# Patient Record
Sex: FEMALE | Race: WHITE | NOT HISPANIC OR LATINO | Employment: OTHER | URBAN - METROPOLITAN AREA
[De-identification: names, ages, dates, MRNs, and addresses within clinical notes are randomized per-mention and may not be internally consistent; named-entity substitution may affect disease eponyms.]

---

## 2023-09-04 ENCOUNTER — APPOINTMENT (OUTPATIENT)
Dept: CT IMAGING | Facility: HOSPITAL | Age: 68
DRG: 853 | End: 2023-09-04
Payer: MEDICARE

## 2023-09-04 ENCOUNTER — HOSPITAL ENCOUNTER (INPATIENT)
Facility: HOSPITAL | Age: 68
LOS: 5 days | Discharge: HOME | DRG: 853 | End: 2023-09-10
Attending: EMERGENCY MEDICINE | Admitting: INTERNAL MEDICINE
Payer: MEDICARE

## 2023-09-04 ENCOUNTER — APPOINTMENT (OUTPATIENT)
Dept: GENERAL RADIOLOGY | Facility: HOSPITAL | Age: 68
DRG: 853 | End: 2023-09-04
Payer: MEDICARE

## 2023-09-04 DIAGNOSIS — N39.0 URINARY TRACT INFECTION WITHOUT HEMATURIA, SITE UNSPECIFIED: ICD-10-CM

## 2023-09-04 DIAGNOSIS — N20.1 LEFT URETERAL STONE: ICD-10-CM

## 2023-09-04 DIAGNOSIS — R41.82 ALTERED MENTAL STATUS, UNSPECIFIED ALTERED MENTAL STATUS TYPE: Primary | ICD-10-CM

## 2023-09-04 PROBLEM — Z79.899 POLYPHARMACY: Status: ACTIVE | Noted: 2023-09-04

## 2023-09-04 PROBLEM — R82.5 POSITIVE URINE DRUG SCREEN: Status: ACTIVE | Noted: 2023-09-04

## 2023-09-04 PROBLEM — N17.9 AKI (ACUTE KIDNEY INJURY): Status: ACTIVE | Noted: 2023-09-04

## 2023-09-04 LAB
ALBUMIN SERPL-MCNC: 3.7 G/DL (ref 3.5–5.2)
ALBUMIN/GLOB SERPL: 1.9 G/DL
ALP SERPL-CCNC: 78 U/L (ref 39–117)
ALT SERPL W P-5'-P-CCNC: 14 U/L (ref 1–33)
AMMONIA BLD-SCNC: 16 UMOL/L (ref 11–51)
AMPHET+METHAMPHET UR QL: POSITIVE
ANION GAP SERPL CALCULATED.3IONS-SCNC: 13.9 MMOL/L (ref 5–15)
AST SERPL-CCNC: 18 U/L (ref 1–32)
BACTERIA UR QL AUTO: ABNORMAL /HPF
BARBITURATES UR QL SCN: NEGATIVE
BENZODIAZ UR QL SCN: POSITIVE
BILIRUB SERPL-MCNC: 0.9 MG/DL (ref 0–1.2)
BILIRUB UR QL STRIP: NEGATIVE
BUN SERPL-MCNC: 24 MG/DL (ref 8–23)
BUN/CREAT SERPL: 17.4 (ref 7–25)
CALCIUM SPEC-SCNC: 9 MG/DL (ref 8.6–10.5)
CANNABINOIDS SERPL QL: NEGATIVE
CHLORIDE SERPL-SCNC: 107 MMOL/L (ref 98–107)
CK SERPL-CCNC: 44 U/L (ref 20–180)
CLARITY UR: ABNORMAL
CO2 SERPL-SCNC: 20.1 MMOL/L (ref 22–29)
COCAINE UR QL: NEGATIVE
COLOR UR: ABNORMAL
CREAT SERPL-MCNC: 1.38 MG/DL (ref 0.57–1)
D-LACTATE SERPL-SCNC: 2 MMOL/L (ref 0.5–2)
D-LACTATE SERPL-SCNC: 4.5 MMOL/L (ref 0.5–2)
D-LACTATE SERPL-SCNC: 4.6 MMOL/L (ref 0.5–2)
DEPRECATED RDW RBC AUTO: 45.7 FL (ref 37–54)
EGFRCR SERPLBLD CKD-EPI 2021: 41.5 ML/MIN/1.73
EOSINOPHIL # BLD MANUAL: 0.23 10*3/MM3 (ref 0–0.4)
EOSINOPHIL NFR BLD MANUAL: 4.1 % (ref 0.3–6.2)
ERYTHROCYTE [DISTWIDTH] IN BLOOD BY AUTOMATED COUNT: 14 % (ref 12.3–15.4)
ETHANOL BLD-MCNC: <10 MG/DL (ref 0–10)
ETHANOL UR QL: <0.01 %
FENTANYL UR-MCNC: NEGATIVE NG/ML
GEN 5 2HR TROPONIN T REFLEX: 11 NG/L
GLOBULIN UR ELPH-MCNC: 2 GM/DL
GLUCOSE BLDC GLUCOMTR-MCNC: 109 MG/DL (ref 70–130)
GLUCOSE SERPL-MCNC: 129 MG/DL (ref 65–99)
GLUCOSE UR STRIP-MCNC: NEGATIVE MG/DL
HCT VFR BLD AUTO: 38.4 % (ref 34–46.6)
HGB BLD-MCNC: 12.9 G/DL (ref 12–15.9)
HGB UR QL STRIP.AUTO: ABNORMAL
HYALINE CASTS UR QL AUTO: ABNORMAL /LPF
KETONES UR QL STRIP: ABNORMAL
LEUKOCYTE ESTERASE UR QL STRIP.AUTO: ABNORMAL
LYMPHOCYTES # BLD MANUAL: 0.35 10*3/MM3 (ref 0.7–3.1)
MCH RBC QN AUTO: 30.2 PG (ref 26.6–33)
MCHC RBC AUTO-ENTMCNC: 33.6 G/DL (ref 31.5–35.7)
MCV RBC AUTO: 89.9 FL (ref 79–97)
METHADONE UR QL SCN: NEGATIVE
NEUTROPHILS # BLD AUTO: 5.11 10*3/MM3 (ref 1.7–7)
NEUTROPHILS NFR BLD MANUAL: 89.8 % (ref 42.7–76)
NITRITE UR QL STRIP: POSITIVE
OPIATES UR QL: NEGATIVE
OVALOCYTES BLD QL SMEAR: ABNORMAL
OXYCODONE UR QL SCN: NEGATIVE
PH UR STRIP.AUTO: 5.5 [PH] (ref 5–8)
PLAT MORPH BLD: NORMAL
PLATELET # BLD AUTO: 147 10*3/MM3 (ref 140–450)
PMV BLD AUTO: 10.6 FL (ref 6–12)
POIKILOCYTOSIS BLD QL SMEAR: ABNORMAL
POTASSIUM SERPL-SCNC: 3.9 MMOL/L (ref 3.5–5.2)
PROT SERPL-MCNC: 5.7 G/DL (ref 6–8.5)
PROT UR QL STRIP: ABNORMAL
QTC INTERVAL: NORMAL MS
RBC # BLD AUTO: 4.27 10*6/MM3 (ref 3.77–5.28)
RBC # UR STRIP: ABNORMAL /HPF
REF LAB TEST METHOD: ABNORMAL
SODIUM SERPL-SCNC: 141 MMOL/L (ref 136–145)
SP GR UR STRIP: 1.02 (ref 1–1.03)
SQUAMOUS #/AREA URNS HPF: ABNORMAL /HPF
TROPONIN T DELTA: -2 NG/L
TROPONIN T SERPL HS-MCNC: 13 NG/L
TSH SERPL DL<=0.05 MIU/L-ACNC: 0.4 UIU/ML (ref 0.27–4.2)
UROBILINOGEN UR QL STRIP: ABNORMAL
VARIANT LYMPHS NFR BLD MANUAL: 6.1 % (ref 19.6–45.3)
WBC # UR STRIP: ABNORMAL /HPF
WBC MORPH BLD: NORMAL
WBC NRBC COR # BLD: 5.69 10*3/MM3 (ref 3.4–10.8)

## 2023-09-04 PROCEDURE — 80307 DRUG TEST PRSMV CHEM ANLYZR: CPT | Performed by: EMERGENCY MEDICINE

## 2023-09-04 PROCEDURE — 99285 EMERGENCY DEPT VISIT HI MDM: CPT

## 2023-09-04 PROCEDURE — 87150 DNA/RNA AMPLIFIED PROBE: CPT | Performed by: EMERGENCY MEDICINE

## 2023-09-04 PROCEDURE — 82140 ASSAY OF AMMONIA: CPT | Performed by: EMERGENCY MEDICINE

## 2023-09-04 PROCEDURE — 71045 X-RAY EXAM CHEST 1 VIEW: CPT

## 2023-09-04 PROCEDURE — 84484 ASSAY OF TROPONIN QUANT: CPT | Performed by: EMERGENCY MEDICINE

## 2023-09-04 PROCEDURE — 82948 REAGENT STRIP/BLOOD GLUCOSE: CPT

## 2023-09-04 PROCEDURE — 82550 ASSAY OF CK (CPK): CPT | Performed by: EMERGENCY MEDICINE

## 2023-09-04 PROCEDURE — G0378 HOSPITAL OBSERVATION PER HR: HCPCS

## 2023-09-04 PROCEDURE — 87077 CULTURE AEROBIC IDENTIFY: CPT | Performed by: EMERGENCY MEDICINE

## 2023-09-04 PROCEDURE — 83605 ASSAY OF LACTIC ACID: CPT | Performed by: EMERGENCY MEDICINE

## 2023-09-04 PROCEDURE — 87040 BLOOD CULTURE FOR BACTERIA: CPT | Performed by: EMERGENCY MEDICINE

## 2023-09-04 PROCEDURE — 85007 BL SMEAR W/DIFF WBC COUNT: CPT | Performed by: EMERGENCY MEDICINE

## 2023-09-04 PROCEDURE — 85025 COMPLETE CBC W/AUTO DIFF WBC: CPT | Performed by: EMERGENCY MEDICINE

## 2023-09-04 PROCEDURE — 70450 CT HEAD/BRAIN W/O DYE: CPT

## 2023-09-04 PROCEDURE — 25010000002 CEFTRIAXONE PER 250 MG: Performed by: EMERGENCY MEDICINE

## 2023-09-04 PROCEDURE — 87086 URINE CULTURE/COLONY COUNT: CPT | Performed by: EMERGENCY MEDICINE

## 2023-09-04 PROCEDURE — P9612 CATHETERIZE FOR URINE SPEC: HCPCS

## 2023-09-04 PROCEDURE — 36415 COLL VENOUS BLD VENIPUNCTURE: CPT

## 2023-09-04 PROCEDURE — 84443 ASSAY THYROID STIM HORMONE: CPT | Performed by: EMERGENCY MEDICINE

## 2023-09-04 PROCEDURE — 80053 COMPREHEN METABOLIC PANEL: CPT | Performed by: EMERGENCY MEDICINE

## 2023-09-04 PROCEDURE — 93010 ELECTROCARDIOGRAM REPORT: CPT | Performed by: INTERNAL MEDICINE

## 2023-09-04 PROCEDURE — 81001 URINALYSIS AUTO W/SCOPE: CPT | Performed by: EMERGENCY MEDICINE

## 2023-09-04 PROCEDURE — 36415 COLL VENOUS BLD VENIPUNCTURE: CPT | Performed by: EMERGENCY MEDICINE

## 2023-09-04 PROCEDURE — 82077 ASSAY SPEC XCP UR&BREATH IA: CPT | Performed by: EMERGENCY MEDICINE

## 2023-09-04 PROCEDURE — 93005 ELECTROCARDIOGRAM TRACING: CPT | Performed by: EMERGENCY MEDICINE

## 2023-09-04 PROCEDURE — 87186 SC STD MICRODIL/AGAR DIL: CPT | Performed by: EMERGENCY MEDICINE

## 2023-09-04 RX ORDER — SODIUM CHLORIDE 0.9 % (FLUSH) 0.9 %
10 SYRINGE (ML) INJECTION AS NEEDED
Status: DISCONTINUED | OUTPATIENT
Start: 2023-09-04 | End: 2023-09-10 | Stop reason: HOSPADM

## 2023-09-04 RX ORDER — ONDANSETRON 2 MG/ML
4 INJECTION INTRAMUSCULAR; INTRAVENOUS EVERY 6 HOURS PRN
Status: DISCONTINUED | OUTPATIENT
Start: 2023-09-04 | End: 2023-09-10 | Stop reason: HOSPADM

## 2023-09-04 RX ORDER — ALPRAZOLAM 2 MG/1
2 TABLET ORAL NIGHTLY PRN
COMMUNITY

## 2023-09-04 RX ORDER — SEMAGLUTIDE 1.34 MG/ML
1 INJECTION, SOLUTION SUBCUTANEOUS WEEKLY
COMMUNITY

## 2023-09-04 RX ORDER — BISACODYL 10 MG
10 SUPPOSITORY, RECTAL RECTAL DAILY PRN
Status: DISCONTINUED | OUTPATIENT
Start: 2023-09-04 | End: 2023-09-10 | Stop reason: HOSPADM

## 2023-09-04 RX ORDER — POLYETHYLENE GLYCOL 3350 17 G/17G
17 POWDER, FOR SOLUTION ORAL DAILY PRN
Status: DISCONTINUED | OUTPATIENT
Start: 2023-09-04 | End: 2023-09-10 | Stop reason: HOSPADM

## 2023-09-04 RX ORDER — AMOXICILLIN 250 MG
2 CAPSULE ORAL 2 TIMES DAILY
Status: DISCONTINUED | OUTPATIENT
Start: 2023-09-04 | End: 2023-09-10 | Stop reason: HOSPADM

## 2023-09-04 RX ORDER — ACETAMINOPHEN 325 MG/1
650 TABLET ORAL EVERY 4 HOURS PRN
Status: DISCONTINUED | OUTPATIENT
Start: 2023-09-04 | End: 2023-09-10 | Stop reason: HOSPADM

## 2023-09-04 RX ORDER — CLINDAMYCIN PALMITATE HYDROCHLORIDE 75 MG/5ML
SOLUTION ORAL 3 TIMES DAILY
COMMUNITY
End: 2023-09-10 | Stop reason: HOSPADM

## 2023-09-04 RX ORDER — NITROGLYCERIN 0.4 MG/1
0.4 TABLET SUBLINGUAL
Status: DISCONTINUED | OUTPATIENT
Start: 2023-09-04 | End: 2023-09-10 | Stop reason: HOSPADM

## 2023-09-04 RX ORDER — ACETAMINOPHEN 650 MG/1
650 SUPPOSITORY RECTAL EVERY 4 HOURS PRN
Status: DISCONTINUED | OUTPATIENT
Start: 2023-09-04 | End: 2023-09-10 | Stop reason: HOSPADM

## 2023-09-04 RX ORDER — BISACODYL 5 MG/1
5 TABLET, DELAYED RELEASE ORAL DAILY PRN
Status: DISCONTINUED | OUTPATIENT
Start: 2023-09-04 | End: 2023-09-10 | Stop reason: HOSPADM

## 2023-09-04 RX ORDER — TRAZODONE HYDROCHLORIDE 50 MG/1
50 TABLET ORAL NIGHTLY
COMMUNITY

## 2023-09-04 RX ORDER — SODIUM CHLORIDE 9 MG/ML
40 INJECTION, SOLUTION INTRAVENOUS AS NEEDED
Status: DISCONTINUED | OUTPATIENT
Start: 2023-09-04 | End: 2023-09-10 | Stop reason: HOSPADM

## 2023-09-04 RX ORDER — SODIUM CHLORIDE 9 MG/ML
75 INJECTION, SOLUTION INTRAVENOUS CONTINUOUS
Status: DISCONTINUED | OUTPATIENT
Start: 2023-09-04 | End: 2023-09-08

## 2023-09-04 RX ORDER — ACETAMINOPHEN 160 MG/5ML
650 SOLUTION ORAL EVERY 4 HOURS PRN
Status: DISCONTINUED | OUTPATIENT
Start: 2023-09-04 | End: 2023-09-10 | Stop reason: HOSPADM

## 2023-09-04 RX ORDER — SUVOREXANT 20 MG/1
TABLET, FILM COATED ORAL
Status: ON HOLD | COMMUNITY
End: 2023-09-08

## 2023-09-04 RX ORDER — SODIUM CHLORIDE 0.9 % (FLUSH) 0.9 %
10 SYRINGE (ML) INJECTION EVERY 12 HOURS SCHEDULED
Status: DISCONTINUED | OUTPATIENT
Start: 2023-09-04 | End: 2023-09-10 | Stop reason: HOSPADM

## 2023-09-04 RX ORDER — ONDANSETRON 4 MG/1
4 TABLET, FILM COATED ORAL EVERY 8 HOURS PRN
COMMUNITY

## 2023-09-04 RX ADMIN — Medication 10 ML: at 20:09

## 2023-09-04 RX ADMIN — CEFTRIAXONE SODIUM 1000 MG: 1 INJECTION, POWDER, FOR SOLUTION INTRAMUSCULAR; INTRAVENOUS at 17:44

## 2023-09-04 RX ADMIN — ACETAMINOPHEN 650 MG: 325 TABLET, FILM COATED ORAL at 21:28

## 2023-09-04 RX ADMIN — SODIUM CHLORIDE 100 ML/HR: 9 INJECTION, SOLUTION INTRAVENOUS at 20:09

## 2023-09-04 RX ADMIN — SODIUM CHLORIDE 500 ML: 9 INJECTION, SOLUTION INTRAVENOUS at 14:54

## 2023-09-05 ENCOUNTER — ANESTHESIA (OUTPATIENT)
Dept: PERIOP | Facility: HOSPITAL | Age: 68
DRG: 853 | End: 2023-09-05
Payer: MEDICARE

## 2023-09-05 ENCOUNTER — APPOINTMENT (OUTPATIENT)
Dept: CT IMAGING | Facility: HOSPITAL | Age: 68
DRG: 853 | End: 2023-09-05
Payer: MEDICARE

## 2023-09-05 ENCOUNTER — ANESTHESIA EVENT (OUTPATIENT)
Dept: PERIOP | Facility: HOSPITAL | Age: 68
DRG: 853 | End: 2023-09-05
Payer: MEDICARE

## 2023-09-05 ENCOUNTER — APPOINTMENT (OUTPATIENT)
Dept: GENERAL RADIOLOGY | Facility: HOSPITAL | Age: 68
DRG: 853 | End: 2023-09-05
Payer: MEDICARE

## 2023-09-05 PROBLEM — B96.20 BACTEREMIA DUE TO ESCHERICHIA COLI: Status: ACTIVE | Noted: 2023-09-05

## 2023-09-05 PROBLEM — R78.81 BACTEREMIA DUE TO ESCHERICHIA COLI: Status: ACTIVE | Noted: 2023-09-05

## 2023-09-05 LAB
ADV 40+41 DNA STL QL NAA+NON-PROBE: NOT DETECTED
ALBUMIN SERPL-MCNC: 2.8 G/DL (ref 3.5–5.2)
ALBUMIN/GLOB SERPL: 1.3 G/DL
ALP SERPL-CCNC: 49 U/L (ref 39–117)
ALT SERPL W P-5'-P-CCNC: 14 U/L (ref 1–33)
ANION GAP SERPL CALCULATED.3IONS-SCNC: 11.4 MMOL/L (ref 5–15)
AST SERPL-CCNC: 18 U/L (ref 1–32)
ASTRO TYP 1-8 RNA STL QL NAA+NON-PROBE: NOT DETECTED
BACTERIA BLD CULT: ABNORMAL
BILIRUB SERPL-MCNC: 0.4 MG/DL (ref 0–1.2)
BOTTLE TYPE: ABNORMAL
BUN SERPL-MCNC: 24 MG/DL (ref 8–23)
BUN/CREAT SERPL: 20.2 (ref 7–25)
BURR CELLS BLD QL SMEAR: ABNORMAL
C CAYETANENSIS DNA STL QL NAA+NON-PROBE: NOT DETECTED
C COLI+JEJ+UPSA DNA STL QL NAA+NON-PROBE: NOT DETECTED
C DIFF TOX GENS STL QL NAA+PROBE: NEGATIVE
CALCIUM SPEC-SCNC: 7.7 MG/DL (ref 8.6–10.5)
CHLORIDE SERPL-SCNC: 109 MMOL/L (ref 98–107)
CO2 SERPL-SCNC: 19.6 MMOL/L (ref 22–29)
CREAT SERPL-MCNC: 1.19 MG/DL (ref 0.57–1)
CRYPTOSP DNA STL QL NAA+NON-PROBE: NOT DETECTED
D-LACTATE SERPL-SCNC: 2.4 MMOL/L (ref 0.5–2)
DEPRECATED RDW RBC AUTO: 45.6 FL (ref 37–54)
E HISTOLYT DNA STL QL NAA+NON-PROBE: NOT DETECTED
EAEC PAA PLAS AGGR+AATA ST NAA+NON-PRB: NOT DETECTED
EC STX1+STX2 GENES STL QL NAA+NON-PROBE: NOT DETECTED
EGFRCR SERPLBLD CKD-EPI 2021: 49.6 ML/MIN/1.73
ELLIPTOCYTES BLD QL SMEAR: ABNORMAL
EPEC EAE GENE STL QL NAA+NON-PROBE: NOT DETECTED
ERYTHROCYTE [DISTWIDTH] IN BLOOD BY AUTOMATED COUNT: 14.3 % (ref 12.3–15.4)
ETEC LTA+ST1A+ST1B TOX ST NAA+NON-PROBE: NOT DETECTED
G LAMBLIA DNA STL QL NAA+NON-PROBE: NOT DETECTED
GLOBULIN UR ELPH-MCNC: 2.2 GM/DL
GLUCOSE BLDC GLUCOMTR-MCNC: 115 MG/DL (ref 70–130)
GLUCOSE BLDC GLUCOMTR-MCNC: 130 MG/DL (ref 70–130)
GLUCOSE BLDC GLUCOMTR-MCNC: 86 MG/DL (ref 70–130)
GLUCOSE BLDC GLUCOMTR-MCNC: 89 MG/DL (ref 70–130)
GLUCOSE BLDC GLUCOMTR-MCNC: 94 MG/DL (ref 70–130)
GLUCOSE SERPL-MCNC: 101 MG/DL (ref 65–99)
HBA1C MFR BLD: 5.5 % (ref 4.8–5.6)
HCT VFR BLD AUTO: 32.6 % (ref 34–46.6)
HGB BLD-MCNC: 11.1 G/DL (ref 12–15.9)
LYMPHOCYTES # BLD MANUAL: 0.24 10*3/MM3 (ref 0.7–3.1)
LYMPHOCYTES NFR BLD MANUAL: 1 % (ref 5–12)
MCH RBC QN AUTO: 30 PG (ref 26.6–33)
MCHC RBC AUTO-ENTMCNC: 34 G/DL (ref 31.5–35.7)
MCV RBC AUTO: 88.1 FL (ref 79–97)
MONOCYTES # BLD: 0.12 10*3/MM3 (ref 0.1–0.9)
NEUTROPHILS # BLD AUTO: 11.55 10*3/MM3 (ref 1.7–7)
NEUTROPHILS NFR BLD MANUAL: 97 % (ref 42.7–76)
NOROVIRUS GI+II RNA STL QL NAA+NON-PROBE: NOT DETECTED
OVALOCYTES BLD QL SMEAR: ABNORMAL
P SHIGELLOIDES DNA STL QL NAA+NON-PROBE: NOT DETECTED
PLAT MORPH BLD: NORMAL
PLATELET # BLD AUTO: 131 10*3/MM3 (ref 140–450)
PMV BLD AUTO: 10.4 FL (ref 6–12)
POIKILOCYTOSIS BLD QL SMEAR: ABNORMAL
POTASSIUM SERPL-SCNC: 3.5 MMOL/L (ref 3.5–5.2)
PROT SERPL-MCNC: 5 G/DL (ref 6–8.5)
RBC # BLD AUTO: 3.7 10*6/MM3 (ref 3.77–5.28)
RVA RNA STL QL NAA+NON-PROBE: NOT DETECTED
S ENT+BONG DNA STL QL NAA+NON-PROBE: NOT DETECTED
SAPO I+II+IV+V RNA STL QL NAA+NON-PROBE: NOT DETECTED
SHIGELLA SP+EIEC IPAH ST NAA+NON-PROBE: NOT DETECTED
SODIUM SERPL-SCNC: 140 MMOL/L (ref 136–145)
V CHOL+PARA+VUL DNA STL QL NAA+NON-PROBE: NOT DETECTED
V CHOLERAE DNA STL QL NAA+NON-PROBE: NOT DETECTED
VARIANT LYMPHS NFR BLD MANUAL: 2 % (ref 19.6–45.3)
WBC MORPH BLD: NORMAL
WBC NRBC COR # BLD: 11.91 10*3/MM3 (ref 3.4–10.8)
Y ENTEROCOL DNA STL QL NAA+NON-PROBE: NOT DETECTED

## 2023-09-05 PROCEDURE — 25010000002 MIDAZOLAM PER 1 MG: Performed by: ANESTHESIOLOGY

## 2023-09-05 PROCEDURE — 85025 COMPLETE CBC W/AUTO DIFF WBC: CPT | Performed by: INTERNAL MEDICINE

## 2023-09-05 PROCEDURE — 25010000002 SUGAMMADEX 200 MG/2ML SOLUTION: Performed by: ANESTHESIOLOGY

## 2023-09-05 PROCEDURE — 25010000002 PROPOFOL 10 MG/ML EMULSION: Performed by: ANESTHESIOLOGY

## 2023-09-05 PROCEDURE — 87507 IADNA-DNA/RNA PROBE TQ 12-25: CPT | Performed by: INTERNAL MEDICINE

## 2023-09-05 PROCEDURE — 0T778DZ DILATION OF LEFT URETER WITH INTRALUMINAL DEVICE, VIA NATURAL OR ARTIFICIAL OPENING ENDOSCOPIC: ICD-10-PCS | Performed by: UROLOGY

## 2023-09-05 PROCEDURE — 83605 ASSAY OF LACTIC ACID: CPT | Performed by: INTERNAL MEDICINE

## 2023-09-05 PROCEDURE — C2617 STENT, NON-COR, TEM W/O DEL: HCPCS | Performed by: UROLOGY

## 2023-09-05 PROCEDURE — 74176 CT ABD & PELVIS W/O CONTRAST: CPT

## 2023-09-05 PROCEDURE — 83036 HEMOGLOBIN GLYCOSYLATED A1C: CPT | Performed by: INTERNAL MEDICINE

## 2023-09-05 PROCEDURE — 25010000002 FENTANYL CITRATE (PF) 50 MCG/ML SOLUTION: Performed by: ANESTHESIOLOGY

## 2023-09-05 PROCEDURE — 25010000002 CEFTRIAXONE PER 250 MG: Performed by: INTERNAL MEDICINE

## 2023-09-05 PROCEDURE — 76000 FLUOROSCOPY <1 HR PHYS/QHP: CPT

## 2023-09-05 PROCEDURE — 80053 COMPREHEN METABOLIC PANEL: CPT | Performed by: INTERNAL MEDICINE

## 2023-09-05 PROCEDURE — 87493 C DIFF AMPLIFIED PROBE: CPT | Performed by: INTERNAL MEDICINE

## 2023-09-05 PROCEDURE — 82948 REAGENT STRIP/BLOOD GLUCOSE: CPT

## 2023-09-05 PROCEDURE — 25010000002 ONDANSETRON PER 1 MG: Performed by: ANESTHESIOLOGY

## 2023-09-05 PROCEDURE — C1769 GUIDE WIRE: HCPCS | Performed by: UROLOGY

## 2023-09-05 PROCEDURE — 85007 BL SMEAR W/DIFF WBC COUNT: CPT | Performed by: INTERNAL MEDICINE

## 2023-09-05 PROCEDURE — 74018 RADEX ABDOMEN 1 VIEW: CPT

## 2023-09-05 DEVICE — URETERAL STENT
Type: IMPLANTABLE DEVICE | Site: URETER | Status: FUNCTIONAL
Brand: POLARIS™ ULTRA

## 2023-09-05 RX ORDER — HYDROCODONE BITARTRATE AND ACETAMINOPHEN 5; 325 MG/1; MG/1
1 TABLET ORAL EVERY 6 HOURS PRN
Status: DISCONTINUED | OUTPATIENT
Start: 2023-09-05 | End: 2023-09-10 | Stop reason: HOSPADM

## 2023-09-05 RX ORDER — HYDRALAZINE HYDROCHLORIDE 20 MG/ML
5 INJECTION INTRAMUSCULAR; INTRAVENOUS
Status: DISCONTINUED | OUTPATIENT
Start: 2023-09-05 | End: 2023-09-05 | Stop reason: HOSPADM

## 2023-09-05 RX ORDER — DROPERIDOL 2.5 MG/ML
0.62 INJECTION, SOLUTION INTRAMUSCULAR; INTRAVENOUS
Status: DISCONTINUED | OUTPATIENT
Start: 2023-09-05 | End: 2023-09-05 | Stop reason: HOSPADM

## 2023-09-05 RX ORDER — FENTANYL CITRATE 50 UG/ML
50 INJECTION, SOLUTION INTRAMUSCULAR; INTRAVENOUS
Status: DISCONTINUED | OUTPATIENT
Start: 2023-09-05 | End: 2023-09-05 | Stop reason: HOSPADM

## 2023-09-05 RX ORDER — NALOXONE HCL 0.4 MG/ML
0.2 VIAL (ML) INJECTION AS NEEDED
Status: DISCONTINUED | OUTPATIENT
Start: 2023-09-05 | End: 2023-09-05 | Stop reason: HOSPADM

## 2023-09-05 RX ORDER — FLUMAZENIL 0.1 MG/ML
0.2 INJECTION INTRAVENOUS AS NEEDED
Status: DISCONTINUED | OUTPATIENT
Start: 2023-09-05 | End: 2023-09-05 | Stop reason: HOSPADM

## 2023-09-05 RX ORDER — SODIUM CHLORIDE, SODIUM LACTATE, POTASSIUM CHLORIDE, CALCIUM CHLORIDE 600; 310; 30; 20 MG/100ML; MG/100ML; MG/100ML; MG/100ML
9 INJECTION, SOLUTION INTRAVENOUS CONTINUOUS
Status: DISCONTINUED | OUTPATIENT
Start: 2023-09-05 | End: 2023-09-08

## 2023-09-05 RX ORDER — OXYCODONE AND ACETAMINOPHEN 7.5; 325 MG/1; MG/1
1 TABLET ORAL EVERY 4 HOURS PRN
Status: DISCONTINUED | OUTPATIENT
Start: 2023-09-05 | End: 2023-09-05 | Stop reason: HOSPADM

## 2023-09-05 RX ORDER — IPRATROPIUM BROMIDE AND ALBUTEROL SULFATE 2.5; .5 MG/3ML; MG/3ML
3 SOLUTION RESPIRATORY (INHALATION) ONCE AS NEEDED
Status: DISCONTINUED | OUTPATIENT
Start: 2023-09-05 | End: 2023-09-05 | Stop reason: HOSPADM

## 2023-09-05 RX ORDER — MIDAZOLAM HYDROCHLORIDE 1 MG/ML
0.5 INJECTION INTRAMUSCULAR; INTRAVENOUS
Status: DISCONTINUED | OUTPATIENT
Start: 2023-09-05 | End: 2023-09-05 | Stop reason: HOSPADM

## 2023-09-05 RX ORDER — ROCURONIUM BROMIDE 10 MG/ML
INJECTION, SOLUTION INTRAVENOUS AS NEEDED
Status: DISCONTINUED | OUTPATIENT
Start: 2023-09-05 | End: 2023-09-05 | Stop reason: SURG

## 2023-09-05 RX ORDER — FENTANYL CITRATE 50 UG/ML
50 INJECTION, SOLUTION INTRAMUSCULAR; INTRAVENOUS ONCE AS NEEDED
Status: COMPLETED | OUTPATIENT
Start: 2023-09-05 | End: 2023-09-05

## 2023-09-05 RX ORDER — LABETALOL HYDROCHLORIDE 5 MG/ML
5 INJECTION, SOLUTION INTRAVENOUS
Status: DISCONTINUED | OUTPATIENT
Start: 2023-09-05 | End: 2023-09-05 | Stop reason: HOSPADM

## 2023-09-05 RX ORDER — HYDROMORPHONE HYDROCHLORIDE 1 MG/ML
0.5 INJECTION, SOLUTION INTRAMUSCULAR; INTRAVENOUS; SUBCUTANEOUS
Status: DISCONTINUED | OUTPATIENT
Start: 2023-09-05 | End: 2023-09-05 | Stop reason: HOSPADM

## 2023-09-05 RX ORDER — SODIUM CHLORIDE 0.9 % (FLUSH) 0.9 %
3-10 SYRINGE (ML) INJECTION AS NEEDED
Status: DISCONTINUED | OUTPATIENT
Start: 2023-09-05 | End: 2023-09-05 | Stop reason: HOSPADM

## 2023-09-05 RX ORDER — LIDOCAINE HYDROCHLORIDE 20 MG/ML
INJECTION, SOLUTION INFILTRATION; PERINEURAL AS NEEDED
Status: DISCONTINUED | OUTPATIENT
Start: 2023-09-05 | End: 2023-09-05 | Stop reason: SURG

## 2023-09-05 RX ORDER — LIDOCAINE HYDROCHLORIDE 10 MG/ML
0.5 INJECTION, SOLUTION INFILTRATION; PERINEURAL ONCE AS NEEDED
Status: DISCONTINUED | OUTPATIENT
Start: 2023-09-05 | End: 2023-09-05 | Stop reason: HOSPADM

## 2023-09-05 RX ORDER — ONDANSETRON 2 MG/ML
INJECTION INTRAMUSCULAR; INTRAVENOUS AS NEEDED
Status: DISCONTINUED | OUTPATIENT
Start: 2023-09-05 | End: 2023-09-05 | Stop reason: SURG

## 2023-09-05 RX ORDER — SODIUM CHLORIDE 0.9 % (FLUSH) 0.9 %
3 SYRINGE (ML) INJECTION EVERY 12 HOURS SCHEDULED
Status: DISCONTINUED | OUTPATIENT
Start: 2023-09-05 | End: 2023-09-05 | Stop reason: HOSPADM

## 2023-09-05 RX ORDER — LOPERAMIDE HYDROCHLORIDE 2 MG/1
2 CAPSULE ORAL 4 TIMES DAILY PRN
Status: DISCONTINUED | OUTPATIENT
Start: 2023-09-05 | End: 2023-09-10 | Stop reason: HOSPADM

## 2023-09-05 RX ORDER — EPHEDRINE SULFATE 50 MG/ML
5 INJECTION, SOLUTION INTRAVENOUS ONCE AS NEEDED
Status: DISCONTINUED | OUTPATIENT
Start: 2023-09-05 | End: 2023-09-05 | Stop reason: HOSPADM

## 2023-09-05 RX ORDER — NICOTINE POLACRILEX 4 MG
15 LOZENGE BUCCAL
Status: DISCONTINUED | OUTPATIENT
Start: 2023-09-05 | End: 2023-09-09

## 2023-09-05 RX ORDER — PROPOFOL 10 MG/ML
VIAL (ML) INTRAVENOUS AS NEEDED
Status: DISCONTINUED | OUTPATIENT
Start: 2023-09-05 | End: 2023-09-05 | Stop reason: SURG

## 2023-09-05 RX ORDER — DEXTROSE MONOHYDRATE 25 G/50ML
25 INJECTION, SOLUTION INTRAVENOUS
Status: DISCONTINUED | OUTPATIENT
Start: 2023-09-05 | End: 2023-09-09

## 2023-09-05 RX ORDER — DIPHENHYDRAMINE HYDROCHLORIDE 50 MG/ML
12.5 INJECTION INTRAMUSCULAR; INTRAVENOUS
Status: DISCONTINUED | OUTPATIENT
Start: 2023-09-05 | End: 2023-09-05 | Stop reason: HOSPADM

## 2023-09-05 RX ORDER — INSULIN LISPRO 100 [IU]/ML
2-7 INJECTION, SOLUTION INTRAVENOUS; SUBCUTANEOUS
Status: DISCONTINUED | OUTPATIENT
Start: 2023-09-05 | End: 2023-09-09

## 2023-09-05 RX ORDER — FAMOTIDINE 10 MG/ML
20 INJECTION, SOLUTION INTRAVENOUS ONCE
Status: COMPLETED | OUTPATIENT
Start: 2023-09-05 | End: 2023-09-05

## 2023-09-05 RX ORDER — IBUPROFEN 600 MG/1
1 TABLET ORAL
Status: DISCONTINUED | OUTPATIENT
Start: 2023-09-05 | End: 2023-09-09

## 2023-09-05 RX ORDER — HYDROCODONE BITARTRATE AND ACETAMINOPHEN 7.5; 325 MG/1; MG/1
1 TABLET ORAL ONCE AS NEEDED
Status: COMPLETED | OUTPATIENT
Start: 2023-09-05 | End: 2023-09-05

## 2023-09-05 RX ORDER — ONDANSETRON 2 MG/ML
4 INJECTION INTRAMUSCULAR; INTRAVENOUS ONCE AS NEEDED
Status: DISCONTINUED | OUTPATIENT
Start: 2023-09-05 | End: 2023-09-05 | Stop reason: HOSPADM

## 2023-09-05 RX ADMIN — SUGAMMADEX 200 MG: 100 INJECTION, SOLUTION INTRAVENOUS at 17:15

## 2023-09-05 RX ADMIN — HYDROCODONE BITARTRATE AND ACETAMINOPHEN 1 TABLET: 5; 325 TABLET ORAL at 13:22

## 2023-09-05 RX ADMIN — SODIUM CHLORIDE 125 ML/HR: 9 INJECTION, SOLUTION INTRAVENOUS at 18:36

## 2023-09-05 RX ADMIN — SODIUM CHLORIDE 500 ML: 9 INJECTION, SOLUTION INTRAVENOUS at 12:18

## 2023-09-05 RX ADMIN — ACETAMINOPHEN 650 MG: 325 TABLET, FILM COATED ORAL at 05:10

## 2023-09-05 RX ADMIN — PROPOFOL 150 MG: 10 INJECTION, EMULSION INTRAVENOUS at 17:03

## 2023-09-05 RX ADMIN — ROCURONIUM BROMIDE 50 MG: 10 INJECTION, SOLUTION INTRAVENOUS at 17:03

## 2023-09-05 RX ADMIN — ACETAMINOPHEN 650 MG: 325 TABLET, FILM COATED ORAL at 01:21

## 2023-09-05 RX ADMIN — CEFTRIAXONE 2000 MG: 2 INJECTION, POWDER, FOR SOLUTION INTRAMUSCULAR; INTRAVENOUS at 09:18

## 2023-09-05 RX ADMIN — SUGAMMADEX 200 MG: 100 INJECTION, SOLUTION INTRAVENOUS at 17:20

## 2023-09-05 RX ADMIN — ONDANSETRON 4 MG: 2 INJECTION INTRAMUSCULAR; INTRAVENOUS at 17:09

## 2023-09-05 RX ADMIN — FENTANYL CITRATE 50 MCG: 50 INJECTION, SOLUTION INTRAMUSCULAR; INTRAVENOUS at 16:44

## 2023-09-05 RX ADMIN — ACETAMINOPHEN 650 MG: 325 SUSPENSION ORAL at 09:17

## 2023-09-05 RX ADMIN — LIDOCAINE HYDROCHLORIDE 100 MG: 20 INJECTION, SOLUTION INFILTRATION; PERINEURAL at 17:03

## 2023-09-05 RX ADMIN — FAMOTIDINE 20 MG: 10 INJECTION INTRAVENOUS at 16:46

## 2023-09-05 RX ADMIN — HYDROCODONE BITARTRATE AND ACETAMINOPHEN 1 TABLET: 7.5; 325 TABLET ORAL at 18:02

## 2023-09-05 RX ADMIN — MIDAZOLAM 0.5 MG: 1 INJECTION INTRAMUSCULAR; INTRAVENOUS at 16:51

## 2023-09-05 RX ADMIN — HYDROCODONE BITARTRATE AND ACETAMINOPHEN 1 TABLET: 5; 325 TABLET ORAL at 22:20

## 2023-09-05 RX ADMIN — Medication 10 ML: at 08:00

## 2023-09-05 RX ADMIN — SODIUM CHLORIDE, POTASSIUM CHLORIDE, SODIUM LACTATE AND CALCIUM CHLORIDE 9 ML/HR: 600; 310; 30; 20 INJECTION, SOLUTION INTRAVENOUS at 16:51

## 2023-09-06 PROBLEM — N20.1 LEFT URETERAL STONE: Status: ACTIVE | Noted: 2023-09-06

## 2023-09-06 LAB
ANION GAP SERPL CALCULATED.3IONS-SCNC: 8.9 MMOL/L (ref 5–15)
BACTERIA SPEC AEROBE CULT: ABNORMAL
BUN SERPL-MCNC: 16 MG/DL (ref 8–23)
BUN/CREAT SERPL: 23.2 (ref 7–25)
CALCIUM SPEC-SCNC: 8.3 MG/DL (ref 8.6–10.5)
CHLORIDE SERPL-SCNC: 109 MMOL/L (ref 98–107)
CO2 SERPL-SCNC: 21.1 MMOL/L (ref 22–29)
CREAT SERPL-MCNC: 0.69 MG/DL (ref 0.57–1)
DEPRECATED RDW RBC AUTO: 46.4 FL (ref 37–54)
EGFRCR SERPLBLD CKD-EPI 2021: 94.1 ML/MIN/1.73
ERYTHROCYTE [DISTWIDTH] IN BLOOD BY AUTOMATED COUNT: 14.5 % (ref 12.3–15.4)
GLUCOSE BLDC GLUCOMTR-MCNC: 109 MG/DL (ref 70–130)
GLUCOSE BLDC GLUCOMTR-MCNC: 94 MG/DL (ref 70–130)
GLUCOSE BLDC GLUCOMTR-MCNC: 98 MG/DL (ref 70–130)
GLUCOSE BLDC GLUCOMTR-MCNC: 98 MG/DL (ref 70–130)
GLUCOSE SERPL-MCNC: 108 MG/DL (ref 65–99)
HCT VFR BLD AUTO: 31.9 % (ref 34–46.6)
HGB BLD-MCNC: 10.8 G/DL (ref 12–15.9)
MCH RBC QN AUTO: 29.9 PG (ref 26.6–33)
MCHC RBC AUTO-ENTMCNC: 33.9 G/DL (ref 31.5–35.7)
MCV RBC AUTO: 88.4 FL (ref 79–97)
PLATELET # BLD AUTO: 111 10*3/MM3 (ref 140–450)
PMV BLD AUTO: 11.1 FL (ref 6–12)
POTASSIUM SERPL-SCNC: 3.2 MMOL/L (ref 3.5–5.2)
POTASSIUM SERPL-SCNC: 4 MMOL/L (ref 3.5–5.2)
RBC # BLD AUTO: 3.61 10*6/MM3 (ref 3.77–5.28)
SODIUM SERPL-SCNC: 139 MMOL/L (ref 136–145)
WBC NRBC COR # BLD: 8.15 10*3/MM3 (ref 3.4–10.8)

## 2023-09-06 PROCEDURE — 97530 THERAPEUTIC ACTIVITIES: CPT

## 2023-09-06 PROCEDURE — 87040 BLOOD CULTURE FOR BACTERIA: CPT | Performed by: UROLOGY

## 2023-09-06 PROCEDURE — 97162 PT EVAL MOD COMPLEX 30 MIN: CPT

## 2023-09-06 PROCEDURE — 25010000002 HYDRALAZINE PER 20 MG: Performed by: INTERNAL MEDICINE

## 2023-09-06 PROCEDURE — 25010000002 ONDANSETRON PER 1 MG: Performed by: UROLOGY

## 2023-09-06 PROCEDURE — 80048 BASIC METABOLIC PNL TOTAL CA: CPT | Performed by: UROLOGY

## 2023-09-06 PROCEDURE — 84132 ASSAY OF SERUM POTASSIUM: CPT | Performed by: INTERNAL MEDICINE

## 2023-09-06 PROCEDURE — 85027 COMPLETE CBC AUTOMATED: CPT | Performed by: UROLOGY

## 2023-09-06 PROCEDURE — 82948 REAGENT STRIP/BLOOD GLUCOSE: CPT

## 2023-09-06 PROCEDURE — 25010000002 CEFTRIAXONE PER 250 MG: Performed by: UROLOGY

## 2023-09-06 RX ORDER — HYDRALAZINE HYDROCHLORIDE 20 MG/ML
10 INJECTION INTRAMUSCULAR; INTRAVENOUS EVERY 6 HOURS PRN
Status: DISCONTINUED | OUTPATIENT
Start: 2023-09-06 | End: 2023-09-10 | Stop reason: HOSPADM

## 2023-09-06 RX ORDER — KETOROLAC TROMETHAMINE 30 MG/ML
15 INJECTION, SOLUTION INTRAMUSCULAR; INTRAVENOUS EVERY 6 HOURS PRN
Status: DISCONTINUED | OUTPATIENT
Start: 2023-09-06 | End: 2023-09-10 | Stop reason: HOSPADM

## 2023-09-06 RX ORDER — UREA 10 %
3 LOTION (ML) TOPICAL NIGHTLY
Status: DISCONTINUED | OUTPATIENT
Start: 2023-09-06 | End: 2023-09-10 | Stop reason: HOSPADM

## 2023-09-06 RX ORDER — POTASSIUM CHLORIDE 750 MG/1
40 TABLET, FILM COATED, EXTENDED RELEASE ORAL EVERY 4 HOURS
Status: COMPLETED | OUTPATIENT
Start: 2023-09-06 | End: 2023-09-06

## 2023-09-06 RX ADMIN — HYDROCODONE BITARTRATE AND ACETAMINOPHEN 1 TABLET: 5; 325 TABLET ORAL at 04:02

## 2023-09-06 RX ADMIN — LOPERAMIDE HYDROCHLORIDE 2 MG: 2 CAPSULE ORAL at 20:22

## 2023-09-06 RX ADMIN — CEFTRIAXONE 2000 MG: 2 INJECTION, POWDER, FOR SOLUTION INTRAMUSCULAR; INTRAVENOUS at 09:33

## 2023-09-06 RX ADMIN — HYDROCODONE BITARTRATE AND ACETAMINOPHEN 1 TABLET: 5; 325 TABLET ORAL at 20:22

## 2023-09-06 RX ADMIN — POTASSIUM CHLORIDE 40 MEQ: 750 TABLET, EXTENDED RELEASE ORAL at 11:49

## 2023-09-06 RX ADMIN — SODIUM CHLORIDE 75 ML/HR: 9 INJECTION, SOLUTION INTRAVENOUS at 13:19

## 2023-09-06 RX ADMIN — Medication 3 MG: at 20:22

## 2023-09-06 RX ADMIN — ACETAMINOPHEN 650 MG: 325 TABLET, FILM COATED ORAL at 13:19

## 2023-09-06 RX ADMIN — HYDRALAZINE HYDROCHLORIDE 10 MG: 20 INJECTION, SOLUTION INTRAMUSCULAR; INTRAVENOUS at 14:19

## 2023-09-06 RX ADMIN — ONDANSETRON 4 MG: 2 INJECTION INTRAMUSCULAR; INTRAVENOUS at 11:47

## 2023-09-06 RX ADMIN — SODIUM CHLORIDE 125 ML/HR: 9 INJECTION, SOLUTION INTRAVENOUS at 04:02

## 2023-09-06 RX ADMIN — POTASSIUM CHLORIDE 40 MEQ: 750 TABLET, EXTENDED RELEASE ORAL at 09:34

## 2023-09-07 LAB
ANION GAP SERPL CALCULATED.3IONS-SCNC: 11.2 MMOL/L (ref 5–15)
BACTERIA SPEC AEROBE CULT: ABNORMAL
BACTERIA SPEC AEROBE CULT: ABNORMAL
BUN SERPL-MCNC: 8 MG/DL (ref 8–23)
BUN/CREAT SERPL: 13.3 (ref 7–25)
CALCIUM SPEC-SCNC: 8.8 MG/DL (ref 8.6–10.5)
CHLORIDE SERPL-SCNC: 110 MMOL/L (ref 98–107)
CO2 SERPL-SCNC: 20.8 MMOL/L (ref 22–29)
CREAT SERPL-MCNC: 0.6 MG/DL (ref 0.57–1)
DEPRECATED RDW RBC AUTO: 48.3 FL (ref 37–54)
EGFRCR SERPLBLD CKD-EPI 2021: 97.3 ML/MIN/1.73
ERYTHROCYTE [DISTWIDTH] IN BLOOD BY AUTOMATED COUNT: 14.9 % (ref 12.3–15.4)
GLUCOSE BLDC GLUCOMTR-MCNC: 108 MG/DL (ref 70–130)
GLUCOSE BLDC GLUCOMTR-MCNC: 87 MG/DL (ref 70–130)
GLUCOSE BLDC GLUCOMTR-MCNC: 94 MG/DL (ref 70–130)
GLUCOSE BLDC GLUCOMTR-MCNC: 99 MG/DL (ref 70–130)
GLUCOSE SERPL-MCNC: 99 MG/DL (ref 65–99)
GRAM STN SPEC: ABNORMAL
HCT VFR BLD AUTO: 32.6 % (ref 34–46.6)
HGB BLD-MCNC: 11.2 G/DL (ref 12–15.9)
ISOLATED FROM: ABNORMAL
ISOLATED FROM: ABNORMAL
MCH RBC QN AUTO: 30.3 PG (ref 26.6–33)
MCHC RBC AUTO-ENTMCNC: 34.4 G/DL (ref 31.5–35.7)
MCV RBC AUTO: 88.1 FL (ref 79–97)
PLATELET # BLD AUTO: 112 10*3/MM3 (ref 140–450)
PMV BLD AUTO: 11.2 FL (ref 6–12)
POTASSIUM SERPL-SCNC: 3.7 MMOL/L (ref 3.5–5.2)
RBC # BLD AUTO: 3.7 10*6/MM3 (ref 3.77–5.28)
SODIUM SERPL-SCNC: 142 MMOL/L (ref 136–145)
WBC NRBC COR # BLD: 6.92 10*3/MM3 (ref 3.4–10.8)

## 2023-09-07 PROCEDURE — 25010000002 CEFTRIAXONE PER 250 MG: Performed by: UROLOGY

## 2023-09-07 PROCEDURE — 85027 COMPLETE CBC AUTOMATED: CPT | Performed by: INTERNAL MEDICINE

## 2023-09-07 PROCEDURE — 97530 THERAPEUTIC ACTIVITIES: CPT

## 2023-09-07 PROCEDURE — 82948 REAGENT STRIP/BLOOD GLUCOSE: CPT

## 2023-09-07 PROCEDURE — 80048 BASIC METABOLIC PNL TOTAL CA: CPT | Performed by: INTERNAL MEDICINE

## 2023-09-07 RX ADMIN — SODIUM CHLORIDE 75 ML/HR: 9 INJECTION, SOLUTION INTRAVENOUS at 17:03

## 2023-09-07 RX ADMIN — Medication 10 ML: at 20:17

## 2023-09-07 RX ADMIN — Medication 10 ML: at 08:59

## 2023-09-07 RX ADMIN — HYDROCODONE BITARTRATE AND ACETAMINOPHEN 1 TABLET: 5; 325 TABLET ORAL at 11:58

## 2023-09-07 RX ADMIN — HYDROCODONE BITARTRATE AND ACETAMINOPHEN 1 TABLET: 5; 325 TABLET ORAL at 20:16

## 2023-09-07 RX ADMIN — CEFTRIAXONE 2000 MG: 2 INJECTION, POWDER, FOR SOLUTION INTRAMUSCULAR; INTRAVENOUS at 08:56

## 2023-09-07 RX ADMIN — Medication 3 MG: at 20:16

## 2023-09-07 RX ADMIN — SENNOSIDES AND DOCUSATE SODIUM 2 TABLET: 50; 8.6 TABLET ORAL at 20:16

## 2023-09-07 RX ADMIN — SENNOSIDES AND DOCUSATE SODIUM 2 TABLET: 50; 8.6 TABLET ORAL at 08:56

## 2023-09-08 ENCOUNTER — HOME HEALTH ADMISSION (OUTPATIENT)
Dept: HOME HEALTH SERVICES | Facility: HOME HEALTHCARE | Age: 68
End: 2023-09-08
Payer: MEDICARE

## 2023-09-08 LAB
ANION GAP SERPL CALCULATED.3IONS-SCNC: 9.9 MMOL/L (ref 5–15)
BUN SERPL-MCNC: 7 MG/DL (ref 8–23)
BUN/CREAT SERPL: 13.5 (ref 7–25)
CALCIUM SPEC-SCNC: 8.6 MG/DL (ref 8.6–10.5)
CHLORIDE SERPL-SCNC: 108 MMOL/L (ref 98–107)
CO2 SERPL-SCNC: 22.1 MMOL/L (ref 22–29)
CREAT SERPL-MCNC: 0.52 MG/DL (ref 0.57–1)
DEPRECATED RDW RBC AUTO: 48.4 FL (ref 37–54)
EGFRCR SERPLBLD CKD-EPI 2021: 100.7 ML/MIN/1.73
ERYTHROCYTE [DISTWIDTH] IN BLOOD BY AUTOMATED COUNT: 14.8 % (ref 12.3–15.4)
GLUCOSE BLDC GLUCOMTR-MCNC: 102 MG/DL (ref 70–130)
GLUCOSE BLDC GLUCOMTR-MCNC: 117 MG/DL (ref 70–130)
GLUCOSE BLDC GLUCOMTR-MCNC: 96 MG/DL (ref 70–130)
GLUCOSE BLDC GLUCOMTR-MCNC: 99 MG/DL (ref 70–130)
GLUCOSE SERPL-MCNC: 91 MG/DL (ref 65–99)
HCT VFR BLD AUTO: 31.3 % (ref 34–46.6)
HGB BLD-MCNC: 10.5 G/DL (ref 12–15.9)
MCH RBC QN AUTO: 29.9 PG (ref 26.6–33)
MCHC RBC AUTO-ENTMCNC: 33.5 G/DL (ref 31.5–35.7)
MCV RBC AUTO: 89.2 FL (ref 79–97)
PLATELET # BLD AUTO: 120 10*3/MM3 (ref 140–450)
PMV BLD AUTO: 10.9 FL (ref 6–12)
POTASSIUM SERPL-SCNC: 3.2 MMOL/L (ref 3.5–5.2)
POTASSIUM SERPL-SCNC: 3.5 MMOL/L (ref 3.5–5.2)
RBC # BLD AUTO: 3.51 10*6/MM3 (ref 3.77–5.28)
SODIUM SERPL-SCNC: 140 MMOL/L (ref 136–145)
WBC NRBC COR # BLD: 5.44 10*3/MM3 (ref 3.4–10.8)

## 2023-09-08 PROCEDURE — 84132 ASSAY OF SERUM POTASSIUM: CPT | Performed by: HOSPITALIST

## 2023-09-08 PROCEDURE — 80048 BASIC METABOLIC PNL TOTAL CA: CPT | Performed by: INTERNAL MEDICINE

## 2023-09-08 PROCEDURE — 82948 REAGENT STRIP/BLOOD GLUCOSE: CPT

## 2023-09-08 PROCEDURE — 25010000002 CEFTRIAXONE PER 250 MG: Performed by: UROLOGY

## 2023-09-08 PROCEDURE — 85027 COMPLETE CBC AUTOMATED: CPT | Performed by: INTERNAL MEDICINE

## 2023-09-08 PROCEDURE — 97165 OT EVAL LOW COMPLEX 30 MIN: CPT

## 2023-09-08 RX ORDER — CEFDINIR 300 MG/1
300 CAPSULE ORAL EVERY 12 HOURS SCHEDULED
Status: DISCONTINUED | OUTPATIENT
Start: 2023-09-08 | End: 2023-09-10 | Stop reason: HOSPADM

## 2023-09-08 RX ORDER — POTASSIUM CHLORIDE 750 MG/1
40 TABLET, FILM COATED, EXTENDED RELEASE ORAL EVERY 4 HOURS
Status: COMPLETED | OUTPATIENT
Start: 2023-09-08 | End: 2023-09-08

## 2023-09-08 RX ORDER — ALPRAZOLAM 0.5 MG/1
2 TABLET ORAL NIGHTLY PRN
Status: DISCONTINUED | OUTPATIENT
Start: 2023-09-08 | End: 2023-09-10 | Stop reason: HOSPADM

## 2023-09-08 RX ORDER — POTASSIUM CHLORIDE 750 MG/1
40 TABLET, FILM COATED, EXTENDED RELEASE ORAL EVERY 4 HOURS
Status: COMPLETED | OUTPATIENT
Start: 2023-09-09 | End: 2023-09-09

## 2023-09-08 RX ORDER — TRAZODONE HYDROCHLORIDE 50 MG/1
50 TABLET ORAL NIGHTLY
Status: DISCONTINUED | OUTPATIENT
Start: 2023-09-08 | End: 2023-09-10 | Stop reason: HOSPADM

## 2023-09-08 RX ADMIN — HYDROCODONE BITARTRATE AND ACETAMINOPHEN 1 TABLET: 5; 325 TABLET ORAL at 03:10

## 2023-09-08 RX ADMIN — Medication 3 MG: at 20:12

## 2023-09-08 RX ADMIN — CEFDINIR 300 MG: 300 CAPSULE ORAL at 20:12

## 2023-09-08 RX ADMIN — HYDROCODONE BITARTRATE AND ACETAMINOPHEN 1 TABLET: 5; 325 TABLET ORAL at 15:36

## 2023-09-08 RX ADMIN — Medication 10 ML: at 09:12

## 2023-09-08 RX ADMIN — TRAZODONE HYDROCHLORIDE 50 MG: 50 TABLET ORAL at 20:12

## 2023-09-08 RX ADMIN — POTASSIUM CHLORIDE 40 MEQ: 750 TABLET, EXTENDED RELEASE ORAL at 09:04

## 2023-09-08 RX ADMIN — POTASSIUM CHLORIDE 40 MEQ: 750 TABLET, EXTENDED RELEASE ORAL at 15:36

## 2023-09-08 RX ADMIN — Medication 10 ML: at 20:12

## 2023-09-08 RX ADMIN — CEFTRIAXONE 2000 MG: 2 INJECTION, POWDER, FOR SOLUTION INTRAMUSCULAR; INTRAVENOUS at 09:05

## 2023-09-08 RX ADMIN — HYDROCODONE BITARTRATE AND ACETAMINOPHEN 1 TABLET: 5; 325 TABLET ORAL at 09:04

## 2023-09-08 RX ADMIN — ALPRAZOLAM 2 MG: 0.5 TABLET ORAL at 20:12

## 2023-09-09 ENCOUNTER — APPOINTMENT (OUTPATIENT)
Dept: GENERAL RADIOLOGY | Facility: HOSPITAL | Age: 68
DRG: 853 | End: 2023-09-09
Payer: MEDICARE

## 2023-09-09 LAB
ANION GAP SERPL CALCULATED.3IONS-SCNC: 10 MMOL/L (ref 5–15)
BUN SERPL-MCNC: 7 MG/DL (ref 8–23)
BUN/CREAT SERPL: 10.9 (ref 7–25)
CALCIUM SPEC-SCNC: 9.5 MG/DL (ref 8.6–10.5)
CHLORIDE SERPL-SCNC: 110 MMOL/L (ref 98–107)
CO2 SERPL-SCNC: 21 MMOL/L (ref 22–29)
CREAT SERPL-MCNC: 0.64 MG/DL (ref 0.57–1)
DEPRECATED RDW RBC AUTO: 51.1 FL (ref 37–54)
EGFRCR SERPLBLD CKD-EPI 2021: 95.8 ML/MIN/1.73
ERYTHROCYTE [DISTWIDTH] IN BLOOD BY AUTOMATED COUNT: 15 % (ref 12.3–15.4)
GLUCOSE BLDC GLUCOMTR-MCNC: 125 MG/DL (ref 70–130)
GLUCOSE BLDC GLUCOMTR-MCNC: 88 MG/DL (ref 70–130)
GLUCOSE BLDC GLUCOMTR-MCNC: 97 MG/DL (ref 70–130)
GLUCOSE SERPL-MCNC: 94 MG/DL (ref 65–99)
HCT VFR BLD AUTO: 37 % (ref 34–46.6)
HGB BLD-MCNC: 12.2 G/DL (ref 12–15.9)
MCH RBC QN AUTO: 30.1 PG (ref 26.6–33)
MCHC RBC AUTO-ENTMCNC: 33 G/DL (ref 31.5–35.7)
MCV RBC AUTO: 91.4 FL (ref 79–97)
PLATELET # BLD AUTO: 206 10*3/MM3 (ref 140–450)
PMV BLD AUTO: 11.4 FL (ref 6–12)
POTASSIUM SERPL-SCNC: 4.7 MMOL/L (ref 3.5–5.2)
POTASSIUM SERPL-SCNC: 4.8 MMOL/L (ref 3.5–5.2)
RBC # BLD AUTO: 4.05 10*6/MM3 (ref 3.77–5.28)
SODIUM SERPL-SCNC: 141 MMOL/L (ref 136–145)
WBC NRBC COR # BLD: 7.67 10*3/MM3 (ref 3.4–10.8)

## 2023-09-09 PROCEDURE — 84132 ASSAY OF SERUM POTASSIUM: CPT | Performed by: HOSPITALIST

## 2023-09-09 PROCEDURE — 82948 REAGENT STRIP/BLOOD GLUCOSE: CPT

## 2023-09-09 PROCEDURE — 85027 COMPLETE CBC AUTOMATED: CPT | Performed by: HOSPITALIST

## 2023-09-09 PROCEDURE — 80048 BASIC METABOLIC PNL TOTAL CA: CPT | Performed by: HOSPITALIST

## 2023-09-09 PROCEDURE — 71045 X-RAY EXAM CHEST 1 VIEW: CPT

## 2023-09-09 RX ORDER — LEVOTHYROXINE SODIUM 112 UG/1
112 TABLET ORAL
Status: DISCONTINUED | OUTPATIENT
Start: 2023-09-10 | End: 2023-09-10 | Stop reason: HOSPADM

## 2023-09-09 RX ORDER — PROPRANOLOL HYDROCHLORIDE 40 MG/1
40 TABLET ORAL EVERY 12 HOURS SCHEDULED
Status: DISCONTINUED | OUTPATIENT
Start: 2023-09-09 | End: 2023-09-10 | Stop reason: HOSPADM

## 2023-09-09 RX ORDER — PROPRANOLOL HYDROCHLORIDE 40 MG/1
40 TABLET ORAL 2 TIMES DAILY
COMMUNITY

## 2023-09-09 RX ORDER — OMEPRAZOLE 20 MG/1
20 CAPSULE, DELAYED RELEASE ORAL DAILY
COMMUNITY

## 2023-09-09 RX ORDER — LEVOTHYROXINE SODIUM 112 UG/1
112 TABLET ORAL
COMMUNITY

## 2023-09-09 RX ORDER — DULOXETIN HYDROCHLORIDE 60 MG/1
60 CAPSULE, DELAYED RELEASE ORAL EVERY 12 HOURS SCHEDULED
Status: DISCONTINUED | OUTPATIENT
Start: 2023-09-09 | End: 2023-09-10 | Stop reason: HOSPADM

## 2023-09-09 RX ORDER — PANTOPRAZOLE SODIUM 40 MG/1
40 TABLET, DELAYED RELEASE ORAL
Status: DISCONTINUED | OUTPATIENT
Start: 2023-09-10 | End: 2023-09-10 | Stop reason: HOSPADM

## 2023-09-09 RX ORDER — DULOXETIN HYDROCHLORIDE 60 MG/1
60 CAPSULE, DELAYED RELEASE ORAL 2 TIMES DAILY
COMMUNITY

## 2023-09-09 RX ADMIN — Medication 3 MG: at 20:23

## 2023-09-09 RX ADMIN — ALPRAZOLAM 2 MG: 0.5 TABLET ORAL at 20:23

## 2023-09-09 RX ADMIN — TRAZODONE HYDROCHLORIDE 50 MG: 50 TABLET ORAL at 20:23

## 2023-09-09 RX ADMIN — POTASSIUM CHLORIDE 40 MEQ: 750 TABLET, EXTENDED RELEASE ORAL at 04:13

## 2023-09-09 RX ADMIN — Medication 10 ML: at 20:24

## 2023-09-09 RX ADMIN — CEFDINIR 300 MG: 300 CAPSULE ORAL at 07:58

## 2023-09-09 RX ADMIN — CEFDINIR 300 MG: 300 CAPSULE ORAL at 20:23

## 2023-09-09 RX ADMIN — HYDROCODONE BITARTRATE AND ACETAMINOPHEN 1 TABLET: 5; 325 TABLET ORAL at 07:53

## 2023-09-09 RX ADMIN — PROPRANOLOL HYDROCHLORIDE 40 MG: 40 TABLET ORAL at 20:23

## 2023-09-09 RX ADMIN — HYDROCODONE BITARTRATE AND ACETAMINOPHEN 1 TABLET: 5; 325 TABLET ORAL at 14:15

## 2023-09-09 RX ADMIN — DULOXETINE HYDROCHLORIDE 60 MG: 60 CAPSULE, DELAYED RELEASE ORAL at 20:23

## 2023-09-09 RX ADMIN — POTASSIUM CHLORIDE 40 MEQ: 750 TABLET, EXTENDED RELEASE ORAL at 00:08

## 2023-09-10 VITALS
TEMPERATURE: 98.8 F | OXYGEN SATURATION: 91 % | WEIGHT: 191.58 LBS | HEART RATE: 73 BPM | RESPIRATION RATE: 16 BRPM | BODY MASS INDEX: 36.17 KG/M2 | SYSTOLIC BLOOD PRESSURE: 102 MMHG | HEIGHT: 61 IN | DIASTOLIC BLOOD PRESSURE: 71 MMHG

## 2023-09-10 LAB
ANION GAP SERPL CALCULATED.3IONS-SCNC: 9 MMOL/L (ref 5–15)
BUN SERPL-MCNC: 8 MG/DL (ref 8–23)
BUN/CREAT SERPL: 12.1 (ref 7–25)
CALCIUM SPEC-SCNC: 9.9 MG/DL (ref 8.6–10.5)
CHLORIDE SERPL-SCNC: 104 MMOL/L (ref 98–107)
CO2 SERPL-SCNC: 25 MMOL/L (ref 22–29)
CREAT SERPL-MCNC: 0.66 MG/DL (ref 0.57–1)
DEPRECATED RDW RBC AUTO: 47.1 FL (ref 37–54)
EGFRCR SERPLBLD CKD-EPI 2021: 95.1 ML/MIN/1.73
ERYTHROCYTE [DISTWIDTH] IN BLOOD BY AUTOMATED COUNT: 14.5 % (ref 12.3–15.4)
GLUCOSE BLDC GLUCOMTR-MCNC: 113 MG/DL (ref 70–130)
GLUCOSE SERPL-MCNC: 113 MG/DL (ref 65–99)
HCT VFR BLD AUTO: 36.7 % (ref 34–46.6)
HGB BLD-MCNC: 11.9 G/DL (ref 12–15.9)
MCH RBC QN AUTO: 29 PG (ref 26.6–33)
MCHC RBC AUTO-ENTMCNC: 32.4 G/DL (ref 31.5–35.7)
MCV RBC AUTO: 89.3 FL (ref 79–97)
PLATELET # BLD AUTO: 246 10*3/MM3 (ref 140–450)
PMV BLD AUTO: 10.9 FL (ref 6–12)
POTASSIUM SERPL-SCNC: 4.2 MMOL/L (ref 3.5–5.2)
RBC # BLD AUTO: 4.11 10*6/MM3 (ref 3.77–5.28)
SODIUM SERPL-SCNC: 138 MMOL/L (ref 136–145)
WBC NRBC COR # BLD: 7.67 10*3/MM3 (ref 3.4–10.8)

## 2023-09-10 PROCEDURE — 97530 THERAPEUTIC ACTIVITIES: CPT

## 2023-09-10 PROCEDURE — 85027 COMPLETE CBC AUTOMATED: CPT | Performed by: HOSPITALIST

## 2023-09-10 PROCEDURE — 80048 BASIC METABOLIC PNL TOTAL CA: CPT | Performed by: HOSPITALIST

## 2023-09-10 PROCEDURE — 82948 REAGENT STRIP/BLOOD GLUCOSE: CPT

## 2023-09-10 PROCEDURE — 25010000002 FUROSEMIDE PER 20 MG: Performed by: HOSPITALIST

## 2023-09-10 RX ORDER — FUROSEMIDE 10 MG/ML
20 INJECTION INTRAMUSCULAR; INTRAVENOUS ONCE
Status: COMPLETED | OUTPATIENT
Start: 2023-09-10 | End: 2023-09-10

## 2023-09-10 RX ORDER — CEFDINIR 300 MG/1
300 CAPSULE ORAL EVERY 12 HOURS SCHEDULED
Qty: 10 CAPSULE | Refills: 0 | Status: SHIPPED | OUTPATIENT
Start: 2023-09-10 | End: 2023-09-15

## 2023-09-10 RX ADMIN — DULOXETINE HYDROCHLORIDE 60 MG: 60 CAPSULE, DELAYED RELEASE ORAL at 08:32

## 2023-09-10 RX ADMIN — CEFDINIR 300 MG: 300 CAPSULE ORAL at 08:32

## 2023-09-10 RX ADMIN — LEVOTHYROXINE SODIUM 112 MCG: 0.11 TABLET ORAL at 05:50

## 2023-09-10 RX ADMIN — PROPRANOLOL HYDROCHLORIDE 40 MG: 40 TABLET ORAL at 11:07

## 2023-09-10 RX ADMIN — FUROSEMIDE 20 MG: 20 INJECTION, SOLUTION INTRAMUSCULAR; INTRAVENOUS at 08:32

## 2023-09-10 RX ADMIN — Medication 10 ML: at 11:07

## 2023-09-10 RX ADMIN — HYDROCODONE BITARTRATE AND ACETAMINOPHEN 1 TABLET: 5; 325 TABLET ORAL at 11:13

## 2023-09-10 RX ADMIN — PANTOPRAZOLE SODIUM 40 MG: 40 TABLET, DELAYED RELEASE ORAL at 05:50

## 2023-09-11 ENCOUNTER — READMISSION MANAGEMENT (OUTPATIENT)
Dept: CALL CENTER | Facility: HOSPITAL | Age: 68
End: 2023-09-11
Payer: MEDICARE

## 2023-09-11 LAB
BACTERIA SPEC AEROBE CULT: NORMAL
BACTERIA SPEC AEROBE CULT: NORMAL

## 2023-09-13 ENCOUNTER — READMISSION MANAGEMENT (OUTPATIENT)
Dept: CALL CENTER | Facility: HOSPITAL | Age: 68
End: 2023-09-13
Payer: MEDICARE

## 2023-09-19 ENCOUNTER — READMISSION MANAGEMENT (OUTPATIENT)
Dept: CALL CENTER | Facility: HOSPITAL | Age: 68
End: 2023-09-19
Payer: MEDICARE

## 2023-09-27 ENCOUNTER — READMISSION MANAGEMENT (OUTPATIENT)
Dept: CALL CENTER | Facility: HOSPITAL | Age: 68
End: 2023-09-27
Payer: MEDICARE

## 2023-10-10 ENCOUNTER — APPOINTMENT (OUTPATIENT)
Dept: CT IMAGING | Facility: HOSPITAL | Age: 68
End: 2023-10-10
Payer: MEDICARE

## 2023-10-10 ENCOUNTER — HOSPITAL ENCOUNTER (EMERGENCY)
Facility: HOSPITAL | Age: 68
Discharge: HOME | End: 2023-10-10
Attending: EMERGENCY MEDICINE | Admitting: EMERGENCY MEDICINE
Payer: MEDICARE

## 2023-10-10 VITALS
HEIGHT: 61 IN | HEART RATE: 63 BPM | OXYGEN SATURATION: 93 % | TEMPERATURE: 98.5 F | BODY MASS INDEX: 33.42 KG/M2 | DIASTOLIC BLOOD PRESSURE: 82 MMHG | SYSTOLIC BLOOD PRESSURE: 145 MMHG | RESPIRATION RATE: 18 BRPM | WEIGHT: 177 LBS

## 2023-10-10 DIAGNOSIS — R10.32 ACUTE LEFT LOWER QUADRANT PAIN: ICD-10-CM

## 2023-10-10 DIAGNOSIS — N39.0 ACUTE UTI: Primary | ICD-10-CM

## 2023-10-10 DIAGNOSIS — Z87.442 HISTORY OF NEPHROLITHIASIS: ICD-10-CM

## 2023-10-10 LAB
ALBUMIN SERPL-MCNC: 3.8 G/DL (ref 3.5–5.2)
ALBUMIN/GLOB SERPL: 1.5 G/DL
ALP SERPL-CCNC: 86 U/L (ref 39–117)
ALT SERPL W P-5'-P-CCNC: 10 U/L (ref 1–33)
ANION GAP SERPL CALCULATED.3IONS-SCNC: 9 MMOL/L (ref 5–15)
AST SERPL-CCNC: 14 U/L (ref 1–32)
BACTERIA UR QL AUTO: ABNORMAL /HPF
BASOPHILS # BLD AUTO: 0.06 10*3/MM3 (ref 0–0.2)
BASOPHILS NFR BLD AUTO: 1.1 % (ref 0–1.5)
BILIRUB SERPL-MCNC: <0.2 MG/DL (ref 0–1.2)
BILIRUB UR QL STRIP: NEGATIVE
BUN SERPL-MCNC: 17 MG/DL (ref 8–23)
BUN/CREAT SERPL: 21 (ref 7–25)
CALCIUM SPEC-SCNC: 9 MG/DL (ref 8.6–10.5)
CHLORIDE SERPL-SCNC: 108 MMOL/L (ref 98–107)
CLARITY UR: CLEAR
CO2 SERPL-SCNC: 23 MMOL/L (ref 22–29)
COLOR UR: YELLOW
CREAT SERPL-MCNC: 0.81 MG/DL (ref 0.57–1)
D-LACTATE SERPL-SCNC: 0.5 MMOL/L (ref 0.5–2)
DEPRECATED RDW RBC AUTO: 45.3 FL (ref 37–54)
EGFRCR SERPLBLD CKD-EPI 2021: 78.7 ML/MIN/1.73
EOSINOPHIL # BLD AUTO: 0.66 10*3/MM3 (ref 0–0.4)
EOSINOPHIL NFR BLD AUTO: 12.2 % (ref 0.3–6.2)
ERYTHROCYTE [DISTWIDTH] IN BLOOD BY AUTOMATED COUNT: 13.6 % (ref 12.3–15.4)
GLOBULIN UR ELPH-MCNC: 2.6 GM/DL
GLUCOSE SERPL-MCNC: 86 MG/DL (ref 65–99)
GLUCOSE UR STRIP-MCNC: NEGATIVE MG/DL
HCT VFR BLD AUTO: 37 % (ref 34–46.6)
HGB BLD-MCNC: 12.5 G/DL (ref 12–15.9)
HGB UR QL STRIP.AUTO: NEGATIVE
HOLD SPECIMEN: NORMAL
HOLD SPECIMEN: NORMAL
HYALINE CASTS UR QL AUTO: ABNORMAL /LPF
IMM GRANULOCYTES # BLD AUTO: 0.01 10*3/MM3 (ref 0–0.05)
IMM GRANULOCYTES NFR BLD AUTO: 0.2 % (ref 0–0.5)
KETONES UR QL STRIP: NEGATIVE
LEUKOCYTE ESTERASE UR QL STRIP.AUTO: ABNORMAL
LIPASE SERPL-CCNC: 18 U/L (ref 13–60)
LYMPHOCYTES # BLD AUTO: 2.24 10*3/MM3 (ref 0.7–3.1)
LYMPHOCYTES NFR BLD AUTO: 41.6 % (ref 19.6–45.3)
MCH RBC QN AUTO: 30.6 PG (ref 26.6–33)
MCHC RBC AUTO-ENTMCNC: 33.8 G/DL (ref 31.5–35.7)
MCV RBC AUTO: 90.7 FL (ref 79–97)
MONOCYTES # BLD AUTO: 0.39 10*3/MM3 (ref 0.1–0.9)
MONOCYTES NFR BLD AUTO: 7.2 % (ref 5–12)
NEUTROPHILS NFR BLD AUTO: 2.03 10*3/MM3 (ref 1.7–7)
NEUTROPHILS NFR BLD AUTO: 37.7 % (ref 42.7–76)
NITRITE UR QL STRIP: NEGATIVE
NRBC BLD AUTO-RTO: 0 /100 WBC (ref 0–0.2)
PH UR STRIP.AUTO: 6 [PH] (ref 5–8)
PLATELET # BLD AUTO: 262 10*3/MM3 (ref 140–450)
PMV BLD AUTO: 10.6 FL (ref 6–12)
POTASSIUM SERPL-SCNC: 3.9 MMOL/L (ref 3.5–5.2)
PROT SERPL-MCNC: 6.4 G/DL (ref 6–8.5)
PROT UR QL STRIP: NEGATIVE
RBC # BLD AUTO: 4.08 10*6/MM3 (ref 3.77–5.28)
RBC # UR STRIP: ABNORMAL /HPF
REF LAB TEST METHOD: ABNORMAL
SODIUM SERPL-SCNC: 140 MMOL/L (ref 136–145)
SP GR UR STRIP: 1.02 (ref 1–1.03)
SQUAMOUS #/AREA URNS HPF: ABNORMAL /HPF
TSH SERPL DL<=0.05 MIU/L-ACNC: 0.98 UIU/ML (ref 0.27–4.2)
UROBILINOGEN UR QL STRIP: ABNORMAL
WBC # UR STRIP: ABNORMAL /HPF
WBC NRBC COR # BLD: 5.39 10*3/MM3 (ref 3.4–10.8)
WHOLE BLOOD HOLD COAG: NORMAL
WHOLE BLOOD HOLD SPECIMEN: NORMAL

## 2023-10-10 PROCEDURE — 84443 ASSAY THYROID STIM HORMONE: CPT | Performed by: EMERGENCY MEDICINE

## 2023-10-10 PROCEDURE — 25510000001 IOPAMIDOL 61 % SOLUTION: Performed by: EMERGENCY MEDICINE

## 2023-10-10 PROCEDURE — 81001 URINALYSIS AUTO W/SCOPE: CPT

## 2023-10-10 PROCEDURE — 25810000003 SODIUM CHLORIDE 0.9 % SOLUTION: Performed by: EMERGENCY MEDICINE

## 2023-10-10 PROCEDURE — 83605 ASSAY OF LACTIC ACID: CPT

## 2023-10-10 PROCEDURE — 74177 CT ABD & PELVIS W/CONTRAST: CPT

## 2023-10-10 PROCEDURE — 85025 COMPLETE CBC W/AUTO DIFF WBC: CPT

## 2023-10-10 PROCEDURE — 80053 COMPREHEN METABOLIC PANEL: CPT

## 2023-10-10 PROCEDURE — 96374 THER/PROPH/DIAG INJ IV PUSH: CPT

## 2023-10-10 PROCEDURE — 25010000002 FENTANYL CITRATE (PF) 50 MCG/ML SOLUTION: Performed by: EMERGENCY MEDICINE

## 2023-10-10 PROCEDURE — 83690 ASSAY OF LIPASE: CPT

## 2023-10-10 PROCEDURE — 99285 EMERGENCY DEPT VISIT HI MDM: CPT

## 2023-10-10 RX ORDER — SODIUM CHLORIDE 0.9 % (FLUSH) 0.9 %
10 SYRINGE (ML) INJECTION AS NEEDED
Status: DISCONTINUED | OUTPATIENT
Start: 2023-10-10 | End: 2023-10-10 | Stop reason: HOSPADM

## 2023-10-10 RX ORDER — FENTANYL CITRATE 50 UG/ML
50 INJECTION, SOLUTION INTRAMUSCULAR; INTRAVENOUS ONCE
Status: COMPLETED | OUTPATIENT
Start: 2023-10-10 | End: 2023-10-10

## 2023-10-10 RX ORDER — HYDROCODONE BITARTRATE AND ACETAMINOPHEN 5; 325 MG/1; MG/1
TABLET ORAL
Qty: 16 TABLET | Refills: 0 | Status: SHIPPED | OUTPATIENT
Start: 2023-10-10 | End: 2023-10-10 | Stop reason: SDUPTHER

## 2023-10-10 RX ORDER — HYDROCODONE BITARTRATE AND ACETAMINOPHEN 5; 325 MG/1; MG/1
TABLET ORAL
Qty: 10 TABLET | Refills: 0 | Status: SHIPPED | OUTPATIENT
Start: 2023-10-10

## 2023-10-10 RX ADMIN — SODIUM CHLORIDE 500 ML: 9 INJECTION, SOLUTION INTRAVENOUS at 17:44

## 2023-10-10 RX ADMIN — IOPAMIDOL 85 ML: 612 INJECTION, SOLUTION INTRAVENOUS at 19:09

## 2023-10-10 RX ADMIN — FENTANYL CITRATE 50 MCG: 50 INJECTION, SOLUTION INTRAMUSCULAR; INTRAVENOUS at 17:42

## 2023-12-31 ENCOUNTER — HOSPITAL ENCOUNTER (EMERGENCY)
Facility: HOSPITAL | Age: 68
Discharge: HOME | End: 2023-12-31
Attending: EMERGENCY MEDICINE | Admitting: EMERGENCY MEDICINE
Payer: MEDICARE

## 2023-12-31 ENCOUNTER — APPOINTMENT (OUTPATIENT)
Dept: CT IMAGING | Facility: HOSPITAL | Age: 68
End: 2023-12-31
Payer: MEDICARE

## 2023-12-31 ENCOUNTER — APPOINTMENT (OUTPATIENT)
Dept: GENERAL RADIOLOGY | Facility: HOSPITAL | Age: 68
End: 2023-12-31
Payer: MEDICARE

## 2023-12-31 VITALS
DIASTOLIC BLOOD PRESSURE: 95 MMHG | TEMPERATURE: 98.5 F | OXYGEN SATURATION: 97 % | HEIGHT: 61 IN | SYSTOLIC BLOOD PRESSURE: 137 MMHG | HEART RATE: 51 BPM | WEIGHT: 177.03 LBS | BODY MASS INDEX: 33.42 KG/M2 | RESPIRATION RATE: 24 BRPM

## 2023-12-31 DIAGNOSIS — R06.2 WHEEZING: ICD-10-CM

## 2023-12-31 DIAGNOSIS — N39.0 URINARY TRACT INFECTION WITHOUT HEMATURIA, SITE UNSPECIFIED: Primary | ICD-10-CM

## 2023-12-31 DIAGNOSIS — M79.18 MUSCULOSKELETAL PAIN: ICD-10-CM

## 2023-12-31 DIAGNOSIS — R05.9 COUGH, UNSPECIFIED TYPE: ICD-10-CM

## 2023-12-31 LAB
ALBUMIN SERPL-MCNC: 4.3 G/DL (ref 3.5–5.2)
ALBUMIN/GLOB SERPL: 2.4 G/DL
ALP SERPL-CCNC: 85 U/L (ref 39–117)
ALT SERPL W P-5'-P-CCNC: 20 U/L (ref 1–33)
ANION GAP SERPL CALCULATED.3IONS-SCNC: 8 MMOL/L (ref 5–15)
AST SERPL-CCNC: 15 U/L (ref 1–32)
BACTERIA UR QL AUTO: NORMAL /HPF
BASOPHILS # BLD AUTO: 0.04 10*3/MM3 (ref 0–0.2)
BASOPHILS NFR BLD AUTO: 0.7 % (ref 0–1.5)
BILIRUB SERPL-MCNC: 0.2 MG/DL (ref 0–1.2)
BILIRUB UR QL STRIP: NEGATIVE
BUN SERPL-MCNC: 13 MG/DL (ref 8–23)
BUN/CREAT SERPL: 15.9 (ref 7–25)
CALCIUM SPEC-SCNC: 9.3 MG/DL (ref 8.6–10.5)
CHLORIDE SERPL-SCNC: 108 MMOL/L (ref 98–107)
CLARITY UR: CLEAR
CO2 SERPL-SCNC: 25 MMOL/L (ref 22–29)
COLOR UR: YELLOW
CREAT SERPL-MCNC: 0.82 MG/DL (ref 0.57–1)
DEPRECATED RDW RBC AUTO: 42.7 FL (ref 37–54)
EGFRCR SERPLBLD CKD-EPI 2021: 77.5 ML/MIN/1.73
EOSINOPHIL # BLD AUTO: 0.49 10*3/MM3 (ref 0–0.4)
EOSINOPHIL NFR BLD AUTO: 9.1 % (ref 0.3–6.2)
ERYTHROCYTE [DISTWIDTH] IN BLOOD BY AUTOMATED COUNT: 12.7 % (ref 12.3–15.4)
FLUAV SUBTYP SPEC NAA+PROBE: NOT DETECTED
FLUBV RNA ISLT QL NAA+PROBE: NOT DETECTED
GLOBULIN UR ELPH-MCNC: 1.8 GM/DL
GLUCOSE SERPL-MCNC: 90 MG/DL (ref 65–99)
GLUCOSE UR STRIP-MCNC: NEGATIVE MG/DL
HCT VFR BLD AUTO: 37.8 % (ref 34–46.6)
HGB BLD-MCNC: 12.2 G/DL (ref 12–15.9)
HGB UR QL STRIP.AUTO: NEGATIVE
HOLD SPECIMEN: NORMAL
HYALINE CASTS UR QL AUTO: NORMAL /LPF
IMM GRANULOCYTES # BLD AUTO: 0.02 10*3/MM3 (ref 0–0.05)
IMM GRANULOCYTES NFR BLD AUTO: 0.4 % (ref 0–0.5)
KETONES UR QL STRIP: NEGATIVE
LEUKOCYTE ESTERASE UR QL STRIP.AUTO: ABNORMAL
LYMPHOCYTES # BLD AUTO: 2.29 10*3/MM3 (ref 0.7–3.1)
LYMPHOCYTES NFR BLD AUTO: 42.3 % (ref 19.6–45.3)
MCH RBC QN AUTO: 29.4 PG (ref 26.6–33)
MCHC RBC AUTO-ENTMCNC: 32.3 G/DL (ref 31.5–35.7)
MCV RBC AUTO: 91.1 FL (ref 79–97)
MONOCYTES # BLD AUTO: 0.37 10*3/MM3 (ref 0.1–0.9)
MONOCYTES NFR BLD AUTO: 6.8 % (ref 5–12)
NEUTROPHILS NFR BLD AUTO: 2.2 10*3/MM3 (ref 1.7–7)
NEUTROPHILS NFR BLD AUTO: 40.7 % (ref 42.7–76)
NITRITE UR QL STRIP: NEGATIVE
PH UR STRIP.AUTO: 6 [PH] (ref 5–8)
PLATELET # BLD AUTO: 239 10*3/MM3 (ref 140–450)
PMV BLD AUTO: 10.6 FL (ref 6–12)
POTASSIUM SERPL-SCNC: 4.4 MMOL/L (ref 3.5–5.2)
PROT SERPL-MCNC: 6.1 G/DL (ref 6–8.5)
PROT UR QL STRIP: NEGATIVE
RBC # BLD AUTO: 4.15 10*6/MM3 (ref 3.77–5.28)
RBC # UR STRIP: NORMAL /HPF
REF LAB TEST METHOD: NORMAL
SARS-COV-2 RNA RESP QL NAA+PROBE: NOT DETECTED
SODIUM SERPL-SCNC: 141 MMOL/L (ref 136–145)
SP GR UR STRIP: 1.01 (ref 1–1.03)
SQUAMOUS #/AREA URNS HPF: NORMAL /HPF
STREP A PCR: NOT DETECTED
TROPONIN T SERPL HS-MCNC: 8 NG/L
TSH SERPL DL<=0.05 MIU/L-ACNC: 0.57 UIU/ML (ref 0.27–4.2)
UROBILINOGEN UR QL STRIP: ABNORMAL
WBC # UR STRIP: NORMAL /HPF
WBC NRBC COR # BLD AUTO: 5.41 10*3/MM3 (ref 3.4–10.8)

## 2023-12-31 PROCEDURE — 81001 URINALYSIS AUTO W/SCOPE: CPT | Performed by: PHYSICIAN ASSISTANT

## 2023-12-31 PROCEDURE — 94640 AIRWAY INHALATION TREATMENT: CPT

## 2023-12-31 PROCEDURE — 84484 ASSAY OF TROPONIN QUANT: CPT | Performed by: PHYSICIAN ASSISTANT

## 2023-12-31 PROCEDURE — 25010000002 MORPHINE PER 10 MG: Performed by: EMERGENCY MEDICINE

## 2023-12-31 PROCEDURE — 25810000003 SODIUM CHLORIDE 0.9 % SOLUTION: Performed by: PHYSICIAN ASSISTANT

## 2023-12-31 PROCEDURE — 96375 TX/PRO/DX INJ NEW DRUG ADDON: CPT

## 2023-12-31 PROCEDURE — 25010000002 ONDANSETRON PER 1 MG: Performed by: EMERGENCY MEDICINE

## 2023-12-31 PROCEDURE — 84443 ASSAY THYROID STIM HORMONE: CPT | Performed by: PHYSICIAN ASSISTANT

## 2023-12-31 PROCEDURE — 87651 STREP A DNA AMP PROBE: CPT | Performed by: EMERGENCY MEDICINE

## 2023-12-31 PROCEDURE — 36415 COLL VENOUS BLD VENIPUNCTURE: CPT

## 2023-12-31 PROCEDURE — 96374 THER/PROPH/DIAG INJ IV PUSH: CPT

## 2023-12-31 PROCEDURE — 74176 CT ABD & PELVIS W/O CONTRAST: CPT

## 2023-12-31 PROCEDURE — 99284 EMERGENCY DEPT VISIT MOD MDM: CPT

## 2023-12-31 PROCEDURE — 80053 COMPREHEN METABOLIC PANEL: CPT | Performed by: PHYSICIAN ASSISTANT

## 2023-12-31 PROCEDURE — 87636 SARSCOV2 & INF A&B AMP PRB: CPT | Performed by: EMERGENCY MEDICINE

## 2023-12-31 PROCEDURE — 85025 COMPLETE CBC W/AUTO DIFF WBC: CPT | Performed by: PHYSICIAN ASSISTANT

## 2023-12-31 PROCEDURE — 71046 X-RAY EXAM CHEST 2 VIEWS: CPT

## 2023-12-31 RX ORDER — ONDANSETRON 2 MG/ML
4 INJECTION INTRAMUSCULAR; INTRAVENOUS ONCE
Status: COMPLETED | OUTPATIENT
Start: 2023-12-31 | End: 2023-12-31

## 2023-12-31 RX ORDER — MORPHINE SULFATE 4 MG/ML
4 INJECTION, SOLUTION INTRAMUSCULAR; INTRAVENOUS ONCE
Status: COMPLETED | OUTPATIENT
Start: 2023-12-31 | End: 2023-12-31

## 2023-12-31 RX ORDER — NITROFURANTOIN 25; 75 MG/1; MG/1
100 CAPSULE ORAL 2 TIMES DAILY
Qty: 14 CAPSULE | Refills: 0 | Status: SHIPPED | OUTPATIENT
Start: 2023-12-31 | End: 2024-01-07

## 2023-12-31 RX ORDER — HYDROCODONE BITARTRATE AND ACETAMINOPHEN 5; 325 MG/1; MG/1
1 TABLET ORAL ONCE AS NEEDED
Status: DISCONTINUED | OUTPATIENT
Start: 2023-12-31 | End: 2023-12-31 | Stop reason: HOSPADM

## 2023-12-31 RX ORDER — ALBUTEROL SULFATE 90 UG/1
2 AEROSOL, METERED RESPIRATORY (INHALATION) EVERY 4 HOURS PRN
Qty: 8 G | Refills: 0 | Status: SHIPPED | OUTPATIENT
Start: 2023-12-31 | End: 2024-01-05

## 2023-12-31 RX ORDER — IPRATROPIUM BROMIDE AND ALBUTEROL SULFATE 2.5; .5 MG/3ML; MG/3ML
3 SOLUTION RESPIRATORY (INHALATION) ONCE
Status: COMPLETED | OUTPATIENT
Start: 2023-12-31 | End: 2023-12-31

## 2023-12-31 RX ADMIN — HYDROCODONE BITARTRATE AND ACETAMINOPHEN 1 TABLET: 5; 325 TABLET ORAL at 13:20

## 2023-12-31 RX ADMIN — MORPHINE SULFATE 4 MG: 4 INJECTION, SOLUTION INTRAMUSCULAR; INTRAVENOUS at 11:38

## 2023-12-31 RX ADMIN — IPRATROPIUM BROMIDE AND ALBUTEROL SULFATE 3 ML: 2.5; .5 SOLUTION RESPIRATORY (INHALATION) at 10:44

## 2023-12-31 RX ADMIN — ONDANSETRON HYDROCHLORIDE 4 MG: 2 INJECTION, SOLUTION INTRAMUSCULAR; INTRAVENOUS at 11:39

## 2023-12-31 RX ADMIN — SODIUM CHLORIDE 1000 ML: 9 INJECTION, SOLUTION INTRAVENOUS at 12:02

## 2024-03-10 ENCOUNTER — HOSPITAL ENCOUNTER (EMERGENCY)
Facility: HOSPITAL | Age: 69
Discharge: HOME | End: 2024-03-10
Attending: EMERGENCY MEDICINE | Admitting: EMERGENCY MEDICINE
Payer: MEDICARE

## 2024-03-10 ENCOUNTER — APPOINTMENT (OUTPATIENT)
Dept: GENERAL RADIOLOGY | Facility: HOSPITAL | Age: 69
End: 2024-03-10
Payer: MEDICARE

## 2024-03-10 ENCOUNTER — APPOINTMENT (OUTPATIENT)
Dept: CT IMAGING | Facility: HOSPITAL | Age: 69
End: 2024-03-10
Payer: MEDICARE

## 2024-03-10 VITALS
WEIGHT: 190.6 LBS | SYSTOLIC BLOOD PRESSURE: 121 MMHG | OXYGEN SATURATION: 92 % | RESPIRATION RATE: 18 BRPM | HEIGHT: 61 IN | HEART RATE: 65 BPM | DIASTOLIC BLOOD PRESSURE: 73 MMHG | BODY MASS INDEX: 35.98 KG/M2 | TEMPERATURE: 98.8 F

## 2024-03-10 DIAGNOSIS — J02.9 PHARYNGITIS, UNSPECIFIED ETIOLOGY: Primary | ICD-10-CM

## 2024-03-10 LAB
ALBUMIN SERPL-MCNC: 4.1 G/DL (ref 3.5–5.2)
ALBUMIN/GLOB SERPL: 2.3 G/DL
ALP SERPL-CCNC: 69 U/L (ref 39–117)
ALT SERPL W P-5'-P-CCNC: 24 U/L (ref 1–33)
ANION GAP SERPL CALCULATED.3IONS-SCNC: 8.1 MMOL/L (ref 5–15)
AST SERPL-CCNC: 17 U/L (ref 1–32)
BACTERIA UR QL AUTO: ABNORMAL /HPF
BASOPHILS # BLD AUTO: 0.02 10*3/MM3 (ref 0–0.2)
BASOPHILS NFR BLD AUTO: 0.2 % (ref 0–1.5)
BILIRUB SERPL-MCNC: 0.5 MG/DL (ref 0–1.2)
BILIRUB UR QL STRIP: NEGATIVE
BUN SERPL-MCNC: 20 MG/DL (ref 8–23)
BUN/CREAT SERPL: 24.1 (ref 7–25)
CALCIUM SPEC-SCNC: 10.1 MG/DL (ref 8.6–10.5)
CHLORIDE SERPL-SCNC: 107 MMOL/L (ref 98–107)
CLARITY UR: CLEAR
CO2 SERPL-SCNC: 23.9 MMOL/L (ref 22–29)
COLOR UR: YELLOW
CREAT SERPL-MCNC: 0.83 MG/DL (ref 0.57–1)
D-LACTATE SERPL-SCNC: 1.2 MMOL/L (ref 0.5–2)
DEPRECATED RDW RBC AUTO: 46.7 FL (ref 37–54)
EGFRCR SERPLBLD CKD-EPI 2021: 76.4 ML/MIN/1.73
EOSINOPHIL # BLD AUTO: 0.13 10*3/MM3 (ref 0–0.4)
EOSINOPHIL NFR BLD AUTO: 1.2 % (ref 0.3–6.2)
ERYTHROCYTE [DISTWIDTH] IN BLOOD BY AUTOMATED COUNT: 13.7 % (ref 12.3–15.4)
FLUAV SUBTYP SPEC NAA+PROBE: NOT DETECTED
FLUBV RNA ISLT QL NAA+PROBE: NOT DETECTED
GLOBULIN UR ELPH-MCNC: 1.8 GM/DL
GLUCOSE SERPL-MCNC: 98 MG/DL (ref 65–99)
GLUCOSE UR STRIP-MCNC: NEGATIVE MG/DL
HCT VFR BLD AUTO: 38.3 % (ref 34–46.6)
HETEROPH AB SER QL LA: NEGATIVE
HGB BLD-MCNC: 12.3 G/DL (ref 12–15.9)
HGB UR QL STRIP.AUTO: NEGATIVE
HOLD SPECIMEN: NORMAL
HYALINE CASTS UR QL AUTO: ABNORMAL /LPF
IMM GRANULOCYTES # BLD AUTO: 0.01 10*3/MM3 (ref 0–0.05)
IMM GRANULOCYTES NFR BLD AUTO: 0.1 % (ref 0–0.5)
KETONES UR QL STRIP: NEGATIVE
LEUKOCYTE ESTERASE UR QL STRIP.AUTO: ABNORMAL
LIPASE SERPL-CCNC: 14 U/L (ref 13–60)
LYMPHOCYTES # BLD AUTO: 1.57 10*3/MM3 (ref 0.7–3.1)
LYMPHOCYTES NFR BLD AUTO: 14.9 % (ref 19.6–45.3)
MCH RBC QN AUTO: 29.7 PG (ref 26.6–33)
MCHC RBC AUTO-ENTMCNC: 32.1 G/DL (ref 31.5–35.7)
MCV RBC AUTO: 92.5 FL (ref 79–97)
MONOCYTES # BLD AUTO: 0.46 10*3/MM3 (ref 0.1–0.9)
MONOCYTES NFR BLD AUTO: 4.4 % (ref 5–12)
NEUTROPHILS NFR BLD AUTO: 79.2 % (ref 42.7–76)
NEUTROPHILS NFR BLD AUTO: 8.35 10*3/MM3 (ref 1.7–7)
NITRITE UR QL STRIP: NEGATIVE
PH UR STRIP.AUTO: 5.5 [PH] (ref 5–8)
PLATELET # BLD AUTO: 207 10*3/MM3 (ref 140–450)
PMV BLD AUTO: 10.4 FL (ref 6–12)
POTASSIUM SERPL-SCNC: 4.1 MMOL/L (ref 3.5–5.2)
PROT SERPL-MCNC: 5.9 G/DL (ref 6–8.5)
PROT UR QL STRIP: NEGATIVE
RBC # BLD AUTO: 4.14 10*6/MM3 (ref 3.77–5.28)
RBC # UR STRIP: ABNORMAL /HPF
REF LAB TEST METHOD: ABNORMAL
SARS-COV-2 RNA RESP QL NAA+PROBE: NOT DETECTED
SODIUM SERPL-SCNC: 139 MMOL/L (ref 136–145)
SP GR UR STRIP: <=1.005 (ref 1–1.03)
SQUAMOUS #/AREA URNS HPF: ABNORMAL /HPF
STREP A PCR: NOT DETECTED
UROBILINOGEN UR QL STRIP: ABNORMAL
WBC # UR STRIP: ABNORMAL /HPF
WBC NRBC COR # BLD AUTO: 10.54 10*3/MM3 (ref 3.4–10.8)

## 2024-03-10 PROCEDURE — 83605 ASSAY OF LACTIC ACID: CPT | Performed by: EMERGENCY MEDICINE

## 2024-03-10 PROCEDURE — 87651 STREP A DNA AMP PROBE: CPT | Performed by: EMERGENCY MEDICINE

## 2024-03-10 PROCEDURE — 70491 CT SOFT TISSUE NECK W/DYE: CPT

## 2024-03-10 PROCEDURE — 25810000003 LACTATED RINGERS PER 1000 ML: Performed by: PHYSICIAN ASSISTANT

## 2024-03-10 PROCEDURE — 83690 ASSAY OF LIPASE: CPT | Performed by: EMERGENCY MEDICINE

## 2024-03-10 PROCEDURE — 99285 EMERGENCY DEPT VISIT HI MDM: CPT

## 2024-03-10 PROCEDURE — 25510000001 IOPAMIDOL PER 1 ML: Performed by: EMERGENCY MEDICINE

## 2024-03-10 PROCEDURE — 25010000002 KETOROLAC TROMETHAMINE PER 15 MG: Performed by: EMERGENCY MEDICINE

## 2024-03-10 PROCEDURE — 99284 EMERGENCY DEPT VISIT MOD MDM: CPT | Performed by: PHYSICIAN ASSISTANT

## 2024-03-10 PROCEDURE — 80053 COMPREHEN METABOLIC PANEL: CPT | Performed by: EMERGENCY MEDICINE

## 2024-03-10 PROCEDURE — 81001 URINALYSIS AUTO W/SCOPE: CPT | Performed by: PHYSICIAN ASSISTANT

## 2024-03-10 PROCEDURE — 71045 X-RAY EXAM CHEST 1 VIEW: CPT

## 2024-03-10 PROCEDURE — 87636 SARSCOV2 & INF A&B AMP PRB: CPT | Performed by: EMERGENCY MEDICINE

## 2024-03-10 PROCEDURE — 96374 THER/PROPH/DIAG INJ IV PUSH: CPT

## 2024-03-10 PROCEDURE — 96361 HYDRATE IV INFUSION ADD-ON: CPT

## 2024-03-10 PROCEDURE — 86308 HETEROPHILE ANTIBODY SCREEN: CPT | Performed by: PHYSICIAN ASSISTANT

## 2024-03-10 PROCEDURE — 25810000003 SODIUM CHLORIDE 0.9 % SOLUTION: Performed by: EMERGENCY MEDICINE

## 2024-03-10 PROCEDURE — 96375 TX/PRO/DX INJ NEW DRUG ADDON: CPT

## 2024-03-10 PROCEDURE — 85025 COMPLETE CBC W/AUTO DIFF WBC: CPT | Performed by: EMERGENCY MEDICINE

## 2024-03-10 PROCEDURE — 25010000002 METHYLPREDNISOLONE PER 125 MG: Performed by: PHYSICIAN ASSISTANT

## 2024-03-10 RX ORDER — HYDROCODONE BITARTRATE AND ACETAMINOPHEN 5; 325 MG/1; MG/1
1 TABLET ORAL ONCE AS NEEDED
Status: DISCONTINUED | OUTPATIENT
Start: 2024-03-10 | End: 2024-03-10 | Stop reason: HOSPADM

## 2024-03-10 RX ORDER — SODIUM CHLORIDE 0.9 % (FLUSH) 0.9 %
10 SYRINGE (ML) INJECTION AS NEEDED
Status: DISCONTINUED | OUTPATIENT
Start: 2024-03-10 | End: 2024-03-10 | Stop reason: HOSPADM

## 2024-03-10 RX ORDER — KETOROLAC TROMETHAMINE 15 MG/ML
15 INJECTION, SOLUTION INTRAMUSCULAR; INTRAVENOUS ONCE
Status: COMPLETED | OUTPATIENT
Start: 2024-03-10 | End: 2024-03-10

## 2024-03-10 RX ORDER — ALBUTEROL SULFATE 2.5 MG/3ML
2.5 SOLUTION RESPIRATORY (INHALATION) EVERY 4 HOURS PRN
Qty: 25 EACH | Refills: 0 | Status: SHIPPED | OUTPATIENT
Start: 2024-03-10

## 2024-03-10 RX ORDER — PREDNISONE 20 MG/1
40 TABLET ORAL DAILY
Qty: 6 TABLET | Refills: 0 | Status: SHIPPED | OUTPATIENT
Start: 2024-03-10 | End: 2024-03-13

## 2024-03-10 RX ORDER — CEFDINIR 300 MG/1
300 CAPSULE ORAL 2 TIMES DAILY
Qty: 20 CAPSULE | Refills: 0 | Status: SHIPPED | OUTPATIENT
Start: 2024-03-10 | End: 2024-03-20

## 2024-03-10 RX ORDER — SODIUM CHLORIDE, SODIUM LACTATE, POTASSIUM CHLORIDE, CALCIUM CHLORIDE 600; 310; 30; 20 MG/100ML; MG/100ML; MG/100ML; MG/100ML
1000 INJECTION, SOLUTION INTRAVENOUS ONCE
Status: COMPLETED | OUTPATIENT
Start: 2024-03-10 | End: 2024-03-10

## 2024-03-10 RX ORDER — METHYLPREDNISOLONE SODIUM SUCCINATE 125 MG/2ML
125 INJECTION, POWDER, LYOPHILIZED, FOR SOLUTION INTRAMUSCULAR; INTRAVENOUS ONCE
Status: COMPLETED | OUTPATIENT
Start: 2024-03-10 | End: 2024-03-10

## 2024-03-10 RX ADMIN — HYDROCODONE BITARTRATE AND ACETAMINOPHEN 1 TABLET: 5; 325 TABLET ORAL at 13:15

## 2024-03-10 RX ADMIN — KETOROLAC TROMETHAMINE 15 MG: 15 INJECTION, SOLUTION INTRAMUSCULAR; INTRAVENOUS at 09:51

## 2024-03-10 RX ADMIN — METHYLPREDNISOLONE SODIUM SUCCINATE 125 MG: 125 INJECTION, POWDER, FOR SOLUTION INTRAMUSCULAR; INTRAVENOUS at 09:51

## 2024-03-10 RX ADMIN — IOPAMIDOL 75 ML: 755 INJECTION, SOLUTION INTRAVENOUS at 11:22

## 2024-03-10 RX ADMIN — SODIUM CHLORIDE, POTASSIUM CHLORIDE, SODIUM LACTATE AND CALCIUM CHLORIDE 1000 ML: 600; 310; 30; 20 INJECTION, SOLUTION INTRAVENOUS at 10:36

## 2024-03-10 RX ADMIN — SODIUM CHLORIDE 1000 ML: 9 INJECTION, SOLUTION INTRAVENOUS at 09:26

## 2024-05-07 ENCOUNTER — OFFICE VISIT (OUTPATIENT)
Dept: NEUROLOGY | Facility: CLINIC | Age: 69
End: 2024-05-07
Payer: MEDICARE

## 2024-05-07 VITALS
HEIGHT: 61 IN | DIASTOLIC BLOOD PRESSURE: 74 MMHG | SYSTOLIC BLOOD PRESSURE: 116 MMHG | WEIGHT: 187 LBS | HEART RATE: 55 BPM | BODY MASS INDEX: 35.3 KG/M2 | OXYGEN SATURATION: 98 %

## 2024-05-07 DIAGNOSIS — R25.1 TREMOR: Primary | ICD-10-CM

## 2024-05-07 PROCEDURE — 1159F MED LIST DOCD IN RCRD: CPT | Performed by: PSYCHIATRY & NEUROLOGY

## 2024-05-07 PROCEDURE — 1160F RVW MEDS BY RX/DR IN RCRD: CPT | Performed by: PSYCHIATRY & NEUROLOGY

## 2024-05-07 PROCEDURE — 99204 OFFICE O/P NEW MOD 45 MIN: CPT | Performed by: PSYCHIATRY & NEUROLOGY

## 2024-05-07 RX ORDER — PRIMIDONE 50 MG/1
50 TABLET ORAL DAILY
Qty: 30 TABLET | Refills: 2 | Status: SHIPPED | OUTPATIENT
Start: 2024-05-07

## 2024-05-10 ENCOUNTER — PATIENT ROUNDING (BHMG ONLY) (OUTPATIENT)
Dept: NEUROLOGY | Facility: CLINIC | Age: 69
End: 2024-05-10
Payer: MEDICARE

## 2024-06-14 ENCOUNTER — TRANSCRIBE ORDERS (OUTPATIENT)
Dept: ADMINISTRATIVE | Facility: HOSPITAL | Age: 69
End: 2024-06-14
Payer: MEDICARE

## 2024-06-14 DIAGNOSIS — Z12.31 VISIT FOR SCREENING MAMMOGRAM: Primary | ICD-10-CM

## 2024-07-23 ENCOUNTER — HOSPITAL ENCOUNTER (OUTPATIENT)
Dept: MAMMOGRAPHY | Facility: HOSPITAL | Age: 69
Discharge: HOME | End: 2024-07-23
Payer: MEDICARE

## 2024-07-23 DIAGNOSIS — Z12.31 VISIT FOR SCREENING MAMMOGRAM: ICD-10-CM

## 2024-07-23 PROCEDURE — 77067 SCR MAMMO BI INCL CAD: CPT

## 2024-07-23 PROCEDURE — 77063 BREAST TOMOSYNTHESIS BI: CPT

## 2024-08-16 ENCOUNTER — PRE-ADMISSION TESTING (OUTPATIENT)
Dept: PREADMISSION TESTING | Facility: HOSPITAL | Age: 69
End: 2024-08-16
Payer: MEDICARE

## 2024-08-16 ENCOUNTER — HOSPITAL ENCOUNTER (OUTPATIENT)
Dept: GENERAL RADIOLOGY | Facility: HOSPITAL | Age: 69
Discharge: HOME | End: 2024-08-16
Payer: MEDICARE

## 2024-08-16 VITALS
RESPIRATION RATE: 18 BRPM | OXYGEN SATURATION: 97 % | WEIGHT: 191.9 LBS | BODY MASS INDEX: 37.67 KG/M2 | TEMPERATURE: 98.3 F | HEART RATE: 60 BPM | HEIGHT: 60 IN | SYSTOLIC BLOOD PRESSURE: 150 MMHG | DIASTOLIC BLOOD PRESSURE: 84 MMHG

## 2024-08-16 LAB
ALBUMIN SERPL-MCNC: 4.2 G/DL (ref 3.5–5.2)
ALBUMIN/GLOB SERPL: 1.8 G/DL
ALP SERPL-CCNC: 79 U/L (ref 39–117)
ALT SERPL W P-5'-P-CCNC: 19 U/L (ref 1–33)
ANION GAP SERPL CALCULATED.3IONS-SCNC: 8.1 MMOL/L (ref 5–15)
AST SERPL-CCNC: 16 U/L (ref 1–32)
BACTERIA UR QL AUTO: ABNORMAL /HPF
BILIRUB SERPL-MCNC: <0.2 MG/DL (ref 0–1.2)
BILIRUB UR QL STRIP: NEGATIVE
BUN SERPL-MCNC: 15 MG/DL (ref 8–23)
BUN/CREAT SERPL: 16.3 (ref 7–25)
CALCIUM SPEC-SCNC: 9.6 MG/DL (ref 8.6–10.5)
CHLORIDE SERPL-SCNC: 108 MMOL/L (ref 98–107)
CLARITY UR: CLEAR
CO2 SERPL-SCNC: 24.9 MMOL/L (ref 22–29)
COLOR UR: YELLOW
CREAT SERPL-MCNC: 0.92 MG/DL (ref 0.57–1)
DEPRECATED RDW RBC AUTO: 44 FL (ref 37–54)
EGFRCR SERPLBLD CKD-EPI 2021: 67.1 ML/MIN/1.73
ERYTHROCYTE [DISTWIDTH] IN BLOOD BY AUTOMATED COUNT: 13 % (ref 12.3–15.4)
GLOBULIN UR ELPH-MCNC: 2.3 GM/DL
GLUCOSE SERPL-MCNC: 116 MG/DL (ref 65–99)
GLUCOSE UR STRIP-MCNC: NEGATIVE MG/DL
HBA1C MFR BLD: 6 % (ref 4.8–5.6)
HCT VFR BLD AUTO: 39.8 % (ref 34–46.6)
HGB BLD-MCNC: 13 G/DL (ref 12–15.9)
HGB UR QL STRIP.AUTO: NEGATIVE
HYALINE CASTS UR QL AUTO: ABNORMAL /LPF
INR PPP: 1.05 (ref 0.9–1.1)
KETONES UR QL STRIP: NEGATIVE
LEUKOCYTE ESTERASE UR QL STRIP.AUTO: ABNORMAL
MCH RBC QN AUTO: 30.2 PG (ref 26.6–33)
MCHC RBC AUTO-ENTMCNC: 32.7 G/DL (ref 31.5–35.7)
MCV RBC AUTO: 92.3 FL (ref 79–97)
NITRITE UR QL STRIP: NEGATIVE
PH UR STRIP.AUTO: 5.5 [PH] (ref 5–8)
PLATELET # BLD AUTO: 232 10*3/MM3 (ref 140–450)
PMV BLD AUTO: 10.9 FL (ref 6–12)
POTASSIUM SERPL-SCNC: 4.3 MMOL/L (ref 3.5–5.2)
PROT SERPL-MCNC: 6.5 G/DL (ref 6–8.5)
PROT UR QL STRIP: ABNORMAL
PROTHROMBIN TIME: 13.9 SECONDS (ref 11.7–14.2)
RBC # BLD AUTO: 4.31 10*6/MM3 (ref 3.77–5.28)
RBC # UR STRIP: ABNORMAL /HPF
REF LAB TEST METHOD: ABNORMAL
SODIUM SERPL-SCNC: 141 MMOL/L (ref 136–145)
SP GR UR STRIP: >1.03 (ref 1–1.03)
SQUAMOUS #/AREA URNS HPF: ABNORMAL /HPF
UROBILINOGEN UR QL STRIP: ABNORMAL
WBC # UR STRIP: ABNORMAL /HPF
WBC NRBC COR # BLD AUTO: 6.85 10*3/MM3 (ref 3.4–10.8)

## 2024-08-16 PROCEDURE — 81001 URINALYSIS AUTO W/SCOPE: CPT

## 2024-08-16 PROCEDURE — 80053 COMPREHEN METABOLIC PANEL: CPT

## 2024-08-16 PROCEDURE — 83036 HEMOGLOBIN GLYCOSYLATED A1C: CPT

## 2024-08-16 PROCEDURE — 73560 X-RAY EXAM OF KNEE 1 OR 2: CPT

## 2024-08-16 PROCEDURE — 36415 COLL VENOUS BLD VENIPUNCTURE: CPT

## 2024-08-16 PROCEDURE — 85027 COMPLETE CBC AUTOMATED: CPT

## 2024-08-16 PROCEDURE — 85610 PROTHROMBIN TIME: CPT

## 2024-08-16 PROCEDURE — 71046 X-RAY EXAM CHEST 2 VIEWS: CPT

## 2024-08-16 RX ORDER — ALPRAZOLAM 0.25 MG/1
0.25 TABLET ORAL 2 TIMES DAILY PRN
COMMUNITY

## 2024-08-16 RX ORDER — CYANOCOBALAMIN 1000 UG/ML
INJECTION, SOLUTION INTRAMUSCULAR; SUBCUTANEOUS
COMMUNITY
Start: 2024-07-30

## 2024-08-16 RX ORDER — METOPROLOL SUCCINATE 50 MG/1
50 TABLET, EXTENDED RELEASE ORAL DAILY
COMMUNITY

## 2024-08-16 RX ORDER — TRAZODONE HYDROCHLORIDE 100 MG/1
2 TABLET ORAL
COMMUNITY

## 2024-08-16 RX ORDER — BUPROPION HYDROCHLORIDE 150 MG/1
1 TABLET ORAL NIGHTLY
COMMUNITY

## 2024-08-16 RX ORDER — ROSUVASTATIN CALCIUM 10 MG/1
1 TABLET, COATED ORAL NIGHTLY
COMMUNITY
Start: 2024-07-05

## 2024-08-22 ENCOUNTER — HOSPITAL ENCOUNTER (OUTPATIENT)
Facility: HOSPITAL | Age: 69
Discharge: HOME | End: 2024-08-25
Attending: ORTHOPAEDIC SURGERY | Admitting: ORTHOPAEDIC SURGERY
Payer: MEDICARE

## 2024-08-22 ENCOUNTER — ANESTHESIA EVENT (OUTPATIENT)
Dept: PERIOP | Facility: HOSPITAL | Age: 69
End: 2024-08-22
Payer: MEDICARE

## 2024-08-22 ENCOUNTER — ANESTHESIA (OUTPATIENT)
Dept: PERIOP | Facility: HOSPITAL | Age: 69
End: 2024-08-22
Payer: MEDICARE

## 2024-08-22 ENCOUNTER — APPOINTMENT (OUTPATIENT)
Dept: GENERAL RADIOLOGY | Facility: HOSPITAL | Age: 69
End: 2024-08-22
Payer: MEDICARE

## 2024-08-22 PROBLEM — Z96.659 KNEE JOINT REPLACEMENT STATUS: Status: ACTIVE | Noted: 2024-08-22

## 2024-08-22 LAB
ANION GAP SERPL CALCULATED.3IONS-SCNC: 10 MMOL/L (ref 5–15)
BUN SERPL-MCNC: 19 MG/DL (ref 8–23)
BUN/CREAT SERPL: 22.9 (ref 7–25)
CALCIUM SPEC-SCNC: 8.5 MG/DL (ref 8.6–10.5)
CHLORIDE SERPL-SCNC: 107 MMOL/L (ref 98–107)
CO2 SERPL-SCNC: 20 MMOL/L (ref 22–29)
CREAT SERPL-MCNC: 0.83 MG/DL (ref 0.57–1)
DEPRECATED RDW RBC AUTO: 43.9 FL (ref 37–54)
EGFRCR SERPLBLD CKD-EPI 2021: 75.9 ML/MIN/1.73
ERYTHROCYTE [DISTWIDTH] IN BLOOD BY AUTOMATED COUNT: 13 % (ref 12.3–15.4)
GLUCOSE SERPL-MCNC: 139 MG/DL (ref 65–99)
HCT VFR BLD AUTO: 35.6 % (ref 34–46.6)
HGB BLD-MCNC: 11.6 G/DL (ref 12–15.9)
MCH RBC QN AUTO: 30.3 PG (ref 26.6–33)
MCHC RBC AUTO-ENTMCNC: 32.6 G/DL (ref 31.5–35.7)
MCV RBC AUTO: 93 FL (ref 79–97)
PLATELET # BLD AUTO: 171 10*3/MM3 (ref 140–450)
PMV BLD AUTO: 11 FL (ref 6–12)
POTASSIUM SERPL-SCNC: 3.4 MMOL/L (ref 3.5–5.2)
RBC # BLD AUTO: 3.83 10*6/MM3 (ref 3.77–5.28)
SODIUM SERPL-SCNC: 137 MMOL/L (ref 136–145)
WBC NRBC COR # BLD AUTO: 8.03 10*3/MM3 (ref 3.4–10.8)

## 2024-08-22 PROCEDURE — A9270 NON-COVERED ITEM OR SERVICE: HCPCS | Performed by: ORTHOPAEDIC SURGERY

## 2024-08-22 PROCEDURE — 25010000002 CEFAZOLIN PER 500 MG: Performed by: ORTHOPAEDIC SURGERY

## 2024-08-22 PROCEDURE — 25810000003 SODIUM CHLORIDE 0.9 % SOLUTION: Performed by: ORTHOPAEDIC SURGERY

## 2024-08-22 PROCEDURE — 63710000001 OXYCODONE 5 MG TABLET: Performed by: ORTHOPAEDIC SURGERY

## 2024-08-22 PROCEDURE — 85027 COMPLETE CBC AUTOMATED: CPT | Performed by: ORTHOPAEDIC SURGERY

## 2024-08-22 PROCEDURE — 25010000002 VANCOMYCIN 1 G RECONSTITUTED SOLUTION: Performed by: ORTHOPAEDIC SURGERY

## 2024-08-22 PROCEDURE — 25010000002 SUGAMMADEX 200 MG/2ML SOLUTION

## 2024-08-22 PROCEDURE — 63710000001 ALPRAZOLAM 0.25 MG TABLET: Performed by: ORTHOPAEDIC SURGERY

## 2024-08-22 PROCEDURE — C1776 JOINT DEVICE (IMPLANTABLE): HCPCS | Performed by: ORTHOPAEDIC SURGERY

## 2024-08-22 PROCEDURE — 25810000003 LACTATED RINGERS PER 1000 ML: Performed by: STUDENT IN AN ORGANIZED HEALTH CARE EDUCATION/TRAINING PROGRAM

## 2024-08-22 PROCEDURE — 97162 PT EVAL MOD COMPLEX 30 MIN: CPT

## 2024-08-22 PROCEDURE — 25010000002 MIDAZOLAM PER 1 MG: Performed by: STUDENT IN AN ORGANIZED HEALTH CARE EDUCATION/TRAINING PROGRAM

## 2024-08-22 PROCEDURE — 97530 THERAPEUTIC ACTIVITIES: CPT

## 2024-08-22 PROCEDURE — 25010000002 DEXAMETHASONE SODIUM PHOSPHATE 20 MG/5ML SOLUTION

## 2024-08-22 PROCEDURE — 25010000002 PROPOFOL 200 MG/20ML EMULSION

## 2024-08-22 PROCEDURE — 25010000002 ONDANSETRON PER 1 MG

## 2024-08-22 PROCEDURE — 73560 X-RAY EXAM OF KNEE 1 OR 2: CPT

## 2024-08-22 PROCEDURE — 63710000001 DULOXETINE 60 MG CAPSULE DELAYED-RELEASE PARTICLES: Performed by: ORTHOPAEDIC SURGERY

## 2024-08-22 PROCEDURE — 63710000001 ASPIRIN 81 MG TABLET DELAYED-RELEASE: Performed by: ORTHOPAEDIC SURGERY

## 2024-08-22 PROCEDURE — 25010000002 GLYCOPYRROLATE 1 MG/5ML SOLUTION

## 2024-08-22 PROCEDURE — 25010000002 HYDROMORPHONE PER 4 MG

## 2024-08-22 PROCEDURE — 63710000001 TRAZODONE 100 MG TABLET: Performed by: ORTHOPAEDIC SURGERY

## 2024-08-22 PROCEDURE — 25010000002 HYDRALAZINE PER 20 MG

## 2024-08-22 PROCEDURE — 25010000002 FENTANYL CITRATE (PF) 50 MCG/ML SOLUTION

## 2024-08-22 PROCEDURE — 25010000002 HYDROMORPHONE 1 MG/ML SOLUTION: Performed by: ORTHOPAEDIC SURGERY

## 2024-08-22 PROCEDURE — 25010000002 MAGNESIUM SULFATE PER 500 MG OF MAGNESIUM

## 2024-08-22 PROCEDURE — C1713 ANCHOR/SCREW BN/BN,TIS/BN: HCPCS | Performed by: ORTHOPAEDIC SURGERY

## 2024-08-22 PROCEDURE — 80048 BASIC METABOLIC PNL TOTAL CA: CPT | Performed by: ORTHOPAEDIC SURGERY

## 2024-08-22 PROCEDURE — 63710000001 BUPROPION XL 150 MG TABLET SUSTAINED-RELEASE 24 HOUR: Performed by: ORTHOPAEDIC SURGERY

## 2024-08-22 PROCEDURE — 63710000001 FAMOTIDINE 20 MG TABLET: Performed by: ORTHOPAEDIC SURGERY

## 2024-08-22 PROCEDURE — 63710000001 ACETAMINOPHEN EXTRA STRENGTH 500 MG TABLET: Performed by: ORTHOPAEDIC SURGERY

## 2024-08-22 PROCEDURE — 25010000002 FENTANYL CITRATE (PF) 100 MCG/2ML SOLUTION

## 2024-08-22 DEVICE — JOURNEY 7.5 ROUND RESURF PAT 32MM STANDARD
Type: IMPLANTABLE DEVICE | Site: KNEE | Status: FUNCTIONAL
Brand: JOURNEY

## 2024-08-22 DEVICE — JOURNEY II BCS FEMORAL OXINIUM                                    RIGHT SIZE 2
Type: IMPLANTABLE DEVICE | Site: KNEE | Status: FUNCTIONAL
Brand: JOURNEY

## 2024-08-22 DEVICE — IMPLANTABLE DEVICE: Type: IMPLANTABLE DEVICE | Status: FUNCTIONAL

## 2024-08-22 DEVICE — DEV CONTRL TISS STRATAFIX SYMM PDS PLUS VIL CT-1 60CM: Type: IMPLANTABLE DEVICE | Site: KNEE | Status: FUNCTIONAL

## 2024-08-22 DEVICE — JOURNEY TIBIAL BASEPLATE NONPOROUS                                    RT SZ 2
Type: IMPLANTABLE DEVICE | Site: KNEE | Status: FUNCTIONAL
Brand: JOURNEY

## 2024-08-22 DEVICE — DEV CONTRL TISS STRATAFIX SPIRAL MNCRYL UD 3/0 PLS 30CM: Type: IMPLANTABLE DEVICE | Site: KNEE | Status: FUNCTIONAL

## 2024-08-22 DEVICE — PALACOS® R IS A FAST-CURING, RADIOPAQUE, POLY(METHYL METHACRYLATE)-BASED BONE CEMENT.PALACOS ® R CONTAINS THE X-RAY CONTRAST MEDIUM ZIRCONIUM DIOXIDE. TO IMPROVE VISIBILITY IN THE SURGICAL FIELD PALACOS ® R HAS BEEN COLOURED WITH CHLOROPHYLL (E141). THE BONE CEMENT IS PREPARED DIRECTLY BEFORE USE BY MIXING A POLYMER POWDER COMPONENT WITH A LIQUID MONOMER COMPONENT. A DUCTILE DOUGH FORMS WHICH CURES WITHIN A FEW MINUTES.
Type: IMPLANTABLE DEVICE | Site: KNEE | Status: FUNCTIONAL
Brand: PALACOS®

## 2024-08-22 DEVICE — JOURNEY II BCS XLPE ARTICULAR                                    INSERT SIZE 1-2 RIGHT 10MM
Type: IMPLANTABLE DEVICE | Site: KNEE | Status: FUNCTIONAL
Brand: JOURNEY

## 2024-08-22 RX ORDER — DIPHENHYDRAMINE HYDROCHLORIDE 50 MG/ML
25 INJECTION INTRAMUSCULAR; INTRAVENOUS EVERY 6 HOURS PRN
Status: DISCONTINUED | OUTPATIENT
Start: 2024-08-22 | End: 2024-08-25 | Stop reason: HOSPADM

## 2024-08-22 RX ORDER — ROCURONIUM BROMIDE 10 MG/ML
INJECTION, SOLUTION INTRAVENOUS AS NEEDED
Status: DISCONTINUED | OUTPATIENT
Start: 2024-08-22 | End: 2024-08-22 | Stop reason: SURG

## 2024-08-22 RX ORDER — MAGNESIUM SULFATE HEPTAHYDRATE 500 MG/ML
INJECTION, SOLUTION INTRAMUSCULAR; INTRAVENOUS AS NEEDED
Status: DISCONTINUED | OUTPATIENT
Start: 2024-08-22 | End: 2024-08-22 | Stop reason: SURG

## 2024-08-22 RX ORDER — DIPHENHYDRAMINE HCL 25 MG
50 CAPSULE ORAL EVERY 6 HOURS PRN
Status: DISCONTINUED | OUTPATIENT
Start: 2024-08-22 | End: 2024-08-25 | Stop reason: HOSPADM

## 2024-08-22 RX ORDER — OXYCODONE AND ACETAMINOPHEN 7.5; 325 MG/1; MG/1
1 TABLET ORAL EVERY 4 HOURS PRN
Status: DISCONTINUED | OUTPATIENT
Start: 2024-08-22 | End: 2024-08-22 | Stop reason: HOSPADM

## 2024-08-22 RX ORDER — MELOXICAM 15 MG/1
15 TABLET ORAL DAILY
Status: DISCONTINUED | OUTPATIENT
Start: 2024-08-23 | End: 2024-08-25 | Stop reason: HOSPADM

## 2024-08-22 RX ORDER — IPRATROPIUM BROMIDE AND ALBUTEROL SULFATE 2.5; .5 MG/3ML; MG/3ML
3 SOLUTION RESPIRATORY (INHALATION) ONCE AS NEEDED
Status: DISCONTINUED | OUTPATIENT
Start: 2024-08-22 | End: 2024-08-22 | Stop reason: HOSPADM

## 2024-08-22 RX ORDER — DROPERIDOL 2.5 MG/ML
0.62 INJECTION, SOLUTION INTRAMUSCULAR; INTRAVENOUS
Status: DISCONTINUED | OUTPATIENT
Start: 2024-08-22 | End: 2024-08-22 | Stop reason: HOSPADM

## 2024-08-22 RX ORDER — EPHEDRINE SULFATE 50 MG/ML
5 INJECTION, SOLUTION INTRAVENOUS ONCE AS NEEDED
Status: DISCONTINUED | OUTPATIENT
Start: 2024-08-22 | End: 2024-08-22 | Stop reason: HOSPADM

## 2024-08-22 RX ORDER — ONDANSETRON 4 MG/1
4 TABLET, FILM COATED ORAL EVERY 6 HOURS PRN
Qty: 30 TABLET | Refills: 0 | Status: SHIPPED | OUTPATIENT
Start: 2024-08-22

## 2024-08-22 RX ORDER — DIPHENHYDRAMINE HYDROCHLORIDE 50 MG/ML
12.5 INJECTION INTRAMUSCULAR; INTRAVENOUS
Status: DISCONTINUED | OUTPATIENT
Start: 2024-08-22 | End: 2024-08-22 | Stop reason: HOSPADM

## 2024-08-22 RX ORDER — PREGABALIN 75 MG/1
150 CAPSULE ORAL ONCE
Status: COMPLETED | OUTPATIENT
Start: 2024-08-22 | End: 2024-08-22

## 2024-08-22 RX ORDER — SODIUM CHLORIDE 0.9 % (FLUSH) 0.9 %
3 SYRINGE (ML) INJECTION EVERY 12 HOURS SCHEDULED
Status: DISCONTINUED | OUTPATIENT
Start: 2024-08-22 | End: 2024-08-22 | Stop reason: HOSPADM

## 2024-08-22 RX ORDER — ACETAMINOPHEN 500 MG
1000 TABLET ORAL EVERY 6 HOURS
Status: DISCONTINUED | OUTPATIENT
Start: 2024-08-22 | End: 2024-08-25 | Stop reason: HOSPADM

## 2024-08-22 RX ORDER — ACETAMINOPHEN 500 MG
1000 TABLET ORAL ONCE
Status: COMPLETED | OUTPATIENT
Start: 2024-08-22 | End: 2024-08-22

## 2024-08-22 RX ORDER — SODIUM CHLORIDE, SODIUM LACTATE, POTASSIUM CHLORIDE, CALCIUM CHLORIDE 600; 310; 30; 20 MG/100ML; MG/100ML; MG/100ML; MG/100ML
9 INJECTION, SOLUTION INTRAVENOUS CONTINUOUS
Status: DISCONTINUED | OUTPATIENT
Start: 2024-08-22 | End: 2024-08-25 | Stop reason: HOSPADM

## 2024-08-22 RX ORDER — BUPROPION HYDROCHLORIDE 150 MG/1
150 TABLET ORAL NIGHTLY
Status: DISCONTINUED | OUTPATIENT
Start: 2024-08-22 | End: 2024-08-25 | Stop reason: HOSPADM

## 2024-08-22 RX ORDER — SODIUM CHLORIDE 0.9 % (FLUSH) 0.9 %
3-10 SYRINGE (ML) INJECTION AS NEEDED
Status: DISCONTINUED | OUTPATIENT
Start: 2024-08-22 | End: 2024-08-22 | Stop reason: HOSPADM

## 2024-08-22 RX ORDER — PHENYLEPHRINE HCL IN 0.9% NACL 1 MG/10 ML
SYRINGE (ML) INTRAVENOUS AS NEEDED
Status: DISCONTINUED | OUTPATIENT
Start: 2024-08-22 | End: 2024-08-22 | Stop reason: SURG

## 2024-08-22 RX ORDER — HYDRALAZINE HYDROCHLORIDE 20 MG/ML
5 INJECTION INTRAMUSCULAR; INTRAVENOUS
Status: DISCONTINUED | OUTPATIENT
Start: 2024-08-22 | End: 2024-08-22 | Stop reason: HOSPADM

## 2024-08-22 RX ORDER — TRANEXAMIC ACID 100 MG/ML
INJECTION, SOLUTION INTRAVENOUS AS NEEDED
Status: DISCONTINUED | OUTPATIENT
Start: 2024-08-22 | End: 2024-08-22 | Stop reason: SURG

## 2024-08-22 RX ORDER — LABETALOL HYDROCHLORIDE 5 MG/ML
5 INJECTION, SOLUTION INTRAVENOUS
Status: DISCONTINUED | OUTPATIENT
Start: 2024-08-22 | End: 2024-08-22 | Stop reason: HOSPADM

## 2024-08-22 RX ORDER — FENTANYL CITRATE 50 UG/ML
INJECTION, SOLUTION INTRAMUSCULAR; INTRAVENOUS AS NEEDED
Status: DISCONTINUED | OUTPATIENT
Start: 2024-08-22 | End: 2024-08-22 | Stop reason: SURG

## 2024-08-22 RX ORDER — EPHEDRINE SULFATE 50 MG/ML
INJECTION INTRAVENOUS AS NEEDED
Status: DISCONTINUED | OUTPATIENT
Start: 2024-08-22 | End: 2024-08-22 | Stop reason: SURG

## 2024-08-22 RX ORDER — NALOXONE HCL 0.4 MG/ML
0.1 VIAL (ML) INJECTION
Status: DISCONTINUED | OUTPATIENT
Start: 2024-08-22 | End: 2024-08-25 | Stop reason: HOSPADM

## 2024-08-22 RX ORDER — ALPRAZOLAM 0.25 MG
0.25 TABLET ORAL 2 TIMES DAILY PRN
Status: DISCONTINUED | OUTPATIENT
Start: 2024-08-22 | End: 2024-08-25 | Stop reason: HOSPADM

## 2024-08-22 RX ORDER — ONDANSETRON 2 MG/ML
4 INJECTION INTRAMUSCULAR; INTRAVENOUS ONCE AS NEEDED
Status: COMPLETED | OUTPATIENT
Start: 2024-08-22 | End: 2024-08-22

## 2024-08-22 RX ORDER — LIDOCAINE HYDROCHLORIDE 10 MG/ML
0.5 INJECTION, SOLUTION INFILTRATION; PERINEURAL ONCE AS NEEDED
Status: DISCONTINUED | OUTPATIENT
Start: 2024-08-22 | End: 2024-08-22 | Stop reason: HOSPADM

## 2024-08-22 RX ORDER — MAGNESIUM HYDROXIDE 1200 MG/15ML
LIQUID ORAL AS NEEDED
Status: DISCONTINUED | OUTPATIENT
Start: 2024-08-22 | End: 2024-08-22 | Stop reason: HOSPADM

## 2024-08-22 RX ORDER — ASPIRIN 81 MG/1
81 TABLET ORAL EVERY 12 HOURS
Qty: 60 TABLET | Refills: 0 | Status: SHIPPED | OUTPATIENT
Start: 2024-08-22 | End: 2024-09-24

## 2024-08-22 RX ORDER — ONDANSETRON 4 MG/1
4 TABLET, ORALLY DISINTEGRATING ORAL EVERY 6 HOURS PRN
Status: DISCONTINUED | OUTPATIENT
Start: 2024-08-22 | End: 2024-08-25 | Stop reason: HOSPADM

## 2024-08-22 RX ORDER — HYDRALAZINE HYDROCHLORIDE 20 MG/ML
INJECTION INTRAMUSCULAR; INTRAVENOUS AS NEEDED
Status: DISCONTINUED | OUTPATIENT
Start: 2024-08-22 | End: 2024-08-22 | Stop reason: SURG

## 2024-08-22 RX ORDER — VANCOMYCIN HYDROCHLORIDE 1 G/20ML
INJECTION, POWDER, LYOPHILIZED, FOR SOLUTION INTRAVENOUS AS NEEDED
Status: DISCONTINUED | OUTPATIENT
Start: 2024-08-22 | End: 2024-08-22 | Stop reason: HOSPADM

## 2024-08-22 RX ORDER — TRAZODONE HYDROCHLORIDE 100 MG/1
200 TABLET ORAL NIGHTLY
Status: DISCONTINUED | OUTPATIENT
Start: 2024-08-22 | End: 2024-08-25 | Stop reason: HOSPADM

## 2024-08-22 RX ORDER — NALOXONE HCL 0.4 MG/ML
0.2 VIAL (ML) INJECTION AS NEEDED
Status: DISCONTINUED | OUTPATIENT
Start: 2024-08-22 | End: 2024-08-22 | Stop reason: HOSPADM

## 2024-08-22 RX ORDER — FLUMAZENIL 0.1 MG/ML
0.2 INJECTION INTRAVENOUS AS NEEDED
Status: DISCONTINUED | OUTPATIENT
Start: 2024-08-22 | End: 2024-08-22 | Stop reason: HOSPADM

## 2024-08-22 RX ORDER — OXYCODONE HYDROCHLORIDE 5 MG/1
10 TABLET ORAL EVERY 4 HOURS PRN
Status: DISCONTINUED | OUTPATIENT
Start: 2024-08-22 | End: 2024-08-25 | Stop reason: HOSPADM

## 2024-08-22 RX ORDER — METOPROLOL SUCCINATE 50 MG/1
50 TABLET, EXTENDED RELEASE ORAL DAILY
Status: DISCONTINUED | OUTPATIENT
Start: 2024-08-23 | End: 2024-08-25 | Stop reason: HOSPADM

## 2024-08-22 RX ORDER — ONDANSETRON 2 MG/ML
INJECTION INTRAMUSCULAR; INTRAVENOUS AS NEEDED
Status: DISCONTINUED | OUTPATIENT
Start: 2024-08-22 | End: 2024-08-22 | Stop reason: SURG

## 2024-08-22 RX ORDER — HYDROCODONE BITARTRATE AND ACETAMINOPHEN 5; 325 MG/1; MG/1
1 TABLET ORAL ONCE AS NEEDED
Status: COMPLETED | OUTPATIENT
Start: 2024-08-22 | End: 2024-08-22

## 2024-08-22 RX ORDER — DULOXETIN HYDROCHLORIDE 60 MG/1
60 CAPSULE, DELAYED RELEASE ORAL 2 TIMES DAILY
Status: DISCONTINUED | OUTPATIENT
Start: 2024-08-22 | End: 2024-08-25 | Stop reason: HOSPADM

## 2024-08-22 RX ORDER — OXYCODONE AND ACETAMINOPHEN 5; 325 MG/1; MG/1
1 TABLET ORAL EVERY 4 HOURS PRN
Qty: 50 TABLET | Refills: 0 | Status: SHIPPED | OUTPATIENT
Start: 2024-08-22

## 2024-08-22 RX ORDER — GLYCOPYRROLATE 0.2 MG/ML
INJECTION INTRAMUSCULAR; INTRAVENOUS AS NEEDED
Status: DISCONTINUED | OUTPATIENT
Start: 2024-08-22 | End: 2024-08-22 | Stop reason: SURG

## 2024-08-22 RX ORDER — SODIUM CHLORIDE 9 MG/ML
100 INJECTION, SOLUTION INTRAVENOUS CONTINUOUS
Status: DISCONTINUED | OUTPATIENT
Start: 2024-08-22 | End: 2024-08-25 | Stop reason: HOSPADM

## 2024-08-22 RX ORDER — ONDANSETRON 2 MG/ML
4 INJECTION INTRAMUSCULAR; INTRAVENOUS EVERY 6 HOURS PRN
Status: DISCONTINUED | OUTPATIENT
Start: 2024-08-22 | End: 2024-08-25 | Stop reason: HOSPADM

## 2024-08-22 RX ORDER — DOCUSATE SODIUM 100 MG/1
100 CAPSULE, LIQUID FILLED ORAL 2 TIMES DAILY PRN
Status: DISCONTINUED | OUTPATIENT
Start: 2024-08-22 | End: 2024-08-25 | Stop reason: HOSPADM

## 2024-08-22 RX ORDER — FAMOTIDINE 20 MG/1
40 TABLET, FILM COATED ORAL DAILY
Status: DISCONTINUED | OUTPATIENT
Start: 2024-08-22 | End: 2024-08-25 | Stop reason: HOSPADM

## 2024-08-22 RX ORDER — HYDROMORPHONE HYDROCHLORIDE 1 MG/ML
0.5 INJECTION, SOLUTION INTRAMUSCULAR; INTRAVENOUS; SUBCUTANEOUS
Status: DISCONTINUED | OUTPATIENT
Start: 2024-08-22 | End: 2024-08-22 | Stop reason: HOSPADM

## 2024-08-22 RX ORDER — MIDAZOLAM HYDROCHLORIDE 1 MG/ML
1 INJECTION INTRAMUSCULAR; INTRAVENOUS
Status: DISCONTINUED | OUTPATIENT
Start: 2024-08-22 | End: 2024-08-22 | Stop reason: HOSPADM

## 2024-08-22 RX ORDER — CEFAZOLIN SODIUM 1 G/3ML
INJECTION, POWDER, FOR SOLUTION INTRAMUSCULAR; INTRAVENOUS AS NEEDED
Status: DISCONTINUED | OUTPATIENT
Start: 2024-08-22 | End: 2024-08-22 | Stop reason: HOSPADM

## 2024-08-22 RX ORDER — LEVOTHYROXINE SODIUM 112 UG/1
112 TABLET ORAL
Status: DISCONTINUED | OUTPATIENT
Start: 2024-08-23 | End: 2024-08-25 | Stop reason: HOSPADM

## 2024-08-22 RX ORDER — ASPIRIN 81 MG/1
81 TABLET ORAL EVERY 12 HOURS SCHEDULED
Status: DISCONTINUED | OUTPATIENT
Start: 2024-08-22 | End: 2024-08-25 | Stop reason: HOSPADM

## 2024-08-22 RX ORDER — LIDOCAINE HYDROCHLORIDE 20 MG/ML
INJECTION, SOLUTION EPIDURAL; INFILTRATION; INTRACAUDAL; PERINEURAL AS NEEDED
Status: DISCONTINUED | OUTPATIENT
Start: 2024-08-22 | End: 2024-08-22 | Stop reason: SURG

## 2024-08-22 RX ORDER — PRIMIDONE 50 MG/1
50 TABLET ORAL DAILY
Status: DISCONTINUED | OUTPATIENT
Start: 2024-08-22 | End: 2024-08-25 | Stop reason: HOSPADM

## 2024-08-22 RX ORDER — CELECOXIB 200 MG/1
200 CAPSULE ORAL ONCE
Status: COMPLETED | OUTPATIENT
Start: 2024-08-22 | End: 2024-08-22

## 2024-08-22 RX ORDER — OXYCODONE HYDROCHLORIDE 5 MG/1
5 TABLET ORAL EVERY 4 HOURS PRN
Status: DISCONTINUED | OUTPATIENT
Start: 2024-08-22 | End: 2024-08-25 | Stop reason: HOSPADM

## 2024-08-22 RX ORDER — FENTANYL CITRATE 50 UG/ML
50 INJECTION, SOLUTION INTRAMUSCULAR; INTRAVENOUS
Status: DISCONTINUED | OUTPATIENT
Start: 2024-08-22 | End: 2024-08-22 | Stop reason: HOSPADM

## 2024-08-22 RX ORDER — DEXAMETHASONE SODIUM PHOSPHATE 4 MG/ML
INJECTION, SOLUTION INTRA-ARTICULAR; INTRALESIONAL; INTRAMUSCULAR; INTRAVENOUS; SOFT TISSUE AS NEEDED
Status: DISCONTINUED | OUTPATIENT
Start: 2024-08-22 | End: 2024-08-22 | Stop reason: SURG

## 2024-08-22 RX ORDER — PROPOFOL 10 MG/ML
INJECTION, EMULSION INTRAVENOUS AS NEEDED
Status: DISCONTINUED | OUTPATIENT
Start: 2024-08-22 | End: 2024-08-22 | Stop reason: SURG

## 2024-08-22 RX ADMIN — ACETAMINOPHEN 1000 MG: 500 TABLET ORAL at 11:42

## 2024-08-22 RX ADMIN — SODIUM CHLORIDE 100 ML/HR: 9 INJECTION, SOLUTION INTRAVENOUS at 11:42

## 2024-08-22 RX ADMIN — CELECOXIB 200 MG: 200 CAPSULE ORAL at 06:05

## 2024-08-22 RX ADMIN — ONDANSETRON 4 MG: 2 INJECTION, SOLUTION INTRAMUSCULAR; INTRAVENOUS at 09:13

## 2024-08-22 RX ADMIN — PREGABALIN 150 MG: 75 CAPSULE ORAL at 06:05

## 2024-08-22 RX ADMIN — Medication 100 MCG: at 07:52

## 2024-08-22 RX ADMIN — SUGAMMADEX 200 MG: 100 INJECTION, SOLUTION INTRAVENOUS at 08:12

## 2024-08-22 RX ADMIN — FENTANYL CITRATE 50 MCG: 50 INJECTION INTRAMUSCULAR; INTRAVENOUS at 07:31

## 2024-08-22 RX ADMIN — ALPRAZOLAM 0.25 MG: 0.25 TABLET ORAL at 16:55

## 2024-08-22 RX ADMIN — ACETAMINOPHEN 1000 MG: 500 TABLET ORAL at 06:05

## 2024-08-22 RX ADMIN — SODIUM CHLORIDE 2000 MG: 900 INJECTION INTRAVENOUS at 23:11

## 2024-08-22 RX ADMIN — ASPIRIN 81 MG: 81 TABLET, COATED ORAL at 21:29

## 2024-08-22 RX ADMIN — HYDRALAZINE HYDROCHLORIDE 5 MG: 20 INJECTION, SOLUTION INTRAMUSCULAR; INTRAVENOUS at 07:41

## 2024-08-22 RX ADMIN — SODIUM CHLORIDE 2000 MG: 900 INJECTION INTRAVENOUS at 07:03

## 2024-08-22 RX ADMIN — ONDANSETRON 4 MG: 2 INJECTION INTRAMUSCULAR; INTRAVENOUS at 07:20

## 2024-08-22 RX ADMIN — EPHEDRINE SULFATE 5 MG: 50 INJECTION INTRAVENOUS at 08:10

## 2024-08-22 RX ADMIN — FAMOTIDINE 40 MG: 20 TABLET, FILM COATED ORAL at 11:42

## 2024-08-22 RX ADMIN — SODIUM CHLORIDE 2000 MG: 900 INJECTION INTRAVENOUS at 16:49

## 2024-08-22 RX ADMIN — HYDROMORPHONE HYDROCHLORIDE 0.5 MG: 1 INJECTION, SOLUTION INTRAMUSCULAR; INTRAVENOUS; SUBCUTANEOUS at 10:14

## 2024-08-22 RX ADMIN — Medication 100 MCG: at 08:10

## 2024-08-22 RX ADMIN — EPHEDRINE SULFATE 10 MG: 50 INJECTION INTRAVENOUS at 07:47

## 2024-08-22 RX ADMIN — MAGNESIUM SULFATE HEPTAHYDRATE 2 G: 500 INJECTION, SOLUTION INTRAMUSCULAR; INTRAVENOUS at 07:35

## 2024-08-22 RX ADMIN — DULOXETINE HYDROCHLORIDE 60 MG: 60 CAPSULE, DELAYED RELEASE ORAL at 21:29

## 2024-08-22 RX ADMIN — HYDROMORPHONE HYDROCHLORIDE 1 MG: 1 INJECTION, SOLUTION INTRAMUSCULAR; INTRAVENOUS; SUBCUTANEOUS at 21:34

## 2024-08-22 RX ADMIN — TRAZODONE HYDROCHLORIDE 200 MG: 100 TABLET ORAL at 21:29

## 2024-08-22 RX ADMIN — SODIUM CHLORIDE, POTASSIUM CHLORIDE, SODIUM LACTATE AND CALCIUM CHLORIDE: 600; 310; 30; 20 INJECTION, SOLUTION INTRAVENOUS at 08:13

## 2024-08-22 RX ADMIN — ROCURONIUM BROMIDE 50 MG: 10 INJECTION, SOLUTION INTRAVENOUS at 07:14

## 2024-08-22 RX ADMIN — TRANEXAMIC ACID 1000 MG: 100 INJECTION, SOLUTION INTRAVENOUS at 07:23

## 2024-08-22 RX ADMIN — SODIUM CHLORIDE, POTASSIUM CHLORIDE, SODIUM LACTATE AND CALCIUM CHLORIDE 9 ML/HR: 600; 310; 30; 20 INJECTION, SOLUTION INTRAVENOUS at 07:03

## 2024-08-22 RX ADMIN — GLYCOPYRROLATE 0.2 MG: 0.2 INJECTION, SOLUTION INTRAMUSCULAR; INTRAVENOUS at 07:42

## 2024-08-22 RX ADMIN — EPHEDRINE SULFATE 5 MG: 50 INJECTION INTRAVENOUS at 07:52

## 2024-08-22 RX ADMIN — EPHEDRINE SULFATE 10 MG: 50 INJECTION INTRAVENOUS at 07:49

## 2024-08-22 RX ADMIN — OXYCODONE 10 MG: 5 TABLET ORAL at 23:11

## 2024-08-22 RX ADMIN — OXYCODONE 10 MG: 5 TABLET ORAL at 13:46

## 2024-08-22 RX ADMIN — FENTANYL CITRATE 50 MCG: 50 INJECTION INTRAMUSCULAR; INTRAVENOUS at 07:35

## 2024-08-22 RX ADMIN — LIDOCAINE HYDROCHLORIDE 100 MG: 20 INJECTION, SOLUTION EPIDURAL; INFILTRATION; INTRACAUDAL; PERINEURAL at 07:14

## 2024-08-22 RX ADMIN — DEXAMETHASONE SODIUM PHOSPHATE 8 MG: 4 INJECTION, SOLUTION INTRAMUSCULAR; INTRAVENOUS at 07:20

## 2024-08-22 RX ADMIN — ACETAMINOPHEN 1000 MG: 500 TABLET ORAL at 16:49

## 2024-08-22 RX ADMIN — BUPROPION HYDROCHLORIDE 150 MG: 150 TABLET, EXTENDED RELEASE ORAL at 21:29

## 2024-08-22 RX ADMIN — MIDAZOLAM 1 MG: 1 INJECTION INTRAMUSCULAR; INTRAVENOUS at 07:03

## 2024-08-22 RX ADMIN — PROPOFOL 150 MG: 10 INJECTION, EMULSION INTRAVENOUS at 07:14

## 2024-08-22 RX ADMIN — HYDROCODONE BITARTRATE AND ACETAMINOPHEN 1 TABLET: 5; 325 TABLET ORAL at 09:35

## 2024-08-22 RX ADMIN — FENTANYL CITRATE 50 MCG: 50 INJECTION, SOLUTION INTRAMUSCULAR; INTRAVENOUS at 08:51

## 2024-08-22 RX ADMIN — ACETAMINOPHEN 1000 MG: 500 TABLET ORAL at 23:11

## 2024-08-22 RX ADMIN — OXYCODONE 10 MG: 5 TABLET ORAL at 18:29

## 2024-08-23 PROCEDURE — A9270 NON-COVERED ITEM OR SERVICE: HCPCS | Performed by: ORTHOPAEDIC SURGERY

## 2024-08-23 PROCEDURE — 94799 UNLISTED PULMONARY SVC/PX: CPT

## 2024-08-23 PROCEDURE — 63710000001 DULOXETINE 60 MG CAPSULE DELAYED-RELEASE PARTICLES: Performed by: ORTHOPAEDIC SURGERY

## 2024-08-23 PROCEDURE — 94640 AIRWAY INHALATION TREATMENT: CPT

## 2024-08-23 PROCEDURE — 63710000001 ACETAMINOPHEN EXTRA STRENGTH 500 MG TABLET: Performed by: ORTHOPAEDIC SURGERY

## 2024-08-23 PROCEDURE — 94760 N-INVAS EAR/PLS OXIMETRY 1: CPT

## 2024-08-23 PROCEDURE — 63710000001 BUPROPION XL 150 MG TABLET SUSTAINED-RELEASE 24 HOUR: Performed by: ORTHOPAEDIC SURGERY

## 2024-08-23 PROCEDURE — 63710000001 DIPHENHYDRAMINE PER 50 MG: Performed by: ORTHOPAEDIC SURGERY

## 2024-08-23 PROCEDURE — 94664 DEMO&/EVAL PT USE INHALER: CPT

## 2024-08-23 PROCEDURE — 25010000002 HYDROMORPHONE 1 MG/ML SOLUTION: Performed by: ORTHOPAEDIC SURGERY

## 2024-08-23 PROCEDURE — 63710000001 PREGABALIN 100 MG CAPSULE: Performed by: ORTHOPAEDIC SURGERY

## 2024-08-23 PROCEDURE — 63710000001 ALPRAZOLAM 0.25 MG TABLET: Performed by: ORTHOPAEDIC SURGERY

## 2024-08-23 PROCEDURE — 63710000001 MELOXICAM 15 MG TABLET: Performed by: ORTHOPAEDIC SURGERY

## 2024-08-23 PROCEDURE — 63710000001 TRAZODONE 100 MG TABLET: Performed by: ORTHOPAEDIC SURGERY

## 2024-08-23 PROCEDURE — 63710000001 ASPIRIN 81 MG TABLET DELAYED-RELEASE: Performed by: ORTHOPAEDIC SURGERY

## 2024-08-23 PROCEDURE — 63710000001 OXYCODONE 5 MG TABLET: Performed by: ORTHOPAEDIC SURGERY

## 2024-08-23 PROCEDURE — 97530 THERAPEUTIC ACTIVITIES: CPT

## 2024-08-23 PROCEDURE — 63710000001 FAMOTIDINE 20 MG TABLET: Performed by: ORTHOPAEDIC SURGERY

## 2024-08-23 PROCEDURE — 63710000001 LEVOTHYROXINE 112 MCG TABLET: Performed by: ORTHOPAEDIC SURGERY

## 2024-08-23 PROCEDURE — 94761 N-INVAS EAR/PLS OXIMETRY MLT: CPT

## 2024-08-23 RX ORDER — ASPIRIN 81 MG/1
81 TABLET, CHEWABLE ORAL DAILY
COMMUNITY
End: 2024-08-25 | Stop reason: HOSPADM

## 2024-08-23 RX ORDER — PREGABALIN 100 MG/1
300 CAPSULE ORAL 2 TIMES DAILY
Status: DISCONTINUED | OUTPATIENT
Start: 2024-08-23 | End: 2024-08-23

## 2024-08-23 RX ORDER — SENNOSIDES 8.6 MG
800 CAPSULE ORAL 4 TIMES DAILY PRN
COMMUNITY
End: 2024-08-25 | Stop reason: HOSPADM

## 2024-08-23 RX ORDER — ALBUTEROL SULFATE 0.83 MG/ML
2.5 SOLUTION RESPIRATORY (INHALATION) EVERY 4 HOURS PRN
Status: DISCONTINUED | OUTPATIENT
Start: 2024-08-23 | End: 2024-08-25 | Stop reason: HOSPADM

## 2024-08-23 RX ORDER — PREGABALIN 100 MG/1
300 CAPSULE ORAL 2 TIMES DAILY
COMMUNITY

## 2024-08-23 RX ORDER — PREGABALIN 100 MG/1
300 CAPSULE ORAL 2 TIMES DAILY
Status: DISCONTINUED | OUTPATIENT
Start: 2024-08-23 | End: 2024-08-25 | Stop reason: HOSPADM

## 2024-08-23 RX ADMIN — DULOXETINE HYDROCHLORIDE 60 MG: 60 CAPSULE, DELAYED RELEASE ORAL at 09:36

## 2024-08-23 RX ADMIN — ASPIRIN 81 MG: 81 TABLET, COATED ORAL at 09:36

## 2024-08-23 RX ADMIN — OXYCODONE 10 MG: 5 TABLET ORAL at 15:12

## 2024-08-23 RX ADMIN — HYDROMORPHONE HYDROCHLORIDE 1 MG: 1 INJECTION, SOLUTION INTRAMUSCULAR; INTRAVENOUS; SUBCUTANEOUS at 04:32

## 2024-08-23 RX ADMIN — PREGABALIN 300 MG: 100 CAPSULE ORAL at 14:16

## 2024-08-23 RX ADMIN — DIPHENHYDRAMINE HYDROCHLORIDE 50 MG: 25 CAPSULE ORAL at 07:34

## 2024-08-23 RX ADMIN — OXYCODONE 10 MG: 5 TABLET ORAL at 09:36

## 2024-08-23 RX ADMIN — DULOXETINE HYDROCHLORIDE 60 MG: 60 CAPSULE, DELAYED RELEASE ORAL at 21:56

## 2024-08-23 RX ADMIN — BUPROPION HYDROCHLORIDE 150 MG: 150 TABLET, EXTENDED RELEASE ORAL at 21:56

## 2024-08-23 RX ADMIN — PREGABALIN 300 MG: 100 CAPSULE ORAL at 21:56

## 2024-08-23 RX ADMIN — MELOXICAM 15 MG: 15 TABLET ORAL at 09:36

## 2024-08-23 RX ADMIN — ACETAMINOPHEN 1000 MG: 500 TABLET ORAL at 11:12

## 2024-08-23 RX ADMIN — ACETAMINOPHEN 1000 MG: 500 TABLET ORAL at 17:33

## 2024-08-23 RX ADMIN — OXYCODONE 10 MG: 5 TABLET ORAL at 03:22

## 2024-08-23 RX ADMIN — FAMOTIDINE 40 MG: 20 TABLET, FILM COATED ORAL at 09:36

## 2024-08-23 RX ADMIN — HYDROMORPHONE HYDROCHLORIDE 1 MG: 1 INJECTION, SOLUTION INTRAMUSCULAR; INTRAVENOUS; SUBCUTANEOUS at 11:29

## 2024-08-23 RX ADMIN — ALPRAZOLAM 0.25 MG: 0.25 TABLET ORAL at 09:40

## 2024-08-23 RX ADMIN — OXYCODONE 10 MG: 5 TABLET ORAL at 21:56

## 2024-08-23 RX ADMIN — HYDROMORPHONE HYDROCHLORIDE 1 MG: 1 INJECTION, SOLUTION INTRAMUSCULAR; INTRAVENOUS; SUBCUTANEOUS at 17:33

## 2024-08-23 RX ADMIN — DIPHENHYDRAMINE HYDROCHLORIDE 50 MG: 25 CAPSULE ORAL at 17:33

## 2024-08-23 RX ADMIN — LEVOTHYROXINE SODIUM 112 MCG: 112 TABLET ORAL at 06:32

## 2024-08-23 RX ADMIN — TRAZODONE HYDROCHLORIDE 200 MG: 100 TABLET ORAL at 21:55

## 2024-08-23 RX ADMIN — ALBUTEROL SULFATE 2.5 MG: 2.5 SOLUTION RESPIRATORY (INHALATION) at 14:42

## 2024-08-23 RX ADMIN — ASPIRIN 81 MG: 81 TABLET, COATED ORAL at 21:56

## 2024-08-24 PROCEDURE — A9270 NON-COVERED ITEM OR SERVICE: HCPCS | Performed by: ORTHOPAEDIC SURGERY

## 2024-08-24 PROCEDURE — 63710000001 PREGABALIN 100 MG CAPSULE: Performed by: ORTHOPAEDIC SURGERY

## 2024-08-24 PROCEDURE — 63710000001 LEVOTHYROXINE 112 MCG TABLET: Performed by: ORTHOPAEDIC SURGERY

## 2024-08-24 PROCEDURE — 63710000001 DULOXETINE 60 MG CAPSULE DELAYED-RELEASE PARTICLES: Performed by: ORTHOPAEDIC SURGERY

## 2024-08-24 PROCEDURE — 63710000001 FAMOTIDINE 20 MG TABLET: Performed by: ORTHOPAEDIC SURGERY

## 2024-08-24 PROCEDURE — 63710000001 TRAZODONE 100 MG TABLET: Performed by: ORTHOPAEDIC SURGERY

## 2024-08-24 PROCEDURE — 63710000001 PRIMIDONE 50 MG TABLET: Performed by: ORTHOPAEDIC SURGERY

## 2024-08-24 PROCEDURE — 97530 THERAPEUTIC ACTIVITIES: CPT | Performed by: PHYSICAL THERAPIST

## 2024-08-24 PROCEDURE — 63710000001 ACETAMINOPHEN EXTRA STRENGTH 500 MG TABLET: Performed by: ORTHOPAEDIC SURGERY

## 2024-08-24 PROCEDURE — 63710000001 BUPROPION XL 150 MG TABLET SUSTAINED-RELEASE 24 HOUR: Performed by: ORTHOPAEDIC SURGERY

## 2024-08-24 PROCEDURE — 63710000001 ASPIRIN 81 MG TABLET DELAYED-RELEASE: Performed by: ORTHOPAEDIC SURGERY

## 2024-08-24 PROCEDURE — 63710000001 OXYCODONE 5 MG TABLET: Performed by: ORTHOPAEDIC SURGERY

## 2024-08-24 PROCEDURE — 63710000001 DIPHENHYDRAMINE PER 50 MG: Performed by: ORTHOPAEDIC SURGERY

## 2024-08-24 PROCEDURE — 63710000001 MELOXICAM 15 MG TABLET: Performed by: ORTHOPAEDIC SURGERY

## 2024-08-24 PROCEDURE — 25010000002 HYDROMORPHONE 1 MG/ML SOLUTION: Performed by: ORTHOPAEDIC SURGERY

## 2024-08-24 PROCEDURE — 63710000001 METOPROLOL SUCCINATE XL 50 MG TABLET SUSTAINED-RELEASE 24 HOUR: Performed by: ORTHOPAEDIC SURGERY

## 2024-08-24 RX ADMIN — HYDROMORPHONE HYDROCHLORIDE 1 MG: 1 INJECTION, SOLUTION INTRAMUSCULAR; INTRAVENOUS; SUBCUTANEOUS at 05:14

## 2024-08-24 RX ADMIN — METOPROLOL SUCCINATE 50 MG: 50 TABLET, FILM COATED, EXTENDED RELEASE ORAL at 08:46

## 2024-08-24 RX ADMIN — PREGABALIN 300 MG: 100 CAPSULE ORAL at 22:35

## 2024-08-24 RX ADMIN — BUPROPION HYDROCHLORIDE 150 MG: 150 TABLET, EXTENDED RELEASE ORAL at 22:34

## 2024-08-24 RX ADMIN — OXYCODONE 10 MG: 5 TABLET ORAL at 18:34

## 2024-08-24 RX ADMIN — DULOXETINE HYDROCHLORIDE 60 MG: 60 CAPSULE, DELAYED RELEASE ORAL at 08:46

## 2024-08-24 RX ADMIN — DULOXETINE HYDROCHLORIDE 60 MG: 60 CAPSULE, DELAYED RELEASE ORAL at 22:34

## 2024-08-24 RX ADMIN — TRAZODONE HYDROCHLORIDE 200 MG: 100 TABLET ORAL at 22:34

## 2024-08-24 RX ADMIN — PRIMIDONE 50 MG: 50 TABLET ORAL at 08:46

## 2024-08-24 RX ADMIN — ACETAMINOPHEN 1000 MG: 500 TABLET ORAL at 16:46

## 2024-08-24 RX ADMIN — MELOXICAM 15 MG: 15 TABLET ORAL at 08:46

## 2024-08-24 RX ADMIN — OXYCODONE 5 MG: 5 TABLET ORAL at 14:36

## 2024-08-24 RX ADMIN — DIPHENHYDRAMINE HYDROCHLORIDE 50 MG: 25 CAPSULE ORAL at 08:56

## 2024-08-24 RX ADMIN — ASPIRIN 81 MG: 81 TABLET, COATED ORAL at 08:46

## 2024-08-24 RX ADMIN — ASPIRIN 81 MG: 81 TABLET, COATED ORAL at 22:35

## 2024-08-24 RX ADMIN — OXYCODONE 10 MG: 5 TABLET ORAL at 08:46

## 2024-08-24 RX ADMIN — PREGABALIN 300 MG: 100 CAPSULE ORAL at 08:46

## 2024-08-24 RX ADMIN — LEVOTHYROXINE SODIUM 112 MCG: 112 TABLET ORAL at 05:14

## 2024-08-24 RX ADMIN — OXYCODONE 10 MG: 5 TABLET ORAL at 03:52

## 2024-08-24 RX ADMIN — OXYCODONE 10 MG: 5 TABLET ORAL at 23:51

## 2024-08-24 RX ADMIN — FAMOTIDINE 40 MG: 20 TABLET, FILM COATED ORAL at 08:46

## 2024-08-25 VITALS
HEART RATE: 57 BPM | HEIGHT: 60 IN | TEMPERATURE: 99 F | BODY MASS INDEX: 37.67 KG/M2 | OXYGEN SATURATION: 96 % | WEIGHT: 191.9 LBS | SYSTOLIC BLOOD PRESSURE: 139 MMHG | DIASTOLIC BLOOD PRESSURE: 65 MMHG | RESPIRATION RATE: 16 BRPM

## 2024-08-25 PROCEDURE — A9270 NON-COVERED ITEM OR SERVICE: HCPCS | Performed by: ORTHOPAEDIC SURGERY

## 2024-08-25 PROCEDURE — 63710000001 ACETAMINOPHEN EXTRA STRENGTH 500 MG TABLET: Performed by: ORTHOPAEDIC SURGERY

## 2024-08-25 PROCEDURE — 63710000001 OXYCODONE 5 MG TABLET: Performed by: ORTHOPAEDIC SURGERY

## 2024-08-25 PROCEDURE — 63710000001 PRIMIDONE 50 MG TABLET: Performed by: ORTHOPAEDIC SURGERY

## 2024-08-25 PROCEDURE — 63710000001 ASPIRIN 81 MG TABLET DELAYED-RELEASE: Performed by: ORTHOPAEDIC SURGERY

## 2024-08-25 PROCEDURE — 63710000001 DULOXETINE 60 MG CAPSULE DELAYED-RELEASE PARTICLES: Performed by: ORTHOPAEDIC SURGERY

## 2024-08-25 PROCEDURE — 63710000001 METOPROLOL SUCCINATE XL 50 MG TABLET SUSTAINED-RELEASE 24 HOUR: Performed by: ORTHOPAEDIC SURGERY

## 2024-08-25 PROCEDURE — 63710000001 MELOXICAM 15 MG TABLET: Performed by: ORTHOPAEDIC SURGERY

## 2024-08-25 PROCEDURE — 63710000001 FAMOTIDINE 20 MG TABLET: Performed by: ORTHOPAEDIC SURGERY

## 2024-08-25 PROCEDURE — 63710000001 PREGABALIN 100 MG CAPSULE: Performed by: ORTHOPAEDIC SURGERY

## 2024-08-25 PROCEDURE — 97530 THERAPEUTIC ACTIVITIES: CPT | Performed by: PHYSICAL THERAPIST

## 2024-08-25 PROCEDURE — 63710000001 LEVOTHYROXINE 112 MCG TABLET: Performed by: ORTHOPAEDIC SURGERY

## 2024-08-25 RX ADMIN — ACETAMINOPHEN 1000 MG: 500 TABLET ORAL at 12:15

## 2024-08-25 RX ADMIN — LEVOTHYROXINE SODIUM 112 MCG: 112 TABLET ORAL at 06:42

## 2024-08-25 RX ADMIN — PREGABALIN 300 MG: 100 CAPSULE ORAL at 08:51

## 2024-08-25 RX ADMIN — FAMOTIDINE 40 MG: 20 TABLET, FILM COATED ORAL at 08:51

## 2024-08-25 RX ADMIN — ASPIRIN 81 MG: 81 TABLET, COATED ORAL at 08:51

## 2024-08-25 RX ADMIN — DULOXETINE HYDROCHLORIDE 60 MG: 60 CAPSULE, DELAYED RELEASE ORAL at 08:51

## 2024-08-25 RX ADMIN — ACETAMINOPHEN 1000 MG: 500 TABLET ORAL at 06:42

## 2024-08-25 RX ADMIN — PRIMIDONE 50 MG: 50 TABLET ORAL at 08:50

## 2024-08-25 RX ADMIN — METOPROLOL SUCCINATE 50 MG: 50 TABLET, FILM COATED, EXTENDED RELEASE ORAL at 08:47

## 2024-08-25 RX ADMIN — MELOXICAM 15 MG: 15 TABLET ORAL at 08:51

## 2024-08-25 RX ADMIN — OXYCODONE 5 MG: 5 TABLET ORAL at 08:51

## 2024-08-29 ENCOUNTER — TELEPHONE (OUTPATIENT)
Age: 69
End: 2024-08-29
Payer: MEDICARE

## 2024-08-30 RX ORDER — OXYCODONE AND ACETAMINOPHEN 5; 325 MG/1; MG/1
1 TABLET ORAL EVERY 4 HOURS PRN
Qty: 50 TABLET | Refills: 0 | OUTPATIENT
Start: 2024-08-30

## 2024-09-02 PROBLEM — B96.20 BACTEREMIA DUE TO ESCHERICHIA COLI: Status: RESOLVED | Noted: 2023-09-05 | Resolved: 2024-09-02

## 2024-09-02 PROBLEM — I25.10 CORONARY ARTERIOSCLEROSIS: Status: ACTIVE | Noted: 2024-02-01

## 2024-09-02 PROBLEM — N39.0 UTI (URINARY TRACT INFECTION): Status: RESOLVED | Noted: 2023-09-04 | Resolved: 2024-09-02

## 2024-09-02 PROBLEM — R78.81 BACTEREMIA DUE TO ESCHERICHIA COLI: Status: RESOLVED | Noted: 2023-09-05 | Resolved: 2024-09-02

## 2024-09-02 PROBLEM — D51.0 PERNICIOUS ANEMIA: Status: ACTIVE | Noted: 2017-10-23

## 2024-09-02 PROBLEM — M17.11 UNILATERAL PRIMARY OSTEOARTHRITIS, RIGHT KNEE: Status: ACTIVE | Noted: 2024-08-14

## 2024-09-02 PROBLEM — M79.7 FIBROMYALGIA, PRIMARY: Status: ACTIVE | Noted: 2023-10-16

## 2024-09-02 PROBLEM — N95.8 GENITOURINARY SYNDROME OF MENOPAUSE: Status: ACTIVE | Noted: 2023-10-23

## 2024-09-02 PROBLEM — E03.9 HYPOTHYROIDISM: Chronic | Status: ACTIVE | Noted: 2017-10-23

## 2024-09-02 PROBLEM — E78.5 HYPERLIPIDEMIA: Chronic | Status: ACTIVE | Noted: 2017-10-23

## 2024-09-03 ENCOUNTER — HOSPITAL ENCOUNTER (OUTPATIENT)
Facility: HOSPITAL | Age: 69
Setting detail: OBSERVATION
Discharge: HOME | End: 2024-09-04
Attending: EMERGENCY MEDICINE | Admitting: HOSPITALIST
Payer: MEDICARE

## 2024-09-03 ENCOUNTER — TELEPHONE (OUTPATIENT)
Age: 69
End: 2024-09-03

## 2024-09-03 DIAGNOSIS — Z87.448 HISTORY OF RENAL INSUFFICIENCY: ICD-10-CM

## 2024-09-03 DIAGNOSIS — N39.0 URINARY TRACT INFECTION IN FEMALE: Primary | ICD-10-CM

## 2024-09-03 DIAGNOSIS — Z86.79 HISTORY OF HYPERTENSION: ICD-10-CM

## 2024-09-03 DIAGNOSIS — Z86.69 HISTORY OF SLEEP APNEA: ICD-10-CM

## 2024-09-03 DIAGNOSIS — G93.40 ACUTE ENCEPHALOPATHY: ICD-10-CM

## 2024-09-03 LAB
ALBUMIN SERPL-MCNC: 4 G/DL (ref 3.5–5.2)
ALBUMIN/GLOB SERPL: 1.5 G/DL
ALP SERPL-CCNC: 94 U/L (ref 39–117)
ALT SERPL W P-5'-P-CCNC: 9 U/L (ref 1–33)
ANION GAP SERPL CALCULATED.3IONS-SCNC: 12.3 MMOL/L (ref 5–15)
AST SERPL-CCNC: 14 U/L (ref 1–32)
B PARAPERT DNA SPEC QL NAA+PROBE: NOT DETECTED
B PERT DNA SPEC QL NAA+PROBE: NOT DETECTED
BACTERIA UR QL AUTO: ABNORMAL /HPF
BASOPHILS # BLD AUTO: 0.05 10*3/MM3 (ref 0–0.2)
BASOPHILS NFR BLD AUTO: 0.5 % (ref 0–1.5)
BILIRUB SERPL-MCNC: 0.4 MG/DL (ref 0–1.2)
BILIRUB UR QL STRIP: NEGATIVE
BUN SERPL-MCNC: 10 MG/DL (ref 8–23)
BUN/CREAT SERPL: 12.3 (ref 7–25)
C PNEUM DNA NPH QL NAA+NON-PROBE: NOT DETECTED
CALCIUM SPEC-SCNC: 10 MG/DL (ref 8.6–10.5)
CHLORIDE SERPL-SCNC: 103 MMOL/L (ref 98–107)
CLARITY UR: CLEAR
CO2 SERPL-SCNC: 25.7 MMOL/L (ref 22–29)
COLOR UR: YELLOW
CREAT SERPL-MCNC: 0.81 MG/DL (ref 0.57–1)
DEPRECATED RDW RBC AUTO: 43.1 FL (ref 37–54)
EGFRCR SERPLBLD CKD-EPI 2021: 78.2 ML/MIN/1.73
EOSINOPHIL # BLD AUTO: 0.44 10*3/MM3 (ref 0–0.4)
EOSINOPHIL NFR BLD AUTO: 4.5 % (ref 0.3–6.2)
ERYTHROCYTE [DISTWIDTH] IN BLOOD BY AUTOMATED COUNT: 13 % (ref 12.3–15.4)
FLUAV SUBTYP SPEC NAA+PROBE: NOT DETECTED
FLUBV RNA ISLT QL NAA+PROBE: NOT DETECTED
GLOBULIN UR ELPH-MCNC: 2.6 GM/DL
GLUCOSE SERPL-MCNC: 111 MG/DL (ref 65–99)
GLUCOSE UR STRIP-MCNC: NEGATIVE MG/DL
HADV DNA SPEC NAA+PROBE: NOT DETECTED
HCOV 229E RNA SPEC QL NAA+PROBE: NOT DETECTED
HCOV HKU1 RNA SPEC QL NAA+PROBE: NOT DETECTED
HCOV NL63 RNA SPEC QL NAA+PROBE: NOT DETECTED
HCOV OC43 RNA SPEC QL NAA+PROBE: NOT DETECTED
HCT VFR BLD AUTO: 38.4 % (ref 34–46.6)
HGB BLD-MCNC: 12.7 G/DL (ref 12–15.9)
HGB UR QL STRIP.AUTO: NEGATIVE
HMPV RNA NPH QL NAA+NON-PROBE: NOT DETECTED
HPIV1 RNA ISLT QL NAA+PROBE: NOT DETECTED
HPIV2 RNA SPEC QL NAA+PROBE: NOT DETECTED
HPIV3 RNA NPH QL NAA+PROBE: NOT DETECTED
HPIV4 P GENE NPH QL NAA+PROBE: NOT DETECTED
HYALINE CASTS UR QL AUTO: ABNORMAL /LPF
IMM GRANULOCYTES # BLD AUTO: 0.06 10*3/MM3 (ref 0–0.05)
IMM GRANULOCYTES NFR BLD AUTO: 0.6 % (ref 0–0.5)
KETONES UR QL STRIP: ABNORMAL
LEUKOCYTE ESTERASE UR QL STRIP.AUTO: ABNORMAL
LYMPHOCYTES # BLD AUTO: 1.55 10*3/MM3 (ref 0.7–3.1)
LYMPHOCYTES NFR BLD AUTO: 15.8 % (ref 19.6–45.3)
M PNEUMO IGG SER IA-ACNC: NOT DETECTED
MAGNESIUM SERPL-MCNC: 1.8 MG/DL (ref 1.6–2.4)
MCH RBC QN AUTO: 30.5 PG (ref 26.6–33)
MCHC RBC AUTO-ENTMCNC: 33.1 G/DL (ref 31.5–35.7)
MCV RBC AUTO: 92.1 FL (ref 79–97)
MONOCYTES # BLD AUTO: 0.45 10*3/MM3 (ref 0.1–0.9)
MONOCYTES NFR BLD AUTO: 4.6 % (ref 5–12)
NEUTROPHILS NFR BLD AUTO: 7.23 10*3/MM3 (ref 1.7–7)
NEUTROPHILS NFR BLD AUTO: 74 % (ref 42.7–76)
NITRITE UR QL STRIP: POSITIVE
NRBC BLD AUTO-RTO: 0 /100 WBC (ref 0–0.2)
PH UR STRIP.AUTO: 6.5 [PH] (ref 5–8)
PLATELET # BLD AUTO: 303 10*3/MM3 (ref 140–450)
PMV BLD AUTO: 10.4 FL (ref 6–12)
POTASSIUM SERPL-SCNC: 4 MMOL/L (ref 3.5–5.2)
PROT SERPL-MCNC: 6.6 G/DL (ref 6–8.5)
PROT UR QL STRIP: NEGATIVE
RBC # BLD AUTO: 4.17 10*6/MM3 (ref 3.77–5.28)
RBC # UR STRIP: ABNORMAL /HPF
REF LAB TEST METHOD: ABNORMAL
RHINOVIRUS RNA SPEC NAA+PROBE: NOT DETECTED
RSV RNA NPH QL NAA+NON-PROBE: NOT DETECTED
SARS-COV-2 RNA NPH QL NAA+NON-PROBE: NOT DETECTED
SODIUM SERPL-SCNC: 141 MMOL/L (ref 136–145)
SP GR UR STRIP: 1.01 (ref 1–1.03)
SQUAMOUS #/AREA URNS HPF: ABNORMAL /HPF
UROBILINOGEN UR QL STRIP: ABNORMAL
WBC # UR STRIP: ABNORMAL /HPF
WBC NRBC COR # BLD AUTO: 9.78 10*3/MM3 (ref 3.4–10.8)

## 2024-09-03 PROCEDURE — 81001 URINALYSIS AUTO W/SCOPE: CPT | Performed by: EMERGENCY MEDICINE

## 2024-09-03 PROCEDURE — 96375 TX/PRO/DX INJ NEW DRUG ADDON: CPT

## 2024-09-03 PROCEDURE — 85025 COMPLETE CBC W/AUTO DIFF WBC: CPT | Performed by: EMERGENCY MEDICINE

## 2024-09-03 PROCEDURE — 87086 URINE CULTURE/COLONY COUNT: CPT | Performed by: EMERGENCY MEDICINE

## 2024-09-03 PROCEDURE — G0378 HOSPITAL OBSERVATION PER HR: HCPCS

## 2024-09-03 PROCEDURE — 25810000003 SODIUM CHLORIDE 0.9 % SOLUTION: Performed by: EMERGENCY MEDICINE

## 2024-09-03 PROCEDURE — 87186 SC STD MICRODIL/AGAR DIL: CPT | Performed by: EMERGENCY MEDICINE

## 2024-09-03 PROCEDURE — 99285 EMERGENCY DEPT VISIT HI MDM: CPT

## 2024-09-03 PROCEDURE — 25010000002 ONDANSETRON PER 1 MG: Performed by: INTERNAL MEDICINE

## 2024-09-03 PROCEDURE — 25010000002 CEFTRIAXONE PER 250 MG: Performed by: EMERGENCY MEDICINE

## 2024-09-03 PROCEDURE — 36415 COLL VENOUS BLD VENIPUNCTURE: CPT

## 2024-09-03 PROCEDURE — 83735 ASSAY OF MAGNESIUM: CPT | Performed by: EMERGENCY MEDICINE

## 2024-09-03 PROCEDURE — 0202U NFCT DS 22 TRGT SARS-COV-2: CPT | Performed by: EMERGENCY MEDICINE

## 2024-09-03 PROCEDURE — 80053 COMPREHEN METABOLIC PANEL: CPT | Performed by: EMERGENCY MEDICINE

## 2024-09-03 PROCEDURE — 87077 CULTURE AEROBIC IDENTIFY: CPT | Performed by: EMERGENCY MEDICINE

## 2024-09-03 PROCEDURE — 25010000002 FLUCONAZOLE PER 200 MG: Performed by: INTERNAL MEDICINE

## 2024-09-03 PROCEDURE — 96365 THER/PROPH/DIAG IV INF INIT: CPT

## 2024-09-03 RX ORDER — PANTOPRAZOLE SODIUM 40 MG/1
40 TABLET, DELAYED RELEASE ORAL
Status: DISCONTINUED | OUTPATIENT
Start: 2024-09-04 | End: 2024-09-04 | Stop reason: HOSPADM

## 2024-09-03 RX ORDER — ACETAMINOPHEN 650 MG/1
650 SUPPOSITORY RECTAL EVERY 4 HOURS PRN
Status: DISCONTINUED | OUTPATIENT
Start: 2024-09-03 | End: 2024-09-04

## 2024-09-03 RX ORDER — BISACODYL 5 MG/1
5 TABLET, DELAYED RELEASE ORAL DAILY PRN
Status: DISCONTINUED | OUTPATIENT
Start: 2024-09-03 | End: 2024-09-04 | Stop reason: HOSPADM

## 2024-09-03 RX ORDER — PREGABALIN 100 MG/1
300 CAPSULE ORAL 2 TIMES DAILY
Status: DISCONTINUED | OUTPATIENT
Start: 2024-09-03 | End: 2024-09-04 | Stop reason: HOSPADM

## 2024-09-03 RX ORDER — FLUCONAZOLE 2 MG/ML
200 INJECTION, SOLUTION INTRAVENOUS EVERY 24 HOURS
Status: DISCONTINUED | OUTPATIENT
Start: 2024-09-03 | End: 2024-09-04 | Stop reason: HOSPADM

## 2024-09-03 RX ORDER — BUPROPION HYDROCHLORIDE 150 MG/1
150 TABLET ORAL NIGHTLY
Status: DISCONTINUED | OUTPATIENT
Start: 2024-09-03 | End: 2024-09-04 | Stop reason: HOSPADM

## 2024-09-03 RX ORDER — ONDANSETRON 2 MG/ML
4 INJECTION INTRAMUSCULAR; INTRAVENOUS EVERY 6 HOURS PRN
Status: DISCONTINUED | OUTPATIENT
Start: 2024-09-03 | End: 2024-09-04 | Stop reason: HOSPADM

## 2024-09-03 RX ORDER — ROSUVASTATIN CALCIUM 10 MG/1
10 TABLET, COATED ORAL NIGHTLY
Status: DISCONTINUED | OUTPATIENT
Start: 2024-09-03 | End: 2024-09-04 | Stop reason: HOSPADM

## 2024-09-03 RX ORDER — LEVOTHYROXINE SODIUM 88 UG/1
88 TABLET ORAL
Status: DISCONTINUED | OUTPATIENT
Start: 2024-09-04 | End: 2024-09-04 | Stop reason: HOSPADM

## 2024-09-03 RX ORDER — OXYCODONE AND ACETAMINOPHEN 5; 325 MG/1; MG/1
1 TABLET ORAL EVERY 4 HOURS PRN
Status: DISCONTINUED | OUTPATIENT
Start: 2024-09-03 | End: 2024-09-04 | Stop reason: HOSPADM

## 2024-09-03 RX ORDER — METOPROLOL SUCCINATE 50 MG/1
50 TABLET, EXTENDED RELEASE ORAL DAILY
Status: DISCONTINUED | OUTPATIENT
Start: 2024-09-03 | End: 2024-09-04 | Stop reason: HOSPADM

## 2024-09-03 RX ORDER — ASPIRIN 81 MG/1
81 TABLET ORAL 2 TIMES DAILY
Status: DISCONTINUED | OUTPATIENT
Start: 2024-09-03 | End: 2024-09-04 | Stop reason: HOSPADM

## 2024-09-03 RX ORDER — ACETAMINOPHEN 160 MG/5ML
650 SOLUTION ORAL EVERY 4 HOURS PRN
Status: DISCONTINUED | OUTPATIENT
Start: 2024-09-03 | End: 2024-09-04

## 2024-09-03 RX ORDER — ACETAMINOPHEN 325 MG/1
650 TABLET ORAL EVERY 4 HOURS PRN
Status: DISCONTINUED | OUTPATIENT
Start: 2024-09-03 | End: 2024-09-04 | Stop reason: HOSPADM

## 2024-09-03 RX ORDER — PHENAZOPYRIDINE HYDROCHLORIDE 200 MG/1
200 TABLET, FILM COATED ORAL
Status: DISCONTINUED | OUTPATIENT
Start: 2024-09-03 | End: 2024-09-04 | Stop reason: HOSPADM

## 2024-09-03 RX ORDER — DULOXETIN HYDROCHLORIDE 60 MG/1
60 CAPSULE, DELAYED RELEASE ORAL 2 TIMES DAILY
Status: DISCONTINUED | OUTPATIENT
Start: 2024-09-03 | End: 2024-09-04 | Stop reason: HOSPADM

## 2024-09-03 RX ORDER — BISACODYL 10 MG
10 SUPPOSITORY, RECTAL RECTAL DAILY PRN
Status: DISCONTINUED | OUTPATIENT
Start: 2024-09-03 | End: 2024-09-04 | Stop reason: HOSPADM

## 2024-09-03 RX ORDER — ONDANSETRON 4 MG/1
4 TABLET, ORALLY DISINTEGRATING ORAL EVERY 6 HOURS PRN
Status: DISCONTINUED | OUTPATIENT
Start: 2024-09-03 | End: 2024-09-04 | Stop reason: HOSPADM

## 2024-09-03 RX ORDER — POLYETHYLENE GLYCOL 3350 17 G/17G
17 POWDER, FOR SOLUTION ORAL DAILY PRN
Status: DISCONTINUED | OUTPATIENT
Start: 2024-09-03 | End: 2024-09-04 | Stop reason: HOSPADM

## 2024-09-03 RX ORDER — AMOXICILLIN 250 MG
2 CAPSULE ORAL 2 TIMES DAILY PRN
Status: DISCONTINUED | OUTPATIENT
Start: 2024-09-03 | End: 2024-09-04 | Stop reason: HOSPADM

## 2024-09-03 RX ORDER — OXYCODONE AND ACETAMINOPHEN 5; 325 MG/1; MG/1
1 TABLET ORAL ONCE
Status: COMPLETED | OUTPATIENT
Start: 2024-09-03 | End: 2024-09-03

## 2024-09-03 RX ORDER — ALBUTEROL SULFATE 0.83 MG/ML
2.5 SOLUTION RESPIRATORY (INHALATION) EVERY 4 HOURS PRN
Status: DISCONTINUED | OUTPATIENT
Start: 2024-09-03 | End: 2024-09-04 | Stop reason: HOSPADM

## 2024-09-03 RX ADMIN — PHENAZOPYRIDINE HYDROCHLORIDE 200 MG: 200 TABLET ORAL at 21:19

## 2024-09-03 RX ADMIN — ONDANSETRON 4 MG: 2 INJECTION, SOLUTION INTRAMUSCULAR; INTRAVENOUS at 17:02

## 2024-09-03 RX ADMIN — CEFTRIAXONE 2000 MG: 2 INJECTION, POWDER, FOR SOLUTION INTRAMUSCULAR; INTRAVENOUS at 14:42

## 2024-09-03 RX ADMIN — BUPROPION HYDROCHLORIDE 150 MG: 150 TABLET, EXTENDED RELEASE ORAL at 21:24

## 2024-09-03 RX ADMIN — SODIUM CHLORIDE 1000 ML: 9 INJECTION, SOLUTION INTRAVENOUS at 14:40

## 2024-09-03 RX ADMIN — FLUCONAZOLE 200 MG: 200 INJECTION, SOLUTION INTRAVENOUS at 21:29

## 2024-09-03 RX ADMIN — ASPIRIN 81 MG: 81 TABLET, COATED ORAL at 21:25

## 2024-09-03 RX ADMIN — TRAZODONE HYDROCHLORIDE 150 MG: 100 TABLET ORAL at 21:25

## 2024-09-03 RX ADMIN — DULOXETINE HYDROCHLORIDE 60 MG: 60 CAPSULE, DELAYED RELEASE ORAL at 21:24

## 2024-09-03 RX ADMIN — OXYCODONE HYDROCHLORIDE AND ACETAMINOPHEN 1 TABLET: 5; 325 TABLET ORAL at 21:37

## 2024-09-03 RX ADMIN — OXYCODONE HYDROCHLORIDE AND ACETAMINOPHEN 1 TABLET: 5; 325 TABLET ORAL at 16:05

## 2024-09-03 RX ADMIN — ROSUVASTATIN CALCIUM 10 MG: 10 TABLET, FILM COATED ORAL at 21:25

## 2024-09-03 RX ADMIN — ACETAMINOPHEN 325MG 650 MG: 325 TABLET ORAL at 18:38

## 2024-09-03 RX ADMIN — METOPROLOL SUCCINATE 50 MG: 50 TABLET, FILM COATED, EXTENDED RELEASE ORAL at 21:18

## 2024-09-03 RX ADMIN — PREGABALIN 300 MG: 100 CAPSULE ORAL at 21:19

## 2024-09-04 ENCOUNTER — READMISSION MANAGEMENT (OUTPATIENT)
Dept: CALL CENTER | Facility: HOSPITAL | Age: 69
End: 2024-09-04
Payer: MEDICARE

## 2024-09-04 VITALS
HEIGHT: 60 IN | SYSTOLIC BLOOD PRESSURE: 99 MMHG | OXYGEN SATURATION: 91 % | TEMPERATURE: 97.7 F | HEART RATE: 55 BPM | RESPIRATION RATE: 16 BRPM | DIASTOLIC BLOOD PRESSURE: 56 MMHG | BODY MASS INDEX: 37.3 KG/M2 | WEIGHT: 190 LBS

## 2024-09-04 LAB
ANION GAP SERPL CALCULATED.3IONS-SCNC: 12 MMOL/L (ref 5–15)
BUN SERPL-MCNC: 9 MG/DL (ref 8–23)
BUN/CREAT SERPL: 14.3 (ref 7–25)
CALCIUM SPEC-SCNC: 8.9 MG/DL (ref 8.6–10.5)
CHLORIDE SERPL-SCNC: 106 MMOL/L (ref 98–107)
CO2 SERPL-SCNC: 22 MMOL/L (ref 22–29)
CREAT SERPL-MCNC: 0.63 MG/DL (ref 0.57–1)
DEPRECATED RDW RBC AUTO: 43.6 FL (ref 37–54)
EGFRCR SERPLBLD CKD-EPI 2021: 95.6 ML/MIN/1.73
ERYTHROCYTE [DISTWIDTH] IN BLOOD BY AUTOMATED COUNT: 12.9 % (ref 12.3–15.4)
GLUCOSE SERPL-MCNC: 93 MG/DL (ref 65–99)
HCT VFR BLD AUTO: 33.6 % (ref 34–46.6)
HGB BLD-MCNC: 10.9 G/DL (ref 12–15.9)
MCH RBC QN AUTO: 30.2 PG (ref 26.6–33)
MCHC RBC AUTO-ENTMCNC: 32.4 G/DL (ref 31.5–35.7)
MCV RBC AUTO: 93.1 FL (ref 79–97)
PLATELET # BLD AUTO: 253 10*3/MM3 (ref 140–450)
PMV BLD AUTO: 10.1 FL (ref 6–12)
POTASSIUM SERPL-SCNC: 3.4 MMOL/L (ref 3.5–5.2)
RBC # BLD AUTO: 3.61 10*6/MM3 (ref 3.77–5.28)
SODIUM SERPL-SCNC: 140 MMOL/L (ref 136–145)
WBC NRBC COR # BLD AUTO: 7.59 10*3/MM3 (ref 3.4–10.8)

## 2024-09-04 PROCEDURE — G0378 HOSPITAL OBSERVATION PER HR: HCPCS

## 2024-09-04 PROCEDURE — 97535 SELF CARE MNGMENT TRAINING: CPT

## 2024-09-04 PROCEDURE — 97530 THERAPEUTIC ACTIVITIES: CPT

## 2024-09-04 PROCEDURE — 94799 UNLISTED PULMONARY SVC/PX: CPT

## 2024-09-04 PROCEDURE — 94618 PULMONARY STRESS TESTING: CPT

## 2024-09-04 PROCEDURE — 80048 BASIC METABOLIC PNL TOTAL CA: CPT | Performed by: INTERNAL MEDICINE

## 2024-09-04 PROCEDURE — 85027 COMPLETE CBC AUTOMATED: CPT | Performed by: INTERNAL MEDICINE

## 2024-09-04 PROCEDURE — 97162 PT EVAL MOD COMPLEX 30 MIN: CPT

## 2024-09-04 PROCEDURE — 25010000002 CEFTRIAXONE PER 250 MG: Performed by: HOSPITALIST

## 2024-09-04 PROCEDURE — 97166 OT EVAL MOD COMPLEX 45 MIN: CPT

## 2024-09-04 RX ORDER — CEFDINIR 300 MG/1
300 CAPSULE ORAL 2 TIMES DAILY
Qty: 6 CAPSULE | Refills: 0 | Status: SHIPPED | OUTPATIENT
Start: 2024-09-05 | End: 2024-09-08

## 2024-09-04 RX ADMIN — ACETAMINOPHEN 325MG 650 MG: 325 TABLET ORAL at 08:49

## 2024-09-04 RX ADMIN — PHENAZOPYRIDINE HYDROCHLORIDE 200 MG: 200 TABLET ORAL at 11:36

## 2024-09-04 RX ADMIN — ASPIRIN 81 MG: 81 TABLET, COATED ORAL at 08:49

## 2024-09-04 RX ADMIN — CEFTRIAXONE 2000 MG: 2 INJECTION, POWDER, FOR SOLUTION INTRAMUSCULAR; INTRAVENOUS at 14:04

## 2024-09-04 RX ADMIN — PHENAZOPYRIDINE HYDROCHLORIDE 200 MG: 200 TABLET ORAL at 08:49

## 2024-09-04 RX ADMIN — OXYCODONE HYDROCHLORIDE AND ACETAMINOPHEN 1 TABLET: 5; 325 TABLET ORAL at 15:27

## 2024-09-04 RX ADMIN — METOPROLOL SUCCINATE 50 MG: 50 TABLET, FILM COATED, EXTENDED RELEASE ORAL at 08:49

## 2024-09-04 RX ADMIN — OXYCODONE HYDROCHLORIDE AND ACETAMINOPHEN 1 TABLET: 5; 325 TABLET ORAL at 10:20

## 2024-09-04 RX ADMIN — DULOXETINE HYDROCHLORIDE 60 MG: 60 CAPSULE, DELAYED RELEASE ORAL at 08:49

## 2024-09-04 RX ADMIN — LEVOTHYROXINE SODIUM 88 MCG: 88 TABLET ORAL at 05:16

## 2024-09-04 RX ADMIN — PREGABALIN 300 MG: 100 CAPSULE ORAL at 08:49

## 2024-09-04 RX ADMIN — PANTOPRAZOLE SODIUM 40 MG: 40 TABLET, DELAYED RELEASE ORAL at 05:17

## 2024-09-04 RX ADMIN — OXYCODONE HYDROCHLORIDE AND ACETAMINOPHEN 1 TABLET: 5; 325 TABLET ORAL at 05:16

## 2024-09-05 ENCOUNTER — TRANSITIONAL CARE MANAGEMENT TELEPHONE ENCOUNTER (OUTPATIENT)
Dept: CALL CENTER | Facility: HOSPITAL | Age: 69
End: 2024-09-05
Payer: MEDICARE

## 2024-09-05 DIAGNOSIS — Z76.0 ENCOUNTER FOR ISSUE OF REPEAT PRESCRIPTION: ICD-10-CM

## 2024-09-05 LAB — BACTERIA SPEC AEROBE CULT: ABNORMAL

## 2024-09-05 RX ORDER — PREGABALIN 300 MG/1
300 CAPSULE ORAL 2 TIMES DAILY
Qty: 60 CAPSULE | Refills: 5 | Status: SHIPPED | OUTPATIENT
Start: 2024-09-05

## 2024-09-06 ENCOUNTER — TRANSITIONAL CARE MANAGEMENT TELEPHONE ENCOUNTER (OUTPATIENT)
Dept: CALL CENTER | Facility: HOSPITAL | Age: 69
End: 2024-09-06
Payer: MEDICARE

## 2024-09-10 ENCOUNTER — TELEPHONE (OUTPATIENT)
Age: 69
End: 2024-09-10
Payer: MEDICARE

## 2024-09-15 ENCOUNTER — DOCUMENTATION (OUTPATIENT)
Age: 69
End: 2024-09-15
Payer: MEDICARE

## 2024-09-16 ENCOUNTER — OFFICE VISIT (OUTPATIENT)
Age: 69
End: 2024-09-16
Payer: MEDICARE

## 2024-09-16 ENCOUNTER — TELEPHONE (OUTPATIENT)
Age: 69
End: 2024-09-16

## 2024-09-16 VITALS
HEART RATE: 63 BPM | RESPIRATION RATE: 14 BRPM | BODY MASS INDEX: 38.09 KG/M2 | HEIGHT: 60 IN | WEIGHT: 194 LBS | TEMPERATURE: 97 F | OXYGEN SATURATION: 97 %

## 2024-09-16 DIAGNOSIS — R39.9 FEMALE GENITOURINARY SYMPTOMS: Primary | ICD-10-CM

## 2024-09-16 DIAGNOSIS — R30.0 DYSURIA: ICD-10-CM

## 2024-09-16 DIAGNOSIS — N39.0 CHRONIC UTI (URINARY TRACT INFECTION): ICD-10-CM

## 2024-09-16 PROBLEM — R01.1 CARDIAC MURMUR: Status: ACTIVE | Noted: 2024-09-16

## 2024-09-16 PROBLEM — E53.8 B12 DEFICIENCY: Status: ACTIVE | Noted: 2024-09-16

## 2024-09-16 PROBLEM — M06.9 ARTHRITIS OR POLYARTHRITIS, RHEUMATOID: Status: ACTIVE | Noted: 2024-09-16

## 2024-09-16 PROBLEM — F41.9 ANXIETY DISORDER: Status: ACTIVE | Noted: 2017-10-23

## 2024-09-16 PROBLEM — I10 BP (HIGH BLOOD PRESSURE): Status: ACTIVE | Noted: 2024-09-16

## 2024-09-16 PROBLEM — M17.0 BILATERAL PRIMARY OSTEOARTHRITIS OF KNEE: Status: ACTIVE | Noted: 2024-02-01

## 2024-09-16 PROBLEM — D64.9 ABSOLUTE ANEMIA: Status: ACTIVE | Noted: 2024-09-16

## 2024-09-16 PROBLEM — I20.9 ANGINA PECTORIS: Status: ACTIVE | Noted: 2024-02-01

## 2024-09-16 PROBLEM — K21.9 GASTROESOPHAGEAL REFLUX DISEASE: Chronic | Status: ACTIVE | Noted: 2017-10-23

## 2024-09-16 PROBLEM — N18.2 STAGE 2 CHRONIC KIDNEY DISEASE: Status: ACTIVE | Noted: 2024-02-01

## 2024-09-16 PROBLEM — E55.9 AVITAMINOSIS D: Status: ACTIVE | Noted: 2024-09-16

## 2024-09-16 LAB
BILIRUB BLD-MCNC: NEGATIVE MG/DL
CLARITY, POC: CLEAR
COLOR UR: ABNORMAL
EXPIRATION DATE: ABNORMAL
GLUCOSE UR STRIP-MCNC: NEGATIVE MG/DL
KETONES UR QL: NEGATIVE
LEUKOCYTE EST, POC: NEGATIVE
Lab: ABNORMAL
NITRITE UR-MCNC: NEGATIVE MG/ML
PH UR: 7 [PH] (ref 5–8)
PROT UR STRIP-MCNC: NEGATIVE MG/DL
RBC # UR STRIP: NEGATIVE /UL
SP GR UR: 1.02 (ref 1–1.03)
UROBILINOGEN UR QL: ABNORMAL

## 2024-09-16 PROCEDURE — 3044F HG A1C LEVEL LT 7.0%: CPT

## 2024-09-16 PROCEDURE — 99214 OFFICE O/P EST MOD 30 MIN: CPT

## 2024-09-16 PROCEDURE — 1160F RVW MEDS BY RX/DR IN RCRD: CPT

## 2024-09-16 PROCEDURE — 81003 URINALYSIS AUTO W/O SCOPE: CPT

## 2024-09-16 PROCEDURE — 1159F MED LIST DOCD IN RCRD: CPT

## 2024-09-16 RX ORDER — METRONIDAZOLE 7.5 MG/G
1 GEL VAGINAL NIGHTLY
Qty: 1 EACH | Refills: 0 | Status: SHIPPED | OUTPATIENT
Start: 2024-09-16 | End: 2024-09-20

## 2024-09-16 RX ORDER — NITROFURANTOIN 25; 75 MG/1; MG/1
100 CAPSULE ORAL 2 TIMES DAILY
Qty: 10 CAPSULE | Refills: 0 | Status: SHIPPED | OUTPATIENT
Start: 2024-09-16

## 2024-09-16 RX ORDER — NITROFURANTOIN MACROCRYSTALS 50 MG/1
50 CAPSULE ORAL NIGHTLY
Qty: 30 CAPSULE | Refills: 11 | Status: SHIPPED | OUTPATIENT
Start: 2024-09-16

## 2024-09-16 RX ORDER — HYDROCODONE BITARTRATE AND ACETAMINOPHEN 7.5; 325 MG/1; MG/1
TABLET ORAL
COMMUNITY
Start: 2024-09-10

## 2024-09-17 ENCOUNTER — TELEPHONE (OUTPATIENT)
Age: 69
End: 2024-09-17
Payer: MEDICARE

## 2024-09-26 ENCOUNTER — TELEPHONE (OUTPATIENT)
Age: 69
End: 2024-09-26
Payer: MEDICARE

## 2024-09-29 ENCOUNTER — APPOINTMENT (OUTPATIENT)
Dept: GENERAL RADIOLOGY | Facility: HOSPITAL | Age: 69
End: 2024-09-29
Payer: MEDICARE

## 2024-09-29 ENCOUNTER — HOSPITAL ENCOUNTER (OUTPATIENT)
Facility: HOSPITAL | Age: 69
Discharge: HOME | End: 2024-09-29
Attending: EMERGENCY MEDICINE | Admitting: EMERGENCY MEDICINE
Payer: MEDICARE

## 2024-09-29 VITALS
HEART RATE: 78 BPM | HEIGHT: 60 IN | RESPIRATION RATE: 16 BRPM | BODY MASS INDEX: 39.07 KG/M2 | SYSTOLIC BLOOD PRESSURE: 184 MMHG | DIASTOLIC BLOOD PRESSURE: 81 MMHG | OXYGEN SATURATION: 95 % | TEMPERATURE: 98.3 F | WEIGHT: 199 LBS

## 2024-09-29 DIAGNOSIS — M79.604 LEG PAIN, ANTERIOR, RIGHT: Primary | ICD-10-CM

## 2024-09-29 DIAGNOSIS — M25.561 CHRONIC PAIN OF RIGHT KNEE: ICD-10-CM

## 2024-09-29 DIAGNOSIS — G89.29 CHRONIC PAIN OF RIGHT KNEE: ICD-10-CM

## 2024-09-29 PROCEDURE — 73590 X-RAY EXAM OF LOWER LEG: CPT

## 2024-09-29 PROCEDURE — 25010000002 KETOROLAC TROMETHAMINE PER 15 MG: Performed by: EMERGENCY MEDICINE

## 2024-09-29 PROCEDURE — G0463 HOSPITAL OUTPT CLINIC VISIT: HCPCS | Performed by: EMERGENCY MEDICINE

## 2024-09-29 PROCEDURE — 99213 OFFICE O/P EST LOW 20 MIN: CPT | Performed by: EMERGENCY MEDICINE

## 2024-09-29 RX ORDER — KETOROLAC TROMETHAMINE 30 MG/ML
30 INJECTION, SOLUTION INTRAMUSCULAR; INTRAVENOUS ONCE
Status: COMPLETED | OUTPATIENT
Start: 2024-09-29 | End: 2024-09-29

## 2024-09-29 RX ORDER — LIDOCAINE 4 G/G
1 PATCH TOPICAL
Status: DISCONTINUED | OUTPATIENT
Start: 2024-09-29 | End: 2024-09-29 | Stop reason: HOSPADM

## 2024-09-29 RX ORDER — HYDROCODONE BITARTRATE AND ACETAMINOPHEN 5; 325 MG/1; MG/1
1 TABLET ORAL ONCE AS NEEDED
Status: DISCONTINUED | OUTPATIENT
Start: 2024-09-29 | End: 2024-09-29 | Stop reason: HOSPADM

## 2024-09-29 RX ADMIN — KETOROLAC TROMETHAMINE 30 MG: 30 INJECTION, SOLUTION INTRAMUSCULAR at 17:45

## 2024-09-29 RX ADMIN — HYDROCODONE BITARTRATE AND ACETAMINOPHEN 1 TABLET: 5; 325 TABLET ORAL at 19:13

## 2024-09-29 RX ADMIN — LIDOCAINE 1 PATCH: 4 PATCH TOPICAL at 17:45

## 2024-09-29 RX ADMIN — HYDROCODONE BITARTRATE AND ACETAMINOPHEN 1 TABLET: 5; 325 TABLET ORAL at 17:45

## 2024-09-30 ENCOUNTER — TELEPHONE (OUTPATIENT)
Age: 69
End: 2024-09-30

## 2024-09-30 ENCOUNTER — PATIENT MESSAGE (OUTPATIENT)
Age: 69
End: 2024-09-30

## 2024-10-04 ENCOUNTER — OFFICE VISIT (OUTPATIENT)
Age: 69
End: 2024-10-04
Payer: MEDICARE

## 2024-10-04 VITALS
DIASTOLIC BLOOD PRESSURE: 82 MMHG | SYSTOLIC BLOOD PRESSURE: 130 MMHG | HEIGHT: 60 IN | TEMPERATURE: 98.4 F | WEIGHT: 205 LBS | BODY MASS INDEX: 40.25 KG/M2 | OXYGEN SATURATION: 97 % | HEART RATE: 78 BPM

## 2024-10-04 DIAGNOSIS — R00.1 BRADYCARDIA: Primary | ICD-10-CM

## 2024-10-04 DIAGNOSIS — Z79.899 HIGH RISK MEDICATION USE: ICD-10-CM

## 2024-10-04 DIAGNOSIS — Z79.899 POLYPHARMACY: ICD-10-CM

## 2024-10-04 PROBLEM — M17.11 OSTEOARTHRITIS OF RIGHT KNEE: Status: ACTIVE | Noted: 2024-06-25

## 2024-10-04 PROBLEM — M25.561 ARTHRALGIA OF RIGHT KNEE: Status: RESOLVED | Noted: 2024-09-10 | Resolved: 2024-10-04

## 2024-10-07 LAB
AMPHETAMINES UR QL SCN: POSITIVE NG/ML
BARBITURATES UR QL SCN: NEGATIVE NG/ML
BENZODIAZ UR QL SCN: POSITIVE NG/ML
BILIRUB BLD-MCNC: ABNORMAL MG/DL
BZE UR QL SCN: NEGATIVE NG/ML
CANNABINOIDS UR QL SCN: NEGATIVE NG/ML
CLARITY, POC: ABNORMAL
COLOR UR: YELLOW
CREAT UR-MCNC: 213.4 MG/DL (ref 20–300)
EXPIRATION DATE: ABNORMAL
GLUCOSE UR STRIP-MCNC: NEGATIVE MG/DL
KETONES UR QL: NEGATIVE
LABORATORY COMMENT REPORT: ABNORMAL
LEUKOCYTE EST, POC: NEGATIVE
Lab: ABNORMAL
METHADONE UR QL SCN: NEGATIVE NG/ML
NITRITE UR-MCNC: NEGATIVE MG/ML
OPIATES UR QL SCN: POSITIVE NG/ML
OXYCODONE+OXYMORPHONE UR QL SCN: NEGATIVE NG/ML
PCP UR QL: NEGATIVE NG/ML
PH UR: 5.5 [PH] (ref 4.5–8.9)
PH UR: 5.5 [PH] (ref 5–8)
PROPOXYPH UR QL SCN: NEGATIVE NG/ML
PROT UR STRIP-MCNC: ABNORMAL MG/DL
RBC # UR STRIP: NEGATIVE /UL
SP GR UR: 1.02 (ref 1–1.03)
UROBILINOGEN UR QL: ABNORMAL

## 2024-10-10 ENCOUNTER — TRANSCRIBE ORDERS (OUTPATIENT)
Dept: ADMINISTRATIVE | Facility: HOSPITAL | Age: 69
End: 2024-10-10
Payer: MEDICARE

## 2024-10-10 ENCOUNTER — HOSPITAL ENCOUNTER (OUTPATIENT)
Dept: GENERAL RADIOLOGY | Facility: HOSPITAL | Age: 69
Discharge: HOME | End: 2024-10-10
Admitting: ORTHOPAEDIC SURGERY
Payer: MEDICARE

## 2024-10-10 DIAGNOSIS — M25.561 RIGHT KNEE PAIN, UNSPECIFIED CHRONICITY: Primary | ICD-10-CM

## 2024-10-10 DIAGNOSIS — M25.561 RIGHT KNEE PAIN, UNSPECIFIED CHRONICITY: ICD-10-CM

## 2024-10-10 PROCEDURE — 73560 X-RAY EXAM OF KNEE 1 OR 2: CPT

## 2024-10-16 DIAGNOSIS — Z76.0 ISSUE OF REPEAT PRESCRIPTION: Primary | ICD-10-CM

## 2024-10-16 RX ORDER — DOCUSATE SODIUM 100 MG/1
100 CAPSULE, LIQUID FILLED ORAL DAILY
Qty: 90 CAPSULE | Refills: 3 | Status: SHIPPED | OUTPATIENT
Start: 2024-10-16

## 2024-11-15 ENCOUNTER — APPOINTMENT (OUTPATIENT)
Dept: CT IMAGING | Facility: HOSPITAL | Age: 69
End: 2024-11-15
Payer: MEDICARE

## 2024-11-15 ENCOUNTER — APPOINTMENT (OUTPATIENT)
Dept: GENERAL RADIOLOGY | Facility: HOSPITAL | Age: 69
End: 2024-11-15
Payer: MEDICARE

## 2024-11-15 ENCOUNTER — HOSPITAL ENCOUNTER (OUTPATIENT)
Facility: HOSPITAL | Age: 69
Setting detail: OBSERVATION
Discharge: HOME HEALTH | End: 2024-11-17
Attending: EMERGENCY MEDICINE | Admitting: INTERNAL MEDICINE
Payer: MEDICARE

## 2024-11-15 DIAGNOSIS — R25.1 FUNCTIONAL TREMOR: ICD-10-CM

## 2024-11-15 DIAGNOSIS — F41.1 GENERALIZED ANXIETY DISORDER: ICD-10-CM

## 2024-11-15 DIAGNOSIS — R41.82 ALTERED MENTAL STATUS, UNSPECIFIED ALTERED MENTAL STATUS TYPE: Primary | ICD-10-CM

## 2024-11-15 DIAGNOSIS — M79.7 FIBROMYALGIA, PRIMARY: ICD-10-CM

## 2024-11-15 DIAGNOSIS — Z79.899 POLYPHARMACY: ICD-10-CM

## 2024-11-15 PROBLEM — G92.9 ENCEPHALOPATHY, TOXIC: Status: ACTIVE | Noted: 2024-11-15

## 2024-11-15 LAB
ALBUMIN SERPL-MCNC: 3.9 G/DL (ref 3.5–5.2)
ALBUMIN/GLOB SERPL: 1.4 G/DL
ALP SERPL-CCNC: 91 U/L (ref 39–117)
ALT SERPL W P-5'-P-CCNC: 7 U/L (ref 1–33)
AMPHET+METHAMPHET UR QL: POSITIVE
ANION GAP SERPL CALCULATED.3IONS-SCNC: 12.4 MMOL/L (ref 5–15)
AST SERPL-CCNC: 15 U/L (ref 1–32)
BARBITURATES UR QL SCN: NEGATIVE
BASOPHILS # BLD AUTO: 0.05 10*3/MM3 (ref 0–0.2)
BASOPHILS NFR BLD AUTO: 1 % (ref 0–1.5)
BENZODIAZ UR QL SCN: POSITIVE
BILIRUB SERPL-MCNC: 0.3 MG/DL (ref 0–1.2)
BILIRUB UR QL STRIP: NEGATIVE
BUN SERPL-MCNC: 11 MG/DL (ref 8–23)
BUN/CREAT SERPL: 12.4 (ref 7–25)
CALCIUM SPEC-SCNC: 9.7 MG/DL (ref 8.6–10.5)
CANNABINOIDS SERPL QL: NEGATIVE
CHLORIDE SERPL-SCNC: 106 MMOL/L (ref 98–107)
CLARITY UR: CLEAR
CO2 SERPL-SCNC: 23.6 MMOL/L (ref 22–29)
COCAINE UR QL: NEGATIVE
COLOR UR: YELLOW
CREAT SERPL-MCNC: 0.89 MG/DL (ref 0.57–1)
D-LACTATE SERPL-SCNC: 1.1 MMOL/L (ref 0.5–2)
DEPRECATED RDW RBC AUTO: 41.4 FL (ref 37–54)
EGFRCR SERPLBLD CKD-EPI 2021: 69.8 ML/MIN/1.73
EOSINOPHIL # BLD AUTO: 0.14 10*3/MM3 (ref 0–0.4)
EOSINOPHIL NFR BLD AUTO: 2.8 % (ref 0.3–6.2)
ERYTHROCYTE [DISTWIDTH] IN BLOOD BY AUTOMATED COUNT: 12.8 % (ref 12.3–15.4)
ETHANOL BLD-MCNC: <10 MG/DL (ref 0–10)
ETHANOL UR QL: <0.01 %
FENTANYL UR-MCNC: NEGATIVE NG/ML
GLOBULIN UR ELPH-MCNC: 2.7 GM/DL
GLUCOSE SERPL-MCNC: 109 MG/DL (ref 65–99)
GLUCOSE UR STRIP-MCNC: NEGATIVE MG/DL
HCT VFR BLD AUTO: 41.9 % (ref 34–46.6)
HGB BLD-MCNC: 13.7 G/DL (ref 12–15.9)
HGB UR QL STRIP.AUTO: NEGATIVE
IMM GRANULOCYTES # BLD AUTO: 0.02 10*3/MM3 (ref 0–0.05)
IMM GRANULOCYTES NFR BLD AUTO: 0.4 % (ref 0–0.5)
KETONES UR QL STRIP: ABNORMAL
LEUKOCYTE ESTERASE UR QL STRIP.AUTO: NEGATIVE
LYMPHOCYTES # BLD AUTO: 1.54 10*3/MM3 (ref 0.7–3.1)
LYMPHOCYTES NFR BLD AUTO: 31.2 % (ref 19.6–45.3)
MCH RBC QN AUTO: 29.6 PG (ref 26.6–33)
MCHC RBC AUTO-ENTMCNC: 32.7 G/DL (ref 31.5–35.7)
MCV RBC AUTO: 90.5 FL (ref 79–97)
METHADONE UR QL SCN: NEGATIVE
MONOCYTES # BLD AUTO: 0.33 10*3/MM3 (ref 0.1–0.9)
MONOCYTES NFR BLD AUTO: 6.7 % (ref 5–12)
NEUTROPHILS NFR BLD AUTO: 2.85 10*3/MM3 (ref 1.7–7)
NEUTROPHILS NFR BLD AUTO: 57.9 % (ref 42.7–76)
NITRITE UR QL STRIP: NEGATIVE
NRBC BLD AUTO-RTO: 0 /100 WBC (ref 0–0.2)
OPIATES UR QL: POSITIVE
OXYCODONE UR QL SCN: NEGATIVE
PH UR STRIP.AUTO: 5.5 [PH] (ref 5–8)
PLATELET # BLD AUTO: 315 10*3/MM3 (ref 140–450)
PMV BLD AUTO: 10.9 FL (ref 6–12)
POTASSIUM SERPL-SCNC: 4.3 MMOL/L (ref 3.5–5.2)
PROCALCITONIN SERPL-MCNC: 0.03 NG/ML (ref 0–0.25)
PROT SERPL-MCNC: 6.6 G/DL (ref 6–8.5)
PROT UR QL STRIP: NEGATIVE
QT INTERVAL: 435 MS
QTC INTERVAL: 446 MS
RBC # BLD AUTO: 4.63 10*6/MM3 (ref 3.77–5.28)
SODIUM SERPL-SCNC: 142 MMOL/L (ref 136–145)
SP GR UR STRIP: >1.03 (ref 1–1.03)
UROBILINOGEN UR QL STRIP: ABNORMAL
WBC NRBC COR # BLD AUTO: 4.93 10*3/MM3 (ref 3.4–10.8)

## 2024-11-15 PROCEDURE — G0378 HOSPITAL OBSERVATION PER HR: HCPCS

## 2024-11-15 PROCEDURE — 80307 DRUG TEST PRSMV CHEM ANLYZR: CPT | Performed by: EMERGENCY MEDICINE

## 2024-11-15 PROCEDURE — 93005 ELECTROCARDIOGRAM TRACING: CPT | Performed by: EMERGENCY MEDICINE

## 2024-11-15 PROCEDURE — 83605 ASSAY OF LACTIC ACID: CPT | Performed by: EMERGENCY MEDICINE

## 2024-11-15 PROCEDURE — 84145 PROCALCITONIN (PCT): CPT | Performed by: EMERGENCY MEDICINE

## 2024-11-15 PROCEDURE — 93010 ELECTROCARDIOGRAM REPORT: CPT | Performed by: INTERNAL MEDICINE

## 2024-11-15 PROCEDURE — 71045 X-RAY EXAM CHEST 1 VIEW: CPT

## 2024-11-15 PROCEDURE — 85025 COMPLETE CBC W/AUTO DIFF WBC: CPT | Performed by: EMERGENCY MEDICINE

## 2024-11-15 PROCEDURE — 80053 COMPREHEN METABOLIC PANEL: CPT | Performed by: EMERGENCY MEDICINE

## 2024-11-15 PROCEDURE — 99291 CRITICAL CARE FIRST HOUR: CPT

## 2024-11-15 PROCEDURE — 70450 CT HEAD/BRAIN W/O DYE: CPT

## 2024-11-15 PROCEDURE — P9612 CATHETERIZE FOR URINE SPEC: HCPCS

## 2024-11-15 PROCEDURE — 82077 ASSAY SPEC XCP UR&BREATH IA: CPT | Performed by: EMERGENCY MEDICINE

## 2024-11-15 PROCEDURE — 81003 URINALYSIS AUTO W/O SCOPE: CPT | Performed by: EMERGENCY MEDICINE

## 2024-11-15 RX ORDER — NITROGLYCERIN 0.4 MG/1
0.4 TABLET SUBLINGUAL
Status: DISCONTINUED | OUTPATIENT
Start: 2024-11-15 | End: 2024-11-17 | Stop reason: HOSPADM

## 2024-11-15 RX ORDER — PANTOPRAZOLE SODIUM 40 MG/1
40 TABLET, DELAYED RELEASE ORAL
Status: DISCONTINUED | OUTPATIENT
Start: 2024-11-16 | End: 2024-11-17 | Stop reason: HOSPADM

## 2024-11-15 RX ORDER — DOCUSATE SODIUM 100 MG/1
100 CAPSULE, LIQUID FILLED ORAL DAILY
Status: DISCONTINUED | OUTPATIENT
Start: 2024-11-16 | End: 2024-11-17 | Stop reason: HOSPADM

## 2024-11-15 RX ORDER — SODIUM CHLORIDE 0.9 % (FLUSH) 0.9 %
10 SYRINGE (ML) INJECTION AS NEEDED
Status: DISCONTINUED | OUTPATIENT
Start: 2024-11-15 | End: 2024-11-17 | Stop reason: HOSPADM

## 2024-11-15 RX ORDER — AMOXICILLIN 250 MG
2 CAPSULE ORAL 2 TIMES DAILY PRN
Status: DISCONTINUED | OUTPATIENT
Start: 2024-11-15 | End: 2024-11-17 | Stop reason: HOSPADM

## 2024-11-15 RX ORDER — BISACODYL 10 MG
10 SUPPOSITORY, RECTAL RECTAL DAILY PRN
Status: DISCONTINUED | OUTPATIENT
Start: 2024-11-15 | End: 2024-11-17 | Stop reason: HOSPADM

## 2024-11-15 RX ORDER — SODIUM CHLORIDE 0.9 % (FLUSH) 0.9 %
10 SYRINGE (ML) INJECTION EVERY 12 HOURS SCHEDULED
Status: DISCONTINUED | OUTPATIENT
Start: 2024-11-15 | End: 2024-11-17 | Stop reason: HOSPADM

## 2024-11-15 RX ORDER — ROSUVASTATIN CALCIUM 20 MG/1
10 TABLET, COATED ORAL NIGHTLY
Status: DISCONTINUED | OUTPATIENT
Start: 2024-11-15 | End: 2024-11-17 | Stop reason: HOSPADM

## 2024-11-15 RX ORDER — ONDANSETRON 4 MG/1
4 TABLET, ORALLY DISINTEGRATING ORAL EVERY 8 HOURS PRN
Status: DISCONTINUED | OUTPATIENT
Start: 2024-11-15 | End: 2024-11-17 | Stop reason: HOSPADM

## 2024-11-15 RX ORDER — DULOXETIN HYDROCHLORIDE 60 MG/1
60 CAPSULE, DELAYED RELEASE ORAL 2 TIMES DAILY
Status: DISCONTINUED | OUTPATIENT
Start: 2024-11-15 | End: 2024-11-17 | Stop reason: HOSPADM

## 2024-11-15 RX ORDER — HYDROCODONE BITARTRATE AND ACETAMINOPHEN 7.5; 325 MG/1; MG/1
1 TABLET ORAL EVERY 6 HOURS PRN
Status: DISCONTINUED | OUTPATIENT
Start: 2024-11-15 | End: 2024-11-17 | Stop reason: HOSPADM

## 2024-11-15 RX ORDER — ALBUTEROL SULFATE 0.83 MG/ML
2.5 SOLUTION RESPIRATORY (INHALATION) EVERY 4 HOURS PRN
Status: DISCONTINUED | OUTPATIENT
Start: 2024-11-15 | End: 2024-11-17 | Stop reason: HOSPADM

## 2024-11-15 RX ORDER — SODIUM CHLORIDE 9 MG/ML
40 INJECTION, SOLUTION INTRAVENOUS AS NEEDED
Status: DISCONTINUED | OUTPATIENT
Start: 2024-11-15 | End: 2024-11-17 | Stop reason: HOSPADM

## 2024-11-15 RX ORDER — POLYETHYLENE GLYCOL 3350 17 G/17G
17 POWDER, FOR SOLUTION ORAL DAILY PRN
Status: DISCONTINUED | OUTPATIENT
Start: 2024-11-15 | End: 2024-11-17 | Stop reason: HOSPADM

## 2024-11-15 RX ORDER — ALPRAZOLAM 0.25 MG/1
0.25 TABLET ORAL 2 TIMES DAILY PRN
Status: DISCONTINUED | OUTPATIENT
Start: 2024-11-15 | End: 2024-11-17 | Stop reason: HOSPADM

## 2024-11-15 RX ORDER — ALPRAZOLAM 0.25 MG/1
0.25 TABLET ORAL SEE ADMIN INSTRUCTIONS
Status: DISCONTINUED | OUTPATIENT
Start: 2024-11-15 | End: 2024-11-15

## 2024-11-15 RX ORDER — ACETAMINOPHEN 325 MG/1
650 TABLET ORAL EVERY 6 HOURS PRN
Status: DISCONTINUED | OUTPATIENT
Start: 2024-11-15 | End: 2024-11-17 | Stop reason: HOSPADM

## 2024-11-15 RX ORDER — METOPROLOL SUCCINATE 50 MG/1
50 TABLET, EXTENDED RELEASE ORAL DAILY
Status: DISCONTINUED | OUTPATIENT
Start: 2024-11-16 | End: 2024-11-17 | Stop reason: HOSPADM

## 2024-11-15 RX ORDER — BUPROPION HYDROCHLORIDE 150 MG/1
150 TABLET ORAL NIGHTLY
Status: DISCONTINUED | OUTPATIENT
Start: 2024-11-15 | End: 2024-11-17 | Stop reason: HOSPADM

## 2024-11-15 RX ORDER — BISACODYL 5 MG/1
5 TABLET, DELAYED RELEASE ORAL DAILY PRN
Status: DISCONTINUED | OUTPATIENT
Start: 2024-11-15 | End: 2024-11-17 | Stop reason: HOSPADM

## 2024-11-15 RX ORDER — PREGABALIN 75 MG/1
150 CAPSULE ORAL 2 TIMES DAILY
Status: DISCONTINUED | OUTPATIENT
Start: 2024-11-15 | End: 2024-11-17 | Stop reason: HOSPADM

## 2024-11-15 RX ORDER — LEVOTHYROXINE SODIUM 88 UG/1
88 TABLET ORAL
Status: DISCONTINUED | OUTPATIENT
Start: 2024-11-16 | End: 2024-11-17 | Stop reason: HOSPADM

## 2024-11-15 RX ORDER — ALPRAZOLAM 0.5 MG
0.5 TABLET ORAL NIGHTLY PRN
Status: DISCONTINUED | OUTPATIENT
Start: 2024-11-15 | End: 2024-11-16

## 2024-11-15 RX ORDER — TRAZODONE HYDROCHLORIDE 50 MG/1
100 TABLET, FILM COATED ORAL NIGHTLY
Status: DISCONTINUED | OUTPATIENT
Start: 2024-11-15 | End: 2024-11-17 | Stop reason: HOSPADM

## 2024-11-15 RX ADMIN — ROSUVASTATIN CALCIUM 10 MG: 20 TABLET, FILM COATED ORAL at 20:43

## 2024-11-15 RX ADMIN — TRAZODONE HYDROCHLORIDE 100 MG: 50 TABLET ORAL at 20:42

## 2024-11-15 RX ADMIN — BUPROPION HYDROCHLORIDE 150 MG: 150 TABLET, EXTENDED RELEASE ORAL at 20:43

## 2024-11-15 RX ADMIN — Medication 10 ML: at 20:43

## 2024-11-15 RX ADMIN — HYDROCODONE BITARTRATE AND ACETAMINOPHEN 1 TABLET: 7.5; 325 TABLET ORAL at 20:46

## 2024-11-15 RX ADMIN — DULOXETINE HYDROCHLORIDE 60 MG: 60 CAPSULE, DELAYED RELEASE ORAL at 20:43

## 2024-11-15 RX ADMIN — PREGABALIN 150 MG: 75 CAPSULE ORAL at 20:43

## 2024-11-15 NOTE — ED NOTES
"Nursing report ED to floor  Sonya Marcus  69 y.o.  female    HPI :  HPI  Stated Reason for Visit: AMS  History Obtained From: patient, EMS    Chief Complaint  Chief Complaint   Patient presents with    Altered Mental Status       Admitting doctor:   Dain Ko MD    Admitting diagnosis:   AMS    Code status:   Current Code Status       Date Active Code Status Order ID Comments User Context       Prior            Allergies:   Compazine [prochlorperazine]; Iodine; Penicillins; Shellfish-derived products; Glycerol, iodinated; Octacosanol; Latex; Levofloxacin; and Sulfa antibiotics    Isolation:   No active isolations    Intake and Output  No intake or output data in the 24 hours ending 11/15/24 1227    Weight:       11/15/24  1202   Weight: 93 kg (205 lb 0.4 oz)       Most recent vitals:   Vitals:    11/15/24 0932 11/15/24 1017 11/15/24 1202   BP: 171/82 138/86    BP Location: Right arm     Pulse: 68 60    Resp: 18     Temp: 97.7 °F (36.5 °C)     TempSrc: Tympanic     SpO2: 98% 90%    Weight:   93 kg (205 lb 0.4 oz)   Height:   152.4 cm (60\")       Active LDAs/IV Access:   Lines, Drains & Airways       Active LDAs       Name Placement date Placement time Site Days    Peripheral IV 11/15/24 1015 Anterior;Right;Upper Arm 11/15/24  1015  Arm  less than 1                    Labs (abnormal labs have a star):   Labs Reviewed   COMPREHENSIVE METABOLIC PANEL - Abnormal; Notable for the following components:       Result Value    Glucose 109 (*)     All other components within normal limits    Narrative:     GFR Normal >60  Chronic Kidney Disease <60  Kidney Failure <15     URINALYSIS W/ CULTURE IF INDICATED - Abnormal; Notable for the following components:    Specific Gravity, UA >1.030 (*)     Ketones, UA 15 mg/dL (1+) (*)     All other components within normal limits    Narrative:     In absence of clinical symptoms, the presence of pyuria, bacteria, and/or nitrites on the urinalysis result does not correlate " "with infection.  Urine microscopic not indicated.   URINE DRUG SCREEN - Abnormal; Notable for the following components:    Amphet/Methamphet, Screen Positive (*)     Benzodiazepine Screen, Urine Positive (*)     Opiate Screen Positive (*)     All other components within normal limits    Narrative:     Negative Thresholds Per Drugs Screened:    Amphetamines                 500 ng/ml  Barbiturates                 200 ng/ml  Benzodiazepines              100 ng/ml  Cocaine                      300 ng/ml  Methadone                    300 ng/ml  Opiates                      300 ng/ml  Oxycodone                    100 ng/ml  THC                           50 ng/ml  Fentanyl                       5 ng/ml      The Normal Value for all drugs tested is negative. This report includes final unconfirmed screening results to be used for medical treatment purposes only. Unconfirmed results must not be used for non-medical purposes such as employment or legal testing. Clinical consideration should be applied to any drug of abuse test, particularly when unconfirmed results are used.           LACTIC ACID, PLASMA - Normal   PROCALCITONIN - Normal    Narrative:     As a Marker for Sepsis (Non-Neonates):    1. <0.5 ng/mL represents a low risk of severe sepsis and/or septic shock.  2. >2 ng/mL represents a high risk of severe sepsis and/or septic shock.    As a Marker for Lower Respiratory Tract Infections that require antibiotic therapy:    PCT on Admission    Antibiotic Therapy       6-12 Hrs later    >0.5                Strongly Recommended  >0.25 - <0.5        Recommended   0.1 - 0.25          Discouraged              Remeasure/reassess PCT  <0.1                Strongly Discouraged     Remeasure/reassess PCT    As 28 day mortality risk marker: \"Change in Procalcitonin Result\" (>80% or <=80%) if Day 0 (or Day 1) and Day 4 values are available. Refer to http://www.[employer]-pct-calculator.com    Change in PCT <=80%  A decrease of PCT " levels below or equal to 80% defines a positive change in PCT test result representing a higher risk for 28-day all-cause mortality of patients diagnosed with severe sepsis for septic shock.    Change in PCT >80%  A decrease of PCT levels of more than 80% defines a negative change in PCT result representing a lower risk for 28-day all-cause mortality of patients diagnosed with severe sepsis or septic shock.      CBC WITH AUTO DIFFERENTIAL - Normal   ETHANOL   CBC AND DIFFERENTIAL    Narrative:     The following orders were created for panel order CBC & Differential.  Procedure                               Abnormality         Status                     ---------                               -----------         ------                     CBC Auto Differential[[id number]]        Normal              Final result                 Please view results for these tests on the individual orders.       EKG:   ECG 12 Lead Altered Mental Status   Preliminary Result   HEART RATE=63  bpm   RR Tstzigvy=163  ms   TN Fbhyvdnt=174  ms   P Horizontal Axis=15  deg   P Front Axis=68  deg   QRSD Interval=98  ms   QT Lpswqvvq=489  ms   BCeD=308  ms   QRS Axis=50  deg   T Wave Axis=30  deg   - OTHERWISE NORMAL ECG -   Sinus rhythm   Low voltage, extremity leads   Date and Time of Study:2024-11-15 10:10:42          Meds given in ED:   Medications   sodium chloride 0.9 % flush 10 mL (has no administration in time range)       Imaging results:  CT Head Without Contrast    Result Date: 11/15/2024   No CT evidence for acute intracranial pathology.  The etiology of the patient's confusion is not further elucidated on this examination. If further radiographic assessment is warranted, one could obtain an MRI of the brain for follow-up.   Radiation dose reduction techniques were utilized, including automated exposure control and exposure modulation based on body size.  This report was finalized on 11/15/2024 11:39 AM by Dr. Jonathon Guzman M.D on  Workstation: [id number]       Ambulatory status:   - u    Social issues:   Social History     Socioeconomic History    Marital status: Single   Tobacco Use    Smoking status: Never    Smokeless tobacco: Never   Vaping Use    Vaping status: Never Used   Substance and Sexual Activity    Alcohol use: Yes     Comment: OCCASIONALLY    Drug use: Never    Sexual activity: Not Currently     Birth control/protection: Post-menopausal     Comment: Post Menopausal with no immediate partner       Peripheral Neurovascular  Peripheral Neurovascular (Adult)  Peripheral Neurovascular WDL: WDL    Neuro Cognitive  Neuro Cognitive (Adult)  Cognitive/Neuro/Behavioral WDL: all  Level of Consciousness: Alert  Arousal Level: arouses to repeated stimulation  Orientation: oriented x 4  Speech: slurred (speech is slow and drawn out.)  Mood/Behavior: cooperative  Last Known Well Date: 11/08/24  Pupils  Pupil PERRLA: yes    Learning  Learning Assessment  Learning Readiness and Ability: no barriers identified    Respiratory  Respiratory WDL  Respiratory WDL: WDL    Abdominal Pain       Pain Assessments  Pain (Adult)  (0-10) Pain Rating: Rest: 0  (0-10) Pain Rating: Activity: 0    NIH Stroke Scale       Amandeep Pritchett RN  11/15/24 12:27 EST

## 2024-11-15 NOTE — PLAN OF CARE
Goal Outcome Evaluation:              Outcome Evaluation: Pt arrived on unit from ED. Pt A&Ox4. RN dysphagia screen at bedside passed. Pt resting in bed with bed alarm on and call light within reach.

## 2024-11-15 NOTE — ED PROVIDER NOTES
EMERGENCY DEPARTMENT ENCOUNTER  Room Number:  15/15  PCP: Ronel Stephen APRN  Independent Historians: Patient and EMS      HPI:  Chief Complaint: had concerns including Altered Mental Status.     A complete HPI/ROS/PMH/PSH/SH/FH are unobtainable due to: Poor historian    Chronic or social conditions impacting patient care (Social Determinants of Health): None      Context: Sonya Marcus is a 69 y.o. female with a medical history of hyperlipidemia, CKD, GERD, prediabetes and hypothyroidism who presents to the ED c/o acute altered mental status and worsening confusion.  Patient is complaining of some headache and nausea and generalized fatigue.  Her family was concerned about her because she was acting confused and she has had some decreased mobility recently because of a leg injury.  Patient denies fevers.  She denies vomiting.  Denies difficulties breathing.  Denies chest pain.      Review of prior external notes (non-ED) -and- Review of prior external test results outside of this encounter: I independently reviewed the discharge summary from September 4, 2024.  She was admitted at that time for UTI management while also recovering from a recent knee surgery.    Prescription drug monitoring program review: SALVATORE reviewed by Dain Ko MD, Kale Willoughby MD   N/A and SALVATORE query complete and reviewed. Patient receives regular prescriptions for controlled substances.    PAST MEDICAL HISTORY  Active Ambulatory Problems     Diagnosis Date Noted    Altered mental status 09/04/2023    Polypharmacy 09/04/2023    Positive urine drug screen 09/04/2023    MICHAEL (acute kidney injury) 09/04/2023    Left ureteral stone 09/06/2023    Tremor 05/07/2024    Knee joint replacement status 08/22/2024    Fibromyalgia, primary 10/16/2023    Coronary arteriosclerosis 02/01/2024    Hypothyroidism 10/23/2017    Genitourinary syndrome of menopause 10/23/2023    Hyperlipidemia 10/23/2017    Osteoarthritis of right knee  06/25/2024    Pernicious anemia 10/23/2017    Urinary tract infection in female 09/03/2024    Absolute anemia 09/16/2024    Gastroesophageal reflux disease 10/23/2017    Angina pectoris 02/01/2024    Anxiety disorder 10/23/2017    Arthritis or polyarthritis, rheumatoid 09/16/2024    Avitaminosis D 09/16/2024    B12 deficiency 09/16/2024    Bilateral primary osteoarthritis of knee 02/01/2024    BP (high blood pressure) 09/16/2024    Cardiac murmur 09/16/2024    Stage 2 chronic kidney disease 02/01/2024     Resolved Ambulatory Problems     Diagnosis Date Noted    UTI (urinary tract infection) 09/04/2023    Bacteremia due to Escherichia coli 09/05/2023    Arthralgia of right knee 09/10/2024     Past Medical History:   Diagnosis Date    Anxiety and depression     Arthritis     Carotid artery occlusion 2021    Chronic urinary tract infection     CKD (chronic kidney disease)     Coronary artery disease 2020    Disease of thyroid gland     GERD (gastroesophageal reflux disease)     History of kidney stones     History of pulmonary embolism     HL (hearing loss) 2024    Hypertension     Left hip pain     Memory loss     MVP (mitral valve prolapse)     Prediabetes     Right knee pain     Right shoulder pain     Sleep apnea          PAST SURGICAL HISTORY  Past Surgical History:   Procedure Laterality Date    APPENDECTOMY      BREAST BIOPSY      COLONOSCOPY  01/01/2022    CYSTOSCOPY W/ URETERAL STENT PLACEMENT Left 09/05/2023    Procedure: LEFT CYSTOSCOPY URETERAL CATHETER/STENT INSERTION;  Surgeon: Quinton Rosa MD;  Location: [facility] OR;  Service: Urology;  Laterality: Left;    EYE SURGERY      HYSTERECTOMY      JOINT REPLACEMENT      KNEE ARTHROPLASTY Left     REPLACEMENT TOTAL KNEE Right     SHOULDER SURGERY      TONSILLECTOMY      TOTAL KNEE ARTHROPLASTY Right 08/22/2024    Procedure: TOTAL KNEE ARTHROPLASTY WITH CORI ROBOT;  Surgeon: Steven Sumner II, MD;  [place] [facility] [place] [facility] [place]  Service:  Robotics - Ortho;  Laterality: Right;         FAMILY HISTORY  Family History   Problem Relation Age of Onset    Stroke Sister     Breast cancer Daughter     Breast cancer Daughter     Stroke Maternal Grandmother     Malig Hyperthermia Neg Hx          SOCIAL HISTORY  Social History     Socioeconomic History    Marital status: Single   Tobacco Use    Smoking status: Never    Smokeless tobacco: Never   Vaping Use    Vaping status: Never Used   Substance and Sexual Activity    Alcohol use: Yes     Comment: OCCASIONALLY    Drug use: Never    Sexual activity: Not Currently     Birth control/protection: Post-menopausal     Comment: Post Menopausal with no immediate partner         ALLERGIES  Compazine [prochlorperazine]; Iodine; Penicillins; Shellfish-derived products; Glycerol, iodinated; Octacosanol; Latex; Levofloxacin; and Sulfa antibiotics      REVIEW OF SYSTEMS  Review of Systems  Included in HPI  All systems reviewed and negative except for those discussed in HPI.      PHYSICAL EXAM    I have reviewed the triage vital signs and nursing notes.    ED Triage Vitals [11/15/24 0932]   Temp Heart Rate Resp BP SpO2   97.7 °F (36.5 °C) 68 18 171/82 98 %      Temp src Heart Rate Source Patient Position BP Location FiO2 (%)   Tympanic Monitor -- Right arm --       Physical Exam  GENERAL: alert, appears fatigued and somewhat sedated.  No sign of distress  SKIN: Warm, dry, no rashes  HENT: Normocephalic, atraumatic, mucous membranes somewhat dry  EYES: no scleral icterus, normal conjunctivae, pupils are small at 2 mm but equally round  CV: regular rhythm, regular rate, no murmurs  RESPIRATORY: normal effort, lungs clear bilaterally, no stridor  ABDOMEN: soft, nondistended, nontender  MUSCULOSKELETAL: No asymmetry of extremities  NEURO: alert, moves all extremities, follows commands, no facial droop, speech is somewhat slurred.      LAB RESULTS  Recent Results (from the past 24 hours)   Comprehensive Metabolic Panel     Collection Time: 11/15/24 10:04 AM    Specimen: Blood   Result Value Ref Range    Glucose 109 (H) 65 - 99 mg/dL    BUN 11 8 - 23 mg/dL    Creatinine 0.89 0.57 - 1.00 mg/dL    Sodium 142 136 - 145 mmol/L    Potassium 4.3 3.5 - 5.2 mmol/L    Chloride 106 98 - 107 mmol/L    CO2 23.6 22.0 - 29.0 mmol/L    Calcium 9.7 8.6 - 10.5 mg/dL    Total Protein 6.6 6.0 - 8.5 g/dL    Albumin 3.9 3.5 - 5.2 g/dL    ALT (SGPT) 7 1 - 33 U/L    AST (SGOT) 15 1 - 32 U/L    Alkaline Phosphatase 91 39 - 117 U/L    Total Bilirubin 0.3 0.0 - 1.2 mg/dL    Globulin 2.7 gm/dL    A/G Ratio 1.4 g/dL    BUN/Creatinine Ratio 12.4 7.0 - 25.0    Anion Gap 12.4 5.0 - 15.0 mmol/L    eGFR 69.8 >60.0 mL/min/1.73   Lactic Acid, Plasma    Collection Time: 11/15/24 10:04 AM    Specimen: Blood   Result Value Ref Range    Lactate 1.1 0.5 - 2.0 mmol/L   Procalcitonin    Collection Time: 11/15/24 10:04 AM    Specimen: Blood   Result Value Ref Range    Procalcitonin 0.03 0.00 - 0.25 ng/mL   CBC Auto Differential    Collection Time: 11/15/24 10:04 AM    Specimen: Blood   Result Value Ref Range    WBC 4.93 3.40 - 10.80 10*3/mm3    RBC 4.63 3.77 - 5.28 10*6/mm3    Hemoglobin 13.7 12.0 - 15.9 g/dL    Hematocrit 41.9 34.0 - 46.6 %    MCV 90.5 79.0 - 97.0 fL    MCH 29.6 26.6 - 33.0 pg    MCHC 32.7 31.5 - 35.7 g/dL    RDW 12.8 12.3 - 15.4 %    RDW-SD 41.4 37.0 - 54.0 fl    MPV 10.9 6.0 - 12.0 fL    Platelets 315 140 - 450 10*3/mm3    Neutrophil % 57.9 42.7 - 76.0 %    Lymphocyte % 31.2 19.6 - 45.3 %    Monocyte % 6.7 5.0 - 12.0 %    Eosinophil % 2.8 0.3 - 6.2 %    Basophil % 1.0 0.0 - 1.5 %    Immature Grans % 0.4 0.0 - 0.5 %    Neutrophils, Absolute 2.85 1.70 - 7.00 10*3/mm3    Lymphocytes, Absolute 1.54 0.70 - 3.10 10*3/mm3    Monocytes, Absolute 0.33 0.10 - 0.90 10*3/mm3    Eosinophils, Absolute 0.14 0.00 - 0.40 10*3/mm3    Basophils, Absolute 0.05 0.00 - 0.20 10*3/mm3    Immature Grans, Absolute 0.02 0.00 - 0.05 10*3/mm3    nRBC 0.0 0.0 - 0.2 /100 WBC   Ethanol     Collection Time: 11/15/24 10:04 AM    Specimen: Blood   Result Value Ref Range    Ethanol <10 0 - 10 mg/dL    Ethanol % <0.010 %   ECG 12 Lead Altered Mental Status    Collection Time: 11/15/24 10:10 AM   Result Value Ref Range    QT Interval 435 ms    QTC Interval 446 ms   Urinalysis With Culture If Indicated - Straight Cath    Collection Time: 11/15/24 10:28 AM    Specimen: Straight Cath; Urine   Result Value Ref Range    Color, UA Yellow Yellow, Straw    Appearance, UA Clear Clear    pH, UA 5.5 5.0 - 8.0    Specific Gravity, UA >1.030 (H) 1.005 - 1.030    Glucose, UA Negative Negative    Ketones, UA 15 mg/dL (1+) (A) Negative    Bilirubin, UA Negative Negative    Blood, UA Negative Negative    Protein, UA Negative Negative    Leuk Esterase, UA Negative Negative    Nitrite, UA Negative Negative    Urobilinogen, UA 1.0 E.U./dL 0.2 - 1.0 E.U./dL   Urine Drug Screen - Straight Cath    Collection Time: 11/15/24 10:28 AM    Specimen: Straight Cath; Urine   Result Value Ref Range    Amphet/Methamphet, Screen Positive (A) Negative    Barbiturates Screen, Urine Negative Negative    Benzodiazepine Screen, Urine Positive (A) Negative    Cocaine Screen, Urine Negative Negative    Opiate Screen Positive (A) Negative    THC, Screen, Urine Negative Negative    Methadone Screen, Urine Negative Negative    Oxycodone Screen, Urine Negative Negative    Fentanyl, Urine Negative Negative         RADIOLOGY  CT Head Without Contrast    Result Date: 11/15/2024  CT HEAD WITHOUT CONTRAST  CLINICAL HISTORY: ams. Confusion.  TECHNIQUE: CT scan of the head was obtained with 3 mm axial soft tissue algorithm images. No intravenous contrast was administered. Sagittal and coronal reconstructions were obtained.  COMPARISON: CT head dated 9/4/2023.  FINDINGS:   The ventricles, sulci, and cisterns are age-appropriate. The gray-white matter differentiation is within normal limits. The basal ganglia and thalami are unremarkable in appearance. The  posterior fossa structures are unremarkable.       No CT evidence for acute intracranial pathology.  The etiology of the patient's confusion is not further elucidated on this examination. If further radiographic assessment is warranted, one could obtain an MRI of the brain for follow-up.   Radiation dose reduction techniques were utilized, including automated exposure control and exposure modulation based on body size.  This report was finalized on 11/15/2024 11:39 AM by Dr. Jonathon Guzman M.D on Workstation: [id number]         MEDICATIONS GIVEN IN ER  Medications   sodium chloride 0.9 % flush 10 mL (has no administration in time range)         ORDERS PLACED DURING THIS VISIT:  Orders Placed This Encounter   Procedures    CT Head Without Contrast    XR Chest 1 View    Comprehensive Metabolic Panel    Urinalysis With Culture If Indicated - Urine, Clean Catch    Lactic Acid, Plasma    Procalcitonin    CBC Auto Differential    Urine Drug Screen - Urine, Clean Catch    Ethanol    Monitor Blood Pressure    [facility] (on-call MD unless specified) Details    ECG 12 Lead Altered Mental Status    Insert Peripheral IV    Initiate Observation Status    CBC & Differential         OUTPATIENT MEDICATION MANAGEMENT:  Current Facility-Administered Medications Ordered in Epic   Medication Dose Route Frequency Provider Last Rate Last Admin    sodium chloride 0.9 % flush 10 mL  10 mL Intravenous PRN Kale Willoughby MD         Current Outpatient Medications Ordered in Epic   Medication Sig Dispense Refill    albuterol (PROVENTIL) (2.5 MG/3ML) 0.083% nebulizer solution Take 2.5 mg by nebulization Every 4 (Four) Hours As Needed for Wheezing. 25 each 0    ALPRAZolam (XANAX) 0.25 MG tablet Take 1 tablet by mouth See Admin Instructions. Take 1 tablet 2 times daily as needed and take 2 tablets at bedtime as needed.      buPROPion XL (WELLBUTRIN XL) 150 MG 24 hr tablet Take 1 tablet by mouth Every Night.      cyanocobalamin 1000 MCG/ML injection  Inject 1 mL under the skin into the appropriate area as directed Every 30 (Thirty) Days.      docusate sodium (Colace) 100 MG capsule Take 1 capsule by mouth Daily. 90 capsule 3    DULoxetine (CYMBALTA) 60 MG capsule Take 1 capsule by mouth 2 (Two) Times a Day.      HYDROcodone-acetaminophen (NORCO) 7.5-325 MG per tablet Take 1 tablet every 4-6 hours by oral route.      levothyroxine (SYNTHROID, LEVOTHROID) 88 MCG tablet Take 1 tablet by mouth Every Morning.      metoprolol succinate XL (TOPROL-XL) 50 MG 24 hr tablet Take 1 tablet by mouth Daily.      naloxone (NARCAN) 4 MG/0.1ML nasal spray Call 911. Don't prime. Leon in 1 nostril for overdose. Repeat in 2-3 minutes in other nostril if no or minimal breathing/responsiveness. 1 each 0    nitrofurantoin (MACRODANTIN) 50 MG capsule Take 1 capsule by mouth Every Night. 30 capsule 11    nitrofurantoin, macrocrystal-monohydrate, (Macrobid) 100 MG capsule Take 1 capsule by mouth 2 (Two) Times a Day. 10 capsule 0    omeprazole (priLOSEC) 20 MG capsule Take 1 capsule by mouth Daily.      ondansetron (Zofran) 4 MG tablet Take 1 tablet by mouth Every 6 (Six) Hours As Needed for Nausea or Vomiting. 30 tablet 0    oxyCODONE-acetaminophen (PERCOCET) 5-325 MG per tablet Take 1 tablet by mouth Every 4 (Four) Hours As Needed for Severe Pain. 50 tablet 0    pregabalin (LYRICA) 100 MG capsule Take 3 capsules by mouth 2 (Two) Times a Day.      pregabalin (LYRICA) 300 MG capsule TAKE ONE CAPSULE BY MOUTH TWICE DAILY 60 capsule 5    rosuvastatin (CRESTOR) 10 MG tablet Take 1 tablet by mouth Every Night.      traZODone (DESYREL) 100 MG tablet Take 2 tablets by mouth every night at bedtime.         PROCEDURES  Procedures      Critical care provider statement:    Critical care time (minutes): 32.   Critical care time was exclusive of:  Separately billable procedures and treating other patients   Critical care was necessary to treat or prevent imminent or life-threatening deterioration of  the following conditions:  CNS Failure   Critical care was time spent personally by me on the following activities:  Development of treatment plan with patient or surrogate, discussions with consultants, evaluation of patient's response to treatment, examination of patient, obtaining history from patient or surrogate, ordering and performing treatments and interventions, ordering and review of laboratory studies, ordering and review of radiographic studies, pulse oximetry, re-evaluation of patient's condition and review of old charts. Critical Care indicators: Altered Mental Status, Elderly, Delirium       PROGRESS, DATA ANALYSIS, CONSULTS, AND MEDICAL DECISION MAKING  All labs have been independently interpreted by me.  All radiology studies have been reviewed by me. All EKG's have been independently viewed and interpreted by me.  Discussion below represents my analysis of pertinent findings related to patient's condition, differential diagnosis, treatment plan and final disposition.    Differential diagnosis includes but is not limited to UTI, hypoglycemia, seizure, stroke, metabolic encephalopathy, dehydration, viral illness.    Clinical Scores:                   ED Course as of 11/15/24 1304   Fri Nov 15, 2024   1015 EKG         EKG time/Interp time: 1010/1015  Rhythm/Rate: Sinus rhythm, 63 bpm  P waves and TN: Present, 180 ms, normal interval  QRS, axis: 98 ms, normal axis, narrow complex  ST and T waves: No acute ischemic changes are present.  Independently interpreted by me contemporaneously with treatment   [TRESA]   1132 Amphet/Methamphet, Screen(!): Positive [TRESA]   1132 Benzodiazepine Screen, Urine(!): Positive [TRESA]   1132 Opiate Screen(!): Positive [TRESA]   1219 I independently interpreted the Head CT w/o Contrast and my findings are: No acute hemorrhage, no midline shift   [TRESA]   1219 I discussed with Dr. Ko from [facility] about the patient.  He agrees to admit her to the hospitalist service for further medical  management today. [TRESA]   1303 I independently interpreted the chest x-ray and my findings are: Low lung volumes.  No pneumothorax [TRESA]      ED Course User Index  [TRESA] Kale Willoughby MD             AS OF 13:04 EST VITALS:    BP - 138/86  HR - 60  TEMP - 97.7 °F (36.5 °C) (Tympanic)  O2 SATS - 90%    COMPLEXITY OF CARE  The patient requires admission.      DIAGNOSIS  Final diagnoses:   Altered mental status, unspecified altered mental status type   Polypharmacy         DISPOSITION  ED Disposition       ED Disposition   Decision to Admit    Condition   --    Comment   Level of Care: Telemetry [5]   Diagnosis: Altered mental status [780.97.ICD-9-CM]   Admitting Physician: WILLIS TSANFORD [[id number]]   Attending Physician: WILLIS STANFORD [[id number]]   Is patient appropriate for Inpatient Observation Unit?: No [0]                  Please note that portions of this document were completed with a voice recognition program.    Note Disclaimer: At [facility], we believe that sharing information builds trust and better relationships. You are receiving this note because you recently visited [facility]. It is possible you will see health information before a provider has talked with you about it. This kind of information can be easy to misunderstand. To help you fully understand what it means for your health, we urge you to discuss this note with your provider.         Kale Willoughby MD  11/15/24 5098

## 2024-11-15 NOTE — ED NOTES
Pt to ed from home via EMS    Family called EMS due to confusion. Pt family reports recurrent UTI's. Pt alert and oriented x4 triage. Pt currently has broken leg so has had trouble getting around.

## 2024-11-15 NOTE — H&P
HISTORY AND PHYSICAL   [facility]        Date of Admission: 11/15/2024  Patient Identification:  Name: Sonya Marcus  Age: 69 y.o.  Sex: female  :  1955  MRN: [id number]                     Primary Care Physician: Ronel Stephen APRN    Chief Complaint: Confusion    History of Present Illness:   Mrs Marcus is a pleasant 69-year-old female who was initially brought to the ER due to confusion.  Daughter was at bedside she was very helpful to get some history as well as insight into everything that is going on.  This patient does live by herself but the daughter is very involved.  She helps her mother with dispensing of medications and even claims to have cameras around the facility to help keep an eye on her.  She even is living independently with a broken leg in which she tells me she is nonweightbearing to her right lower extremity per surgery and intervention per Dr. Sumner.  She denies taking any additional medications with pain medications or benzos as she is on both Norco and Xanax.  Daughter was concerned about her utilizing Benadryl in excess that she has done in the past but the patient adamantly denies that.  Her urine drug screen is positive for amphetamine and apparently this has been a theme in the past but that has never been explained.  She adamantly denies taking any type of amphetamine or recent cold medicine.  She does openly admit to taking Lyrica and they deny the addition of any new medications.  Daughter provided some insight and the fact that Lyrica is the 1 medication that this patient will not let her touch as the daughter dispenses her medications on a daily basis to help her get the right medications but Lyrica she states she keeps hidden so there is concerned about her overutilizing.  Workup in the ER was completely unremarkable for any type of infectious etiology.  Case discussed with ER MD and chest x-ray is pending to ensure we do not miss any type of  pneumonia but I highly doubt that would be present given normal white count and lack of fever and/or cough.    Past Medical History:  Past Medical History:   Diagnosis Date    Angina pectoris     Anxiety and depression     Arthralgia of right knee 09/10/2024    Arthritis     Carotid artery occlusion 2021    Chronic urinary tract infection     CKD (chronic kidney disease)     Coronary arteriosclerosis     Coronary artery disease 2020    Disease of thyroid gland     Fibromyalgia, primary 2000    Genitourinary syndrome of menopause     GERD (gastroesophageal reflux disease)     History of kidney stones     History of pulmonary embolism     AFTER HYSTERECTOMY    HL (hearing loss) 2024    Hyperlipidemia 2020    Hypertension     Hypothyroidism     Left hip pain     Memory loss     MVP (mitral valve prolapse)     DR LUZ LAST OFFICE NOTE WITH CHART 7/30/2024    Prediabetes     Right knee pain     Right shoulder pain     Sleep apnea     PT IS NOT USING CPAP     Past Surgical History:  Past Surgical History:   Procedure Laterality Date    APPENDECTOMY      BREAST BIOPSY      COLONOSCOPY  01/01/2022    CYSTOSCOPY W/ URETERAL STENT PLACEMENT Left 09/05/2023    Procedure: LEFT CYSTOSCOPY URETERAL CATHETER/STENT INSERTION;  Surgeon: Quinton Rosa MD;  Location: [facility];  Service: Urology;  Laterality: Left;    EYE SURGERY      HYSTERECTOMY      JOINT REPLACEMENT      KNEE ARTHROPLASTY Left     REPLACEMENT TOTAL KNEE Right     SHOULDER SURGERY      TONSILLECTOMY      TOTAL KNEE ARTHROPLASTY Right 08/22/2024    Procedure: TOTAL KNEE ARTHROPLASTY WITH CORI ROBOT;  Surgeon: Steven Sumner II, MD;  [place] [facility] [place]  Service: Robotics - Ortho;  Laterality: Right;      Home Meds:  (Not in a hospital admission)      Allergies:  Allergies   Allergen Reactions    Compazine [Prochlorperazine] Dystonia    Iodine Anaphylaxis    Penicillins Nausea And Vomiting     Tolerated rocephin 9/2023 admission     Shellfish-Derived Products Anaphylaxis    Glycerol, Iodinated Unknown - Low Severity     Anaphylaxis   (also to shellfish)    Octacosanol Dizziness     HEADACHE    Latex Itching    Levofloxacin Hives    Sulfa Antibiotics GI Intolerance     Immunizations:  Immunization History   Administered Date(s) Administered    Fluzone High-Dose 65+YRS 2023    Hep A / Hep B 01/10/1976    Hep B / HiB 2005    Hepatitis B 2 Dose Vaccine Heplisav-B 2023    INFLUENZA A MONOVALENT (H5N1), ADJUVANTED-2013 09/10/2023    INFLUENZA NASAL UF 10/10/2023    Influenza, Unspecified 10/02/2023    Pneumococcal Conjugate Unspecified 2022    Pneumococcal, Unspecified 2022    Td (TDVAX) 2015    Td, Not Adsorbed 2015     Social History:   Social History     Social History Narrative    Not on file     Social History     Socioeconomic History    Marital status: Single   Tobacco Use    Smoking status: Never    Smokeless tobacco: Never   Vaping Use    Vaping status: Never Used   Substance and Sexual Activity    Alcohol use: Yes     Comment: OCCASIONALLY    Drug use: Never    Sexual activity: Not Currently     Birth control/protection: Post-menopausal     Comment: Post Menopausal with no immediate partner       Family History:  Family History   Problem Relation Age of Onset    Stroke Sister     Breast cancer Daughter     Breast cancer Daughter     Stroke Maternal Grandmother     Malig Hyperthermia Neg Hx         Review of Systems  See history of present illness and past medical history.  Patient denies fever chills night sweats.  Denies any nausea vomiting abdominal pain.  Denies any current confusion.  Denies any headache loss of consciousness and/or function.  Admits to right lower extremity pain with immobility per orthopedic recommendations.  Denies any chest pain cough or shortness of air. Remainder of ROS is negative.    Objective:  T Max 24 hrs: Temp (24hrs), Av.7 °F (36.5 °C), Min:97.7 °F (36.5 °C),  "Max:97.7 °F (36.5 °C)    Vitals Ranges:   Temp:  [97.7 °F (36.5 °C)] 97.7 °F (36.5 °C)  Heart Rate:  [60-68] 60  Resp:  [18] 18  BP: (138-171)/(82-86) 138/86      Exam:  /86   Pulse 60   Temp 97.7 °F (36.5 °C) (Tympanic)   Resp 18   Ht 152.4 cm (60\")   Wt 93 kg (205 lb 0.4 oz)   SpO2 90%   BMI 40.04 kg/m²     General Appearance:    Alert, cooperative, affect a bit flat but speech is fluent, alert noted x 3 for me on exam, daughter at bedside, nontoxic in appearance   Head:    Normocephalic, without obvious abnormality, atraumatic   Eyes:    PERRLA/EOM's intact, both eyes   Ears:    Normal external ear canals, both ears   Nose:   Nares normal, septum midline, mucosa normal, no drainage    or sinus tenderness   Throat:   Lips, mucosa, and tongue normal   Neck:   Supple, very large diameter making JVD on accessible.  No meningismus       Lungs:     Clear to auscultation bilaterally, respirations unlabored   Chest Wall:    No tenderness or deformity    Heart:    Regular rate and rhythm, S1 and S2 normal   Abdomen:     Soft, nontender, bowel sounds active all four quadrants   Extremities: No edema or volume overload   Pulses:   2+ and symmetric all extremities           Neurologic:   CNII-XII intact, no obvious focal deficits      .    Data Review:  Labs in chart were reviewed.             Imaging Results (All)       Procedure Component Value Units Date/Time    XR Chest 1 View [[id number]] Resulted: 11/15/24 1244     Updated: 11/15/24 1245    CT Head Without Contrast [[id number]] Collected: 11/15/24 1121     Updated: 11/15/24 1142    Narrative:      CT HEAD WITHOUT CONTRAST     CLINICAL HISTORY: ams. Confusion.     TECHNIQUE: CT scan of the head was obtained with 3 mm axial soft tissue  algorithm images. No intravenous contrast was administered. Sagittal and  coronal reconstructions were obtained.     COMPARISON: CT head dated 9/4/2023.     FINDINGS:       The ventricles, sulci, and cisterns are " age-appropriate. The gray-white  matter differentiation is within normal limits. The basal ganglia and  thalami are unremarkable in appearance. The posterior fossa structures  are unremarkable.       Impression:         No CT evidence for acute intracranial pathology.  The etiology of the  patient's confusion is not further elucidated on this examination. If  further radiographic assessment is warranted, one could obtain an MRI of  the brain for follow-up.        Radiation dose reduction techniques were utilized, including automated  exposure control and exposure modulation based on body size.     This report was finalized on 11/15/2024 11:39 AM by Dr. Jonathon Guzman M.D on Workstation: [id number]                 Assessment:  Active Hospital Problems    Diagnosis  POA    **Encephalopathy, toxic [G92.9]  Unknown    Stage 2 chronic kidney disease [N18.2]  Yes    Fibromyalgia, primary [M79.7]  Yes    Polypharmacy [Z79.899]  Not Applicable    Anxiety disorder [F41.9]  Yes    Hypothyroidism [E03.9]  Yes      Resolved Hospital Problems   No resolved problems to display.       Plan:    After obtaining further history in the ER and discussing things with daughter at bedside, I think her intermittent encephalopathy is due to toxic reasons from polypharmacy.  It appears that her Lyrica is likely being utilized and access.  She is already on a max dose of 300 mg twice daily but this is the one medication she does not let anyone particularly her daughter control or help her dose so we do not know she is taking more than she states.  When you start talking about this subject she starts to have less eye contact and I think that is the culprit.  There are also concerns about her utilizing Benadryl ad mirian. but she declines that    Ultimately I am going to bring her in and just observe overnight and I am going actually reduced her Lyrica to 150 mg twice daily and will also reduce her nightly trazodone from 200 to 100 mg    There  is no explanation for the amphetamine and apparently this has been a longstanding theme even when the PCP is checked urine drug screen.  Perhaps this is an interaction or side effect of Lyrica but she adamantly declines the use of amphetamine    Fibromyalgia generalized anxiety could complicate all of the above    Denies excess use of benzos or pain medication (also illustrated on her drug toxicology)    Hold nitrofurantoin     Both Norco and Percocet noted on home MAR.  [place] was the only 1 continued    No infectious workup or etiology found with what is noted in the ER.  Case discussed with ER MD and chest x-ray pending to ensure there is no missed pneumonia but I would highly doubt that    Fall precautions with PT to evaluate.  She states she is still nonweightbearing to her right lower extremity from previous knee surgery by Dr. Sumner.  I definitely think this is adding to her issues of generalized anxiety and also possibly increasing her risk for polypharmacy        Further recommendations to follow as clinical course unfolds    Dain Ko MD  11/15/2024  12:59 EST

## 2024-11-15 NOTE — CASE MANAGEMENT/SOCIAL WORK
Discharge Planning Assessment  [facility]     Patient Name: Sonya Marcus  MRN: [id number]  Today's Date: 11/15/2024    Admit Date: 11/15/2024    Plan: Home with family assist, HH vs SNF pending therapy evaluations   Discharge Needs Assessment       Row Name 11/15/24 1508       Living Environment    People in Home alone    Current Living Arrangements home    Potentially Unsafe Housing Conditions none    Primary Care Provided by self    Family Caregiver if Needed child(janae), adult    Family Caregiver Names Cece and tiff    Quality of Family Relationships involved;helpful    Able to Return to Prior Arrangements yes       Resource/Environmental Concerns    Resource/Environmental Concerns none    Transportation Concerns none       Transition Planning    Patient/Family Anticipates Transition to home with family;home with help/services;inpatient rehabilitation facility    Patient/Family Anticipated Services at Transition skilled nursing;home health care    Transportation Anticipated family or friend will provide       Discharge Needs Assessment    Readmission Within the Last 30 Days no previous admission in last 30 days    Equipment Currently Used at Home wheelchair    Concerns to be Addressed adjustment to diagnosis/illness    Anticipated Changes Related to Illness inability to care for self    Equipment Needed After Discharge none                   Discharge Plan       Row Name 11/15/24 1500       Plan    Plan Home with family assist, HH vs SNF pending therapy evaluations    Patient/Family in Agreement with Plan yes    Plan Comments Spoke with patient at bedside, introduced self, explained CCP role and verified face sheet and pharmacy information. Pt lives alone in 1 level home with no HIRAM, she is normally IADL's, uses walker or cane, recently she has had to rely more on w/c and having trouble taking care of things. She has no HH or SNF history, pt open to HH or SNF if recommended, await therapy  evaluations, if SNF needed would require precert. CCP will follow for needs.  Pending clinical course- Pau WILLIAM                  Continued Care and Services - Admitted Since 11/15/2024    No active coordination exists for this encounter.       Selected Continued Care - Prior Encounters Includes continued care and service providers with selected services from prior encounters from 8/17/2024 to 11/15/2024      Discharged on 8/25/2024 Admission date: 8/22/2024 - Discharge disposition: Home or Self Care      Home Medical Care       Service Provider Services Address Phone Fax Patient Preferred    [facility] [id number] [place] [address] [phone] --                          Expected Discharge Date and Time       Expected Discharge Date Expected Discharge Time    Nov 18, 2024            Demographic Summary       Row Name 11/15/24 1508       General Information    Admission Type observation                   Functional Status       Row Name 11/15/24 1508       Functional Status    Usual Activity Tolerance good    Current Activity Tolerance fair       Functional Status, IADL    Medications independent    Meal Preparation independent    Housekeeping independent    Laundry independent    Shopping independent       Mental Status    General Appearance WDL WDL       Mental Status Summary    Recent Changes in Mental Status/Cognitive Functioning no changes                   Psychosocial    No documentation.                  Abuse/Neglect    No documentation.                  Legal       Row Name 11/15/24 1508       Financial/Legal    Who Manages Finances if Patient Unable daughters                   Substance Abuse    No documentation.                  Patient Forms    No documentation.                     Pau Buck RN

## 2024-11-16 ENCOUNTER — TELEPHONE (OUTPATIENT)
Age: 69
End: 2024-11-16
Payer: MEDICARE

## 2024-11-16 LAB
HBA1C MFR BLD: 5.5 % (ref 4.8–5.6)
TSH SERPL DL<=0.05 MIU/L-ACNC: 2.04 UIU/ML (ref 0.27–4.2)

## 2024-11-16 PROCEDURE — 97530 THERAPEUTIC ACTIVITIES: CPT | Performed by: PHYSICAL THERAPIST

## 2024-11-16 PROCEDURE — 83036 HEMOGLOBIN GLYCOSYLATED A1C: CPT | Performed by: INTERNAL MEDICINE

## 2024-11-16 PROCEDURE — 84443 ASSAY THYROID STIM HORMONE: CPT | Performed by: INTERNAL MEDICINE

## 2024-11-16 PROCEDURE — 25010000002 ENOXAPARIN PER 10 MG: Performed by: INTERNAL MEDICINE

## 2024-11-16 PROCEDURE — 97162 PT EVAL MOD COMPLEX 30 MIN: CPT | Performed by: PHYSICAL THERAPIST

## 2024-11-16 PROCEDURE — G0378 HOSPITAL OBSERVATION PER HR: HCPCS

## 2024-11-16 PROCEDURE — 96372 THER/PROPH/DIAG INJ SC/IM: CPT

## 2024-11-16 PROCEDURE — 99214 OFFICE O/P EST MOD 30 MIN: CPT | Performed by: PSYCHIATRY & NEUROLOGY

## 2024-11-16 RX ORDER — NITROFURANTOIN MACROCRYSTALS 50 MG/1
50 CAPSULE ORAL NIGHTLY
Status: DISCONTINUED | OUTPATIENT
Start: 2024-11-16 | End: 2024-11-17 | Stop reason: HOSPADM

## 2024-11-16 RX ORDER — ENOXAPARIN SODIUM 100 MG/ML
40 INJECTION SUBCUTANEOUS EVERY 24 HOURS
Status: DISCONTINUED | OUTPATIENT
Start: 2024-11-16 | End: 2024-11-17 | Stop reason: HOSPADM

## 2024-11-16 RX ADMIN — TRAZODONE HYDROCHLORIDE 100 MG: 50 TABLET ORAL at 20:01

## 2024-11-16 RX ADMIN — HYDROCODONE BITARTRATE AND ACETAMINOPHEN 1 TABLET: 7.5; 325 TABLET ORAL at 14:20

## 2024-11-16 RX ADMIN — PREGABALIN 150 MG: 75 CAPSULE ORAL at 20:01

## 2024-11-16 RX ADMIN — DULOXETINE HYDROCHLORIDE 60 MG: 60 CAPSULE, DELAYED RELEASE ORAL at 09:02

## 2024-11-16 RX ADMIN — ALPRAZOLAM 0.25 MG: 0.25 TABLET ORAL at 21:53

## 2024-11-16 RX ADMIN — PANTOPRAZOLE SODIUM 40 MG: 40 TABLET, DELAYED RELEASE ORAL at 06:20

## 2024-11-16 RX ADMIN — HYDROCODONE BITARTRATE AND ACETAMINOPHEN 1 TABLET: 7.5; 325 TABLET ORAL at 07:01

## 2024-11-16 RX ADMIN — METOPROLOL SUCCINATE 50 MG: 50 TABLET, EXTENDED RELEASE ORAL at 09:03

## 2024-11-16 RX ADMIN — DULOXETINE HYDROCHLORIDE 60 MG: 60 CAPSULE, DELAYED RELEASE ORAL at 20:01

## 2024-11-16 RX ADMIN — LEVOTHYROXINE SODIUM 88 MCG: 88 TABLET ORAL at 06:20

## 2024-11-16 RX ADMIN — Medication 10 ML: at 09:03

## 2024-11-16 RX ADMIN — NITROFURANTOIN MACROCRYSTALS 50 MG: 50 CAPSULE ORAL at 20:01

## 2024-11-16 RX ADMIN — ALPRAZOLAM 0.25 MG: 0.25 TABLET ORAL at 07:01

## 2024-11-16 RX ADMIN — ROSUVASTATIN CALCIUM 10 MG: 20 TABLET, FILM COATED ORAL at 20:01

## 2024-11-16 RX ADMIN — BUPROPION HYDROCHLORIDE 150 MG: 150 TABLET, EXTENDED RELEASE ORAL at 20:01

## 2024-11-16 RX ADMIN — ACETAMINOPHEN 650 MG: 325 TABLET ORAL at 09:02

## 2024-11-16 RX ADMIN — ENOXAPARIN SODIUM 40 MG: 100 INJECTION SUBCUTANEOUS at 14:13

## 2024-11-16 RX ADMIN — DOCUSATE SODIUM 100 MG: 100 CAPSULE, LIQUID FILLED ORAL at 09:03

## 2024-11-16 RX ADMIN — PREGABALIN 150 MG: 75 CAPSULE ORAL at 09:02

## 2024-11-16 RX ADMIN — HYDROCODONE BITARTRATE AND ACETAMINOPHEN 1 TABLET: 7.5; 325 TABLET ORAL at 21:51

## 2024-11-16 NOTE — PROGRESS NOTES
Name: Soyna Marcus ADMIT: 11/15/2024   : 1955  PCP: Ronel Stephen APRN    MRN: [id number] LOS: 0 days   AGE/SEX: 69 y.o. female  ROOM: [facility]     Subjective   Subjective   Complaining of headache/dizziness.  No double vision or loss of the vision.  No slurring of the speech.  No unilateral numbness or weakness.  No loss of consciousness.  No confusion.  No hallucinations or delusions.  No fever or chills    Review of Systems  Cardiovascular/respiratory.  No chest pain/no palpitations/no shortness of breath/no cough/no wheeze  .  Positive occasional dysuria.  No hematuria.  No frequency or urgency  GI.  No abdominal pain.  No nausea or vomiting       Objective   Objective   Vital Signs  Temp:  [97.5 °F (36.4 °C)-98.1 °F (36.7 °C)] 97.9 °F (36.6 °C)  Heart Rate:  [60-72] 65  Resp:  [18] 18  BP: (116-171)/(58-86) 131/58  SpO2:  [90 %-98 %] 95 %  on  Flow (L/min) (Oxygen Therapy):  [2] 2;   Device (Oxygen Therapy): nasal cannula  No intake or output data in the 24 hours ending 24 0902  Body mass index is 40.04 kg/m².      11/15/24  1202   Weight: 93 kg (205 lb 0.4 oz)     Physical Exam  General.  Elderly female.  She is alert and oriented x 4.  In no apparent pain/distress/diaphoresis.  Normal mood and affect.  Eyes.  Pupils equal round and reactive.  Intact extraocular musculature.  No pallor or jaundice  Oral cavity.  Moist mucous membrane.  Neck.  Supple.  No JVD.  No lymphadenopathy or thyromegaly  Cardiovascular.  Regular rate and rhythm with no gallops or murmurs.  Chest.  Clear to auscultation bilaterally with no added sounds.  Abdomen.  Soft lax.  No tenderness.  No organomegaly.  No guarding or rebound  Extremities.  No clubbing/cyanosis/edema.  CNS.  No acute focal neurological deficits.    Results Review:      Results from last 7 days   Lab Units 11/15/24  1004   SODIUM mmol/L 142   POTASSIUM mmol/L 4.3   CHLORIDE mmol/L 106   CO2 mmol/L 23.6   BUN mg/dL 11   CREATININE mg/dL 0.89  "  GLUCOSE mg/dL 109*   CALCIUM mg/dL 9.7   AST (SGOT) U/L 15   ALT (SGPT) U/L 7     Estimated Creatinine Clearance: 59.9 mL/min (by C-G formula based on SCr of 0.89 mg/dL).                                Invalid input(s): \"LDLCALC\"  Results from last 7 days   Lab Units 11/15/24  1004   WBC 10*3/mm3 4.93   HEMOGLOBIN g/dL 13.7   HEMATOCRIT % 41.9   PLATELETS 10*3/mm3 315   MCV fL 90.5   MCH pg 29.6   MCHC g/dL 32.7   RDW % 12.8   RDW-SD fl 41.4   MPV fL 10.9   NEUTROPHIL % % 57.9   LYMPHOCYTE % % 31.2   MONOCYTES % % 6.7   EOSINOPHIL % % 2.8   BASOPHIL % % 1.0   IMM GRAN % % 0.4   NEUTROS ABS 10*3/mm3 2.85   LYMPHS ABS 10*3/mm3 1.54   MONOS ABS 10*3/mm3 0.33   EOS ABS 10*3/mm3 0.14   BASOS ABS 10*3/mm3 0.05   IMMATURE GRANS (ABS) 10*3/mm3 0.02   NRBC /100 WBC 0.0             Results from last 7 days   Lab Units 11/15/24  1004   PROCALCITONIN ng/mL 0.03   LACTATE mmol/L 1.1                     Results from last 7 days   Lab Units 11/15/24  1028   NITRITE UA  Negative           Imaging:  Imaging Results (Last 24 Hours)       Procedure Component Value Units Date/Time    XR Chest 1 View [[id number]] Collected: 11/15/24 1322     Updated: 11/15/24 1327    Narrative:      XR CHEST 1 VW-     HISTORY: Female who is 69 years-old, altered mental status     TECHNIQUE: Frontal view of the chest     COMPARISON: 8/16/2024     FINDINGS: The heart size is borderline. Pulmonary vasculature is  unremarkable. No focal pulmonary consolidation, pleural effusion, or  pneumothorax. No acute osseous process.       Impression:      No focal pulmonary consolidation. Borderline heart size.  Follow-up as clinical indications persist.     This report was finalized on 11/15/2024 1:24 PM by Dr. Michele Christian M.D on Workstation: [id number]       CT Head Without Contrast [[id number]] Collected: 11/15/24 1121     Updated: 11/15/24 1142    Narrative:      CT HEAD WITHOUT CONTRAST     CLINICAL HISTORY: ams. Confusion.     TECHNIQUE: CT scan of the head " was obtained with 3 mm axial soft tissue  algorithm images. No intravenous contrast was administered. Sagittal and  coronal reconstructions were obtained.     COMPARISON: CT head dated 9/4/2023.     FINDINGS:       The ventricles, sulci, and cisterns are age-appropriate. The gray-white  matter differentiation is within normal limits. The basal ganglia and  thalami are unremarkable in appearance. The posterior fossa structures  are unremarkable.       Impression:         No CT evidence for acute intracranial pathology.  The etiology of the  patient's confusion is not further elucidated on this examination. If  further radiographic assessment is warranted, one could obtain an MRI of  the brain for follow-up.        Radiation dose reduction techniques were utilized, including automated  exposure control and exposure modulation based on body size.     This report was finalized on 11/15/2024 11:39 AM by Dr. Jonathon Guzman M.D on Workstation: [id number]                  I reviewed the patient's new clinical results / labs / tests / procedures      Assessment/Plan     Active Hospital Problems    Diagnosis  POA    **Encephalopathy, toxic [G92.9]  Unknown    Stage 2 chronic kidney disease [N18.2]  Yes    Fibromyalgia, primary [M79.7]  Yes    Polypharmacy [Z79.899]  Not Applicable    Anxiety disorder [F41.9]  Yes    Hypothyroidism [E03.9]  Yes      Resolved Hospital Problems   No resolved problems to display.           Confusion mental status changes secondary to toxic encephalopathy.  Toxic encephalopathy is secondary to Xanax/Lyrica/hydrocodone/trazodone.  Resolved with decreasing Lyrica and trazodone and changing Xanax and Norco to as needed.  She denies any illicit drug use and I cannot explain the amphetamine in the urine tox screen.  There is no evidence of infection (no fever/negative UA for UTI/normal lactic acid and procalcitonin/negative chest x-ray).  CNS examination is normal.  CT scan of the brain is negative  for acute disease.  Hypothyroidism.  Continue current replacement check TSH  Fibromyalgia.  Continue Cymbalta/as needed Norco/Lyrica at lower dose  Anxiety.  Continue Wellbutrin and change Xanax to as needed.  Right lower extremity fracture.  Continue nonweightbearing status.  Hyperglycemia.  Check A1c.  Hypertension.  No evidence of angina or congestive heart failure.  Good blood pressure control.  Continue Toprol.  VTE prophylaxis.  Lovenox.        Discussed my findings and plan of treatment with the patient/daughter.  Disposition.  Patient wants to go home tomorrow to live with her daughter.  Will need home health physical therapy.  Discharge home tomorrow with 24-hour care and home health        Martinez Mak MD  [facility]  11/16/24  09:02 EST

## 2024-11-16 NOTE — THERAPY DISCHARGE NOTE
Patient Name: Sonya Marcus  : 1955    MRN: [id number]                              Today's Date: 2024       Admit Date: 11/15/2024    Visit Dx:     ICD-10-CM ICD-9-CM   1. Altered mental status, unspecified altered mental status type  R41.82 780.97   2. Polypharmacy  Z79.899 V58.69     Patient Active Problem List   Diagnosis    Altered mental status    Polypharmacy    Positive urine drug screen    MICHAEL (acute kidney injury)    Left ureteral stone    Tremor    Knee joint replacement status    Fibromyalgia, primary    Coronary arteriosclerosis    Hypothyroidism    Genitourinary syndrome of menopause    Hyperlipidemia    Osteoarthritis of right knee    Pernicious anemia    Urinary tract infection in female    Absolute anemia    Gastroesophageal reflux disease    Angina pectoris    Anxiety disorder    Arthritis or polyarthritis, rheumatoid    Avitaminosis D    B12 deficiency    Bilateral primary osteoarthritis of knee    BP (high blood pressure)    Cardiac murmur    Stage 2 chronic kidney disease    Encephalopathy, toxic     Past Medical History:   Diagnosis Date    Angina pectoris     Anxiety and depression     Arthralgia of right knee 09/10/2024    Arthritis     Carotid artery occlusion     Chronic urinary tract infection     CKD (chronic kidney disease)     Coronary arteriosclerosis     Coronary artery disease     Disease of thyroid gland     Fibromyalgia, primary 2000    Genitourinary syndrome of menopause     GERD (gastroesophageal reflux disease)     History of kidney stones     History of pulmonary embolism     AFTER HYSTERECTOMY    HL (hearing loss)     Hyperlipidemia     Hypertension     Hypothyroidism     Left hip pain     Memory loss     MVP (mitral valve prolapse)     DR LUZ LAST OFFICE NOTE WITH CHART 2024    Prediabetes     Right knee pain     Right shoulder pain     Sleep apnea     PT IS NOT USING CPAP     Past Surgical History:   Procedure Laterality Date     APPENDECTOMY      BREAST BIOPSY      COLONOSCOPY  01/01/2022    CYSTOSCOPY W/ URETERAL STENT PLACEMENT Left 09/05/2023    Procedure: LEFT CYSTOSCOPY URETERAL CATHETER/STENT INSERTION;  Surgeon: Quinton Rosa MD;  Location: [facility] OR;  Service: Urology;  Laterality: Left;    EYE SURGERY      HYSTERECTOMY      JOINT REPLACEMENT      KNEE ARTHROPLASTY Left     REPLACEMENT TOTAL KNEE Right     SHOULDER SURGERY      TONSILLECTOMY      TOTAL KNEE ARTHROPLASTY Right 08/22/2024    Procedure: TOTAL KNEE ARTHROPLASTY WITH CORI ROBOT;  Surgeon: Steven Sumner II, MD;  [place] [facility] [place] [place] [facility];  Service: Robotics - Ortho;  Laterality: Right;      General Information       Row Name 11/16/24 1036          Physical Therapy Time and Intention    Document Type evaluation  -     Mode of Treatment individual therapy;physical therapy  -       Row Name 11/16/24 1036          General Information    Patient Profile Reviewed yes  -     Prior Level of Function independent:;transfer;w/c or scooter  -     Existing Precautions/Restrictions fall;non-weight bearing  RLE  -       Row Name 11/16/24 1036          Living Environment    People in Home alone  -       Row Name 11/16/24 1036          Home Main Entrance    Number of Stairs, Main Entrance none  -[facility] Name 11/16/24 1036          Stairs Within Home, Primary    Number of Stairs, Within Home, Primary none  -       Row Name 11/16/24 1036          Cognition    Orientation Status (Cognition) oriented x 4  -       Row Name 11/16/24 1036          Safety Issues/Impairments Affecting Functional Mobility    Impairments Affecting Function (Mobility) endurance/activity tolerance;range of motion (ROM);pain;strength  -               User Key  (r) = Recorded By, (t) = Taken By, (c) = Cosigned By      Initials Name Provider Type    Marion Foy, PT Physical Therapist                   Mobility       Row Name 11/16/24 1346          Bed Mobility     Bed Mobility supine-sit;sit-supine  -     Supine-Sit Hadley (Bed Mobility) standby assist  -     Sit-Supine [place] (Bed Mobility) standby assist  -[facility] Name 11/16/24 1346          Bed-Chair Transfer    Bed-Chair Hadley (Transfers) contact guard  -     Assistive Device (Bed-Chair Transfers) walker, front-wheeled  -[facility] Name 11/16/24 1346          Sit-Stand Transfer    Sit-Stand Hadley (Transfers) contact guard  -     Assistive Device (Sit-Stand Transfers) walker, front-wheeled  -[facility] Name 11/16/24 1346          Gait/Stairs (Locomotion)    Hadley Level (Gait) unable to assess  -               User Key  (r) = Recorded By, (t) = Taken By, (c) = Cosigned By      Initials Name Provider Type    Marion Foy, PT Physical Therapist                   Obj/Interventions       Row Name 11/16/24 1037          Range of Motion Comprehensive    Comment, General Range of Motion WFL except r knee 5-95  -KH       Row Name 11/16/24 1037          Strength Comprehensive (MMT)    Comment, General Manual Muscle Testing (MMT) Assessment generalized weakness  -KH       Row Name 11/16/24 1037          Motor Skills    Therapeutic Exercise --  BLE TKA protocol x 10 reps  -               User Key  (r) = Recorded By, (t) = Taken By, (c) = Cosigned By      Initials Name Provider Type    Marion Foy, PT Physical Therapist                   Goals/Plan    No documentation.                  Clinical Impression       Row Name 11/16/24 1038          Pain    Pretreatment Pain Rating 5/10  -     Posttreatment Pain Rating 5/10  -     Pain Location extremity  -     Pain Side/Orientation right;lower  -KH       Row Name 11/16/24 1038          Plan of Care Review    Plan of Care Reviewed With patient  -     Outcome Evaluation Patient presented from home with confusion and reports of recurrent UTIs.  Patient is status post right TKA in September 2024.   Following this surgery she was found to have a right tibia fracture and has been nonweightbearing on right lower extremity for the past several weeks.  Patient reports she lives alone.  She transfers independently and mobilizes using a wheelchair.  Patient reports she is currently working with physical therapy 1 time per week and her daughters assist with meals.  Patient was in bed and agreeable to therapy when PT arrived.  She reports pain just below the knee on the right leg.  Patient demonstrated independent bed mobility.  She was able to stand and pivot to and from the bedside commode with contact-guard assist and RW X.  She maintained NWB on RLE.  Patient was able to transfer laterally from end of bed towards head of bed independently while maintaining NWB on RLE.  Patient appears to be at or near recent baseline.  She is safe to DC home with family when medically stable.  [place] [facility] than using a brief.  Nursing assistant was informed of this.  She is independent with bilateral lower extremity exercises and was encouraged to continue these on her own.  She reports that she plans to DC home to her daughter's house and continue home health PT.  She did inquire about a home health aide to assist with bathing.  PT will sign off  -       Row Name 11/16/24 1038          Therapy Assessment/Plan (PT)    Patient/Family Therapy Goals Statement (PT) return home to [facility]  -     Rehab Potential (PT) --  -     Criteria for Skilled Interventions Met (PT) no problems identified which require skilled intervention  -     Therapy Frequency (PT) evaluation only  -       Row Name 11/16/24 1038          Positioning and Restraints    Pre-Treatment Position in bed  -     Post Treatment Position bed  -     In Bed fowlers;call light within reach;encouraged to call for assist;exit alarm on;notified [facility]               User Key  (r) = Recorded By, (t) = Taken By,  (c) = Cosigned By      Initials Name Provider Type    Marion Foy, PT Physical Therapist                   Outcome Measures       Row Name 11/16/24 1670          How much help from another person do you currently need...    Turning from your back to your side while in flat bed without using bedrails? 4  -KH     Moving from lying on back to sitting on the side of a flat bed without bedrails? 4  -KH     Moving to and from a bed to a chair (including a wheelchair)? 3  -KH     Standing up from a chair using your arms (e.g., wheelchair, bedside chair)? 3  -KH     Climbing 3-5 steps with a railing? 1  -KH     To walk in hospital room? 1  -KH     AM-PAC 6 Clicks Score (PT) 16  -KH     Highest Level of Mobility Goal 5 --> Static standing  -JEMIMA       Row Name 11/16/24 0570          Functional Assessment    Outcome Measure Options AM-PAC 6 Clicks Basic Mobility (PT)  -               User Key  (r) = Recorded By, (t) = Taken By, (c) = Cosigned By      Initials Name Provider Type    Marion Foy, PT Physical Therapist                  Physical Therapy Education        No education to display                  PT Recommendation and Plan     Outcome Evaluation: Patient presented from home with confusion and reports of recurrent UTIs.  Patient is status post right TKA in September 2024.  Following this surgery she was found to have a right tibia fracture and has been nonweightbearing on right lower extremity for the past several weeks.  Patient reports she lives alone.  She transfers independently and mobilizes using a wheelchair.  Patient reports she is currently working with physical therapy 1 time per week and her daughters assist with meals.  Patient was in bed and agreeable to therapy when PT arrived.  She reports pain just below the knee on the right leg.  Patient demonstrated independent bed mobility.  She was able to stand and pivot to and from the bedside commode with contact-guard assist and  RW X.  She maintained NWB on RLE.  Patient was able to transfer laterally from end of bed towards head of bed independently while maintaining NWB on RLE.  Patient appears to be at or near recent baseline.  She is safe to DC home with family when medically stable.  She was encouraged to transfer to [facility] with nursing assistance rather than using a brief.  Nursing assistant was informed of this.  She is independent with bilateral lower extremity exercises and was encouraged to continue these on her own.  She reports that she plans to DC home to her daughter's house and continue home health PT.  She did inquire about a home health aide to assist with bathing.  PT will sign off     Time Calculation:         PT Charges       Row Name 11/16/24 1354 11/16/24 1227          Time Calculation    Start Time 1026  -KH --     Stop Time 1054  -KH --     Time Calculation (min) 28 min  -KH --     PT Received On -- 11/16/24  -     PT - Next Appointment -- 11/18/24  -     PT Goal Re-Cert Due Date -- 11/23/24  -        Time Calculation- PT    Total Timed Code Minutes- PT 14 minute(s)  -KH --        Timed Charges    21192 - PT Therapeutic Exercise Minutes 6  -KH --     33467 - PT Therapeutic Activity Minutes 8  -KH --        Total Minutes    Timed Charges Total Minutes 14  -KH --      Total Minutes 14  -KH --               User Key  (r) = Recorded By, (t) = Taken By, (c) = Cosigned By      Initials Name Provider Type    Marion Foy, PT Physical Therapist                  Therapy Charges for Today       Code Description Service Date Service Provider Modifiers Qty    33455637833  PT THERAPEUTIC ACT EA 15 MIN 11/16/2024 Marion Martell, PT GP 1    [id number] HC PT EVAL MOD COMPLEXITY 2 11/16/2024 Marion Martell, PT GP 1            PT G-Codes  Outcome Measure Options: AM-PAC 6 Clicks Basic Mobility (PT)  AM-PAC 6 Clicks Score (PT): 16    PT Discharge Summary  Anticipated Discharge Disposition (PT):  home with assist, home with home health    Marion Martell, PT  11/16/2024

## 2024-11-16 NOTE — CONSULTS
Neurology Consult Note    Consult Date: 11/16/2024    Referring MD: Dr. Mak    Reason for Consult I have been asked to see the patient in neurological consultation to render advice and opinion regarding right hand tremor    Sonya Marcus is a 69 y.o. female with history of anxiety and depression, CAD, thyroid disease, fibromyalgia, hypertension, hyperlipidemia, obstructive sleep apnea who was admitted to the hospital for encephalopathy which is felt to be related to polypharmacy.  Her medications were reduced or held and this morning she was back to her baseline.  She complained of a right upper extremity tremor for which she had wanted to see neurology as an outpatient.  We were consulted to see her for this in the hospital.    She reports that the tremor has been ongoing for about 1 year.  She describes predominantly right upper extremity kinetic tremor that is frustrating with handwriting and eating/drinking.  She thinks it has been a bit better since being in the hospital.  She thinks it may be affected by the dose of her Xanax.  She is hesitant to add new medications especially if they would be sedating.    Past Medical History:   Diagnosis Date    Angina pectoris     Anxiety and depression     Arthralgia of right knee 09/10/2024    Arthritis     Carotid artery occlusion 2021    Chronic urinary tract infection     CKD (chronic kidney disease)     Coronary arteriosclerosis     Coronary artery disease 2020    Disease of thyroid gland     Fibromyalgia, primary 2000    Genitourinary syndrome of menopause     GERD (gastroesophageal reflux disease)     History of kidney stones     History of pulmonary embolism     AFTER HYSTERECTOMY    HL (hearing loss) 2024    Hyperlipidemia 2020    Hypertension     Hypothyroidism     Left hip pain     Memory loss     MVP (mitral valve prolapse)     DR LUZ LAST OFFICE NOTE WITH CHART 7/30/2024    Prediabetes     Right knee pain     Right shoulder pain     Sleep apnea   "   PT IS NOT USING CPAP       Exam  /62 (BP Location: Right arm, Patient Position: Lying)   Pulse 51   Temp 98.6 °F (37 °C) (Oral)   Resp 18   Ht 152.4 cm (60\")   Wt 93 kg (205 lb 0.4 oz)   SpO2 96%   BMI 40.04 kg/m²   Gen: NAD, vitals reviewed  MS: oriented x3, recent/remote memory intact, normal attention/concentration, language intact, no neglect.  CN: visual acuity grossly normal, PERRL, EOMI, no facial droop, no dysarthria  Motor: 5/5 throughout upper and lower extremities, normal tone no cogwheeling, mild medium frequency low amplitude right upper extremity predominantly kinetic and postural tremor, appears to be intermittent and distractible.  No tremor appreciated on the left.  No bradykinesia or hypokinesia    DATA:    Lab Results   Component Value Date    GLUCOSE 109 (H) 11/15/2024    CALCIUM 9.7 11/15/2024     11/15/2024    K 4.3 11/15/2024    CO2 23.6 11/15/2024     11/15/2024    BUN 11 11/15/2024    CREATININE 0.89 11/15/2024    BCR 12.4 11/15/2024    ANIONGAP 12.4 11/15/2024     Lab Results   Component Value Date    WBC 4.93 11/15/2024    HGB 13.7 11/15/2024    HCT 41.9 11/15/2024    MCV 90.5 11/15/2024     11/15/2024       Lab review: CBC, BMP reviewed    Imaging review: CT head from the emergency department report reviewed, no acute findings noted.    Diagnoses:  Functional tremor  Toxic encephalopathy  Generalized anxiety    Comment: Her tremor appears most consistent with a functional tremor however  her performance on the spiral test was at least somewhat suggestive of essential tremor.  The lack of involvement on the contralateral side makes that a bit less likely.  We discussed possible medication options.  She is hesitant to try anything now given her reason for admission and I agree.  She requested outpatient follow-up for her tremor which we will arrange.    No further inpatient neurologic workup needed.  "

## 2024-11-16 NOTE — TELEPHONE ENCOUNTER
Contact pharmacy ([facility]) cancel any refills the patient may have on her Lyrica. I will need to see this patient on a monthly basis for monitoring. If she is get medication from and illegal source she could be exposed other substance. I will need to discuss the need for her to be seen by pain management.

## 2024-11-16 NOTE — CASE MANAGEMENT/SOCIAL WORK
Continued Stay Note  [facility]     Patient Name: Sonya Marcus  MRN: [id number]  Today's Date: 11/16/2024    Admit Date: 11/15/2024    Plan: Home with family to assist and HH (for PT and bathing assist). Pt reviewing HH list for a new HH agency.   Discharge Plan       Row Name 11/16/24 1524       Plan    Plan Home with family to assist and HH (for PT and bathing assist). Pt reviewing HH list for a new HH agency.    Patient/Family in Agreement with Plan yes    Plan Comments Inbound call from staff RN stating pt asked if she could get bathing assistance at home at DC. Chart reviewed. Noted in September pt started with [employer] . Called and spoke with pt about bath assistance at home and DC plan. Pt confirmed she is current with [facility] . States she does not want to continue using them if possible. Informed pt there are other agencies in the [facility] and she would have to choose a different one. Pt provided (faxed list to [facility]N) with the [facility] HH list and medicare.gov to review for a new HH agency. CCP to follow up......JW                   Discharge Codes    No documentation.                 Expected Discharge Date and Time       Expected Discharge Date Expected Discharge Time    Nov 18, 2024               Alfreda Dugan, RN

## 2024-11-16 NOTE — PLAN OF CARE
Goal Outcome Evaluation:  Plan of Care Reviewed With: patient           Outcome Evaluation: Patient presented from home with confusion and reports of recurrent UTIs.  Patient is status post right TKA in September 2024.  Following this surgery she was found to have a right tibia fracture and has been nonweightbearing on right lower extremity for the past several weeks.  Patient reports she lives alone.  She transfers independently and mobilizes using a wheelchair.  Patient reports she is currently working with physical therapy 1 time per week and her daughters assist with meals.  Patient was in bed and agreeable to therapy when PT arrived.  She reports pain just below the knee on the right leg.  Patient demonstrated independent bed mobility.  She was able to stand and pivot to and from the bedside commode with contact-guard assist and RW X.  She maintained NWB on RLE.  Patient was able to transfer laterally from end of bed towards head of bed independently while maintaining NWB on RLE.  Patient appears to be at or near recent baseline.  She is safe to DC home with family when medically stable.  She was encouraged to [place] [facility] [place] rather than using a brief.  Nursing assistant was informed of this.  She is independent with bilateral lower extremity exercises and was encouraged to continue these on her own.  She reports that she plans to DC home to her daughter's house and continue home health PT.  She did inquire about a home health aide to assist with bathing.  PT will sign off    Anticipated Discharge Disposition (PT): home with assist, home with home health

## 2024-11-16 NOTE — PLAN OF CARE
Goal Outcome Evaluation:  Plan of Care Reviewed With: patient        Progress: improving  Outcome Evaluation: A&O x4, flat affect, PRN Norco ordered for pain given, assist x2 to [facility], non weight bearing on right leg per Dr. Sumner from previous tibia fx, VSS, tolerating regular diet, patient has concerns about rigth hand tremor, states that is why she was taking 0.5 mg Xanax TID, says it was helping but made her overly sleepy, patient would like help with referral to Neurology, patient is concerned her Macrodantin 50 mg @[facility] was not reordered and she gets recurrent UTI's when off for extended time, PT/OT to see patient, plan of care ongoing call light within reach, bed alarm on

## 2024-11-17 ENCOUNTER — READMISSION MANAGEMENT (OUTPATIENT)
Dept: CALL CENTER | Facility: HOSPITAL | Age: 69
End: 2024-11-17
Payer: MEDICARE

## 2024-11-17 VITALS
OXYGEN SATURATION: 98 % | HEIGHT: 60 IN | SYSTOLIC BLOOD PRESSURE: 119 MMHG | HEART RATE: 54 BPM | DIASTOLIC BLOOD PRESSURE: 67 MMHG | RESPIRATION RATE: 18 BRPM | TEMPERATURE: 98.6 F | BODY MASS INDEX: 40.25 KG/M2 | WEIGHT: 205.03 LBS

## 2024-11-17 PROBLEM — G92.9 ENCEPHALOPATHY, TOXIC: Status: RESOLVED | Noted: 2024-11-15 | Resolved: 2024-11-17

## 2024-11-17 PROBLEM — S82.91XD: Status: ACTIVE | Noted: 2024-11-17

## 2024-11-17 LAB
ANION GAP SERPL CALCULATED.3IONS-SCNC: 11 MMOL/L (ref 5–15)
BASOPHILS # BLD AUTO: 0.07 10*3/MM3 (ref 0–0.2)
BASOPHILS NFR BLD AUTO: 1.1 % (ref 0–1.5)
BUN SERPL-MCNC: 16 MG/DL (ref 8–23)
BUN/CREAT SERPL: 22.2 (ref 7–25)
CALCIUM SPEC-SCNC: 9.2 MG/DL (ref 8.6–10.5)
CHLORIDE SERPL-SCNC: 110 MMOL/L (ref 98–107)
CO2 SERPL-SCNC: 21 MMOL/L (ref 22–29)
CREAT SERPL-MCNC: 0.72 MG/DL (ref 0.57–1)
DEPRECATED RDW RBC AUTO: 43.3 FL (ref 37–54)
EGFRCR SERPLBLD CKD-EPI 2021: 90.1 ML/MIN/1.73
EOSINOPHIL # BLD AUTO: 0.28 10*3/MM3 (ref 0–0.4)
EOSINOPHIL NFR BLD AUTO: 4.6 % (ref 0.3–6.2)
ERYTHROCYTE [DISTWIDTH] IN BLOOD BY AUTOMATED COUNT: 13.1 % (ref 12.3–15.4)
GLUCOSE SERPL-MCNC: 109 MG/DL (ref 65–99)
HCT VFR BLD AUTO: 38.4 % (ref 34–46.6)
HGB BLD-MCNC: 12.4 G/DL (ref 12–15.9)
IMM GRANULOCYTES # BLD AUTO: 0.03 10*3/MM3 (ref 0–0.05)
IMM GRANULOCYTES NFR BLD AUTO: 0.5 % (ref 0–0.5)
LYMPHOCYTES # BLD AUTO: 2.66 10*3/MM3 (ref 0.7–3.1)
LYMPHOCYTES NFR BLD AUTO: 43.7 % (ref 19.6–45.3)
MCH RBC QN AUTO: 29.3 PG (ref 26.6–33)
MCHC RBC AUTO-ENTMCNC: 32.3 G/DL (ref 31.5–35.7)
MCV RBC AUTO: 90.8 FL (ref 79–97)
MONOCYTES # BLD AUTO: 0.51 10*3/MM3 (ref 0.1–0.9)
MONOCYTES NFR BLD AUTO: 8.4 % (ref 5–12)
NEUTROPHILS NFR BLD AUTO: 2.54 10*3/MM3 (ref 1.7–7)
NEUTROPHILS NFR BLD AUTO: 41.7 % (ref 42.7–76)
NRBC BLD AUTO-RTO: 0 /100 WBC (ref 0–0.2)
PLATELET # BLD AUTO: 232 10*3/MM3 (ref 140–450)
PMV BLD AUTO: 11.1 FL (ref 6–12)
POTASSIUM SERPL-SCNC: 3.7 MMOL/L (ref 3.5–5.2)
RBC # BLD AUTO: 4.23 10*6/MM3 (ref 3.77–5.28)
SODIUM SERPL-SCNC: 142 MMOL/L (ref 136–145)
WBC NRBC COR # BLD AUTO: 6.09 10*3/MM3 (ref 3.4–10.8)

## 2024-11-17 PROCEDURE — 96372 THER/PROPH/DIAG INJ SC/IM: CPT

## 2024-11-17 PROCEDURE — 25010000002 ENOXAPARIN PER 10 MG: Performed by: INTERNAL MEDICINE

## 2024-11-17 PROCEDURE — 80048 BASIC METABOLIC PNL TOTAL CA: CPT | Performed by: INTERNAL MEDICINE

## 2024-11-17 PROCEDURE — 85025 COMPLETE CBC W/AUTO DIFF WBC: CPT | Performed by: INTERNAL MEDICINE

## 2024-11-17 PROCEDURE — G0378 HOSPITAL OBSERVATION PER HR: HCPCS

## 2024-11-17 RX ORDER — ALPRAZOLAM 0.25 MG/1
0.25 TABLET ORAL EVERY 12 HOURS PRN
Qty: 10 TABLET | Refills: 0 | Status: SHIPPED | OUTPATIENT
Start: 2024-11-17

## 2024-11-17 RX ORDER — PREGABALIN 150 MG/1
150 CAPSULE ORAL 2 TIMES DAILY
Qty: 12 CAPSULE | Refills: 0 | Status: SHIPPED | OUTPATIENT
Start: 2024-11-17 | End: 2024-11-25 | Stop reason: SDUPTHER

## 2024-11-17 RX ORDER — TRAZODONE HYDROCHLORIDE 100 MG/1
100 TABLET ORAL
Qty: 30 TABLET | Refills: 3 | Status: SHIPPED | OUTPATIENT
Start: 2024-11-17

## 2024-11-17 RX ADMIN — ALPRAZOLAM 0.25 MG: 0.25 TABLET ORAL at 04:10

## 2024-11-17 RX ADMIN — ENOXAPARIN SODIUM 40 MG: 100 INJECTION SUBCUTANEOUS at 09:40

## 2024-11-17 RX ADMIN — Medication 10 ML: at 09:44

## 2024-11-17 RX ADMIN — METOPROLOL SUCCINATE 50 MG: 50 TABLET, EXTENDED RELEASE ORAL at 09:44

## 2024-11-17 RX ADMIN — DULOXETINE HYDROCHLORIDE 60 MG: 60 CAPSULE, DELAYED RELEASE ORAL at 09:40

## 2024-11-17 RX ADMIN — LEVOTHYROXINE SODIUM 88 MCG: 88 TABLET ORAL at 04:10

## 2024-11-17 RX ADMIN — HYDROCODONE BITARTRATE AND ACETAMINOPHEN 1 TABLET: 7.5; 325 TABLET ORAL at 10:50

## 2024-11-17 RX ADMIN — PANTOPRAZOLE SODIUM 40 MG: 40 TABLET, DELAYED RELEASE ORAL at 04:10

## 2024-11-17 RX ADMIN — HYDROCODONE BITARTRATE AND ACETAMINOPHEN 1 TABLET: 7.5; 325 TABLET ORAL at 04:11

## 2024-11-17 RX ADMIN — PREGABALIN 150 MG: 75 CAPSULE ORAL at 09:39

## 2024-11-17 NOTE — CASE MANAGEMENT/SOCIAL WORK
Continued Stay Note  [facility]     Patient Name: Sonya Marcus  MRN: [id number]  Today's Date: 11/17/2024    Admit Date: 11/15/2024    Plan: Home with HH--[facility]  to work with pt to either accommodate pt's needs or find pt a new HH agency   Discharge Plan       Row Name 11/17/24 1459       Plan    Plan Home with HH--[facility]  to work with pt to either accommodate pt's needs or find pt a new HH agency    Patient/Family in Agreement with Plan yes    Plan Comments Pt dc'd to home earlier today. She did have an order for HH at [facility] including the bath assistant. I was not contacted by staff RN or pt before [facility] about which HH agency she wanted to use if [facility]. CCP received an inbound call from [facility] requesting a follow up on any issues they could address with pt since she was current with them prior to this hospital stay. Explained that the only issue pt stated to me was about lack of bathing assistance. Per [facility]--bath assistance is a service they should be able to provide pt and she will follow up with pt and the [facility] team. If [facility] is not able to meet pt's needs or pt still wants to change agencies, [facility] staff will assist pt in doing so........JW    Final Discharge Disposition Code 06 - home with home health care    Final Note pt dc'd to home with HH                   Discharge Codes    No documentation.                 Expected Discharge Date and Time       Expected Discharge Date Expected Discharge Time    Nov 17, 2024               Alfreda Dugan, RN

## 2024-11-17 NOTE — CASE MANAGEMENT/SOCIAL WORK
Case Management Discharge Note      Final Note: pt dc'd to home with HH         Selected Continued Care - Discharged on 11/17/2024 Admission date: 11/15/2024 - Discharge disposition: Home-Health Care Svc      Destination    No services have been selected for the patient.                Durable Medical Equipment    No services have been selected for the patient.                Dialysis/Infusion    No services have been selected for the patient.                Home Medical Care Coordination complete.      Service Provider Services Address Phone Fax Patient Preferred    [facility] [address], [place] [address] [phone] --       Internal Comment last updated by Alfreda Dugan, RN 11/17/2024 1514    See CCP note                         Therapy    No services have been selected for the patient.                Community Resources    No services have been selected for the patient.                Community & DME    No services have been selected for the patient.                    Selected Continued Care - Prior Encounters Includes continued care and service providers with selected services from prior encounters from 8/17/2024 to 11/17/2024      Discharged on 8/25/2024 Admission date: 8/22/2024 - Discharge disposition: Home or Self Care      Home Medical Care       Service Provider Services Address Phone Fax Patient Preferred    [facility] [address], [facility] [address] [phone] --                               Final Discharge Disposition Code: 06 - home with home health care

## 2024-11-17 NOTE — PLAN OF CARE
Problem: Adult Inpatient Plan of Care  Goal: Plan of Care Review  Outcome: Progressing  Goal: Patient-Specific Goal (Individualized)  Outcome: Progressing  Goal: Absence of Hospital-Acquired Illness or Injury  Outcome: Progressing  Intervention: Identify and Manage Fall Risk  Recent Flowsheet Documentation  Taken 11/16/2024 2200 by Sujatha Bo RN  Safety Promotion/Fall Prevention: safety round/check completed  Taken 11/16/2024 2000 by Sujatha Bo RN  Safety Promotion/Fall Prevention:   safety round/check completed   nonskid shoes/slippers when out of bed   lighting adjusted   fall prevention program maintained   clutter free environment maintained   activity supervised  Intervention: Prevent Skin Injury  Recent Flowsheet Documentation  Taken 11/16/2024 2000 by Sujatha Bo RN  Body Position: position changed independently  Skin Protection: drying agents applied  Intervention: Prevent and Manage VTE (Venous Thromboembolism) Risk  Recent Flowsheet Documentation  Taken 11/16/2024 2000 by Sujatha Bo RN  VTE Prevention/Management: (lovenox) other (see comments)  Intervention: Prevent Infection  Recent Flowsheet Documentation  Taken 11/16/2024 2200 by Sujatha Bo RN  Infection Prevention: rest/sleep promoted  Taken 11/16/2024 2000 by Sujatha Bo RN  Infection Prevention:   visitors restricted/screened   single patient room provided   rest/sleep promoted   personal protective equipment utilized   hand hygiene promoted  Goal: Optimal Comfort and Wellbeing  Outcome: Progressing  Intervention: Monitor Pain and Promote Comfort  Recent Flowsheet Documentation  Taken 11/16/2024 2000 by Sujatha Bo RN  Pain Management Interventions:   care clustered   diversional activity provided   pain medication given   pillow support provided   position adjusted  Intervention: Provide Person-Centered Care  Recent Flowsheet Documentation  Taken 11/16/2024 2000 by Sujatha Bo RN  Trust  Relationship/Rapport:   care explained   choices provided   emotional support provided   empathic listening provided   questions answered  Goal: Readiness for Transition of Care  Outcome: Progressing     Problem: Skin Injury Risk Increased  Goal: Skin Health and Integrity  Outcome: Progressing  Intervention: Optimize Skin Protection  Recent Flowsheet Documentation  Taken 11/16/2024 2000 by Sujatha Bo RN  Activity Management: activity encouraged  Pressure Reduction Techniques:   heels elevated off bed   frequent weight shift encouraged  Head of Bed (HOB) Positioning: HOB elevated  Pressure Reduction Devices:   alternating pressure pump (JIMMY)   chair cushion utilized  Skin Protection: drying agents applied     Problem: Fall Injury Risk  Goal: Absence of Fall and Fall-Related Injury  Outcome: Progressing  Intervention: Identify and Manage Contributors  Recent Flowsheet Documentation  Taken 11/16/2024 2000 by Sujatha Bo RN  Medication Review/Management: medications reviewed  Intervention: Promote Injury-Free Environment  Recent Flowsheet Documentation  Taken 11/16/2024 2200 by Sujatha Bo, RN  Safety Promotion/Fall Prevention: safety round/check completed  Taken 11/16/2024 2000 by Sujatha Bo RN  Safety Promotion/Fall Prevention:   safety round/check completed   nonskid shoes/slippers when out of bed   lighting adjusted   fall prevention program maintained   clutter free environment maintained   activity supervised   Goal Outcome Evaluation:

## 2024-11-17 NOTE — DISCHARGE SUMMARY
"    Patient Name: Sonya Marcus  : 1955  MRN: [id number]    Date of Admission: 11/15/2024  Date of Discharge:  2024  Primary Care Physician: Ronel Stephen APRN      Discharge Diagnoses     Active Hospital Problems    Diagnosis  POA    Aftercare for healing traumatic closed fracture of right lower extremity [S82.91XD]  Not Applicable    Functional tremor [R25.1]  Yes    Stage 2 chronic kidney disease [N18.2]  Yes    Fibromyalgia, primary [M79.7]  Yes    Polypharmacy [Z79.899]  Not Applicable    Anxiety disorder [F41.9]  Yes    Hypothyroidism [E03.9]  Yes      Resolved Hospital Problems    Diagnosis Date Resolved POA    **Encephalopathy, toxic [G92.9] 2024 Yes        Hospital Course     Brief admission history and physical.  Please refer to the H&P for full detail.  A pleasant 69 years old female with a past history of hypothyroidism/recurrent UTI/fibromyalgia/anxiety/recent right lower extremity fracture/hypertension who presents to the emergency department with generalized weakness and change in the level of consciousness and mentation changes.  Her physical examination on admission included an afebrile patient with stable vital signs/orientation times 3 out of 4.  Hospital course.  Initial evaluation in the emergency department included a chest x-ray revealing no acute disease.  CT scan of the brain without contrast revealing no acute event.  Ethanol level is negative.  CBC was normal.  Lactic acid and procalcitonin were normal.  CMP normal except random blood sugar of 109.  Urine tox screen positive for meth amphetamine/benzodiazepine (prescribed)/opioid (prescribed).  UA was negative.  Hospital course will be summarized in a problem-oriented manner as below.  1.  Confusion with mental status changes secondary to toxic encephalopathy/function\" essential tremor.  The toxic encephalopathy was thought to be secondary to Xanax/Lyrica/hydrocodone/trazodone.  This has resolved with decreasing " Lyrica and trazodone and changing Xanax and Norco to as needed.  She has denied any illicit drug use and I cannot explain the amphetamine in the urine.  CT scan of the brain was negative.  CNS examination was normal.  There was no evidence of infection with no fever and negative UA for UTI and normal lactic acid and procalcitonin with negative chest x-ray.Metabolic encephalopathy has resolved during this admission with medication adjustment.  Neurology saw the patient and she was diagnosed with essential tremor and they will follow-up as an outpatient.  2.  Hypothyroidism.  Her current replacement was maintained.  TSH was checked and it was normal.  3.  Fibromyalgia.  This remained stable on Cymbalta and as needed Norco and Lyrica at a lower dose.  4.  Anxiety disorder.  This remained rather symptomatic while on Cymbalta/Wellbutrin and as needed Xanax.  This needs to be followed up as an outpatient.  5.  Recent right lower extremity fracture s/p surgery.  This remained without any complicating issues.  She was nonweightbearing.  Physical therapy will follow-up with the patient at home  6.  Hypertension.  This remained under good control without any signs of angina or congestive heart failure on Toprol  7.  Hyperglycemia.  Noted to have mild hyperglycemia but A1c was normal at 5.5.  8.  History of obstructive sleep apnea.  She did experience nocturnal hypoxemia that was treated with oxygen.  She is not compliant with her CPAP and was counseled to comply.    At the time of discharge patient was alert and oriented x 4 without any focal deficits and hemodynamically stable.  Follow-up with primary MD/neurology/orthopedic as scheduled.    Consultants     Consult Orders (all) (From admission, onward)       Start     Ordered    11/16/24 1123  Inpatient Neurology Consult General  Once        Specialty:  Neurology  Provider:  John Lugo MD    11/16/24 1124    11/15/24 1148  [facility] (on-call MD unless specified)  Details  Once        Specialty:  Hospitalist  Provider:  (Not yet assigned)    11/15/24 1147                  Procedures     Imaging Results (All)       Procedure Component Value Units Date/Time    XR Chest 1 View [[id number]] Collected: 11/15/24 1322     Updated: 11/15/24 1327    Narrative:      XR CHEST 1 VW-     HISTORY: Female who is 69 years-old, altered mental status     TECHNIQUE: Frontal view of the chest     COMPARISON: 8/16/2024     FINDINGS: The heart size is borderline. Pulmonary vasculature is  unremarkable. No focal pulmonary consolidation, pleural effusion, or  pneumothorax. No acute osseous process.       Impression:      No focal pulmonary consolidation. Borderline heart size.  Follow-up as clinical indications persist.     This report was finalized on 11/15/2024 1:24 PM by Dr. Michele Christian M.D on Workstation: [id number]       CT Head Without Contrast [[id number]] Collected: 11/15/24 1121     Updated: 11/15/24 1142    Narrative:      CT HEAD WITHOUT CONTRAST     CLINICAL HISTORY: ams. Confusion.     TECHNIQUE: CT scan of the head was obtained with 3 mm axial soft tissue  algorithm images. No intravenous contrast was administered. Sagittal and  coronal reconstructions were obtained.     COMPARISON: CT head dated 9/4/2023.     FINDINGS:       The ventricles, sulci, and cisterns are age-appropriate. The gray-white  matter differentiation is within normal limits. The basal ganglia and  thalami are unremarkable in appearance. The posterior fossa structures  are unremarkable.       Impression:         No CT evidence for acute intracranial pathology.  The etiology of the  patient's confusion is not further elucidated on this examination. If  further radiographic assessment is warranted, one could obtain an MRI of  the brain for follow-up.        Radiation dose reduction techniques were utilized, including automated  exposure control and exposure modulation based on body size.     This report was  "finalized on 11/15/2024 11:39 AM by Dr. Jonathon Guzman M.D on Workstation: [id number]               Pertinent Labs     Results from last 7 days   Lab Units 11/17/24  0330 11/15/24  1004   WBC 10*3/mm3 6.09 4.93   HEMOGLOBIN g/dL 12.4 13.7   PLATELETS 10*3/mm3 232 315     Results from last 7 days   Lab Units 11/17/24  0330 11/15/24  1004   SODIUM mmol/L 142 142   POTASSIUM mmol/L 3.7 4.3   CHLORIDE mmol/L 110* 106   CO2 mmol/L 21.0* 23.6   BUN mg/dL 16 11   CREATININE mg/dL 0.72 0.89   GLUCOSE mg/dL 109* 109*   Estimated Creatinine Clearance: 74 mL/min (by C-G formula based on SCr of 0.72 mg/dL).  Results from last 7 days   Lab Units 11/15/24  1004   ALBUMIN g/dL 3.9   BILIRUBIN mg/dL 0.3   ALK PHOS U/L 91   AST (SGOT) U/L 15   ALT (SGPT) U/L 7     Results from last 7 days   Lab Units 11/17/24  0330 11/15/24  1004   CALCIUM mg/dL 9.2 9.7   ALBUMIN g/dL  --  3.9               Invalid input(s): \"LDLCALC\"      Imaging Results (Last 24 Hours)       ** No results found for the last 24 hours. **            Test Results Pending at Discharge         Discharge Exam   Physical Exam  Vitals.  Temperature 98.6 a pulse of 49 respiratory rate of 18 and blood pressure 103/58 and O2 sats of 93% on room air  General.  Elderly female.  She is alert and oriented x 4.  In no apparent pain/distress/diaphoresis.  Normal mood and affect..  Obese.  Eyes.  Pupils equal round and reactive.  Intact extraocular musculature.  No pallor or jaundice  Oral cavity.  Moist mucous membrane.  Neck.  Supple.  No JVD.  No lymphadenopathy or thyromegaly  Cardiovascular.  Regular rate and rhythm with no gallops or murmurs.  Chest.  Clear to auscultation bilaterally with no added sounds.  Abdomen.  Soft lax.  No tenderness.  No organomegaly.  No guarding or rebound  Extremities.  No clubbing/cyanosis/edema.  CNS.  No acute focal neurological deficits.  Discharge Details        Discharge Medications        Changes to Medications        Instructions Start " Date   ALPRAZolam 0.25 MG tablet  Commonly known as: XANAX  What changed:   when to take this  reasons to take this   0.25 mg, Oral, Every 12 Hours PRN, Take 1 tablet 2 times daily as needed and take 2 tablets at bedtime as needed.      pregabalin 150 MG capsule  Commonly known as: LYRICA  What changed:   medication strength  how much to take  Another medication with the same name was removed. Continue taking this medication, and follow the directions you see here.   150 mg, Oral, 2 Times Daily      traZODone 100 MG tablet  Commonly known as: DESYREL  What changed: how much to take   100 mg, Oral, Every Night at Bedtime             Continue These Medications        Instructions Start Date   albuterol (2.5 MG/3ML) 0.083% nebulizer solution  Commonly known as: PROVENTIL   2.5 mg, Nebulization, Every 4 Hours PRN      buPROPion  MG 24 hr tablet  Commonly known as: WELLBUTRIN XL   1 tablet, Oral, Nightly      cyanocobalamin 1000 MCG/ML injection   1,000 mcg, Subcutaneous, Every 30 Days      docusate sodium 100 MG capsule  Commonly known as: Colace   100 mg, Oral, Daily      DULoxetine 60 MG capsule  Commonly known as: CYMBALTA   60 mg, Oral, 2 Times Daily      HYDROcodone-acetaminophen 7.5-325 MG per tablet  Commonly known as: NORCO   Take 1 tablet every 4-6 hours by oral route.      levothyroxine 88 MCG tablet  Commonly known as: SYNTHROID, LEVOTHROID   88 mcg, Oral, Every Early Morning      metoprolol succinate XL 50 MG 24 hr tablet  Commonly known as: TOPROL-XL   50 mg, Oral, Daily      naloxone 4 MG/0.1ML nasal spray  Commonly known as: NARCAN   Call 911. Don't prime. [place] in 1 nostril for overdose. Repeat in 2-3 minutes in other nostril if no or minimal breathing/responsiveness.      nitrofurantoin 50 MG capsule  Commonly known as: MACRODANTIN   50 mg, Oral, Nightly      omeprazole 20 MG capsule  Commonly known as: priLOSEC   20 mg, Oral, Daily      ondansetron 4 MG tablet  Commonly known as: Zofran   4 mg,  Oral, Every 6 Hours PRN      rosuvastatin 10 MG tablet  Commonly known as: CRESTOR   1 tablet, Oral, Nightly             Stop These Medications      nitrofurantoin (macrocrystal-monohydrate) 100 MG capsule  Commonly known as: Macrobid     oxyCODONE-acetaminophen 5-325 MG per tablet  Commonly known as: PERCOCET              Allergies   Allergen Reactions    Compazine [Prochlorperazine] Dystonia    Iodine Anaphylaxis    Penicillins Nausea And Vomiting     Tolerated rocephin 9/2023 admission    Shellfish-Derived Products Anaphylaxis    Glycerol, Iodinated Unknown - Low Severity     Anaphylaxis   (also to shellfish)    Octacosanol Dizziness     HEADACHE    Latex Itching    Levofloxacin Hives    Sulfa Antibiotics GI Intolerance         Discharge Disposition:  Condition: Stable    Diet:   Diet Order   Procedures    Diet: Regular/House; Fluid Consistency: Thin (IDDSI 0)       Activity:   Activity Instructions       Other Activity Instructions      Activity Instructions: Nonweightbearing right lower extremity adults not released by orthopedic            CODE STATUS:    Code Status and Medical Interventions: CPR (Attempt to Resuscitate); Full Support   Ordered at: 11/15/24 4349     Level Of Support Discussed With:    Patient     Code Status (Patient has no pulse and is not breathing):    CPR (Attempt to Resuscitate)     Medical Interventions (Patient has pulse or is breathing):    Full Support       No future appointments.  Additional Instructions for the Follow-ups that You Need to Schedule       Ambulatory Referral to Home Health   As directed      Face to Face Visit Date: 11/17/2024   Follow-up provider for Plan of Care?: I treated the patient in an acute care facility and will not continue treatment after discharge.   Follow-up provider: ORION SWANSON [[id number]]   Reason/Clinical Findings: Weakness/tremor/toxic encephalopathy/fibromyalgia   Describe mobility limitations that make leaving home difficult: As above    Nursing/Therapeutic Services Requested: Physical Therapy   PT orders: Therapeutic exercise (Bathing assistance) Gait Training Transfer training Strengthening Other (add comment)   Weight Bearing Status: Non-Weight Bearing   Frequency: 1 Week 1        Call MD With Problems / Concerns   As directed      Instructions: If increasing weakness/fever chills/hematuria dysuria/nausea or vomiting/chest pain/shortness of breath/mentation changes/dizziness/loss of consciousness/focal neurological deficit    Order Comments: Instructions: If increasing weakness/fever chills/hematuria dysuria/nausea or vomiting/chest pain/shortness of breath/mentation changes/dizziness/loss of consciousness/focal neurological deficit         Discharge Follow-up with PCP   As directed       Currently Documented PCP:    Ronel Stephen APRN    PCP Phone Number:    [phone]     Follow Up Details: 1.  Toxic encephalopathy/functional, essential tremor/hypothyroidism/fibromyalgia/anxiety disorder/history of right lower extremity fracture/hypertension        Discharge Follow-up with Specified Provider: Neurology; 1 Month   As directed      To: Neurology   Follow Up: 1 Month   Follow Up Details: Functional/essential tremor        Discharge Follow-up with Specified Provider: Orthopedic.  As scheduled.   As directed      To: Orthopedic.  As scheduled.   Follow Up Details: Right lower extremity fracture               Follow-up Information       Ronel Stephen APRN .    Specialties: Family Medicine, Nurse Practitioner  Why: 1.  Toxic encephalopathy/functional, essential tremor/hypothyroidism/fibromyalgia/anxiety disorder/history of right lower extremity fracture/hypertension  Contact information:  [facility]  [place] [address]  [facility] [address]  [phone]                               Time Spent on Discharge:  Greater than 30 minutes      Martinez Mak MD  [facility]  11/17/24  08:14 EST

## 2024-11-18 ENCOUNTER — TRANSITIONAL CARE MANAGEMENT TELEPHONE ENCOUNTER (OUTPATIENT)
Dept: CALL CENTER | Facility: HOSPITAL | Age: 69
End: 2024-11-18
Payer: MEDICARE

## 2024-11-18 NOTE — OUTREACH NOTE
Prep Survey      Flowsheet Row Responses   [facility] patient discharged from? [place]   Is LACE score < 7 ? No   Eligibility [facility]   Date of Admission 11/15/24   Date of Discharge 11/17/24   Discharge Disposition Home-Health Care Sv   Discharge diagnosis Encephalopathy, toxic   Does the patient have one of the following disease processes/diagnoses(primary or secondary)? Other   Does the patient have Home health ordered? Yes   What is the Home health agency?  [facility]    Is there a DME ordered? No   Prep survey completed? Yes            Tiffanie ZIMMER - Registered Nurse

## 2024-11-18 NOTE — OUTREACH NOTE
Call Center TCM Note      Flowsheet Row Responses   [facility] [place] [facility]? [place]   Does the patient have one of the following disease processes/diagnoses(primary or secondary)? Other   TCM attempt successful? No   Unsuccessful attempts Attempt 2   Call Status Left message            Megha Saravia RN    11/18/2024, 16:20 EST

## 2024-11-18 NOTE — OUTREACH NOTE
Call Center TCM Note      Flowsheet Row Responses   [facility] patient discharged from? [place]   Does the patient have one of the following disease processes/diagnoses(primary or secondary)? Other   TCM attempt successful? No   Unsuccessful attempts Attempt 1  [No verbal consent]   Call Status Left message            Megha Saravia RN    11/18/2024, 15:13 EST

## 2024-11-19 ENCOUNTER — TRANSITIONAL CARE MANAGEMENT TELEPHONE ENCOUNTER (OUTPATIENT)
Dept: CALL CENTER | Facility: HOSPITAL | Age: 69
End: 2024-11-19
Payer: MEDICARE

## 2024-11-19 ENCOUNTER — TELEPHONE (OUTPATIENT)
Age: 69
End: 2024-11-19
Payer: MEDICARE

## 2024-11-19 NOTE — TELEPHONE ENCOUNTER
Called pt , pt states she has an ortho appt today and that she's possibly going to rehab. Pt states she will call back later.   
Have her see derm. RL
Please call Pt to schedule a Tcm hsp f/u, needs to be scheduled in the next 14days. Thank you  
WDL

## 2024-11-19 NOTE — OUTREACH NOTE
Call Center TCM Note      Flowsheet Row Responses   [facility] patient discharged from? [place]   Does the patient have one of the following disease processes/diagnoses(primary or secondary)? Other   TCM attempt successful? No   Unsuccessful attempts Attempt 3            Oly Ordoñez RN    11/19/2024, 12:48 EST

## 2024-11-22 ENCOUNTER — OFFICE VISIT (OUTPATIENT)
Age: 69
End: 2024-11-22
Payer: MEDICARE

## 2024-11-22 VITALS
RESPIRATION RATE: 16 BRPM | OXYGEN SATURATION: 95 % | HEIGHT: 60 IN | WEIGHT: 181 LBS | SYSTOLIC BLOOD PRESSURE: 130 MMHG | TEMPERATURE: 96.5 F | HEART RATE: 91 BPM | DIASTOLIC BLOOD PRESSURE: 82 MMHG | BODY MASS INDEX: 35.53 KG/M2

## 2024-11-22 DIAGNOSIS — S82.91XD: Primary | ICD-10-CM

## 2024-11-22 DIAGNOSIS — Z51.81 ENCOUNTER FOR THERAPEUTIC DRUG LEVEL MONITORING: ICD-10-CM

## 2024-11-22 NOTE — PROGRESS NOTES
"Transitional Care Follow Up Visit  Subjective     Sonya Marcus is a 69 y.o. female who presents for a transitional care management visit.    Within 48 business hours after discharge our office contacted her via telephone to coordinate her care and needs.      I reviewed and discussed the details of that call along with the discharge summary, hospital problems, inpatient lab results, inpatient diagnostic studies, and consultation reports with Sonya.     Current outpatient and discharge medications have been reconciled for the patient.  Reviewed by: Latoya Bain MA          11/17/2024     8:57 PM   Date of TCM Phone Call   [facility]   Date of Admission 11/15/2024   Date of Discharge 11/17/2024   Discharge Disposition Home-Health Care [facility]     Risk for Readmission (LACE) Score: 8 (11/17/2024  6:00 AM)      History of Present Illness   Course During Hospital Stay:  Pt was seen in the emergency department on 11/15/24 it included a chest x-ray revealing no acute disease. CT scan of the brain without contrast revealing no acute event. Ethanol level is negative. CBC was normal. Lactic acid and procalcitonin were normal. CMP normal except random blood sugar of 109. Urine tox screen positive for meth amphetamine/benzodiazepine (prescribed)/opioid (prescribed). UA was negative Pt was admitted into [facility] for evaluation.. Pt was discharged from [facility] on Sunday 11/17/24     The following portions of the patient's history were reviewed and updated as appropriate: allergies, current medications, past family history, past medical history, past social history, past surgical history, and problem list.    Review of Systems    Objective   /82 (BP Location: Left arm, Patient Position: Sitting, Cuff Size: Adult)   Pulse 91   Temp 96.5 °F (35.8 °C) (Temporal)   Resp 16   Ht 152.4 cm (60\")   Wt 82.1 kg (181 lb)   SpO2 95%   BMI 35.35 kg/m²   Physical Exam    Assessment & Plan "   Problems Addressed this Visit       Aftercare for healing traumatic closed fracture of right lower extremity - Primary    Relevant Orders    Ambulatory Referral to Orthopedic Surgery     Other Visit Diagnoses       Encounter for therapeutic drug level monitoring              Diagnoses         Codes Comments    Aftercare for healing traumatic closed fracture of right lower extremity    -  Primary ICD-10-CM: S82.91XD  ICD-9-CM: V54.14     Encounter for therapeutic drug level monitoring     ICD-10-CM: Z51.81  ICD-9-CM: V58.83

## 2024-11-23 DIAGNOSIS — M79.7 FIBROMYALGIA, PRIMARY: ICD-10-CM

## 2024-11-23 RX ORDER — PREGABALIN 150 MG/1
150 CAPSULE ORAL 2 TIMES DAILY
Qty: 12 CAPSULE | Refills: 0 | Status: CANCELLED | OUTPATIENT
Start: 2024-11-23

## 2024-11-23 NOTE — PROGRESS NOTES
"Chief Complaint  Hospital Follow Up Visit (F/u from hospital broken femur)    Subjective        Sonya Marcus presents to [facility]  History of Present Illness    History of Present Illness  The patient presents for evaluation follow up from hosptial discharge. She is accompanied by her daughter.    She underwent knee replacement surgery on 08/22/2024. A month later, she tripped over a stool and fractured her leg, which has rendered her non-weightbearing since then. Her doctor advised her to continue non-weightbearing for an additional two weeks.    She was admitted to the hospital for two days due to a suspected urinary tract infection (UTI). During this time, her Lyrica, trazodone, and Xanax dosages were reduced. She has been managing well since then.    She is currently under the care of Dr. Sumner and is seeking a second opinion. She has not taken Benadryl. She is also on Synthroid and cholesterol medication. She has undergone a bone density scan and reports her results as normal.    Her Lyrica dosage was reduced from 300 mg bid to 75 mg bid, which she tolerates well. She states she was previously on Percocet but requested a switch to hydrocodone 7.5 mg, which she takes every six hours.       Objective   Vital Signs:  /82 (BP Location: Left arm, Patient Position: Sitting, Cuff Size: Adult)   Pulse 91   Temp 96.5 °F (35.8 °C) (Temporal)   Resp 16   Ht 152.4 cm (60\")   Wt 82.1 kg (181 lb)   SpO2 95%   BMI 35.35 kg/m²   Estimated body mass index is 35.35 kg/m² as calculated from the following:    Height as of this encounter: 152.4 cm (60\").    Weight as of this encounter: 82.1 kg (181 lb).          Review of Systems   Physical Exam   Result Review :          Results  Imaging  X-ray showed bone defects from hardware insertion and removal.              Assessment and Plan   Diagnoses and all orders for this visit:    1. Aftercare for healing traumatic closed fracture of " right lower extremity (Primary)  -     Ambulatory Referral to Orthopedic Surgery  -     Urine Drug Screen - Urine, Clean Catch    2. Encounter for therapeutic drug level monitoring  -     Urine Drug Screen - Urine, Clean Catch        Assessment & Plan  1. Polypharmacy.  The patient's current medication regimen includes both Xanax and opiates, which is not advisable. She has been advised to discontinue either Xanax or opiates. A urine drug screen will be conducted today. The possibility of inpatient rehabilitation was discussed, but she expressed discomfort with this option. She has been advised to take the lower most effective dosage of her hydrocodone. She states that she will be taking the pain medication and is adamant about taking her xanax. She state she has not been taking he benadryl but her daughter admits to finding and empty package under her bed. The dosage of Lyrica has been reduced to 150 mg. The patient's states  her psychiatric provider is aware of her altered mental status. The patient was unable to leave a urine sample today and her daughter could not wait for her to urinated. I once more expressed my concerns to the patient and her daughter she is at a high risk of overdosing and dying.        After the patients appointment the patients daughter messaged and requested a refill of her mothers Pregabalin. After reviewing the patients Reid the patient received gabapentin and ketamine from another provider Lorraine Paniagua. Her Alprazolam prescriptions are overlapping and from multiple prescribes. I need to follow up with her face to face on Monday before considering a prescription.     2. Knee replacement.  The patient had her knee replaced on August 22. She tripped over a stool and broke her leg a month later. She has been nonweightbearing since then. She saw the doctor 3 days ago and was advised to continue nonweightbearing for 2 more weeks. She is seeking a second opinion to ensure the current  treatment plan is appropriate. A referral to Dr. Valiente at [facility] has been made.    3. Leg fracture.  The patient broke her leg a month after her knee replacement surgery. She has been nonweightbearing since then. She saw the doctor 3 days ago and was advised to continue nonweightbearing for 2 more weeks. She is seeking a second opinion to ensure the current treatment plan is appropriate.    4. Medication Management.  The dosage of Lyrica has been reduced to 150 mg. The patient has been advised to discontinue either Xanax or opiates. The patient states psychiatric provider is aware of her altered mental status events .    Follow-up  Return in 30 days for follow up.           Follow Up   No follow-ups on file.  Patient was given instructions and counseling regarding her condition or for health maintenance advice. Please see specific information pulled into the AVS if appropriate.         Patient or patient representative verbalized consent for the use of Ambient Listening during the visit with  CHANELLE Mosley for chart documentation. 11/23/2024  15:42 EST

## 2024-11-25 ENCOUNTER — OFFICE VISIT (OUTPATIENT)
Age: 69
End: 2024-11-25
Payer: MEDICARE

## 2024-11-25 VITALS
RESPIRATION RATE: 15 BRPM | HEART RATE: 84 BPM | BODY MASS INDEX: 35.53 KG/M2 | OXYGEN SATURATION: 95 % | DIASTOLIC BLOOD PRESSURE: 76 MMHG | WEIGHT: 181 LBS | HEIGHT: 60 IN | SYSTOLIC BLOOD PRESSURE: 118 MMHG | TEMPERATURE: 97.1 F

## 2024-11-25 DIAGNOSIS — G89.4 CHRONIC PAIN SYNDROME: ICD-10-CM

## 2024-11-25 DIAGNOSIS — Z79.899 HIGH RISK MEDICATION USE: Primary | ICD-10-CM

## 2024-11-25 DIAGNOSIS — Z79.899 ENCOUNTER FOR LONG-TERM (CURRENT) USE OF HIGH-RISK MEDICATION: ICD-10-CM

## 2024-11-25 PROCEDURE — 99214 OFFICE O/P EST MOD 30 MIN: CPT

## 2024-11-25 PROCEDURE — 3074F SYST BP LT 130 MM HG: CPT

## 2024-11-25 PROCEDURE — 3078F DIAST BP <80 MM HG: CPT

## 2024-11-25 PROCEDURE — 1125F AMNT PAIN NOTED PAIN PRSNT: CPT

## 2024-11-25 RX ORDER — LEVOTHYROXINE SODIUM 112 UG/1
100 TABLET ORAL DAILY
COMMUNITY

## 2024-11-25 RX ORDER — PREGABALIN 150 MG/1
150 CAPSULE ORAL 2 TIMES DAILY
Qty: 14 CAPSULE | Refills: 0 | Status: SHIPPED | OUTPATIENT
Start: 2024-11-25

## 2024-11-25 RX ORDER — BUPROPION HYDROCHLORIDE 300 MG/1
1 TABLET ORAL DAILY
COMMUNITY
Start: 2024-11-07 | End: 2024-11-25

## 2024-11-25 RX ORDER — TRAZODONE HYDROCHLORIDE 150 MG/1
100 TABLET ORAL NIGHTLY
COMMUNITY
End: 2024-11-25

## 2024-11-25 NOTE — PROGRESS NOTES
"Chief Complaint  Pain (Rt leg pain)    Subjective        Sonya Marcus presents to [facility]  History of Present Illness    History of Present Illness  The patient presents for evaluation of multiple medical concerns. She is accompanied by an adult female.    She reports that her urine was dark in color, even though she consumed a significant amount of water throughout the morning.    She has been diagnosed with diabetes and has been treated for it. She was previously on metformin but discontinued it due to adverse effects. Her daughter reports that her blood glucose levels are consistently high during hospital visits. She is currently taking levothyroxine 88 mcg.    She has been prescribed gabapentin and ketamine by Dr. Lorraine Paniagua, which she applies topically. She also takes oral pregabalin. She has switched from diazepam to Xanax to manage her tremors, with prescriptions being refilled weekly. She has been on Xanax 0.25 mg for 50 years. She has been taking opiates since her surgery. She is also on Wellbutrin 150 mg and trazodone 100 mg, which she reports helps her sleep well. She is currently not taking Tenormin due to side effects of double vision and blurred vision.       Objective   Vital Signs:  /76 (BP Location: Right arm, Patient Position: Sitting, Cuff Size: Adult)   Pulse 84   Temp 97.1 °F (36.2 °C) (Temporal)   Resp 15   Ht 152.4 cm (60\")   Wt 82.1 kg (181 lb)   SpO2 95%   BMI 35.35 kg/m²   Estimated body mass index is 35.35 kg/m² as calculated from the following:    Height as of this encounter: 152.4 cm (60\").    Weight as of this encounter: 82.1 kg (181 lb).          Review of Systems   Physical Exam   Result Review :          Results  Laboratory Studies  A1c was 5.5 three months ago. Glucose was 109 ten days ago. Potassium was slightly low at 3.4 two months ago.              Assessment and Plan   Diagnoses and all orders for this visit:    1. Chronic " pain syndrome (Primary)  -     pregabalin (Lyrica) 150 MG capsule; Take 1 capsule by mouth 2 (Two) Times a Day.  Dispense: 14 capsule; Refill: 0    2. Encounter for long-term (current) use of high-risk medication  -     Compliance Drug Analysis, Ur - Urine, Clean Catch; Future        Assessment & Plan  1. Urine discoloration.  She was informed that urine color can be influenced by various factors, including hydration status.    2. Medication management.  She was educated about the potential interactions between short-acting benzodiazepines and hydrocodone. The possibility of addiction to opiates and benzodiazepines was discussed. She was advised that Cymbalta can cause a false positive for amphetamines. A prescription for Lyrica 150 mg was provided for a week. Regular drug screens will be conducted. She was advised to inform the provider of any controlled substances she is taking. If the test results are inconsistent, a referral to pain management will be necessary.    3. Diabetes.  Her last A1c was 5.5, indicating prediabetes. It was explained that glucose levels can fluctuate due to various factors, including stress responses such as sepsis or injury. Her TSH was within normal range. Her glucose level was 109 ten days ago, which is slightly elevated but not concerning. Her A1c level is below normal.    4. Hypothyroidism.  She is currently on levothyroxine 88 mcg. A refill for levothyroxine was provided.               Follow Up   No follow-ups on file.  Patient was given instructions and counseling regarding her condition or for health maintenance advice. Please see specific information pulled into the AVS if appropriate.         Patient or patient representative verbalized consent for the use of Ambient Listening during the visit with  CHANELLE Mosley for chart documentation. 11/25/2024  16:39 EST

## 2024-12-03 ENCOUNTER — OFFICE VISIT (OUTPATIENT)
Age: 69
End: 2024-12-03
Payer: MEDICARE

## 2024-12-03 VITALS
SYSTOLIC BLOOD PRESSURE: 112 MMHG | DIASTOLIC BLOOD PRESSURE: 78 MMHG | TEMPERATURE: 97.9 F | RESPIRATION RATE: 16 BRPM | HEIGHT: 60 IN | OXYGEN SATURATION: 97 % | HEART RATE: 61 BPM | WEIGHT: 181 LBS | BODY MASS INDEX: 35.53 KG/M2

## 2024-12-03 DIAGNOSIS — M79.7 FIBROMYALGIA, PRIMARY: ICD-10-CM

## 2024-12-03 DIAGNOSIS — Z79.899 POLYPHARMACY: Primary | ICD-10-CM

## 2024-12-03 PROCEDURE — 3044F HG A1C LEVEL LT 7.0%: CPT

## 2024-12-03 PROCEDURE — 99214 OFFICE O/P EST MOD 30 MIN: CPT

## 2024-12-03 PROCEDURE — 3078F DIAST BP <80 MM HG: CPT

## 2024-12-03 PROCEDURE — 1125F AMNT PAIN NOTED PAIN PRSNT: CPT

## 2024-12-03 PROCEDURE — 3074F SYST BP LT 130 MM HG: CPT

## 2024-12-03 NOTE — PROGRESS NOTES
"Chief Complaint  Med Refill    Subjective        Sonyalola Marcus presents to [facility]  History of Present Illness    History of Present Illness  The patient presents for evaluation of multiple medical concerns. She is accompanied by an adult female.    She has been prescribed Lyrica for her fibromyalgia, which she reports has been beneficial. She is seeking a refill of this medication. She also mentions that she has been using Afrin and is curious about its addictive potential.    She has been taking metoprolol, but not on a daily basis. She only takes it when her heart rate exceeds 100. She has been self-regulating this medication due to its effect on her heart rate. She expresses a desire to switch back to propranolol.       Objective   Vital Signs:  /78 (BP Location: Left arm, Patient Position: Sitting, Cuff Size: Adult)   Pulse 61   Temp 97.9 °F (36.6 °C) (Temporal)   Resp 16   Ht 152.4 cm (60\")   Wt 82.1 kg (181 lb)   SpO2 97%   BMI 35.35 kg/m²   Estimated body mass index is 35.35 kg/m² as calculated from the following:    Height as of this encounter: 152.4 cm (60\").    Weight as of this encounter: 82.1 kg (181 lb).          Review of Systems   Physical Exam   Result Review :          Results                Assessment and Plan   Diagnoses and all orders for this visit:    1. Fibromyalgia, primary (Primary)    2. Polypharmacy        Assessment & Plan  1. Fibromyalgia.  She reports that Lyrica helps with her fibromyalgia symptoms, including lack of energy and body aches. A refill for Lyrica was provided. She was advised to gradually discontinue the use of Afrin due to its potential for causing rebound rhinorrhea.  Patient's urine drug screen is not back to 1 week supply.  Lyrica 150 mg 1 twice daily will be provided.  Referral will be made to pain management this patient needs a higher level of care.  I will continue her Lyrica until pain management takes over her " case.    2. Hypertension.  She was instructed to take her beta blocker unless her heart rate falls below 60. It was clarified that she should not self-regulate the medication based on her heart rate being above 100. The importance of consistent medication use was emphasized.    3. Medication Management.  She was advised to bring back the pregabalin 300 mg for proper disposal. The use of certain over-the-counter medications like Benadryl, ibuprofen, and Sudafed was discouraged due to their potential side effects.    4. Health Maintenance.  A diabetic foot exam, microalbumin, lipid panel, and A1c will be conducted during her next visit.    Follow-up  Return in 1 week for follow up.           Follow Up   No follow-ups on file.  Patient was given instructions and counseling regarding her condition or for health maintenance advice. Please see specific information pulled into the AVS if appropriate.         Patient or patient representative verbalized consent for the use of Ambient Listening during the visit with  CHANELLE Mosley for chart documentation. 12/6/2024  15:46 EST

## 2024-12-04 ENCOUNTER — PATIENT MESSAGE (OUTPATIENT)
Age: 69
End: 2024-12-04
Payer: MEDICARE

## 2024-12-04 DIAGNOSIS — G89.4 CHRONIC PAIN SYNDROME: ICD-10-CM

## 2024-12-04 LAB — DRUGS UR: NORMAL

## 2024-12-04 RX ORDER — ALPRAZOLAM 0.5 MG
1 TABLET ORAL 3 TIMES DAILY
Status: ON HOLD | COMMUNITY
Start: 2024-11-15

## 2024-12-04 RX ORDER — PREGABALIN 150 MG/1
150 CAPSULE ORAL 2 TIMES DAILY
Qty: 14 CAPSULE | Refills: 0 | Status: ON HOLD | OUTPATIENT
Start: 2024-12-04

## 2024-12-06 ENCOUNTER — HOSPITAL ENCOUNTER (OUTPATIENT)
Facility: HOSPITAL | Age: 69
Discharge: HOME | End: 2024-12-11
Attending: ORTHOPAEDIC SURGERY | Admitting: ORTHOPAEDIC SURGERY
Payer: MEDICARE

## 2024-12-06 PROCEDURE — 63710000001 TRAZODONE 100 MG TABLET: Performed by: ORTHOPAEDIC SURGERY

## 2024-12-06 PROCEDURE — A9270 NON-COVERED ITEM OR SERVICE: HCPCS | Performed by: ORTHOPAEDIC SURGERY

## 2024-12-06 PROCEDURE — 63710000001 BUPROPION XL 150 MG TABLET SUSTAINED-RELEASE 24 HOUR: Performed by: ORTHOPAEDIC SURGERY

## 2024-12-06 PROCEDURE — 63710000001 DULOXETINE 60 MG CAPSULE DELAYED-RELEASE PARTICLES: Performed by: ORTHOPAEDIC SURGERY

## 2024-12-06 PROCEDURE — 63710000001 HYDROCODONE-ACETAMINOPHEN 5-325 MG TABLET: Performed by: ORTHOPAEDIC SURGERY

## 2024-12-06 PROCEDURE — G0378 HOSPITAL OBSERVATION PER HR: HCPCS

## 2024-12-06 PROCEDURE — 63710000001 HYDROCODONE-ACETAMINOPHEN 7.5-325 MG TABLET: Performed by: ORTHOPAEDIC SURGERY

## 2024-12-06 RX ORDER — TRAZODONE HYDROCHLORIDE 100 MG/1
100 TABLET ORAL NIGHTLY
Status: DISCONTINUED | OUTPATIENT
Start: 2024-12-06 | End: 2024-12-07

## 2024-12-06 RX ORDER — ALPRAZOLAM 0.25 MG/1
0.25 TABLET ORAL 2 TIMES DAILY PRN
Status: DISCONTINUED | OUTPATIENT
Start: 2024-12-06 | End: 2024-12-07

## 2024-12-06 RX ORDER — BUPROPION HYDROCHLORIDE 150 MG/1
150 TABLET ORAL NIGHTLY
Status: DISCONTINUED | OUTPATIENT
Start: 2024-12-06 | End: 2024-12-07

## 2024-12-06 RX ORDER — DULOXETIN HYDROCHLORIDE 60 MG/1
60 CAPSULE, DELAYED RELEASE ORAL 2 TIMES DAILY
Status: DISCONTINUED | OUTPATIENT
Start: 2024-12-06 | End: 2024-12-07

## 2024-12-06 RX ORDER — HYDROCODONE BITARTRATE AND ACETAMINOPHEN 5; 325 MG/1; MG/1
1 TABLET ORAL EVERY 6 HOURS PRN
Status: DISCONTINUED | OUTPATIENT
Start: 2024-12-06 | End: 2024-12-06

## 2024-12-06 RX ORDER — HYDROCODONE BITARTRATE AND ACETAMINOPHEN 7.5; 325 MG/1; MG/1
1 TABLET ORAL EVERY 6 HOURS PRN
Status: DISCONTINUED | OUTPATIENT
Start: 2024-12-06 | End: 2024-12-11 | Stop reason: HOSPADM

## 2024-12-06 RX ADMIN — DULOXETINE HYDROCHLORIDE 60 MG: 60 CAPSULE, DELAYED RELEASE ORAL at 21:02

## 2024-12-06 RX ADMIN — HYDROCODONE BITARTRATE AND ACETAMINOPHEN 1 TABLET: 5; 325 TABLET ORAL at 18:45

## 2024-12-06 RX ADMIN — HYDROCODONE BITARTRATE AND ACETAMINOPHEN 1 TABLET: 7.5; 325 TABLET ORAL at 23:32

## 2024-12-06 RX ADMIN — BUPROPION HYDROCHLORIDE 150 MG: 150 TABLET, EXTENDED RELEASE ORAL at 21:02

## 2024-12-06 RX ADMIN — TRAZODONE HYDROCHLORIDE 100 MG: 100 TABLET ORAL at 21:02

## 2024-12-07 PROBLEM — S82.209G DELAYED UNION OF TIBIAL SHAFT FRACTURE: Status: ACTIVE | Noted: 2024-12-07

## 2024-12-07 PROCEDURE — A9270 NON-COVERED ITEM OR SERVICE: HCPCS | Performed by: ORTHOPAEDIC SURGERY

## 2024-12-07 PROCEDURE — 63710000001 ALPRAZOLAM 0.25 MG TABLET: Performed by: ORTHOPAEDIC SURGERY

## 2024-12-07 PROCEDURE — 63710000001 HYDROCODONE-ACETAMINOPHEN 7.5-325 MG TABLET: Performed by: ORTHOPAEDIC SURGERY

## 2024-12-07 PROCEDURE — 63710000001 DULOXETINE 60 MG CAPSULE DELAYED-RELEASE PARTICLES: Performed by: ORTHOPAEDIC SURGERY

## 2024-12-07 PROCEDURE — 63710000001 TRAZODONE 100 MG TABLET: Performed by: ORTHOPAEDIC SURGERY

## 2024-12-07 PROCEDURE — 63710000001 PREGABALIN 75 MG CAPSULE: Performed by: ORTHOPAEDIC SURGERY

## 2024-12-07 PROCEDURE — G0378 HOSPITAL OBSERVATION PER HR: HCPCS

## 2024-12-07 PROCEDURE — 63710000001 LEVOTHYROXINE 100 MCG TABLET: Performed by: ORTHOPAEDIC SURGERY

## 2024-12-07 PROCEDURE — 63710000001 BUPROPION XL 150 MG TABLET SUSTAINED-RELEASE 24 HOUR: Performed by: ORTHOPAEDIC SURGERY

## 2024-12-07 RX ORDER — DULOXETIN HYDROCHLORIDE 60 MG/1
60 CAPSULE, DELAYED RELEASE ORAL 2 TIMES DAILY
Status: DISCONTINUED | OUTPATIENT
Start: 2024-12-07 | End: 2024-12-11 | Stop reason: HOSPADM

## 2024-12-07 RX ORDER — LEVOTHYROXINE SODIUM 100 UG/1
100 TABLET ORAL
Status: DISCONTINUED | OUTPATIENT
Start: 2024-12-07 | End: 2024-12-11 | Stop reason: HOSPADM

## 2024-12-07 RX ORDER — TRAZODONE HYDROCHLORIDE 100 MG/1
100 TABLET ORAL NIGHTLY
Status: DISCONTINUED | OUTPATIENT
Start: 2024-12-07 | End: 2024-12-11 | Stop reason: HOSPADM

## 2024-12-07 RX ORDER — ALPRAZOLAM 0.25 MG/1
0.25 TABLET ORAL EVERY 12 HOURS PRN
Status: DISCONTINUED | OUTPATIENT
Start: 2024-12-07 | End: 2024-12-11 | Stop reason: HOSPADM

## 2024-12-07 RX ORDER — BUPROPION HYDROCHLORIDE 150 MG/1
150 TABLET ORAL NIGHTLY
Status: DISCONTINUED | OUTPATIENT
Start: 2024-12-07 | End: 2024-12-11 | Stop reason: HOSPADM

## 2024-12-07 RX ORDER — PREGABALIN 75 MG/1
150 CAPSULE ORAL 2 TIMES DAILY
Status: DISCONTINUED | OUTPATIENT
Start: 2024-12-07 | End: 2024-12-11 | Stop reason: HOSPADM

## 2024-12-07 RX ORDER — METOPROLOL SUCCINATE 50 MG/1
50 TABLET, EXTENDED RELEASE ORAL DAILY
Status: DISCONTINUED | OUTPATIENT
Start: 2024-12-07 | End: 2024-12-11 | Stop reason: HOSPADM

## 2024-12-07 RX ADMIN — LEVOTHYROXINE SODIUM 100 MCG: 0.1 TABLET ORAL at 09:59

## 2024-12-07 RX ADMIN — TRAZODONE HYDROCHLORIDE 100 MG: 100 TABLET ORAL at 20:34

## 2024-12-07 RX ADMIN — HYDROCODONE BITARTRATE AND ACETAMINOPHEN 1 TABLET: 7.5; 325 TABLET ORAL at 05:32

## 2024-12-07 RX ADMIN — ALPRAZOLAM 0.25 MG: 0.25 TABLET ORAL at 22:02

## 2024-12-07 RX ADMIN — PREGABALIN 150 MG: 75 CAPSULE ORAL at 20:34

## 2024-12-07 RX ADMIN — PREGABALIN 150 MG: 75 CAPSULE ORAL at 10:02

## 2024-12-07 RX ADMIN — HYDROCODONE BITARTRATE AND ACETAMINOPHEN 1 TABLET: 7.5; 325 TABLET ORAL at 18:05

## 2024-12-07 RX ADMIN — DULOXETINE HYDROCHLORIDE 60 MG: 60 CAPSULE, DELAYED RELEASE ORAL at 10:02

## 2024-12-07 RX ADMIN — ALPRAZOLAM 0.25 MG: 0.25 TABLET ORAL at 09:59

## 2024-12-07 RX ADMIN — BUPROPION HYDROCHLORIDE 150 MG: 150 TABLET, EXTENDED RELEASE ORAL at 20:34

## 2024-12-07 RX ADMIN — DULOXETINE HYDROCHLORIDE 60 MG: 60 CAPSULE, DELAYED RELEASE ORAL at 20:34

## 2024-12-07 RX ADMIN — HYDROCODONE BITARTRATE AND ACETAMINOPHEN 1 TABLET: 7.5; 325 TABLET ORAL at 12:16

## 2024-12-07 NOTE — SIGNIFICANT NOTE
12/07/24 1555   OTHER   Discipline physical therapist   Rehab Time/Intention   Session Not Performed patient/family declined evaluation  (pt declined evaluation this date, education provided regarding mobility, plan for eval tomorrow)   Recommendation   PT - Next Appointment 12/08/24

## 2024-12-07 NOTE — H&P
Orthopaedic Surgery  History & Physical  Dr. MARIA ESTHER Harvey Burak II  [phone]    HPI:  Patient is a 69 y.o. Not  or  female who presents with problems with her right leg.  She underwent successful knee replacement surgery by me 3-1/2 months ago.  She was doing quite well postoperatively until she had a fall at about 4 to 6 weeks postop.  During that fall, she developed a tibial stress fracture through one of the robotic pin sites.  That has significantly hampered her recovery.  I made her nonweightbearing for a while but at this point she is unable to mobilize at home and is worried about continued falling.  It was not safe for her to go back home from my office yesterday and therefore I got her admitted.    MEDICAL HISTORY  Past Medical History:   Diagnosis Date    Angina pectoris     Anxiety and depression     Arthralgia of right knee 09/10/2024    Arthritis     Carotid artery occlusion 2021    Chronic urinary tract infection     CKD (chronic kidney disease)     Coronary arteriosclerosis     Coronary artery disease 2020    Disease of thyroid gland     Fibromyalgia, primary 2000    Genitourinary syndrome of menopause     GERD (gastroesophageal reflux disease)     History of kidney stones     History of pulmonary embolism     AFTER HYSTERECTOMY    HL (hearing loss) 2024    Hyperlipidemia 2020    Hypertension     Hypothyroidism     Left hip pain     Memory loss     MVP (mitral valve prolapse)     DR LUZ LAST OFFICE NOTE WITH CHART 7/30/2024    Prediabetes     Right knee pain     Right shoulder pain     Sleep apnea     PT IS NOT USING CPAP     Past Surgical History:   Procedure Laterality Date    APPENDECTOMY      BREAST BIOPSY      COLONOSCOPY  01/01/2022    CYSTOSCOPY W/ URETERAL STENT PLACEMENT Left 09/05/2023    Procedure: LEFT CYSTOSCOPY URETERAL CATHETER/STENT INSERTION;  Surgeon: Quinton Rosa MD;  [facility];  Service: Urology;  Laterality: Left;    EYE SURGERY       HYSTERECTOMY      JOINT REPLACEMENT      KNEE ARTHROPLASTY Left     REPLACEMENT TOTAL KNEE Right     SHOULDER SURGERY      TONSILLECTOMY      TOTAL KNEE ARTHROPLASTY Right 08/22/2024    Procedure: TOTAL KNEE ARTHROPLASTY WITH CORI ROBOT;  Surgeon: Steven Sumner II, MD;  Location: [facility];  Service: Robotics - Ortho;  Laterality: Right;     Prior to Admission medications    Medication Sig Start Date End Date Taking? Authorizing Provider   ALPRAZolam (XANAX) 0.25 MG tablet Take 1 tablet by mouth 2 times daily as needed and take 2 tablets at bedtime as needed. 11/17/24  Yes Martinez Mak MD   ALPRAZolam (XANAX) 0.5 MG tablet Take 1 tablet by mouth 3 times a day.  Patient taking differently: Take 1 tablet by mouth 3 times a day. Pt wants to stay with 0.25 dose 11/15/24  Yes Tara Ojeda MD   buPROPion XL (WELLBUTRIN XL) 150 MG 24 hr tablet Take 1 tablet by mouth Every Night.   Yes Tara Ojeda MD   cyanocobalamin 1000 MCG/ML injection Inject 1 mL under the skin into the appropriate area as directed Every 30 (Thirty) Days. 7/30/24  Yes Tara Ojeda MD   docusate sodium (Colace) 100 MG capsule Take 1 capsule by mouth Daily. 10/16/24  Yes Bipin Stephen MD   DULoxetine (CYMBALTA) 60 MG capsule Take 1 capsule by mouth 2 (Two) Times a Day.   Yes Tara Ojeda MD   HYDROcodone-acetaminophen (NORCO) 7.5-325 MG per tablet Take 1 tablet every 4-6 hours by oral route. 9/10/24  Yes Tara Ojeda MD   levothyroxine (SYNTHROID, LEVOTHROID) 112 MCG tablet Take 100 mcg by mouth Daily.  Patient taking differently: Take 100 mcg by mouth Daily. Pt not taking anymore   Yes Tara Ojeda MD   levothyroxine (SYNTHROID, LEVOTHROID) 88 MCG tablet Take 1 tablet by mouth Every Morning.   Yes Tara Ojeda MD   metoprolol succinate XL (TOPROL-XL) 50 MG 24 hr tablet Take 1 tablet by mouth Daily.   Yes Tara Ojeda MD   nitrofurantoin (MACRODANTIN) 50 MG  capsule Take 1 capsule by mouth Every Night. 9/16/24  Yes Ronel Stephen APRN   omeprazole (priLOSEC) 20 MG capsule Take 1 capsule by mouth Daily.   Yes ProviderTara MD   pregabalin (Lyrica) 150 MG capsule Take 1 capsule by mouth 2 (Two) Times a Day. 12/4/24  Yes Ronel Stephen APRN   rosuvastatin (CRESTOR) 10 MG tablet Take 1 tablet by mouth Every Night. 7/5/24  Yes ProviderTara MD   traZODone (DESYREL) 100 MG tablet Take 1 tablet by mouth every night at bedtime. 11/17/24  Yes Martinez Mak MD   albuterol (PROVENTIL) (2.5 MG/3ML) 0.083% nebulizer solution Take 2.5 mg by nebulization Every 4 (Four) Hours As Needed for Wheezing. 3/10/24   Elizabeth Vallejo PA-C   naloxone (NARCAN) 4 MG/0.1ML nasal spray Call 911. Don't prime. [place] in 1 nostril for overdose. Repeat in 2-3 minutes in other nostril if no or minimal breathing/responsiveness. 10/4/24   Ronel Stephen APRN   ondansetron (Zofran) 4 MG tablet Take 1 tablet by mouth Every 6 (Six) Hours As Needed for Nausea or Vomiting. 8/22/24   Steven Sumner II, MD     Allergies   Allergen Reactions    Fish Allergy Anaphylaxis     Has epi pen    Iodine Anaphylaxis    Penicillins Nausea And Vomiting and Rash     Tolerated rocephin 9/2023 admission    Prochlorperazine Dystonia, Anaphylaxis, Rash and Other (See Comments)     Paralysis    Muscle twisting    Shellfish Allergy Anaphylaxis, Rash and Swelling    Shellfish-Derived Products Anaphylaxis    Sulfate Rash    Glycerol, Iodinated Unknown - Low Severity     Anaphylaxis   (also to shellfish)    Octacosanol Dizziness     HEADACHE    Latex Rash    Levofloxacin Hives    Sulfa Antibiotics GI Intolerance     Most Recent Immunizations   Administered Date(s) Administered    Fluzone High-Dose 65+YRS 11/01/2023    Hep A / Hep B 01/10/1976    Hep B / HiB 01/01/2005    Hepatitis B 2 Dose Vaccine Heplisav-B 11/01/2023    INFLUENZA A MONOVALENT (H5N1), ADJUVANTED-2013 09/10/2023    INFLUENZA NASAL UF  "10/10/2023    Influenza, Unspecified 10/02/2023    Pneumococcal Conjugate Unspecified 01/01/2022    Pneumococcal, Unspecified 01/01/2022    Td (TDVAX) 01/01/2015    Td, Not Adsorbed 01/01/2015     Social History     Tobacco Use    Smoking status: Never    Smokeless tobacco: Never   Substance Use Topics    Alcohol use: Yes     Comment: OCCASIONALLY      Social History     Substance and Sexual Activity   Drug Use Never       REVIEW OF SYSTEMS:  Head: negative for headache  Respiratory: negative for shortness of breath.   Cardiovascular: negative for chest pain.   Gastrointestinal: negative abdominal pain.   Neurological: negative for LOC  Psychiatric/Behavioral: negative for memory loss.   All other systems reviewed and are negative    VITALS: /68 (BP Location: Right arm, Patient Position: Lying)   Pulse 56   Temp 97.7 °F (36.5 °C) (Oral)   Resp 17   Ht 154.9 cm (61\")   Wt 81.6 kg (180 lb)   SpO2 95%   BMI 34.01 kg/m²  Body mass index is 34.01 kg/m².    PHYSICAL EXAM:   CONSTITUTIONAL: A&Ox3, No acute distress  LUNGS: Equal chest rise, no shortness of air  CARDIOVASCULAR: palpable peripheral pulses  SKIN: no skin lesions in the area examined  LYMPH: no lymphadenopathy in the area examined  EXTREMITY: Right knee incision is fully healed.  No sign of infection.  She has excellent motion.    RADIOLOGY REVIEW:   No radiology results for the last 7 days    LABS:   Results for the past 24 hours: No results found for this or any previous visit (from the past 24 hours).    IMPRESSION:  Patient is a 69 y.o. Not  or  female with right tibial stress fracture    PLAN:   Admited to: Steven Sumner II, MD  Plan: Patient may be weight-bear as tolerated at this point.  She needs to work with physical therapy.  I think she will need to discharge to a rehab facility as her current living environment is not conducive to a full recovery.    R \"Toño\" Burak DOUGLAS MD  Orthopaedic Surgery  [facility]  [phone] - [place] Office  [phone] - [facility]

## 2024-12-07 NOTE — DISCHARGE PLACEMENT REQUEST
"Mike Marcus (69 y.o. Female)       Date of Birth   1955    Social Security Number       Address   [address] [place] [address]  [place] [address]    Home Phone   [phone]    MRN   [id number]       Moravian   Cheondoism    Marital Status                               Admission Date   12/6/24    Admission Type   Urgent    Admitting Provider   Steven Sumner II, MD    Attending Provider   Steven Sumner II, MD    Department, Room/Bed   [address], P887/1       Discharge Date       Discharge Disposition       Discharge Destination                                 Attending Provider: Steven Sumner II, MD    Allergies: Fish Allergy, Iodine, Penicillins, Prochlorperazine, Shellfish Allergy, Shellfish-derived Products, Sulfate, Glycerol, Iodinated, Octacosanol, Latex, Levofloxacin, Sulfa Antibiotics    Isolation: None   Infection: None   Code Status: CPR    Ht: 154.9 cm (61\")   Wt: 81.6 kg (180 lb)    Admission Cmt: None   Principal Problem: Delayed union of tibial shaft fracture [S82.209G]                   Active Insurance as of 12/6/2024       Primary Coverage       Payor Plan Insurance Group Employer/Plan Group    ANTHEM MEDICARE REPLACEMENT ANTHEM MEDICARE ADVANTAGE [id number]       Payor Plan Address Payor Plan Phone Number Payor Plan Fax Number Effective Dates    [address] [phone]  1/1/2024 - None Entered    [facility] [id number]         Subscriber Name Subscriber Birth Date Member ID       MIKE MARCUS 1955 [id number]                     Emergency Contacts        (Rel.) Home Phone Work Phone Mobile Phone    BARNEY KEY (Daughter) -- -- [phone]    SHARATH SANTOYO (Daughter) -- -- [phone]                "

## 2024-12-07 NOTE — PLAN OF CARE
Goal Outcome Evaluation:              Outcome Evaluation: PRN norco increased to 7.5 q6, Ax1 with walker to BR, VSS.

## 2024-12-07 NOTE — PLAN OF CARE
Goal Outcome Evaluation:  Plan of Care Reviewed With: patient        Progress: (P) no change  Outcome Evaluation: R tibial fracture. WBAT. Assist x 1 BRP. Pt preferring to use BSC due to pain. [facility] representative spoke with pt today, trying to get Pt in as soon as able. A&Ox4. VSS, RA. Regular diet. PRN Norco for pain. Xanax for anxiety. Educated on IS. PT eval tomorrow.

## 2024-12-07 NOTE — PLAN OF CARE
Goal Outcome Evaluation:  Plan of Care Reviewed With: patient        Progress: no change  Outcome Evaluation: Direct admit this evening. Pt of Dr. Sumner. Pt complaint of R tibial pain. Pt states history of R knee replacement in August, fall in September resulting in R tibial fracture, pain limiting pt's ability to do ADLs, currently mobilizing with wheelchair and walker. Pt lives alone and [facility] [place]. VSS, RA. Regular diet. PRN Norco for pain.

## 2024-12-08 PROCEDURE — 97162 PT EVAL MOD COMPLEX 30 MIN: CPT

## 2024-12-08 PROCEDURE — 63710000001 METOPROLOL SUCCINATE XL 50 MG TABLET SUSTAINED-RELEASE 24 HOUR: Performed by: ORTHOPAEDIC SURGERY

## 2024-12-08 PROCEDURE — 63710000001 LEVOTHYROXINE 100 MCG TABLET: Performed by: ORTHOPAEDIC SURGERY

## 2024-12-08 PROCEDURE — A9270 NON-COVERED ITEM OR SERVICE: HCPCS | Performed by: ORTHOPAEDIC SURGERY

## 2024-12-08 PROCEDURE — 97110 THERAPEUTIC EXERCISES: CPT

## 2024-12-08 PROCEDURE — 63710000001 PREGABALIN 75 MG CAPSULE: Performed by: ORTHOPAEDIC SURGERY

## 2024-12-08 PROCEDURE — 63710000001 ALPRAZOLAM 0.25 MG TABLET: Performed by: ORTHOPAEDIC SURGERY

## 2024-12-08 PROCEDURE — 63710000001 DULOXETINE 60 MG CAPSULE DELAYED-RELEASE PARTICLES: Performed by: ORTHOPAEDIC SURGERY

## 2024-12-08 PROCEDURE — 63710000001 BUPROPION XL 150 MG TABLET SUSTAINED-RELEASE 24 HOUR: Performed by: ORTHOPAEDIC SURGERY

## 2024-12-08 PROCEDURE — G0378 HOSPITAL OBSERVATION PER HR: HCPCS

## 2024-12-08 PROCEDURE — 63710000001 ONDANSETRON ODT 4 MG TABLET DISPERSIBLE: Performed by: ORTHOPAEDIC SURGERY

## 2024-12-08 PROCEDURE — 63710000001 TRAZODONE 100 MG TABLET: Performed by: ORTHOPAEDIC SURGERY

## 2024-12-08 PROCEDURE — 63710000001 HYDROCODONE-ACETAMINOPHEN 7.5-325 MG TABLET: Performed by: ORTHOPAEDIC SURGERY

## 2024-12-08 RX ORDER — AMOXICILLIN 250 MG
2 CAPSULE ORAL 2 TIMES DAILY PRN
Status: DISCONTINUED | OUTPATIENT
Start: 2024-12-08 | End: 2024-12-11 | Stop reason: HOSPADM

## 2024-12-08 RX ORDER — BISACODYL 10 MG
10 SUPPOSITORY, RECTAL RECTAL DAILY PRN
Status: DISCONTINUED | OUTPATIENT
Start: 2024-12-08 | End: 2024-12-11 | Stop reason: HOSPADM

## 2024-12-08 RX ORDER — ONDANSETRON 2 MG/ML
4 INJECTION INTRAMUSCULAR; INTRAVENOUS EVERY 6 HOURS PRN
Status: DISCONTINUED | OUTPATIENT
Start: 2024-12-08 | End: 2024-12-11 | Stop reason: HOSPADM

## 2024-12-08 RX ORDER — BISACODYL 5 MG/1
5 TABLET, DELAYED RELEASE ORAL DAILY PRN
Status: DISCONTINUED | OUTPATIENT
Start: 2024-12-08 | End: 2024-12-11 | Stop reason: HOSPADM

## 2024-12-08 RX ORDER — ONDANSETRON 4 MG/1
4 TABLET, ORALLY DISINTEGRATING ORAL EVERY 6 HOURS PRN
Status: DISCONTINUED | OUTPATIENT
Start: 2024-12-08 | End: 2024-12-11 | Stop reason: HOSPADM

## 2024-12-08 RX ORDER — POLYETHYLENE GLYCOL 3350 17 G/17G
17 POWDER, FOR SOLUTION ORAL DAILY PRN
Status: DISCONTINUED | OUTPATIENT
Start: 2024-12-08 | End: 2024-12-11 | Stop reason: HOSPADM

## 2024-12-08 RX ADMIN — LEVOTHYROXINE SODIUM 100 MCG: 0.1 TABLET ORAL at 06:16

## 2024-12-08 RX ADMIN — BUPROPION HYDROCHLORIDE 150 MG: 150 TABLET, EXTENDED RELEASE ORAL at 20:23

## 2024-12-08 RX ADMIN — TRAZODONE HYDROCHLORIDE 100 MG: 100 TABLET ORAL at 20:23

## 2024-12-08 RX ADMIN — PREGABALIN 150 MG: 75 CAPSULE ORAL at 09:38

## 2024-12-08 RX ADMIN — ONDANSETRON 4 MG: 4 TABLET, ORALLY DISINTEGRATING ORAL at 08:13

## 2024-12-08 RX ADMIN — HYDROCODONE BITARTRATE AND ACETAMINOPHEN 1 TABLET: 7.5; 325 TABLET ORAL at 14:08

## 2024-12-08 RX ADMIN — HYDROCODONE BITARTRATE AND ACETAMINOPHEN 1 TABLET: 7.5; 325 TABLET ORAL at 20:23

## 2024-12-08 RX ADMIN — HYDROCODONE BITARTRATE AND ACETAMINOPHEN 1 TABLET: 7.5; 325 TABLET ORAL at 08:13

## 2024-12-08 RX ADMIN — PREGABALIN 150 MG: 75 CAPSULE ORAL at 20:23

## 2024-12-08 RX ADMIN — DULOXETINE HYDROCHLORIDE 60 MG: 60 CAPSULE, DELAYED RELEASE ORAL at 20:23

## 2024-12-08 RX ADMIN — ALPRAZOLAM 0.25 MG: 0.25 TABLET ORAL at 09:38

## 2024-12-08 RX ADMIN — HYDROCODONE BITARTRATE AND ACETAMINOPHEN 1 TABLET: 7.5; 325 TABLET ORAL at 02:31

## 2024-12-08 RX ADMIN — METOPROLOL SUCCINATE 50 MG: 50 TABLET, EXTENDED RELEASE ORAL at 09:38

## 2024-12-08 RX ADMIN — DULOXETINE HYDROCHLORIDE 60 MG: 60 CAPSULE, DELAYED RELEASE ORAL at 09:38

## 2024-12-08 NOTE — PLAN OF CARE
Goal Outcome Evaluation:  Plan of Care Reviewed With: patient        Progress: no change  Outcome Evaluation: All needs met. Rested well. WBAT. Up w/ assist x 1 and walker to toilet. Incontinent episode and care provided. Regular diet. VSS. Oriented x 4. SR. RA while awake and placed on 2L NC for sleep. PRN Xanax and Norco.

## 2024-12-08 NOTE — PLAN OF CARE
Goal Outcome Evaluation:  Plan of Care Reviewed With: patient      Pt admitted after fall history of R knee replacement and developed a tibial stress fracture through one of the robotic pin sites after fall a few weeks back. She presents with pain and decreased function and strength. She lives alone. This visit she was able to walk in [place] and tolerated fair due to increased pain. Anticipates d/c to snf at this time.            Anticipated Discharge Disposition (PT): skilled nursing facility, home with home health

## 2024-12-08 NOTE — THERAPY EVALUATION
Patient Name: Sonya Marcus  : 1955    MRN: [id number]                              Today's Date: 2024       Admit Date: 2024    Visit Dx: No diagnosis found.  Patient Active Problem List   Diagnosis    Altered mental status    Polypharmacy    Positive urine drug screen    MICHAEL (acute kidney injury)    Left ureteral stone    Functional tremor    Knee joint replacement status    Fibromyalgia, primary    Coronary arteriosclerosis    Hypothyroidism    Genitourinary syndrome of menopause    Hyperlipidemia    Osteoarthritis of right knee    Pernicious anemia    Urinary tract infection in female    Absolute anemia    Gastroesophageal reflux disease    Angina pectoris    Anxiety disorder    Arthritis or polyarthritis, rheumatoid    Avitaminosis D    B12 deficiency    Bilateral primary osteoarthritis of knee    BP (high blood pressure)    Cardiac murmur    Stage 2 chronic kidney disease    Aftercare for healing traumatic closed fracture of right lower extremity    Delayed union of tibial shaft fracture     Past Medical History:   Diagnosis Date    Angina pectoris     Anxiety and depression     Arthralgia of right knee 09/10/2024    Arthritis     Carotid artery occlusion     Chronic urinary tract infection     CKD (chronic kidney disease)     Coronary arteriosclerosis     Coronary artery disease     Disease of thyroid gland     Fibromyalgia, primary 2000    Genitourinary syndrome of menopause     GERD (gastroesophageal reflux disease)     History of kidney stones     History of pulmonary embolism     AFTER HYSTERECTOMY    HL (hearing loss)     Hyperlipidemia     Hypertension     Hypothyroidism     Left hip pain     Memory loss     MVP (mitral valve prolapse)     DR LUZ LAST OFFICE NOTE WITH CHART 2024    Prediabetes     Right knee pain     Right shoulder pain     Sleep apnea     PT IS NOT USING CPAP     Past Surgical History:   Procedure Laterality Date    APPENDECTOMY       BREAST BIOPSY      COLONOSCOPY  01/01/2022    CYSTOSCOPY W/ URETERAL STENT PLACEMENT Left 09/05/2023    Procedure: LEFT CYSTOSCOPY URETERAL CATHETER/STENT INSERTION;  Surgeon: Quinton Rosa MD;  Location: [facility] OR;  Service: Urology;  Laterality: Left;    EYE SURGERY      HYSTERECTOMY      JOINT REPLACEMENT      KNEE ARTHROPLASTY Left     REPLACEMENT TOTAL KNEE Right     SHOULDER SURGERY      TONSILLECTOMY      TOTAL KNEE ARTHROPLASTY Right 08/22/2024    Procedure: TOTAL KNEE ARTHROPLASTY WITH CORI ROBOT;  Surgeon: Steven Sumner II, MD;  Location: [facility];  Service: Robotics - Ortho;  Laterality: Right;      General Information       Row Name 12/08/24 1417          Physical Therapy Time and Intention    Document Type evaluation  -CS     Mode of Treatment physical therapy  -CS       Row Name 12/08/24 1417          General Information    Patient Profile Reviewed yes  -CS     Existing Precautions/Restrictions fall  -CS       Row Name 12/08/24 1417          Cognition    Orientation Status (Cognition) oriented x 3  -CS       Row Name 12/08/24 1417          Safety Issues/Impairments Affecting Functional Mobility    Impairments Affecting Function (Mobility) balance;endurance/activity tolerance;pain;range of motion (ROM);strength  -CS               User Key  (r) = Recorded By, (t) = Taken By, (c) = Cosigned By      Initials Name Provider Type    CS Santos Meza, PT Physical Therapist                   Mobility       Row Name 12/08/24 1418          Bed Mobility    Bed Mobility supine-sit;sit-supine  -CS     Supine-Sit Cove (Bed Mobility) standby assist  -CS     Sit-Supine Cove (Bed Mobility) standby assist  -CS       Row Name 12/08/24 1418          Sit-Stand Transfer    Sit-Stand Cove (Transfers) minimum assist (75% patient effort)  -CS     Assistive Device (Sit-Stand Transfers) walker, front-wheeled  -CS       Row Name 12/08/24 1418          Gait/Stairs  (Locomotion)    [place] Level (Gait) minimum assist (75% patient effort)  -CS     Assistive Device (Gait) walker, front-wheeled  -CS     Patient was able to Ambulate yes  -CS     Distance in Feet (Gait) 60  -CS               User Key  (r) = Recorded By, (t) = Taken By, (c) = Cosigned By      Initials Name Provider Type    Santos Bartlett, PT Physical Therapist                   Obj/Interventions       Row Name 12/08/24 1418          Range of Motion Comprehensive    Comment, General Range of Motion R knee not tested  -CS       Row Name 12/08/24 1418          Strength Comprehensive (MMT)    Comment, General Manual Muscle Testing (MMT) Assessment R knee not tested  -CS               User Key  (r) = Recorded By, (t) = Taken By, (c) = Cosigned By      Initials Name Provider Type    Santos Bartlett, PT Physical Therapist                   Goals/Plan       Row Name 12/08/24 1419          Bed Mobility Goal 1 (PT)    Activity/Assistive Device (Bed Mobility Goal 1, PT) bed mobility activities, all  -CS     E[place] Level/Cues Needed (Bed Mobility Goal 1, PT) modified independence  -CS     Time Frame (Bed Mobility Goal 1, PT) 1 week  -CS       Row Name 12/08/24 1419          Transfer Goal 1 (PT)    Activity/Assistive Device (Transfer Goal 1, PT) sit-to-stand/stand-to-sit;bed-to-chair/chair-to-bed  -CS     [place] Level/Cues Needed (Transfer Goal 1, PT) modified independence  -CS     Time Frame (Transfer Goal 1, PT) 1 week  -CS       Row Name 12/08/24 1419          Gait Training Goal 1 (PT)    Activity/Assistive Device (Gait Training Goal 1, PT) assistive device use  -CS     E[place] Level (Gait Training Goal 1, PT) modified independence  -CS     Distance (Gait Training Goal 1, PT) 150  -CS       Row Name 12/08/24 1419          Therapy Assessment/Plan (PT)    Planned Therapy Interventions (PT) balance training;bed mobility training;gait training;home exercise program;manual therapy  techniques;neuromuscular re-education;transfer training;stretching;strengthening;stair training;ROM (range of motion);patient/family education  -CS               User Key  (r) = Recorded By, (t) = Taken By, (c) = Cosigned By      Initials Name Provider Type    Santos Bartlett, PT Physical Therapist                   Clinical Impression       Row Name 12/08/24 1419          Pain    Pain Side/Orientation lower  -CS     Additional Documentation Pain Scale: FACES Pre/Post-Treatment (Group)  -CS       Row Name 12/08/24 1419          Pain Scale: FACES Pre/Post-Treatment    Pain: FACES Scale, Pretreatment 4-->hurts little more  -CS     Posttreatment Pain Rating 4-->hurts little more  -CS       Row Name 12/08/24 1419          Plan of Care Review    Plan of Care Reviewed With patient  -CS       Row Name 12/08/24 1419          Therapy Assessment/Plan (PT)    Patient/Family Therapy Goals Statement (PT) home  -CS     Criteria for Skilled Interventions Met (PT) yes  -CS     Therapy Frequency (PT) 6 times/wk  -CS       Row Name 12/08/24 1419          Positioning and Restraints    Pre-Treatment Position in bed  -CS     Post Treatment Position bed  -CS     In Bed supine;call light within reach;encouraged to call for assist;exit alarm on  -CS               User Key  (r) = Recorded By, (t) = Taken By, (c) = Cosigned By      Initials Name Provider Type    Santos Bartlett, PT Physical Therapist                   Outcome Measures       Row Name 12/08/24 1420          How much help from another person do you currently need...    Turning from your back to your side while in flat bed without using bedrails? 4  -CS     Moving from lying on back to sitting on the side of a flat bed without bedrails? 4  -CS     Moving to and from a bed to a chair (including a wheelchair)? 3  -CS     Standing up from a chair using your arms (e.g., wheelchair, bedside chair)? 3  -CS     Climbing 3-5 steps with a railing? 2  -CS     To walk in hospital  room? 3  -CS     AM-PAC 6 Clicks Score (PT) 19  -     Highest Level of Mobility Goal 6 --> Walk 10 steps or more  -       Row Name 12/08/24 1420          Functional Assessment    Outcome Measure Options AM-PAC 6 Clicks Basic Mobility (PT)  -               User Key  (r) = Recorded By, (t) = Taken By, (c) = Cosigned By      Initials Name Provider Type    CS Santos Meza, PT Physical Therapist                                 Physical Therapy Education       Title: PT OT SLP Therapies (In Progress)       Topic: Physical Therapy (Done)       Point: Mobility training (Done)       Learning Progress Summary            Patient Acceptance, E,TB, VU,NR by  at 12/8/2024 1420                      Point: Home exercise program (Done)       Learning Progress Summary            Patient Acceptance, E,TB, VU,NR by  at 12/8/2024 1420                      Point: Body mechanics (Done)       Learning Progress Summary            Patient Acceptance, E,TB, VU,NR by  at 12/8/2024 1420                      Point: Precautions (Done)       Learning Progress Summary            Patient Acceptance, E,TB, VU,NR by  at 12/8/2024 1420                                      User Key       Initials Effective Dates Name Provider Type Discipline     07/11/23 -  Santos Meza, PT Physical Therapist PT                  PT Recommendation and Plan  Planned Therapy Interventions (PT): balance training, bed mobility training, gait training, home exercise program, manual therapy techniques, neuromuscular re-education, transfer training, stretching, strengthening, stair training, ROM (range of motion), patient/family education        Time Calculation:         PT Charges       Row Name 12/08/24 1422             Time Calculation    Start Time 1401  -      Stop Time 1418  -      Time Calculation (min) 17 min  -      PT Received On 12/08/24  -      PT - Next Appointment 12/09/24  -      PT Goal Re-Cert Due Date 12/15/24  -                 User Key  (r) = Recorded By, (t) = Taken By, (c) = Cosigned By      Initials Name Provider Type     Santos Meza, PT Physical Therapist                  Therapy Charges for Today       Code Description Service Date Service Provider Modifiers Qty    87200472987 HC PT EVAL MOD COMPLEXITY 2 12/8/2024 Santos Meza, PT GP 1    74255512962 HC PT THER PROC EA 15 MIN 12/8/2024 Santos Meza, PT GP 1            PT G-Codes  Outcome Measure Options: AM-PAC 6 Clicks Basic Mobility (PT)  AM-PAC 6 Clicks Score (PT): 19  PT Discharge Summary  Anticipated Discharge Disposition (PT): skilled nursing facility, home with home health    Santos Meza, PT  12/8/2024

## 2024-12-09 PROCEDURE — A9270 NON-COVERED ITEM OR SERVICE: HCPCS | Performed by: ORTHOPAEDIC SURGERY

## 2024-12-09 PROCEDURE — 63710000001 ALPRAZOLAM 0.25 MG TABLET: Performed by: ORTHOPAEDIC SURGERY

## 2024-12-09 PROCEDURE — 63710000001 DULOXETINE 60 MG CAPSULE DELAYED-RELEASE PARTICLES: Performed by: ORTHOPAEDIC SURGERY

## 2024-12-09 PROCEDURE — 63710000001 PANTOPRAZOLE 40 MG TABLET DELAYED-RELEASE: Performed by: ORTHOPAEDIC SURGERY

## 2024-12-09 PROCEDURE — 63710000001 BUPROPION XL 150 MG TABLET SUSTAINED-RELEASE 24 HOUR: Performed by: ORTHOPAEDIC SURGERY

## 2024-12-09 PROCEDURE — 63710000001 HYDROCODONE-ACETAMINOPHEN 7.5-325 MG TABLET: Performed by: ORTHOPAEDIC SURGERY

## 2024-12-09 PROCEDURE — G0378 HOSPITAL OBSERVATION PER HR: HCPCS

## 2024-12-09 PROCEDURE — 63710000001 LEVOTHYROXINE 100 MCG TABLET: Performed by: ORTHOPAEDIC SURGERY

## 2024-12-09 PROCEDURE — 63710000001 PREGABALIN 75 MG CAPSULE: Performed by: ORTHOPAEDIC SURGERY

## 2024-12-09 PROCEDURE — 63710000001 TRAZODONE 100 MG TABLET: Performed by: ORTHOPAEDIC SURGERY

## 2024-12-09 RX ORDER — PANTOPRAZOLE SODIUM 40 MG/1
40 TABLET, DELAYED RELEASE ORAL NIGHTLY
Status: DISCONTINUED | OUTPATIENT
Start: 2024-12-10 | End: 2024-12-11 | Stop reason: HOSPADM

## 2024-12-09 RX ADMIN — HYDROCODONE BITARTRATE AND ACETAMINOPHEN 1 TABLET: 7.5; 325 TABLET ORAL at 02:29

## 2024-12-09 RX ADMIN — DULOXETINE HYDROCHLORIDE 60 MG: 60 CAPSULE, DELAYED RELEASE ORAL at 20:47

## 2024-12-09 RX ADMIN — PANTOPRAZOLE SODIUM 40 MG: 40 TABLET, DELAYED RELEASE ORAL at 23:16

## 2024-12-09 RX ADMIN — PREGABALIN 150 MG: 75 CAPSULE ORAL at 20:47

## 2024-12-09 RX ADMIN — LEVOTHYROXINE SODIUM 100 MCG: 0.1 TABLET ORAL at 06:34

## 2024-12-09 RX ADMIN — TRAZODONE HYDROCHLORIDE 100 MG: 100 TABLET ORAL at 20:47

## 2024-12-09 RX ADMIN — ALPRAZOLAM 0.25 MG: 0.25 TABLET ORAL at 13:51

## 2024-12-09 RX ADMIN — PREGABALIN 150 MG: 75 CAPSULE ORAL at 08:48

## 2024-12-09 RX ADMIN — HYDROCODONE BITARTRATE AND ACETAMINOPHEN 1 TABLET: 7.5; 325 TABLET ORAL at 10:32

## 2024-12-09 RX ADMIN — HYDROCODONE BITARTRATE AND ACETAMINOPHEN 1 TABLET: 7.5; 325 TABLET ORAL at 22:22

## 2024-12-09 RX ADMIN — HYDROCODONE BITARTRATE AND ACETAMINOPHEN 1 TABLET: 7.5; 325 TABLET ORAL at 16:47

## 2024-12-09 RX ADMIN — BUPROPION HYDROCHLORIDE 150 MG: 150 TABLET, EXTENDED RELEASE ORAL at 20:47

## 2024-12-09 RX ADMIN — DULOXETINE HYDROCHLORIDE 60 MG: 60 CAPSULE, DELAYED RELEASE ORAL at 08:48

## 2024-12-09 NOTE — PLAN OF CARE
Goal Outcome Evaluation:  Plan of Care Reviewed With: patient        Progress: no change  Outcome Evaluation: R tibial fracture. WBAT. Assist x 1 BRP. Worked w PT. A&Ox4. VSS, RA. Regular diet. PRN Norco for pain. Xanax for anxiety. Educated on IS. Pt hopes to dc to rehab

## 2024-12-09 NOTE — CASE MANAGEMENT/SOCIAL WORK
Discharge Planning Assessment  [facility]     Patient Name: Sonya Marcus  MRN: [id number]  Today's Date: 12/9/2024    Admit Date: 12/6/2024    Plan: [facility]- pre-cert initiated   Discharge Needs Assessment       Row Name 12/09/24 1525       Living Environment    People in Home alone    Current Living Arrangements home    Potentially Unsafe Housing Conditions none    Primary Care Provided by self    Provides Primary Care For no one    Family Caregiver if Needed child(janae), adult    Family Caregiver Names daughter, Cece Riddle [phone] and Kalli Baker [phone]    Quality of Family Relationships helpful;involved;supportive    Able to Return to Prior Arrangements yes       Resource/Environmental Concerns    Resource/Environmental Concerns home accessibility    Home Accessibility Concerns none       Transportation Needs    In the past 12 months, has lack of transportation kept you from medical appointments or from getting medications? no    In the past 12 months, has lack of transportation kept you from meetings, work, or from getting things needed for daily living? No       Food Insecurity    Within the past 12 months, you worried that your food would run out before you got the money to buy more. Often true    Within the past 12 months, the food you bought just didn't last and you didn't have money to get more. Often true       Transition Planning    Patient/Family Anticipates Transition to home with help/services;inpatient rehabilitation facility    Patient/Family Anticipated Services at Transition     Transportation Anticipated family or friend will provide       Discharge Needs Assessment    Readmission Within the Last 30 Days no previous admission in last 30 days    Equipment Currently Used at Home walker, rolling;wheelchair;cane, straight                   Discharge Plan       Row Name 12/09/24 1527       Plan    Plan [facility]- pre-cert initiated    Patient/Family in  Agreement with Plan yes    Plan Comments Spoke with patient at bedside. Introduced self and explained role. Facesheet verified. Patient lives alone in a 1st floor apartment. At baseline, she is IADLS, but feels that she has been struggling at home. Her daughter, Cece Riddle [phone], has been assisting her, but she has had to return back to work. Patient has a cane, walker and wheelchair at home. She has had multiple recent hospital stays and is interested in [facility] at [place]. Spoke with [facility] and they actually received the referral while the patient was at home. Patient ambulated 60 ft with PT so it was explained to patient and daughter, Cece Riddle [phone], that insurance may deny acute rehab. they both wanted [facility] to puruse rehab aND see what happens. [facility] notified and will initiate pre-cert.  If denied, I discussed with the patient possibility of SNF. She would not want to go to a SNF and would rather return home. She is current with [facility]  ( referral in [facility]), but said that she may just want OP PT @ [facility] if IRF is denied. Transfer packet started and in CCP office. Will follow.                  Continued Care and Services - Admitted Since 12/6/2024       Destination       Service Provider Request Status Services Address Phone Fax Patient Preferred    [facility] Considering -- [address] [place] [address] [place] [id number] [phone] --              Home Medical Care       Service Provider Request Status Services Address Phone Fax Patient Preferred    [facility] - [facility] Pending - Request Sent -- [id number] [place], [place] [id number] [phone] --                  Selected Continued Care - Prior Encounters Includes continued care and service providers with selected services from prior encounters from 9/7/2024 to 12/9/2024      Discharged on 11/17/2024 Admission date: 11/15/2024 -  Discharge disposition: Home-Health Care [facility]      Home Medical Care       Service Provider Services Address Phone Fax Patient Preferred    [facility] - [facility] [address] [facility] [place] [facility]"" [address] G[address]HI [address] [place] [address] [phone] --       Internal Comment last updated by Alfreda Dugan RN 11/17/2024 1514    See CCP note                                     Expected Discharge Date and Time       Expected Discharge Date Expected Discharge Time    Dec 11, 2024            Demographic Summary       Row Name 12/09/24 1524       General Information    Admission Type observation    Arrived From home    Required Notices Provided Observation Status Notice    Referral Source admission list    Reason for Consult discharge planning    Preferred Language English                   Functional Status       Row Name 12/09/24 1525       Functional Status    Usual Activity Tolerance good    Current Activity Tolerance fair       Functional Status, IADL    Medications assistive person    Meal Preparation assistive person    Housekeeping assistive person    Laundry assistive person    Shopping assistive person       Mental Status    General Appearance WDL WDL                   Psychosocial    No documentation.                  Abuse/Neglect    No documentation.                  Legal    No documentation.                  Substance Abuse    No documentation.                  Patient Forms    No documentation.                     Latoya Lubin, RN

## 2024-12-09 NOTE — DISCHARGE PLACEMENT REQUEST
"Mike Marcus (69 y.o. Female)       Date of Birth   1955    Social Security Number       Address   [address] [place] [address]  [place] [address]    Home Phone   [phone]    MRN   [id number]       Denominational   Yazdanism    Marital Status                               Admission Date   12/6/24    Admission Type   Urgent    Admitting Provider   Steven Sumner II, MD    Attending Provider   Steven Sumner II, MD    Department, Room/Bed   [address], P887/1       Discharge Date       Discharge Disposition       Discharge Destination                                 Attending Provider: Steven Sumner II, MD    Allergies: Fish Allergy, Iodine, Penicillins, Prochlorperazine, Shellfish Allergy, Shellfish-derived Products, Sulfate, Glycerol, Iodinated, Octacosanol, Latex, Levofloxacin, Sulfa Antibiotics    Isolation: None   Infection: None   Code Status: CPR    Ht: 154.9 cm (61\")   Wt: 81.6 kg (180 lb)    Admission Cmt: None   Principal Problem: Delayed union of tibial shaft fracture [S82.209G]                   Active Insurance as of 12/6/2024       Primary Coverage       Payor Plan Insurance Group Employer/Plan Group    ANTHEM MEDICARE REPLACEMENT ANTHEM MEDICARE ADVANTAGE [id number]       Payor Plan Address Payor Plan Phone Number Payor Plan Fax Number Effective Dates    [address] [phone]  1/1/2024 - None Entered    [facility] [id number]         Subscriber Name Subscriber Birth Date Member ID       MIKE MARCUS 1955 [id number]                     Emergency Contacts        (Rel.) Home Phone Work Phone Mobile Phone    BARNEY KEY (Daughter) -- -- [phone]    SHARATH SANTOYO (Daughter) -- -- [phone]                "

## 2024-12-10 PROCEDURE — 63710000001 ALPRAZOLAM 0.25 MG TABLET: Performed by: ORTHOPAEDIC SURGERY

## 2024-12-10 PROCEDURE — A9270 NON-COVERED ITEM OR SERVICE: HCPCS | Performed by: ORTHOPAEDIC SURGERY

## 2024-12-10 PROCEDURE — 97530 THERAPEUTIC ACTIVITIES: CPT

## 2024-12-10 PROCEDURE — 63710000001 PREGABALIN 75 MG CAPSULE: Performed by: ORTHOPAEDIC SURGERY

## 2024-12-10 PROCEDURE — 97535 SELF CARE MNGMENT TRAINING: CPT

## 2024-12-10 PROCEDURE — 63710000001 PANTOPRAZOLE 40 MG TABLET DELAYED-RELEASE: Performed by: ORTHOPAEDIC SURGERY

## 2024-12-10 PROCEDURE — 63710000001 TRAZODONE 100 MG TABLET: Performed by: ORTHOPAEDIC SURGERY

## 2024-12-10 PROCEDURE — 63710000001 DULOXETINE 60 MG CAPSULE DELAYED-RELEASE PARTICLES: Performed by: ORTHOPAEDIC SURGERY

## 2024-12-10 PROCEDURE — 63710000001 LEVOTHYROXINE 100 MCG TABLET: Performed by: ORTHOPAEDIC SURGERY

## 2024-12-10 PROCEDURE — G0378 HOSPITAL OBSERVATION PER HR: HCPCS

## 2024-12-10 PROCEDURE — 63710000001 BUPROPION XL 150 MG TABLET SUSTAINED-RELEASE 24 HOUR: Performed by: ORTHOPAEDIC SURGERY

## 2024-12-10 PROCEDURE — 63710000001 HYDROCODONE-ACETAMINOPHEN 7.5-325 MG TABLET: Performed by: ORTHOPAEDIC SURGERY

## 2024-12-10 PROCEDURE — 97165 OT EVAL LOW COMPLEX 30 MIN: CPT

## 2024-12-10 RX ADMIN — HYDROCODONE BITARTRATE AND ACETAMINOPHEN 1 TABLET: 7.5; 325 TABLET ORAL at 20:58

## 2024-12-10 RX ADMIN — HYDROCODONE BITARTRATE AND ACETAMINOPHEN 1 TABLET: 7.5; 325 TABLET ORAL at 08:11

## 2024-12-10 RX ADMIN — HYDROCODONE BITARTRATE AND ACETAMINOPHEN 1 TABLET: 7.5; 325 TABLET ORAL at 14:57

## 2024-12-10 RX ADMIN — TRAZODONE HYDROCHLORIDE 100 MG: 100 TABLET ORAL at 20:58

## 2024-12-10 RX ADMIN — BUPROPION HYDROCHLORIDE 150 MG: 150 TABLET, EXTENDED RELEASE ORAL at 20:58

## 2024-12-10 RX ADMIN — LEVOTHYROXINE SODIUM 100 MCG: 0.1 TABLET ORAL at 07:01

## 2024-12-10 RX ADMIN — ALPRAZOLAM 0.25 MG: 0.25 TABLET ORAL at 12:02

## 2024-12-10 RX ADMIN — PREGABALIN 150 MG: 75 CAPSULE ORAL at 20:58

## 2024-12-10 RX ADMIN — DULOXETINE HYDROCHLORIDE 60 MG: 60 CAPSULE, DELAYED RELEASE ORAL at 20:58

## 2024-12-10 RX ADMIN — PREGABALIN 150 MG: 75 CAPSULE ORAL at 08:55

## 2024-12-10 RX ADMIN — PANTOPRAZOLE SODIUM 40 MG: 40 TABLET, DELAYED RELEASE ORAL at 21:01

## 2024-12-10 RX ADMIN — DULOXETINE HYDROCHLORIDE 60 MG: 60 CAPSULE, DELAYED RELEASE ORAL at 08:55

## 2024-12-10 NOTE — PLAN OF CARE
Goal Outcome Evaluation:  Plan of Care Reviewed With: patient        Progress: improving  Outcome Evaluation: Pt cooperative with care, stayed mostly in bed. Able to make needs known, takes pain med as needed. Encouraged to call for assistance as needed

## 2024-12-10 NOTE — PLAN OF CARE
Goal Outcome Evaluation:      Patient admitted with a right tibial shaft fracture.  Patient is alert x4.  Vitals are stable.  Norco ordered for pain management. WBAT.  Precert pending to [facility].  No signs of distress noted.  Will continue to monitor and update accordingly.

## 2024-12-10 NOTE — PLAN OF CARE
Goal Outcome Evaluation:VSS,afebrile, pain controlled with po pain medication, WBAT Right LE, worked with therapy today, therapy rec HH vs OP. Will continue to monitor.

## 2024-12-10 NOTE — PLAN OF CARE
Goal Outcome Evaluation:  Plan of Care Reviewed With: patient           Outcome Evaluation: Pt w/ recent R knee replacement admitted to [facility] w/ tibia stress fx following a fall at home. Pt is WBAT RLE w/ conservative mgmt at this time. Pt lives alone in an apartment w/ no steps to enter. (I)<>Mod (I) w/ BADLs at baseline, now using RW following surgery, has a shower chair. Pt UIC upon arrival. Performed STS, functional mobility, and sink side ADLs - initially w/ CGA/RW then progressing to SBA/RW. Completed LBD from chair level w/ setup. Minimal strength/activity tolerance deficits noted. OT will continue to follow to address stated deficits. Recommend d/c w/ HH vs OP.    Anticipated Discharge Disposition (OT): home with home health, home with outpatient therapy services (pending progress)

## 2024-12-10 NOTE — THERAPY EVALUATION
Patient Name: Sonya Marcus  : 1955    MRN: [id number]                              Today's Date: 12/10/2024       Admit Date: 2024    Visit Dx: No diagnosis found.  Patient Active Problem List   Diagnosis    Altered mental status    Polypharmacy    Positive urine drug screen    MICHAEL (acute kidney injury)    Left ureteral stone    Functional tremor    Knee joint replacement status    Fibromyalgia, primary    Coronary arteriosclerosis    Hypothyroidism    Genitourinary syndrome of menopause    Hyperlipidemia    Osteoarthritis of right knee    Pernicious anemia    Urinary tract infection in female    Absolute anemia    Gastroesophageal reflux disease    Angina pectoris    Anxiety disorder    Arthritis or polyarthritis, rheumatoid    Avitaminosis D    B12 deficiency    Bilateral primary osteoarthritis of knee    BP (high blood pressure)    Cardiac murmur    Stage 2 chronic kidney disease    Aftercare for healing traumatic closed fracture of right lower extremity    Delayed union of tibial shaft fracture     Past Medical History:   Diagnosis Date    Angina pectoris     Anxiety and depression     Arthralgia of right knee 09/10/2024    Arthritis     Carotid artery occlusion     Chronic urinary tract infection     CKD (chronic kidney disease)     Coronary arteriosclerosis     Coronary artery disease     Disease of thyroid gland     Fibromyalgia, primary 2000    Genitourinary syndrome of menopause     GERD (gastroesophageal reflux disease)     History of kidney stones     History of pulmonary embolism     AFTER HYSTERECTOMY    HL (hearing loss)     Hyperlipidemia     Hypertension     Hypothyroidism     Left hip pain     Memory loss     MVP (mitral valve prolapse)     DR LUZ LAST OFFICE NOTE WITH CHART 2024    Prediabetes     Right knee pain     Right shoulder pain     Sleep apnea     PT IS NOT USING CPAP     Past Surgical History:   Procedure Laterality Date    APPENDECTOMY       BREAST BIOPSY      COLONOSCOPY  01/01/2022    CYSTOSCOPY W/ URETERAL STENT PLACEMENT Left 09/05/2023    Procedure: LEFT CYSTOSCOPY URETERAL CATHETER/STENT INSERTION;  Surgeon: Quinton Rosa MD;  Location: [facility] OR;  Service: Urology;  Laterality: Left;    EYE SURGERY      HYSTERECTOMY      JOINT REPLACEMENT      KNEE ARTHROPLASTY Left     REPLACEMENT TOTAL KNEE Right     SHOULDER SURGERY      TONSILLECTOMY      TOTAL KNEE ARTHROPLASTY Right 08/22/2024    Procedure: TOTAL KNEE ARTHROPLASTY WITH CORI ROBOT;  Surgeon: Steven Sumner II, MD;  Location: [facility];  Service: Robotics - Ortho;  Laterality: Right;      General Information       Row Name 12/10/24 0924          OT Time and Intention    Subjective Information no complaints  -KG     Document Type evaluation  -KG     Mode of Treatment occupational therapy;individual therapy  -KG     Patient Effort good  -KG     Symptoms Noted During/After Treatment none  -KG       Row Name 12/10/24 0924          General Information    Patient Profile Reviewed yes  -KG     Prior Level of Function independent:;ADL's;all household mobility;community mobility;home management  -KG     Existing Precautions/Restrictions fall  RLE WBAT  -KG     Barriers to Rehab none identified  -KG       Row Name 12/10/24 0924          Living Environment    People in Home alone  -KG       Row Name 12/10/24 0924          Home Main Entrance    Number of Stairs, Main Entrance none  -KG       Row Name 12/10/24 0924          Cognition    Orientation Status (Cognition) oriented x 4  -KG       Row Name 12/10/24 0924          Safety Issues/Impairments Affecting Functional Mobility    Impairments Affecting Function (Mobility) endurance/activity tolerance;pain;strength  -KG               User Key  (r) = Recorded By, (t) = Taken By, (c) = Cosigned By      Initials Name Provider Type    KG Julio Cesar Leo OT Occupational Therapist                     Mobility/ADL's       Row Name  12/10/24 0925          Bed Mobility    Comment, (Bed Mobility) NT, in chair upon arrival  -KG       Row Name 12/10/24 0925          Transfers    Transfers sit-stand transfer;stand-sit transfer  -KG       Row Name 12/10/24 0925          Sit-Stand Transfer    Sit-Stand [place] (Transfers) standby assist;contact guard  -KG     Assistive Device (Sit-Stand Transfers) walker, front-wheeled  -KG       Row Name 12/10/24 0925          Stand-Sit Transfer    Stand-Sit [place] (Transfers) standby assist;contact guard  -KG     Assistive Device (Stand-Sit Transfers) walker, front-wheeled  -KG       Row Name 12/10/24 0925          Functional Mobility    Functional Mobility- Ind. Level supervision required;contact guard assist  ffrom recliner to bathroom, then to hallway and back to recliner -- simulating household distance. Initially needing CGA then progressing to SBA  -KG     Functional Mobility- Device walker, front-wheeled  -KG       Row Name 12/10/24 0925          Activities of Daily Living    BADL Assessment/Intervention lower body dressing;grooming  -KG       Row Name 12/10/24 0925          Lower Body Dressing Assessment/Training    [place] Level (Lower Body Dressing) doff;don;socks;standby assist  -KG     Position (Lower Body Dressing) supported sitting  -KG       Row Name 12/10/24 0925          Grooming Assessment/Training    [place] Level (Grooming) wash face, hands;standby assist  -KG     Position (Grooming) sink side;supported standing  -KG               User Key  (r) = Recorded By, (t) = Taken By, (c) = Cosigned By      Initials Name Provider Type    Julio Cesar Marin OT Occupational Therapist                   Obj/Interventions       Row Name 12/10/24 0927          Sensory Assessment (Somatosensory)    Sensory Assessment (Somatosensory) sensation intact  -KG       Row Name 12/10/24 0927          Vision Assessment/Intervention    Visual Impairment/Limitations WNL  -KG       Row Name 12/10/24 0927           Range of Motion Comprehensive    General Range of Motion no range of motion deficits identified  -KG       Row Name 12/10/24 0927          Strength Comprehensive (MMT)    Comment, General Manual Muscle Testing (MMT) Assessment BUE strength 4/5 grossly  -KG       Row Name 12/10/24 0927          Motor Skills    Motor Skills functional endurance  -KG     Functional Endurance minimal endurance deficits noted  -KG       Row Name 12/10/24 0927          Balance    Balance Assessment sitting static balance;sitting dynamic balance;sit to stand dynamic balance;standing static balance;standing dynamic balance  -KG     Static Sitting Balance standby assist  -KG     Dynamic Sitting Balance standby assist  -KG     Position, Sitting Balance sitting in chair  -KG     Sit to Stand Dynamic Balance standby assist;contact guard  -KG     Static Standing Balance standby assist;contact guard  -KG     Dynamic Standing Balance standby assist;contact guard  -KG     Position/Device Used, Standing Balance walker, front-wheeled  -KG     Balance Interventions sitting;standing;sit to stand;supported;static;dynamic;occupation based/functional task  -KG               User Key  (r) = Recorded By, (t) = Taken By, (c) = Cosigned By      Initials Name Provider Type    KG Julio Cesar Leo, OT Occupational Therapist                   Goals/Plan       Row Name 12/10/24 0932          Bed Mobility Goal 1 (OT)    Activity/Assistive Device (Bed Mobility Goal 1, OT) sit to supine;supine to sit  -KG     [place] Level/Cues Needed (Bed Mobility Goal 1, OT) standby assist  -KG     Time Frame (Bed Mobility Goal 1, OT) short term goal (STG);2 weeks  -KG     Progress/Outcomes (Bed Mobility Goal 1, OT) new goal  -KG       Row Name 12/10/24 0932          Transfer Goal 1 (OT)    Activity/Assistive Device (Transfer Goal 1, OT) sit-to-stand/stand-to-sit;bed-to-chair/chair-to-bed;toilet;shower chair  -KG     [place] Level/Cues Needed (Transfer Goal 1, OT)  standby assist  -KG     Time Frame (Transfer Goal 1, OT) short term goal (STG);2 weeks  -KG     Progress/Outcome (Transfer Goal 1, OT) new goal  -KG       Row Name 12/10/24 0932          Bathing Goal 1 (OT)    Activity/Device (Bathing Goal 1, OT) upper body bathing;lower body bathing  -KG     [place] Level/Cues Needed (Bathing Goal 1, OT) standby assist  -KG     Time Frame (Bathing Goal 1, OT) short term goal (STG);2 weeks  -KG     Progress/Outcomes (Bathing Goal 1, OT) new goal  -KG       Row Name 12/10/24 0932          Dressing Goal 1 (OT)    Activity/Device (Dressing Goal 1, OT) upper body dressing;lower body dressing  -KG     [place]/Cues Needed (Dressing Goal 1, OT) standby assist  -KG     Time Frame (Dressing Goal 1, OT) short term goal (STG);2 weeks  -KG     Progress/Outcome (Dressing Goal 1, OT) new goal  -KG       Row Name 12/10/24 0932          Toileting Goal 1 (OT)    Activity/Device (Toileting Goal 1, OT) adjust/manage clothing;perform perineal hygiene  -KG     [place] Level/Cues Needed (Toileting Goal 1, OT) standby assist  -KG     Time Frame (Toileting Goal 1, OT) short term goal (STG);2 weeks  -KG     Progress/Outcome (Toileting Goal 1, OT) new goal  -KG       Row Name 12/10/24 0932          Grooming Goal 1 (OT)    Activity/Device (Grooming Goal 1, OT) oral care;wash face, hands  -KG     [place] (Grooming Goal 1, OT) standby assist  -KG     Time Frame (Grooming Goal 1, OT) short term goal (STG);2 weeks  -KG     Progress/Outcome (Grooming Goal 1, OT) new goal  -KG       Row Name 12/10/24 0932          Therapy Assessment/Plan (OT)    Planned Therapy Interventions (OT) activity tolerance training;adaptive equipment training;BADL retraining;functional balance retraining;occupation/activity based interventions;patient/caregiver education/training;ROM/therapeutic exercise;transfer/mobility retraining;strengthening exercise  -KG               User Key  (r) = Recorded By, (t) = Taken By,  (c) = Cosigned By      Initials Name Provider Type    KG Julio Cesar Leo, DANISHA Occupational Therapist                   Clinical Impression       Row Name 12/10/24 0928          Pain Assessment    Pretreatment Pain Rating 5/10  -KG     Posttreatment Pain Rating 5/10  -KG     Pain Location --  RLE  -KG     Pain Management Interventions positioning techniques utilized  -KG     Response to Pain Interventions intervention effective per patient report  -KG       Row Name 12/10/24 0928          Plan of Care Review    Plan of Care Reviewed With patient  -KG     Outcome Evaluation Pt w/ recent R knee replacement admitted to [facility] w/ tibia stress fx following a fall at home. Pt is WBAT RLE w/ conservative mgmt at this time. Pt lives alone in an apartment w/ no steps to enter. (I)<>Mod (I) w/ BADLs at baseline, now using RW following surgery, has a shower chair. Pt UIC upon arrival. Performed STS, functional mobility, and sink side ADLs - initially w/ CGA/RW then progressing to SBA/RW. Completed LBD from chair level w/ setup. Minimal strength/activity tolerance deficits noted. OT will continue to follow to address stated deficits. Recommend d/c w/ HH vs OP.  -KG       Row Name 12/10/24 0928          Therapy Assessment/Plan (OT)    Criteria for Skilled Therapeutic Interventions Met (OT) skilled treatment is necessary  -KG     Therapy Frequency (OT) 5 times/wk  -KG       Row Name 12/10/24 0928          Therapy Plan Review/Discharge Plan (OT)    Anticipated Discharge Disposition (OT) home with home health;home with outpatient therapy services  pending progress  -KG       Row Name 12/10/24 0928          Vital Signs    Pre Patient Position Sitting  -KG     Intra Patient Position Standing  -KG     Post Patient Position Sitting  -KG       Row Name 12/10/24 0928          Positioning and Restraints    Pre-Treatment Position sitting in chair/recliner  -KG     Post Treatment Position chair  -KG     In Chair notified nsg;reclined;call light  within reach;encouraged to call for assist;exit alarm on  -KG               User Key  (r) = Recorded By, (t) = Taken By, (c) = Cosigned By      Initials Name Provider Type    Julio Cesar Marin, DANISHA Occupational Therapist                   Outcome Measures       Row Name 12/10/24 0932          How much help from another is currently needed...    Putting on and taking off regular lower body clothing? 4  -KG     Bathing (including washing, rinsing, and drying) 3  -KG     Toileting (which includes using toilet bed pan or urinal) 3  -KG     Putting on and taking off regular upper body clothing 4  -KG     Taking care of personal grooming (such as brushing teeth) 4  -KG     Eating meals 4  -KG     AM-PAC 6 Clicks Score (OT) 22  -KG       Row Name 12/10/24 0843          How much help from another person do you currently need...    Turning from your back to your side while in flat bed without using bedrails? 4  -PH     Moving from lying on back to sitting on the side of a flat bed without bedrails? 4  -PH     Moving to and from a bed to a chair (including a wheelchair)? 4  -PH     Standing up from a chair using your arms (e.g., wheelchair, bedside chair)? 4  -PH     Climbing 3-5 steps with a railing? 3  -PH     To walk in hospital room? 4  -PH     AM-PAC 6 Clicks Score (PT) 23  -PH     Highest Level of Mobility Goal 7 --> Walk 25 feet or more  -PH       Row Name 12/10/24 0932          Modified [place] Scale    Modified [place] Scale 3 - Moderate disability.  Requiring some help, but able to walk without assistance.  -KG       Row Name 12/10/24 0932 12/10/24 0843       Functional Assessment    Outcome Measure Options AM-PAC 6 Clicks Daily Activity (OT);Modified [place]  -KG AM-PAC 6 Clicks Basic Mobility (PT)  -PH              User Key  (r) = Recorded By, (t) = Taken By, (c) = Cosigned By      Initials Name Provider Type    PH Antoinette Pollack, PTA Physical Therapist Assistant    Julio Cesar Marin, OT Occupational Therapist                     Occupational Therapy Education       Title: PT OT SLP Therapies (In Progress)       Topic: Occupational Therapy (Not Started)       Point: ADL training (Not Started)       Description:   Instruct learner(s) on proper safety adaptation and remediation techniques during self care or transfers.   Instruct in proper use of assistive devices.                  Learner Progress:  Not documented in this visit.              Point: Home exercise program (Not Started)       Description:   Instruct learner(s) on appropriate technique for monitoring, assisting and/or progressing therapeutic exercises/activities.                  Learner Progress:  Not documented in this visit.              Point: Precautions (Not Started)       Description:   Instruct learner(s) on prescribed precautions during self-care and functional transfers.                  Learner Progress:  Not documented in this visit.              Point: Body mechanics (Not Started)       Description:   Instruct learner(s) on proper positioning and spine alignment during self-care, functional mobility activities and/or exercises.                  Learner Progress:  Not documented in this visit.                                  OT Recommendation and Plan  Planned Therapy Interventions (OT): activity tolerance training, adaptive equipment training, BADL retraining, functional balance retraining, occupation/activity based interventions, patient/caregiver education/training, ROM/therapeutic exercise, transfer/mobility retraining, strengthening exercise  Therapy Frequency (OT): 5 times/wk  Plan of Care Review  Plan of Care Reviewed With: patient  Outcome Evaluation: Pt w/ recent R knee replacement admitted to [facility] w/ tibia stress fx following a fall at home. Pt is WBAT RLE w/ conservative mgmt at this time. Pt lives alone in an apartment w/ no steps to enter. (I)<>Mod (I) w/ BADLs at baseline, now using RW following surgery, has a shower chair. Pt UIC upon  arrival. Performed STS, functional mobility, and sink side ADLs - initially w/ CGA/RW then progressing to SBA/RW. Completed LBD from chair level w/ setup. Minimal strength/activity tolerance deficits noted. OT will continue to follow to address stated deficits. Recommend d/c w/ HH vs OP.     Time Calculation:   Evaluation Complexity (OT)  Review Occupational Profile/Medical/Therapy History Complexity: brief/low complexity  Assessment, Occupational Performance/Identification of Deficit Complexity: 1-3 performance deficits  Clinical Decision Making Complexity (OT): problem focused assessment/low complexity  Overall Complexity of Evaluation (OT): low complexity     Time Calculation- OT       Row Name 12/10/24 0933             Time Calculation- OT    OT Start Time 0855  -KG      OT Stop Time 0910  -KG      OT Time Calculation (min) 15 min  -KG      Total Timed Code Minutes- OT 10 minute(s)  -KG      OT Non-Billable Time (min) 5 min  -KG      OT Received On 12/10/24  -KG      OT - Next Appointment 12/11/24  -KG      OT Goal Re-Cert Due Date 12/24/24  -KG         Timed Charges    08882 - OT Self Care/Mgmt Minutes 10  -KG         Untimed Charges    OT Eval/Re-eval Minutes 5  -KG         Total Minutes    Timed Charges Total Minutes 10  -KG      Untimed Charges Total Minutes 5  -KG       Total Minutes 15  -KG                User Key  (r) = Recorded By, (t) = Taken By, (c) = Cosigned By      Initials Name Provider Type    KG Julio Cesar Leo OT Occupational Therapist                  Therapy Charges for Today       Code Description Service Date Service Provider Modifiers Qty    92539631894 HC OT SELF CARE/MGMT/TRAIN EA 15 MIN 12/10/2024 Julio Cesar Leo OT GO 1    77827444322 HC OT EVAL LOW COMPLEXITY 2 12/10/2024 Julio Cesar Leo OT GO 1                 Julio Cesar Leo OT  12/10/2024

## 2024-12-10 NOTE — PLAN OF CARE
Goal Outcome Evaluation:  Plan of Care Reviewed With: patient        Progress: improving  Outcome Evaluation: Pt was seen for PT tx this AM. Pt was seated EOB and stood w/ mod I. Pt amb 60' w/ SBA and fww. Pt was steady although mildly slow w/ no overt LOB. Pt performed ther ex for B LE and was UIC at end of session. Discussed dc w/ pt who stated if she was only going to be at [facility] for a few days she would rather go home. Pt further stated she has and OP PT near her home.  Pt is progressing nicely and would benefit from OP PT after dc.    Anticipated Discharge Disposition (PT): home, home with outpatient therapy services

## 2024-12-10 NOTE — THERAPY TREATMENT NOTE
Patient Name: Sonya Marcus  : 1955    MRN: [id number]                              Today's Date: 12/10/2024       Admit Date: 2024    Visit Dx: No diagnosis found.  Patient Active Problem List   Diagnosis    Altered mental status    Polypharmacy    Positive urine drug screen    MICHAEL (acute kidney injury)    Left ureteral stone    Functional tremor    Knee joint replacement status    Fibromyalgia, primary    Coronary arteriosclerosis    Hypothyroidism    Genitourinary syndrome of menopause    Hyperlipidemia    Osteoarthritis of right knee    Pernicious anemia    Urinary tract infection in female    Absolute anemia    Gastroesophageal reflux disease    Angina pectoris    Anxiety disorder    Arthritis or polyarthritis, rheumatoid    Avitaminosis D    B12 deficiency    Bilateral primary osteoarthritis of knee    BP (high blood pressure)    Cardiac murmur    Stage 2 chronic kidney disease    Aftercare for healing traumatic closed fracture of right lower extremity    Delayed union of tibial shaft fracture     Past Medical History:   Diagnosis Date    Angina pectoris     Anxiety and depression     Arthralgia of right knee 09/10/2024    Arthritis     Carotid artery occlusion     Chronic urinary tract infection     CKD (chronic kidney disease)     Coronary arteriosclerosis     Coronary artery disease     Disease of thyroid gland     Fibromyalgia, primary 2000    Genitourinary syndrome of menopause     GERD (gastroesophageal reflux disease)     History of kidney stones     History of pulmonary embolism     AFTER HYSTERECTOMY    HL (hearing loss)     Hyperlipidemia     Hypertension     Hypothyroidism     Left hip pain     Memory loss     MVP (mitral valve prolapse)     DR LUZ LAST OFFICE NOTE WITH CHART 2024    Prediabetes     Right knee pain     Right shoulder pain     Sleep apnea     PT IS NOT USING CPAP     Past Surgical History:   Procedure Laterality Date    APPENDECTOMY       BREAST BIOPSY      COLONOSCOPY  01/01/2022    CYSTOSCOPY W/ URETERAL STENT PLACEMENT Left 09/05/2023    Procedure: LEFT CYSTOSCOPY URETERAL CATHETER/STENT INSERTION;  Surgeon: Quinton Rosa MD;  Location: [facility] OR;  Service: Urology;  Laterality: Left;    EYE SURGERY      HYSTERECTOMY      JOINT REPLACEMENT      KNEE ARTHROPLASTY Left     REPLACEMENT TOTAL KNEE Right     SHOULDER SURGERY      TONSILLECTOMY      TOTAL KNEE ARTHROPLASTY Right 08/22/2024    Procedure: TOTAL KNEE ARTHROPLASTY WITH CORI ROBOT;  Surgeon: Steven Sumner II, MD;  Location: [facility] OR [facility];  Service: Robotics - Ortho;  Laterality: Right;      General Information       Row Name 12/10/24 0838          Physical Therapy Time and Intention    Document Type therapy note (daily note)  -     Mode of Treatment physical therapy  -       Row Name 12/10/24 0838          General Information    Existing Precautions/Restrictions fall  -       Row Name 12/10/24 0838          Cognition    Orientation Status (Cognition) oriented x 3  -       Row Name 12/10/24 0838          Safety Issues/Impairments Affecting Functional Mobility    Impairments Affecting Function (Mobility) endurance/activity tolerance;strength  -     Comment, Safety Issues/Impairments (Mobility) gt belt and non skid socks donned  -               User Key  (r) = Recorded By, (t) = Taken By, (c) = Cosigned By      Initials Name Provider Type    PH Antoinette Pollack PTA Physical Therapist Assistant                   Mobility       Row Name 12/10/24 0838          Bed Mobility    Comment, (Bed Mobility) NT - pt seated EOB at beg of session and UIC at end  -       Row Name 12/10/24 0838          Sit-Stand Transfer    Sit-Stand [place] (Transfers) modified independence  -     Assistive Device (Sit-Stand Transfers) walker, front-wheeled  -       Row Name 12/10/24 0838          Gait/Stairs (Locomotion)    [place] Level (Gait) standby assist  -      Assistive Device (Gait) walker, front-wheeled  -PH     Distance in Feet (Gait) 60  -PH     Deviations/Abnormal Patterns (Gait) gait speed decreased;stride length decreased  -PH     Comment, (Gait/Stairs) slow but steady w/ no overt LOB  -PH               User Key  (r) = Recorded By, (t) = Taken By, (c) = Cosigned By      Initials Name Provider Type    PH Antoinette Pollack PTA Physical Therapist Assistant                   Obj/Interventions       Row Name 12/10/24 0839          Motor Skills    Therapeutic Exercise other (see comments)  BAP, LAQ, seated march; x 15 reps  -PH               User Key  (r) = Recorded By, (t) = Taken By, (c) = Cosigned By      Initials Name Provider Type    PH Antoinette Pollack PTA Physical Therapist Assistant                   Goals/Plan    No documentation.                  Clinical Impression       Row Name 12/10/24 0840          Pain    Pain Management Interventions exercise or physical activity utilized  -PH     Response to Pain Interventions activity participation with tolerable pain  -PH     Pre/Posttreatment Pain Comment a little pain - unrated  -PH       Row Name 12/10/24 0840          Plan of Care Review    Plan of Care Reviewed With patient  -PH     Progress improving  -PH     Outcome Evaluation Pt was seen for PT tx this AM. Pt was seated EOB and stood w/ mod I. Pt amb 60' w/ SBA and fww. Pt was steady although mildly slow w/ no overt LOB. Pt performed ther ex for B LE and was UIC at end of session. Discussed dc w/ pt who stated if she was only going to be at [facility] for a few days she would rather go home. Pt further stated she has and OP PT near her home.  Pt is progressing nicely and would benefit from OP PT after dc.  -PH       Row Name 12/10/24 0840          Positioning and Restraints    Pre-Treatment Position other (comment)  seated EOB  -PH     Post Treatment Position chair  -PH     In Chair notified nsg;reclined;call light within reach;encouraged to call for  assist  -PH               User Key  (r) = Recorded By, (t) = Taken By, (c) = Cosigned By      Initials Name Provider Type    Antoinette Hernandes PTA Physical Therapist Assistant                   Outcome Measures       Row Name 12/10/24 0843          How much help from another person do you currently need...    Turning from your back to your side while in flat bed without using bedrails? 4  -PH     Moving from lying on back to sitting on the side of a flat bed without bedrails? 4  -PH     Moving to and from a bed to a chair (including a wheelchair)? 4  -PH     Standing up from a chair using your arms (e.g., wheelchair, bedside chair)? 4  -PH     Climbing 3-5 steps with a railing? 3  -PH     To walk in hospital room? 4  -PH     AM-PAC 6 Clicks Score (PT) 23  -PH     Highest Level of Mobility Goal 7 --> Walk 25 feet or more  -PH       Row Name 12/10/24 0843          Functional Assessment    Outcome Measure Options AM-PAC 6 Clicks Basic Mobility (PT)  -PH               User Key  (r) = Recorded By, (t) = Taken By, (c) = Cosigned By      Initials Name Provider Type    Antoinette Hernandes PTA Physical Therapist Assistant                                 Physical Therapy Education       Title: PT OT SLP Therapies (In Progress)       Topic: Physical Therapy (Done)       Point: Mobility training (Done)       Learning Progress Summary            Patient Acceptance, E,TB,D, VU,DU by  at 12/10/2024 0843    Acceptance, E,TB, VU,NR by  at 12/8/2024 1420                      Point: Home exercise program (Done)       Learning Progress Summary            Patient Acceptance, E,TB,D, VU,DU by  at 12/10/2024 0843    Acceptance, E,TB, VU,NR by CS at 12/8/2024 1420                      Point: Body mechanics (Done)       Learning Progress Summary            Patient Acceptance, E,TB,D, VU,DU by  at 12/10/2024 0843    Acceptance, E,TB, VU,NR by  at 12/8/2024 1420                      Point: Precautions (Done)        Learning Progress Summary            Patient Acceptance, E,TB,D, VU,DU by  at 12/10/2024 0843    Acceptance, E,TB, VU,NR by  at 12/8/2024 1420                                      User Key       Initials Effective Dates Name Provider Type Discipline     07/11/23 -  Santos Meza, PT Physical Therapist PT     06/16/21 -  Antoinette Pollack PTA Physical Therapist Assistant PT                  PT Recommendation and Plan     Progress: improving  Outcome Evaluation: Pt was seen for PT tx this AM. Pt was seated EOB and stood w/ mod I. Pt amb 60' w/ SBA and fww. Pt was steady although mildly slow w/ no overt LOB. Pt performed ther ex for B LE and was UIC at end of session. Discussed dc w/ pt who stated if she was only going to be at [facility] for a few days she would rather go home. Pt further stated she has and OP PT near her home.  Pt is progressing nicely and would benefit from OP PT after dc.     Time Calculation:         PT Charges       Row Name 12/10/24 0844             Time Calculation    Start Time 0820  -PH      Stop Time 0835  -PH      Time Calculation (min) 15 min  -PH      PT Received On 12/10/24  -PH      PT - Next Appointment 12/11/24  -PH         Timed Charges    07557 - PT Therapeutic Activity Minutes 15  -PH         Total Minutes    Timed Charges Total Minutes 15  -PH       Total Minutes 15  -PH                User Key  (r) = Recorded By, (t) = Taken By, (c) = Cosigned By      Initials Name Provider Type     Antoinette Pollack PTA Physical Therapist Assistant                  Therapy Charges for Today       Code Description Service Date Service Provider Modifiers Qty    93679314327  PT THERAPEUTIC ACT EA 15 MIN 12/10/2024 Antoinette Pollack PTA GP 1            PT G-Codes  Outcome Measure Options: AM-PAC 6 Clicks Basic Mobility (PT)  AM-PAC 6 Clicks Score (PT): 23  PT Discharge Summary  Anticipated Discharge Disposition (PT): home, home with outpatient therapy services    Antoinette  Ranjeet, TONNY  12/10/2024

## 2024-12-10 NOTE — CASE MANAGEMENT/SOCIAL WORK
Continued Stay Note  [facility]     Patient Name: Sonya Marcus  MRN: [id number]  Today's Date: 12/10/2024    Admit Date: 12/6/2024    Plan: [facility]- pre-cert pending   Discharge Plan       Row Name 12/10/24 1649       Plan    Plan [facility]- pre-cert pending    Patient/Family in Agreement with Plan yes    Plan Comments Spoke with patient at bedside. Advised her that based upon her PT/OT notes, it is likely that pre-cert will be denied. Patient states that she is ok with going home, but would like CCP to talk with her daughter. Daughter, Cece, placed on speaker phone. CCP explained to her that based upon PT notes and recs., pre-cert will likely be denied. She became upset with this CCP and CCP explained that we wanted her to be aware that receiving a denial is very possible and we didn't want her or the patient to be caught off guard. Explained that this also gives them the opportunity to work on a back up plan. Explained that at this time pre-cert is still pending and will notify patient once a determination has been made. Will follow.                   Discharge Codes    No documentation.                 Expected Discharge Date and Time       Expected Discharge Date Expected Discharge Time    Dec 11, 2024               Latoya Lubin RN

## 2024-12-11 ENCOUNTER — READMISSION MANAGEMENT (OUTPATIENT)
Dept: CALL CENTER | Facility: HOSPITAL | Age: 69
End: 2024-12-11
Payer: MEDICARE

## 2024-12-11 VITALS
HEART RATE: 64 BPM | DIASTOLIC BLOOD PRESSURE: 63 MMHG | BODY MASS INDEX: 33.99 KG/M2 | OXYGEN SATURATION: 97 % | RESPIRATION RATE: 16 BRPM | SYSTOLIC BLOOD PRESSURE: 116 MMHG | HEIGHT: 61 IN | TEMPERATURE: 97.7 F | WEIGHT: 180 LBS

## 2024-12-11 PROCEDURE — A9270 NON-COVERED ITEM OR SERVICE: HCPCS | Performed by: ORTHOPAEDIC SURGERY

## 2024-12-11 PROCEDURE — G0378 HOSPITAL OBSERVATION PER HR: HCPCS

## 2024-12-11 PROCEDURE — 63710000001 HYDROCODONE-ACETAMINOPHEN 7.5-325 MG TABLET: Performed by: ORTHOPAEDIC SURGERY

## 2024-12-11 PROCEDURE — 97530 THERAPEUTIC ACTIVITIES: CPT

## 2024-12-11 PROCEDURE — 63710000001 ALPRAZOLAM 0.25 MG TABLET: Performed by: ORTHOPAEDIC SURGERY

## 2024-12-11 PROCEDURE — 63710000001 PREGABALIN 75 MG CAPSULE: Performed by: ORTHOPAEDIC SURGERY

## 2024-12-11 PROCEDURE — 63710000001 SENNOSIDES-DOCUSATE 8.6-50 MG TABLET: Performed by: ORTHOPAEDIC SURGERY

## 2024-12-11 PROCEDURE — 63710000001 DULOXETINE 60 MG CAPSULE DELAYED-RELEASE PARTICLES: Performed by: ORTHOPAEDIC SURGERY

## 2024-12-11 PROCEDURE — 63710000001 LEVOTHYROXINE 100 MCG TABLET: Performed by: ORTHOPAEDIC SURGERY

## 2024-12-11 RX ORDER — ACETAMINOPHEN 325 MG/1
650 TABLET ORAL EVERY 6 HOURS PRN
Status: DISCONTINUED | OUTPATIENT
Start: 2024-12-11 | End: 2024-12-11 | Stop reason: HOSPADM

## 2024-12-11 RX ORDER — HYDROCODONE BITARTRATE AND ACETAMINOPHEN 7.5; 325 MG/1; MG/1
1 TABLET ORAL EVERY 4 HOURS PRN
Qty: 40 TABLET | Refills: 0 | Status: SHIPPED | OUTPATIENT
Start: 2024-12-11

## 2024-12-11 RX ADMIN — DULOXETINE HYDROCHLORIDE 60 MG: 60 CAPSULE, DELAYED RELEASE ORAL at 08:23

## 2024-12-11 RX ADMIN — SENNOSIDES AND DOCUSATE SODIUM 2 TABLET: 50; 8.6 TABLET ORAL at 08:23

## 2024-12-11 RX ADMIN — PREGABALIN 75 MG: 75 CAPSULE ORAL at 08:32

## 2024-12-11 RX ADMIN — HYDROCODONE BITARTRATE AND ACETAMINOPHEN 1 TABLET: 7.5; 325 TABLET ORAL at 11:29

## 2024-12-11 RX ADMIN — PREGABALIN 75 MG: 75 CAPSULE ORAL at 08:23

## 2024-12-11 RX ADMIN — ALPRAZOLAM 0.25 MG: 0.25 TABLET ORAL at 09:53

## 2024-12-11 RX ADMIN — HYDROCODONE BITARTRATE AND ACETAMINOPHEN 1 TABLET: 7.5; 325 TABLET ORAL at 17:31

## 2024-12-11 RX ADMIN — LEVOTHYROXINE SODIUM 100 MCG: 0.1 TABLET ORAL at 05:30

## 2024-12-11 RX ADMIN — HYDROCODONE BITARTRATE AND ACETAMINOPHEN 1 TABLET: 7.5; 325 TABLET ORAL at 05:30

## 2024-12-11 NOTE — CASE MANAGEMENT/SOCIAL WORK
Post-Acute Authorization Submission      Post Acute Pre-Cert Documentation  Request Submitted by Facility - Type:: Post Acute  Post-Acute Authorization Type Submitted:: IRF  Date Post Acute Pre-Cert Inititated per Facility: 12/10/24  Accepting Facility: [facility]  Hospital Discharge Date Requested: 12/10/24  All Clinicals Submitted?: Yes  Had Accepting Facility at Time of Submission: Yes  Response Received from Insurance?: P2P Requested  Response Communicated to::   Authorization Number:: PENDING [id number]              RADHA Ackerman

## 2024-12-11 NOTE — THERAPY TREATMENT NOTE
Patient Name: Sonya Marcus  : 1955    MRN: [id number]                              Today's Date: 2024       Admit Date: 2024    Visit Dx: No diagnosis found.  Patient Active Problem List   Diagnosis    Altered mental status    Polypharmacy    Positive urine drug screen    MICHAEL (acute kidney injury)    Left ureteral stone    Functional tremor    Knee joint replacement status    Fibromyalgia, primary    Coronary arteriosclerosis    Hypothyroidism    Genitourinary syndrome of menopause    Hyperlipidemia    Osteoarthritis of right knee    Pernicious anemia    Urinary tract infection in female    Absolute anemia    Gastroesophageal reflux disease    Angina pectoris    Anxiety disorder    Arthritis or polyarthritis, rheumatoid    Avitaminosis D    B12 deficiency    Bilateral primary osteoarthritis of knee    BP (high blood pressure)    Cardiac murmur    Stage 2 chronic kidney disease    Aftercare for healing traumatic closed fracture of right lower extremity    Delayed union of tibial shaft fracture     Past Medical History:   Diagnosis Date    Angina pectoris     Anxiety and depression     Arthralgia of right knee 09/10/2024    Arthritis     Carotid artery occlusion     Chronic urinary tract infection     CKD (chronic kidney disease)     Coronary arteriosclerosis     Coronary artery disease     Disease of thyroid gland     Fibromyalgia, primary 2000    Genitourinary syndrome of menopause     GERD (gastroesophageal reflux disease)     History of kidney stones     History of pulmonary embolism     AFTER HYSTERECTOMY    HL (hearing loss)     Hyperlipidemia     Hypertension     Hypothyroidism     Left hip pain     Memory loss     MVP (mitral valve prolapse)     DR LUZ LAST OFFICE NOTE WITH CHART 2024    Prediabetes     Right knee pain     Right shoulder pain     Sleep apnea     PT IS NOT USING CPAP     Past Surgical History:   Procedure Laterality Date    APPENDECTOMY       BREAST BIOPSY      COLONOSCOPY  01/01/2022    CYSTOSCOPY W/ URETERAL STENT PLACEMENT Left 09/05/2023    Procedure: LEFT CYSTOSCOPY URETERAL CATHETER/STENT INSERTION;  Surgeon: Quinton Rosa MD;  Location: [facility] OR;  Service: Urology;  Laterality: Left;    EYE SURGERY      HYSTERECTOMY      JOINT REPLACEMENT      KNEE ARTHROPLASTY Left     REPLACEMENT TOTAL KNEE Right     SHOULDER SURGERY      TONSILLECTOMY      TOTAL KNEE ARTHROPLASTY Right 08/22/2024    Procedure: TOTAL KNEE ARTHROPLASTY WITH CORI ROBOT;  Surgeon: Steven Sumner II, MD;  Location: [facility];  Service: Robotics - Ortho;  Laterality: Right;      General Information       Row Name 12/11/24 1604          Physical Therapy Time and Intention    Document Type therapy note (daily note)  -DJ     Mode of Treatment individual therapy;physical therapy  -DJ       Row Name 12/11/24 1604          General Information    Patient Profile Reviewed yes  -DJ     Existing Precautions/Restrictions fall  -DJ       Row Name 12/11/24 1604          Cognition    Orientation Status (Cognition) oriented x 4  -DJ       Row Name 12/11/24 1604          Safety Issues/Impairments Affecting Functional Mobility    Comment, Safety Issues/Impairments (Mobility) gt belt  -DJ               User Key  (r) = Recorded By, (t) = Taken By, (c) = Cosigned By      Initials Name Provider Type    DJ Cleo Carter, PT Physical Therapist                   Mobility       Row Name 12/11/24 1606          Bed Mobility    Bed Mobility supine-sit;sit-supine  -DJ     Supine-Sit [place] (Bed Mobility) independent  -DJ     Sit-Supine [place] (Bed Mobility) independent  -DJ       Row Name 12/11/24 1606          Transfers    Comment, (Transfers) sit/stand from EOB  -DJ       Row Name 12/11/24 1606          Bed-Chair Transfer    Bed-Chair [place] (Transfers) not tested  -DJ       Row Name 12/11/24 1606          Sit-Stand Transfer    Sit-Stand [place] (Transfers)  modified independence  -DJ     Assistive Device (Sit-Stand Transfers) walker, front-wheeled  -DJ       Row Name 12/11/24 1606          Gait/Stairs (Locomotion)    [place] Level (Gait) standby assist  -DJ     Assistive Device (Gait) walker, front-wheeled  -DJ     Distance in Feet (Gait) 100  -DJ     Deviations/Abnormal Patterns (Gait) gait speed decreased;stride length decreased  -DJ     [place] Level (Stairs) not tested  -DJ     Comment, (Gait/Stairs) Pt amb 100' with r wx and SBA - good balance, endurance improving, no appreciable LE limp  -DJ               User Key  (r) = Recorded By, (t) = Taken By, (c) = Cosigned By      Initials Name Provider Type    Cleo Eagle, BLAISE Physical Therapist                   Obj/Interventions       Row Name 12/11/24 1607          Motor Skills    Motor Skills functional endurance  -DJ     Functional Endurance improving  -DJ       Row Name 12/11/24 1607          Balance    Balance Assessment standing static balance;standing dynamic balance  -DJ     Static Standing Balance standby assist  -DJ     Dynamic Standing Balance standby assist  -DJ     Position/Device Used, Standing Balance unsupported;walker, front-wheeled  -DJ     Balance Interventions sitting;standing;sit to stand;weight shifting activity  -DJ     Comment, Balance good with r wx  -DJ               User Key  (r) = Recorded By, (t) = Taken By, (c) = Cosigned By      Initials Name Provider Type    Cleo Eagle, PT Physical Therapist                   Goals/Plan    No documentation.                  Clinical Impression       Row Name 12/11/24 1608          Pain    Pretreatment Pain Rating 5/10  -DJ     Pain Location extremity  -DJ     Pain Side/Orientation right;lower  -DJ     Response to Pain Interventions activity participation with tolerable pain  -DJ     Pre/Posttreatment Pain Comment pt recently received pain meds  -DJ       Row Name 12/11/24 1608          Plan of Care Review    Plan of Care Reviewed With  patient  -DJ     Progress improving  -DJ     Outcome Evaluation Pt resting in bed in NAD, pleasant and cooperative. She sat EOB and stood from EOB independently using r wx. Pt amb 100' with r wx and SBA - good balance, endurance improving, no appreciable LE limp. She returned supine in bed independently. Pt is progressing well and dem no balance deficits. She reports her kenia thinks she needs 24/7 care. Pt reports she feels safe to return home and I agree. She is interested in attending outpt PT. She was receiving home therapy but staes they only came once a week. She would certainly benefit from home PT services to ensure home safety with progression to outpt PT services or even d/c straight to outpt PT. Will cont to follow while here and progerss as tolerated.  -DJ       Row Name 12/11/24 1601          Therapy Assessment/Plan (PT)    Patient/Family Therapy Goals Statement (PT) home  -DJ     Rehab Potential (PT) good  -DJ     Criteria for Skilled Interventions Met (PT) skilled treatment is necessary  -DJ     Therapy Frequency (PT) 5 times/wk  -DJ       Row Name 12/11/24 1608          Vital Signs    O2 Delivery Pre Treatment room air  -DJ     O2 Delivery Intra Treatment room air  -DJ     O2 Delivery Post Treatment room air  -DJ     Pre Patient Position Supine  -DJ     Intra Patient Position Standing  -DJ     Post Patient Position Supine  -DJ       Row Name 12/11/24 1608          Positioning and Restraints    Pre-Treatment Position in bed  -DJ     Post Treatment Position bed  -DJ     In Bed notified nsg;supine;call light within reach;encouraged to call for assist  no exit alarm on - ot has been amb to bathroom independently (per pt)  -DJ               User Key  (r) = Recorded By, (t) = Taken By, (c) = Cosigned By      Initials Name Provider Type    Cleo Eagle, PT Physical Therapist                   Outcome Measures       Row Name 12/11/24 1616 12/11/24 0842       How much help from another person do you  currently need...    Turning from your back to your side while in flat bed without using bedrails? 4  -DJ 4  -EE    Moving from lying on back to sitting on the side of a flat bed without bedrails? 4  -DJ 4  -EE    Moving to and from a bed to a chair (including a wheelchair)? 4  -DJ 4  -EE    Standing up from a chair using your arms (e.g., wheelchair, bedside chair)? 4  -DJ 4  -EE    Climbing 3-5 steps with a railing? 3  -DJ 4  -EE    To walk in hospital room? 4  -DJ 4  -EE    AM-PAC 6 Clicks Score (PT) 23  -DJ 24  -EE    Highest Level of Mobility Goal 7 --> Walk 25 feet or more  -DJ 8 --> Walked 250 feet or more  -EE      Row Name 12/11/24 1616          Functional Assessment    Outcome Measure Options AM-PAC 6 Clicks Basic Mobility (PT)  -DJ               User Key  (r) = Recorded By, (t) = Taken By, (c) = Cosigned By      Initials Name Provider Type    Cleo Eagle, PT Physical Therapist    Sapna Lee, RN Registered Nurse                                 Physical Therapy Education       Title: PT OT SLP Therapies (In Progress)       Topic: Physical Therapy (Done)       Point: Mobility training (Done)       Learning Progress Summary            Patient Acceptance, E, VU by  at 12/11/2024 1616    Acceptance, E,TB,D, VU,DU by  at 12/10/2024 0843    Acceptance, E,TB, VU,NR by  at 12/8/2024 1420                      Point: Home exercise program (Done)       Learning Progress Summary            Patient Acceptance, E,TB,D, VU,DU by PH at 12/10/2024 0843    Acceptance, E,TB, VU,NR by  at 12/8/2024 1420                      Point: Body mechanics (Done)       Learning Progress Summary            Patient Acceptance, E, VU by DJ at 12/11/2024 1616    Acceptance, E,TB,D, VU,DU by  at 12/10/2024 0843    Acceptance, E,TB, VU,NR by  at 12/8/2024 1420                      Point: Precautions (Done)       Learning Progress Summary            Patient Acceptance, E, VU by  at 12/11/2024 1616    Acceptance, E,TB,D,  VU,DU by  at 12/10/2024 0843    Acceptance, E,TB, VU,NR by  at 12/8/2024 1420                                      User Key       Initials Effective Dates Name Provider Type Discipline     07/11/23 -  Santos Meza, PT Physical Therapist PT     06/16/21 -  Antoinette Pollack PTA Physical Therapist Assistant PT     10/25/19 -  Cleo Carter PT Physical Therapist PT                  PT Recommendation and Plan     Progress: improving  Outcome Evaluation: Pt resting in bed in NAD, pleasant and cooperative. She sat EOB and stood from EOB independently using r wx. Pt amb 100' with r wx and SBA - good balance, endurance improving, no appreciable LE limp. She returned supine in bed independently. Pt is progressing well and dem no balance deficits. She reports her kenia thinks she needs 24/7 care. Pt reports she feels safe to return home and I agree. She is interested in attending outpt PT. She was receiving home therapy but staes they only came once a week. She would certainly benefit from home PT services to ensure home safety with progression to outpt PT services or even d/c straight to outpt PT. Will cont to follow while here and progerss as tolerated.     Time Calculation:         PT Charges       Row Name 12/11/24 1617             Time Calculation    Start Time 0830  -DJ      Stop Time 0850  -DJ      Time Calculation (min) 20 min  -DJ      PT Non-Billable Time (min) 10 min  -DJ      PT Received On 12/11/24  -DJ      PT - Next Appointment 12/12/24  -DJ                User Key  (r) = Recorded By, (t) = Taken By, (c) = Cosigned By      Initials Name Provider Type     Cleo Carter, PT Physical Therapist                  Therapy Charges for Today       Code Description Service Date Service Provider Modifiers Qty    [id number] HC PT THERAPEUTIC ACT EA 15 MIN 12/11/2024 Cleo Carter, PT GP 1            PT G-Codes  Outcome Measure Options: AM-PAC 6 Clicks Basic Mobility (PT)  AM-PAC 6 Clicks Score (PT):  23  AM-PAC 6 Clicks Score (OT): 22  Modified Keila Scale: 3 - Moderate disability.  Requiring some help, but able to walk without assistance.  PT Discharge Summary  Anticipated Discharge Disposition (PT): home, home with outpatient therapy services    Cleo Carter, PT  12/11/2024

## 2024-12-11 NOTE — CASE MANAGEMENT/SOCIAL WORK
Continued Stay Note  [facility]     Patient Name: Sonya Marcus  MRN: [id number]  Today's Date: 12/11/2024    Admit Date: 12/6/2024    Plan: Pending   Discharge Plan       Row Name 12/11/24 1126       Plan    Plan Pending    Patient/Family in Agreement with Plan yes    Plan Comments CCP received an inbound call from Lili/GABBIE who states pre-cert has been denied. She said they are offering a P2P option and information is as follows: call [phone] due by 2:30PM today, reason, to establish medical necessity. Email sent to [facility] Authorizations with above information. CCP to follow. CD, CSW.                   Discharge Codes    No documentation.                 Expected Discharge Date and Time       Expected Discharge Date Expected Discharge Time    Dec 11, 2024

## 2024-12-11 NOTE — PROGRESS NOTES
Patient wanting to go to rehab but may not be able to based on work with therapy.  She may end up discharging home.

## 2024-12-11 NOTE — CASE MANAGEMENT/SOCIAL WORK
Continued Stay Note  [facility]     Patient Name: Sonya Marcus  MRN: [id number]  Today's Date: 12/11/2024    Admit Date: 12/6/2024    Plan: Home with plans for outpatient therapy at [facility]   Discharge Plan       Row Name 12/11/24 1448       Plan    Plan Home with plans for outpatient therapy at [facility]    Patient/Family in Agreement with Plan yes    Plan Comments [facility] notified via secure chat that the doctor is not going to do the P2P as the patient does not meet requirements for IRF. She said she can go home with assist. [facility] spoke with the patient at the bedside and informed her. She called her daughter Cece via her cell phone and placed her on speaker phone and [facility] informed her of the above information. She said she will be up to the hospital shortly and can take her mother home. She also requested a referral for outpatient therapy to [facility]. [facility] informed the patient's nurse Sapna of the above information via telephone. [facility] sent Dr. Sumner a secure chat with the above information. CD, CSW.                   Discharge Codes    No documentation.                 Expected Discharge Date and Time       Expected Discharge Date Expected Discharge Time    Dec 11, 2024

## 2024-12-11 NOTE — DISCHARGE SUMMARY
Orthopaedic Discharge Summary  Dr. MARIA ESTHER Lozano” [facility]  [phone]    NAME: Sonya Burgess Andreas PCP: Ronel Stephen APRN   :  MRN: [id number]  [id number] LOS:  ADMIT: 0 days  2024   AGE/SEX: 69 y.o. female DC:  today             Admitting Diagnosis: Delayed union of tibial shaft fracture [S82.209G]    Surgery Performed: No admission procedures for hospital encounter.    Discharge Medications:         Discharge Medications        Changes to Medications        Instructions Start Date   ALPRAZolam 0.25 MG tablet  Commonly known as: XANAX  What changed: Another medication with the same name was removed. Continue taking this medication, and follow the directions you see here.   Take 1 tablet by mouth 2 times daily as needed and take 2 tablets at bedtime as needed.             Continue These Medications        Instructions Start Date   albuterol (2.5 MG/3ML) 0.083% nebulizer solution  Commonly known as: PROVENTIL   2.5 mg, Nebulization, Every 4 Hours PRN      buPROPion  MG 24 hr tablet  Commonly known as: WELLBUTRIN XL   1 tablet, Nightly      cyanocobalamin 1000 MCG/ML injection   1,000 mcg, Every 30 Days      docusate sodium 100 MG capsule  Commonly known as: Colace   100 mg, Oral, Daily      DULoxetine 60 MG capsule  Commonly known as: CYMBALTA   60 mg, 2 Times Daily      HYDROcodone-acetaminophen 7.5-325 MG per tablet  Commonly known as: NORCO   Take 1 tablet every 4-6 hours by oral route.      levothyroxine 88 MCG tablet  Commonly known as: SYNTHROID, LEVOTHROID   88 mcg, Every Early Morning      levothyroxine 112 MCG tablet  Commonly known as: SYNTHROID, LEVOTHROID   100 mcg, Daily      metoprolol succinate XL 50 MG 24 hr tablet  Commonly known as: TOPROL-XL   50 mg, Daily      naloxone 4 MG/0.1ML nasal spray  Commonly known as: NARCAN   Call 911. Don't prime. [place] in 1 nostril for overdose. Repeat in 2-3 minutes in other nostril if no or minimal breathing/responsiveness.      nitrofurantoin 50 MG  capsule  Commonly known as: MACRODANTIN   50 mg, Oral, Nightly      omeprazole 20 MG capsule  Commonly known as: priLOSEC   20 mg, Daily      ondansetron 4 MG tablet  Commonly known as: Zofran   4 mg, Oral, Every 6 Hours PRN      pregabalin 150 MG capsule  Commonly known as: Lyrica   150 mg, Oral, 2 Times Daily      rosuvastatin 10 MG tablet  Commonly known as: CRESTOR   1 tablet, Nightly      traZODone 100 MG tablet  Commonly known as: DESYREL   100 mg, Oral, Every Night at Bedtime               Vitals:     Vitals:    12/10/24 1742 12/10/24 2100 12/11/24 0510 12/11/24 0823   BP: 131/68 115/74 111/68 116/63   BP Location: Left arm Left arm Right arm    Patient Position: Lying Lying Lying    Pulse: 65 64 70 64   Resp: 16 16 16    Temp: 98.6 °F (37 °C) 98.2 °F (36.8 °C) 97.7 °F (36.5 °C)    TempSrc: Oral Oral Oral    SpO2: 96% 96% 97%    Weight:       Height:           Labs:      No visits with results within 2 Day(s) from this visit.   Latest known visit with results is:   Orders Only on 11/25/2024   Component Date Value Ref Range Status    Report Summary 11/25/2024 FINAL   Final    Comment: ====================================================================  [facility] COMP DRUG ANALYSIS,UR  ====================================================================  Test                             Result       Flag       Units  Drug Present and Declared for Prescription Verification    Alprazolam                     140          EXPECTED   ng/mg creat    Alpha-hydroxyalprazolam        352          EXPECTED   ng/mg creat     Source of alprazolam is a scheduled prescription medication.     Alpha-hydroxyalprazolam is an expected metabolite of alprazolam.    Hydrocodone                    >4950        EXPECTED   ng/mg creat    Hydromorphone                  150          EXPECTED   ng/mg creat    Dihydrocodeine                 391          EXPECTED   ng/mg creat    Norhydrocodone                 >2475        EXPECTED   ng/mg  creat     Sources of hydrocodone include scheduled prescription     medications. Hydromorphone, dihydrocodeine and norhydrocodone are     expected metabolites                            of hydrocodone. Hydromorphone and     dihydrocodeine are also available as scheduled prescription     medications.    Bupropion                      PRESENT      EXPECTED    Hydroxybupropion               PRESENT      EXPECTED     Hydroxybupropion is an expected metabolite of bupropion.    Duloxetine                     PRESENT      EXPECTED    Trazodone                      PRESENT      EXPECTED    1,3 chlorophenyl piperazine    PRESENT      EXPECTED     1,3-chlorophenyl piperazine is an expected metabolite of     trazodone.    Acetaminophen                  PRESENT      EXPECTED    Metoprolol                     PRESENT      EXPECTED  Drug Present not Declared for Prescription Verification    Desmethyldiazepam              196          UNEXPECTED ng/mg creat    Oxazepam                       428          UNEXPECTED ng/mg creat    Temazepam                      572          UNEXPECTED ng/mg creat     Desmethyldiazepam, oxazepam, and temazepam are benzodiazepine     drugs, but may also                            be present as common metabolites of other     benzodiazepine drugs, including diazepam.    Pregabalin                     PRESENT      UNEXPECTED    Ibuprofen                      PRESENT      UNEXPECTED    Lidocaine                      PRESENT      UNEXPECTED  ====================================================================  Test                      Result    Flag   Units      Ref Range    Creatinine              202              mg/dL      >=20  ====================================================================  Declared Medications:   The flagging and interpretation on this report are based on the   following declared medications.  Unexpected results may arise from   inaccuracies in the declared medications.    "**Note: The testing scope of this panel includes these medications:   Alprazolam   Bupropion   Duloxetine   Hydrocodone (Norco)   Metoprolol   Trazodone   **Note: The testing scope of this panel does not include small to   moderate amounts of these reported medication                           s:   Acetaminophen (Norco)   **Note: The testing scope of this panel does not include following   reported medications:   Albuterol   Cyanocobalamin   Docusate   Levothyroxine   Naloxone   Nitrofurantoin   Omeprazole   Ondansetron   Rosuvastatin  ====================================================================  For clinical consultation, please call [phone].  ====================================================================          No results found.    Hospital Course:   69 y.o. female was admitted to [facility] to services of Steven Sumner II, MD with Decreased mobility.  She was hoping to be able to discharge to a rehab facility.  Unfortunately, she did quite well with physical therapy and was not deemed a candidate for inpatient rehab.  She is therefore being discharged home.  She has done quite well with physical therapy.  Pain is controlled.  She is weight-bear as tolerated.    HOME: The patient progressed well with physical therapy. There were cleared for discharge to home. The patietn was sent home in good condition}.       R \"Toño\" Burak DOUGLAS MD  Orthopaedic Surgery  [facility]  [phone]                                               "

## 2024-12-11 NOTE — PLAN OF CARE
Goal Outcome Evaluation:  Plan of Care Reviewed With: patient        Progress: improving  Outcome Evaluation: Pt resting in bed in NAD, pleasant and cooperative. She sat EOB and stood from EOB independently using r wx. Pt amb 100' with r wx and SBA - good balance, endurance improving, no appreciable LE limp. She returned supine in bed independently. Pt is progressing well and dem no balance deficits. She reports her kenia thinks she needs 24/7 care. Pt reports she feels safe to return home and I agree. She is interested in attending outpt PT. She was receiving home therapy but staes they only came once a week. She would certainly benefit from home PT services to ensure home safety with progression to outpt PT services or even d/c straight to outpt PT. Will cont to follow while here and progerss as tolerated.    Anticipated Discharge Disposition (PT): home, home with outpatient therapy services

## 2024-12-11 NOTE — PLAN OF CARE
Problem: Adult Inpatient Plan of Care  Goal: Plan of Care Review  Outcome: Progressing  Flowsheets (Taken 12/11/2024 0424)  Progress: no change  Plan of Care Reviewed With: patient  Goal: Absence of Hospital-Acquired Illness or Injury  Outcome: Progressing  Intervention: Identify and Manage Fall Risk  Recent Flowsheet Documentation  Taken 12/11/2024 0424 by Daija Reddy RN  Safety Promotion/Fall Prevention:   assistive device/personal items within reach   clutter free environment maintained   fall prevention program maintained   nonskid shoes/slippers when out of bed   room organization consistent   safety round/check completed  Taken 12/11/2024 0216 by Daija Reddy RN  Safety Promotion/Fall Prevention:   assistive device/personal items within reach   clutter free environment maintained   nonskid shoes/slippers when out of bed   room organization consistent   safety round/check completed  Taken 12/11/2024 0001 by Daija Reddy RN  Safety Promotion/Fall Prevention:   assistive device/personal items within reach   clutter free environment maintained   fall prevention program maintained   nonskid shoes/slippers when out of bed   room organization consistent   safety round/check completed  Taken 12/10/2024 2203 by Daija Reddy RN  Safety Promotion/Fall Prevention:   assistive device/personal items within reach   clutter free environment maintained   fall prevention program maintained   lighting adjusted   nonskid shoes/slippers when out of bed   room organization consistent   safety round/check completed  Taken 12/10/2024 2058 by Daija Reddy, RN  Safety Promotion/Fall Prevention:   activity supervised   assistive device/personal items within reach   clutter free environment maintained   fall prevention program maintained   nonskid shoes/slippers when out of bed   lighting adjusted   room organization consistent   safety round/check completed  Intervention: Prevent Skin Injury  Recent Flowsheet  Documentation  Taken 12/11/2024 0424 by Daija Reddy RN  Body Position: position changed independently  Taken 12/11/2024 0216 by Daija Reddy RN  Body Position: position changed independently  Taken 12/11/2024 0001 by Daija Reddy RN  Body Position: position changed independently  Taken 12/10/2024 2203 by Daija Reddy RN  Body Position: position changed independently  Taken 12/10/2024 2058 by Daija Reddy RN  Body Position: position changed independently  Intervention: Prevent and Manage VTE (Venous Thromboembolism) Risk  Recent Flowsheet Documentation  Taken 12/10/2024 2058 by Daija Reddy RN  VTE Prevention/Management: patient refused intervention  Intervention: Prevent Infection  Recent Flowsheet Documentation  Taken 12/10/2024 2058 by Daija Reddy RN  Infection Prevention:   environmental surveillance performed   hand hygiene promoted   personal protective equipment utilized   rest/sleep promoted   single patient room provided  Goal: Optimal Comfort and Wellbeing  Outcome: Progressing  Intervention: Provide Person-Centered Care  Recent Flowsheet Documentation  Taken 12/10/2024 2058 by Daija Reddy RN  Trust Relationship/Rapport:   care explained   questions answered   questions encouraged  Goal: Readiness for Transition of Care  Outcome: Progressing     Problem: Fall Injury Risk  Goal: Absence of Fall and Fall-Related Injury  Outcome: Progressing  Intervention: Identify and Manage Contributors  Recent Flowsheet Documentation  Taken 12/10/2024 2058 by Daija Reddy RN  Medication Review/Management: medications reviewed  Intervention: Promote Injury-Free Environment  Recent Flowsheet Documentation  Taken 12/11/2024 0424 by Daija Reddy RN  Safety Promotion/Fall Prevention:   assistive device/personal items within reach   clutter free environment maintained   fall prevention program maintained   nonskid shoes/slippers when out of bed   room organization consistent   safety round/check  completed  Taken 12/11/2024 0216 by Daija Reddy, KWADWO  Safety Promotion/Fall Prevention:   assistive device/personal items within reach   clutter free environment maintained   nonskid shoes/slippers when out of bed   room organization consistent   safety round/check completed  Taken 12/11/2024 0001 by Daija Reddy, KWADWO  Safety Promotion/Fall Prevention:   assistive device/personal items within reach   clutter free environment maintained   fall prevention program maintained   nonskid shoes/slippers when out of bed   room organization consistent   safety round/check completed  Taken 12/10/2024 2203 by Daija Reddy, KWADWO  Safety Promotion/Fall Prevention:   assistive device/personal items within reach   clutter free environment maintained   fall prevention program maintained   lighting adjusted   nonskid shoes/slippers when out of bed   room organization consistent   safety round/check completed  Taken 12/10/2024 2058 by Daija Reddy, KWADWO  Safety Promotion/Fall Prevention:   activity supervised   assistive device/personal items within reach   clutter free environment maintained   fall prevention program maintained   nonskid shoes/slippers when out of bed   lighting adjusted   room organization consistent   safety round/check completed   Goal Outcome Evaluation:  Plan of Care Reviewed With: patient        Progress: no change

## 2024-12-12 ENCOUNTER — TRANSITIONAL CARE MANAGEMENT TELEPHONE ENCOUNTER (OUTPATIENT)
Dept: CALL CENTER | Facility: HOSPITAL | Age: 69
End: 2024-12-12
Payer: MEDICARE

## 2024-12-12 NOTE — OUTREACH NOTE
Prep Survey      Flowsheet Row Responses   [facility] patient discharged from? [place]   Is LACE score < 7 ? No   Eligibility [facility]   Date of Admission 12/06/24   Date of Discharge 12/11/24   Discharge Disposition Home or Self Care   Discharge diagnosis Delayed union of tibial shaft fracture,   Does the patient have one of the following disease processes/diagnoses(primary or secondary)? Other   Does the patient have Home health ordered? No   Is there a DME ordered? No   Comments regarding appointments out pt PT   Prep survey completed? Yes            Tiffanie ZIMMER - Registered Nurse

## 2024-12-12 NOTE — OUTREACH NOTE
Call Center TCM Note      Flowsheet Row Responses   [facility] patient discharged from? [place]   Does the patient have one of the following disease processes/diagnoses(primary or secondary)? Other   TCM attempt successful? Yes   Call start time 1156   Call end time 1201   Discharge diagnosis Delayed union of tibial shaft fracture,   Meds reviewed with patient/caregiver? Yes   Is the patient having any side effects they believe may be caused by any medication additions or changes? No   Does the patient have all medications ordered at discharge? Yes   Is the patient taking all medications as directed (includes completed medication regime)? Yes   Comments HOSP DC FU appt 12/17/24 830. Please note pt also has an appt on 12/20 on schedule. TCM call complete.   Does the patient have an appointment with their PCP within 7-14 days of discharge? Yes   Has home health visited the patient within 72 hours of discharge? N/A   Home health comments OUT PT THERAPY   Psychosocial issues? No   Did the patient receive a copy of their discharge instructions? Yes   Nursing interventions Reviewed instructions with patient   What is the patient's perception of their health status since discharge? Improving   Is the patient/caregiver able to teach back signs and symptoms related to disease process for when to call PCP? Yes   Is the patient/caregiver able to teach back signs and symptoms related to disease process for when to call 911? Yes   Is the patient/caregiver able to teach back the hierarchy of who to call/visit for symptoms/problems? PCP, Specialist, Home health nurse, Urgent Care, ED, 911 Yes   TCM call completed? Yes   Wrap up additional comments SPoke with Daughter she reports Pt is improving and states that orhto did not do peer to peer to get Pt accepted in in pt rehab. Pt will do out Pt rehab. PCP appt set.   Call end time 1201            Sena Lawosn RN    12/12/2024, 12:02 EST

## 2024-12-12 NOTE — CASE MANAGEMENT/SOCIAL WORK
Case Management Discharge Note      Final Note: dc home with OP PT         Selected Continued Care - Discharged on 12/11/2024 Admission date: 12/6/2024 - Discharge disposition: Home or Self Care      Destination    No services have been selected for the patient.                Durable Medical Equipment    No services have been selected for the patient.                Dialysis/Infusion    No services have been selected for the patient.                Home Medical Care    No services have been selected for the patient.                Therapy    No services have been selected for the patient.                Community Resources    No services have been selected for the patient.                Community & DME    No services have been selected for the patient.                    Selected Continued Care - Prior Encounters Includes continued care and service providers with selected services from prior encounters from 9/7/2024 to 12/11/2024      Discharged on 11/17/2024 Admission date: 11/15/2024 - Discharge disposition: Home-Health Care c      Home Medical Care       Service Provider Services Address Phone Fax Patient Preferred    [facility] - [facility] [id number]  [place] [phone] --       Internal Comment last updated by Alfreda Dugan, RN 11/17/2024 1514    See CCP note                                     Transportation Services  Private: Car    Final Discharge Disposition Code: 01 - home or self-care

## 2024-12-20 ENCOUNTER — OFFICE VISIT (OUTPATIENT)
Age: 69
End: 2024-12-20
Payer: MEDICARE

## 2024-12-20 VITALS
HEART RATE: 68 BPM | SYSTOLIC BLOOD PRESSURE: 118 MMHG | HEIGHT: 61 IN | BODY MASS INDEX: 34.55 KG/M2 | TEMPERATURE: 97.4 F | OXYGEN SATURATION: 97 % | RESPIRATION RATE: 17 BRPM | WEIGHT: 183 LBS | DIASTOLIC BLOOD PRESSURE: 76 MMHG

## 2024-12-20 DIAGNOSIS — Z12.11 ENCOUNTER FOR SCREENING FOR MALIGNANT NEOPLASM OF COLON: ICD-10-CM

## 2024-12-20 DIAGNOSIS — Z79.899 POLYPHARMACY: ICD-10-CM

## 2024-12-20 DIAGNOSIS — G89.29 OTHER CHRONIC PAIN: Primary | ICD-10-CM

## 2024-12-20 DIAGNOSIS — G89.4 CHRONIC PAIN SYNDROME: ICD-10-CM

## 2024-12-20 DIAGNOSIS — M79.7 FIBROMYALGIA: ICD-10-CM

## 2024-12-20 DIAGNOSIS — Z11.59 NEED FOR HEPATITIS C SCREENING TEST: ICD-10-CM

## 2024-12-20 PROCEDURE — 1125F AMNT PAIN NOTED PAIN PRSNT: CPT

## 2024-12-20 PROCEDURE — 3078F DIAST BP <80 MM HG: CPT

## 2024-12-20 PROCEDURE — 1160F RVW MEDS BY RX/DR IN RCRD: CPT

## 2024-12-20 PROCEDURE — 1111F DSCHRG MED/CURRENT MED MERGE: CPT

## 2024-12-20 PROCEDURE — 1159F MED LIST DOCD IN RCRD: CPT

## 2024-12-20 PROCEDURE — 3044F HG A1C LEVEL LT 7.0%: CPT

## 2024-12-20 PROCEDURE — 99495 TRANSJ CARE MGMT MOD F2F 14D: CPT

## 2024-12-20 PROCEDURE — 3074F SYST BP LT 130 MM HG: CPT

## 2024-12-20 RX ORDER — PREGABALIN 150 MG/1
150 CAPSULE ORAL 2 TIMES DAILY
Qty: 60 CAPSULE | Refills: 0 | Status: SHIPPED | OUTPATIENT
Start: 2024-12-20

## 2024-12-20 NOTE — PROGRESS NOTES
Transitional Care Follow Up Visit  Subjective     Sonya Marcus is a 69 y.o. female who presents for a transitional care management visit.    Within 48 business hours after discharge our office contacted her via telephone to coordinate her care and needs.      I reviewed and discussed the details of that call along with the discharge summary, hospital problems, inpatient lab results, inpatient diagnostic studies, and consultation reports with Sonya.     Current outpatient and discharge medications have been reconciled for the patient.  Reviewed by: Latoya Bain MA          12/11/2024    10:44 PM   Date of TCM Phone Call   [facility]   Date of Admission 12/6/2024   Date of Discharge 12/11/2024   Discharge Disposition Home or Self Care     Risk for Readmission (LACE) Score: 11 (12/11/2024  6:00 AM)      History of Present Illness     Course During Hospital Stay:  Ara was admitted to [facility] to services of Steven Sumner II, MD with Decreased mobility.  She was hoping to be able to discharge to a rehab facility.  Unfortunately, she did quite well with physical therapy and was not deemed a candidate for inpatient rehab.  She is therefore being discharged home.  She has done quite well with physical therapy.  Pain is controlled.  She is weight-bear as tolerated. The patient progressed well with physical therapy. There were cleared for discharge to home. The patietn was sent home in good condition}.         The following portions of the patient's history were reviewed and updated as appropriate: allergies, current medications, past family history, past medical history, past social history, past surgical history, and problem list.    Review of Systems    Objective   There were no vitals taken for this visit.  Physical Exam              Assessment & Plan   Diagnoses and all orders for this visit:    1. Other chronic pain (Primary)    2. Need for hepatitis C screening  test    3. Polypharmacy    4. Encounter for screening for malignant neoplasm of colon    5. Chronic pain syndrome  -     Ambulatory Referral to Pain Management    6. Fibromyalgia  -     Ambulatory Referral to Pain Management  -     pregabalin (Lyrica) 150 MG capsule; Take 1 capsule by mouth 2 (Two) Times a Day.  Dispense: 60 capsule; Refill: 0                    Patient or patient representative verbalized consent for the use of Ambient Listening during the visit with  CHANELLE Mosley for chart documentation. 12/22/2024  16:59 EST

## 2024-12-20 NOTE — PROGRESS NOTES
Transitional Care Follow Up Visit  Subjective     Sonya Marcus is a 69 y.o. female who presents for a transitional care management visit.    Within 48 business hours after discharge our office contacted her via telephone to coordinate her care and needs.      I reviewed and discussed the details of that call along with the discharge summary, hospital problems, inpatient lab results, inpatient diagnostic studies, and consultation reports with Sonya.     Current outpatient and discharge medications have been reconciled for the patient.  Reviewed by: Latoya Bain MA          12/11/2024    10:44 PM   Date of TCM Phone Call   [facility]   Date of Admission 12/6/2024   Date of Discharge 12/11/2024   Discharge Disposition Home or Self Care     Risk for Readmission (LACE) Score: 11 (12/11/2024  6:00 AM)      Ara was admitted to [facility] to services of Steven Sumner II, MD with Decreased mobility.  She was hoping to be able to discharge to a rehab facility.  Unfortunately, she did quite well with physical therapy and was not deemed a candidate for inpatient rehab.  She is therefore being discharged home.  She has done quite well with physical therapy.  Pain is controlled.  She is weight-bear as tolerated. The patient progressed well with physical therapy. There were cleared for discharge to home. The patietn was sent home in good condition}.           The following portions of the patient's history were reviewed and updated as appropriate: allergies, current medications, past family history, past medical history, past social history, past surgical history, and problem list.    Review of Systems    Objective   There were no vitals taken for this visit.  Physical Exam  Constitutional:       Appearance: She is obese.   HENT:      Head: Normocephalic and atraumatic.   Cardiovascular:      Rate and Rhythm: Normal rate and regular rhythm.   Pulmonary:      Effort: Pulmonary  effort is normal.      Breath sounds: Normal breath sounds.   Neurological:      Mental Status: She is alert.   Psychiatric:         Attention and Perception: Attention normal.         Mood and Affect: Mood is anxious. Mood is not depressed or elated. Affect is tearful. Affect is not labile, blunt, flat, angry or inappropriate.         Speech: Speech normal.         Behavior: Behavior normal. Behavior is cooperative.         Thought Content: Thought content normal.         Cognition and Memory: Cognition and memory normal.         Judgment: Judgment normal.                   Assessment & Plan   Diagnoses and all orders for this visit:    1. Other chronic pain (Primary)    2. Need for hepatitis C screening test    3. Polypharmacy    4. Encounter for screening for malignant neoplasm of colon    5. Chronic pain syndrome  -     Ambulatory Referral to Pain Management    6. Fibromyalgia  -     Ambulatory Referral to Pain Management  -     pregabalin (Lyrica) 150 MG capsule; Take 1 capsule by mouth 2 (Two) Times a Day.  Dispense: 60 capsule; Refill: 0         1. Post-hospitalization follow-up.  Her pain is currently under control, and she is bearing weight as tolerated. She has been informed that her quality of life is expected to improve once she discontinues the use of pain medication. She has developed a dependence and tolerance to hydrocodone, necessitating the involvement of pain management to assist in tapering off the medication. She has been on benzodiazepines for a long time and states she does not take them every day. She has a history of issues with Benadryl and is currently taking opiates and benzodiazepines together. A referral to pain management will be initiated today. A prescription refill for pregabalin will be provided, after which the management of this medication will be transferred to the pain management team. She has been advised to take the minimum effective dose of hydrocodone. A urine specimen will  be collected today to check for microalbuminuria. She has been advised to schedule a diabetic eye exam at [facility]. An internal referral for a screening colonoscopy will be made at the beginning of the year.       Diagnoses and all orders for this visit:    1. Other chronic pain (Primary)    2. Need for hepatitis C screening test    3. Polypharmacy    4. Encounter for screening for malignant neoplasm of colon    5. Chronic pain syndrome  -     Ambulatory Referral to Pain Management    6. Fibromyalgia  -     Ambulatory Referral to Pain Management  -     pregabalin (Lyrica) 150 MG capsule; Take 1 capsule by mouth 2 (Two) Times a Day.  Dispense: 60 capsule; Refill: 0         Patient or patient representative verbalized consent for the use of Ambient Listening during the visit with  CHANELLE Mosley for chart documentation. 12/20/2024  09:08 EST

## 2025-02-03 ENCOUNTER — OFFICE VISIT (OUTPATIENT)
Dept: FAMILY MEDICINE CLINIC | Facility: CLINIC | Age: 70
End: 2025-02-03
Payer: MEDICARE

## 2025-02-03 VITALS
WEIGHT: 174 LBS | OXYGEN SATURATION: 97 % | TEMPERATURE: 98.5 F | HEIGHT: 61 IN | SYSTOLIC BLOOD PRESSURE: 102 MMHG | BODY MASS INDEX: 32.85 KG/M2 | HEART RATE: 64 BPM | DIASTOLIC BLOOD PRESSURE: 66 MMHG

## 2025-02-03 DIAGNOSIS — R73.01 IFG (IMPAIRED FASTING GLUCOSE): ICD-10-CM

## 2025-02-03 DIAGNOSIS — E55.9 AVITAMINOSIS D: ICD-10-CM

## 2025-02-03 DIAGNOSIS — E78.2 MIXED HYPERLIPIDEMIA: Primary | Chronic | ICD-10-CM

## 2025-02-03 DIAGNOSIS — E03.9 ACQUIRED HYPOTHYROIDISM: Chronic | ICD-10-CM

## 2025-02-03 DIAGNOSIS — K21.9 GASTROESOPHAGEAL REFLUX DISEASE WITHOUT ESOPHAGITIS: Chronic | ICD-10-CM

## 2025-02-03 DIAGNOSIS — E53.8 B12 DEFICIENCY: ICD-10-CM

## 2025-02-03 PROCEDURE — 1160F RVW MEDS BY RX/DR IN RCRD: CPT | Performed by: NURSE PRACTITIONER

## 2025-02-03 PROCEDURE — 3074F SYST BP LT 130 MM HG: CPT | Performed by: NURSE PRACTITIONER

## 2025-02-03 PROCEDURE — 1125F AMNT PAIN NOTED PAIN PRSNT: CPT | Performed by: NURSE PRACTITIONER

## 2025-02-03 PROCEDURE — 99214 OFFICE O/P EST MOD 30 MIN: CPT | Performed by: NURSE PRACTITIONER

## 2025-02-03 PROCEDURE — 3078F DIAST BP <80 MM HG: CPT | Performed by: NURSE PRACTITIONER

## 2025-02-03 PROCEDURE — 1159F MED LIST DOCD IN RCRD: CPT | Performed by: NURSE PRACTITIONER

## 2025-02-03 RX ORDER — LEVOTHYROXINE SODIUM 88 UG/1
88 TABLET ORAL
Qty: 90 TABLET | Refills: 1 | Status: SHIPPED | OUTPATIENT
Start: 2025-02-03

## 2025-02-03 RX ORDER — ROSUVASTATIN CALCIUM 10 MG/1
10 TABLET, COATED ORAL NIGHTLY
Qty: 90 TABLET | Refills: 1 | Status: SHIPPED | OUTPATIENT
Start: 2025-02-03

## 2025-02-03 RX ORDER — ASPIRIN 81 MG/1
81 TABLET ORAL DAILY
COMMUNITY

## 2025-02-03 RX ORDER — CETIRIZINE HYDROCHLORIDE 5 MG/1
5 TABLET ORAL DAILY
COMMUNITY

## 2025-02-03 NOTE — PROGRESS NOTES
Subjective   Sonya Marcus is a 69 y.o. female.     History of Present Illness   Chief Complaint     Establish Care  Med Refill    Sonya Marcus 69 y.o. female who presents today for a new patient appointment.    she has a history of   Patient Active Problem List   Diagnosis    Altered mental status    Polypharmacy    Positive urine drug screen    MICHAEL (acute kidney injury)    Left ureteral stone    Functional tremor    Knee joint replacement status    Fibromyalgia, primary    Coronary arteriosclerosis    Hypothyroidism    Genitourinary syndrome of menopause    Hyperlipidemia    Osteoarthritis of right knee    Pernicious anemia    Urinary tract infection in female    Absolute anemia    Gastroesophageal reflux disease    Angina pectoris    Anxiety disorder    Arthritis or polyarthritis, rheumatoid    Avitaminosis D    B12 deficiency    Bilateral primary osteoarthritis of knee    BP (high blood pressure)    Cardiac murmur    Stage 2 chronic kidney disease    Aftercare for healing traumatic closed fracture of right lower extremity    Delayed union of tibial shaft fracture   .  she is here to establish care and is here to get their medications refilled I reviewed the PFSH recorded today by my MA/LPN staff.         Patient has history of coronary artery disease and [facility] who also prescribes her metoprolol.  She has an appointment coming up.    She is also seeing a psychiatrist through [facility] and is prescribed Xanax which she states she has been on for 50 or so years.  She is also prescribed Wellbutrin, duloxetine and trazodone.    She was referred to pain management by her previous PCP and has appointment coming up this week.  She has been taking Lyrica for fibromyalgia.  She is also prescribed hydrocodone per Dr. Sumner for pain related to right tibial fracture that occurred back in the fall.  She states the strength is being weaned down.    She is due for updated labs  to check her hypothyroidism.            The following portions of the patient's history were reviewed and updated as appropriate: allergies, current medications, past family history, past medical history, past social history, past surgical history, and problem list.    Review of Systems   Constitutional:  Negative for unexpected weight change.   Respiratory:  Negative for shortness of breath.    Cardiovascular:  Negative for chest pain and palpitations.   Musculoskeletal:  Positive for arthralgias, joint swelling and myalgias.   Psychiatric/Behavioral:  Negative for behavioral problems.        Objective   Physical Exam  Vitals and nursing note reviewed.   Constitutional:       Appearance: Normal appearance. She is well-developed.   Neck:      Thyroid: No thyromegaly.      Vascular: No carotid bruit.   Cardiovascular:      Rate and Rhythm: Normal rate and regular rhythm.   Pulmonary:      Effort: Pulmonary effort is normal.      Breath sounds: Normal breath sounds.   Neurological:      Mental Status: She is alert and oriented to person, place, and time.   Psychiatric:         Mood and Affect: Mood normal.         Behavior: Behavior normal.         Thought Content: Thought content normal.         Judgment: Judgment normal.         Assessment & Plan   Diagnoses and all orders for this visit:    1. Mixed hyperlipidemia (Primary)  -     Comprehensive metabolic panel  -     Lipid panel  -     CBC and Differential  -     TSH  -     Vitamin B12  -     Hemoglobin A1c  -     T4, free  -     T3, free  -     Vitamin D 25 hydroxy    2. B12 deficiency  -     Comprehensive metabolic panel  -     Lipid panel  -     CBC and Differential  -     TSH  -     Vitamin B12  -     Hemoglobin A1c  -     T4, free  -     T3, free  -     Vitamin D 25 hydroxy    3. Acquired hypothyroidism  -     Comprehensive metabolic panel  -     Lipid panel  -     CBC and Differential  -     TSH  -     Vitamin B12  -     Hemoglobin A1c  -     T4, free  -      T3, free  -     Vitamin D 25 hydroxy    4. Gastroesophageal reflux disease without esophagitis  -     Comprehensive metabolic panel  -     Lipid panel  -     CBC and Differential  -     TSH  -     Vitamin B12  -     Hemoglobin A1c  -     T4, free  -     T3, free  -     Vitamin D 25 hydroxy    5. IFG (impaired fasting glucose)  -     Comprehensive metabolic panel  -     Lipid panel  -     CBC and Differential  -     TSH  -     Vitamin B12  -     Hemoglobin A1c  -     T4, free  -     T3, free  -     Vitamin D 25 hydroxy    6. Avitaminosis D  -     Comprehensive metabolic panel  -     Lipid panel  -     CBC and Differential  -     TSH  -     Vitamin B12  -     Hemoglobin A1c  -     T4, free  -     T3, free  -     Vitamin D 25 hydroxy    Other orders  -     rosuvastatin (CRESTOR) 10 MG tablet; Take 1 tablet by mouth Every Night.  Dispense: 90 tablet; Refill: 1  -     levothyroxine (SYNTHROID, LEVOTHROID) 88 MCG tablet; Take 1 tablet by mouth Every Morning.  Dispense: 90 tablet; Refill: 1  -     omeprazole (priLOSEC) 20 MG capsule; Take 1 capsule by mouth Daily.  Dispense: 90 capsule; Refill: 1

## 2025-02-04 DIAGNOSIS — E03.9 ACQUIRED HYPOTHYROIDISM: Primary | ICD-10-CM

## 2025-02-04 LAB
25(OH)D3+25(OH)D2 SERPL-MCNC: 55.2 NG/ML (ref 30–100)
ALBUMIN SERPL-MCNC: 4.3 G/DL (ref 3.9–4.9)
ALP SERPL-CCNC: 104 IU/L (ref 44–121)
ALT SERPL-CCNC: 10 IU/L (ref 0–32)
AST SERPL-CCNC: 18 IU/L (ref 0–40)
BASOPHILS # BLD AUTO: 0.1 X10E3/UL (ref 0–0.2)
BASOPHILS NFR BLD AUTO: 1 %
BILIRUB SERPL-MCNC: <0.2 MG/DL (ref 0–1.2)
BUN SERPL-MCNC: 14 MG/DL (ref 8–27)
BUN/CREAT SERPL: 17 (ref 12–28)
CALCIUM SERPL-MCNC: 9.5 MG/DL (ref 8.7–10.3)
CHLORIDE SERPL-SCNC: 105 MMOL/L (ref 96–106)
CHOLEST SERPL-MCNC: 137 MG/DL (ref 100–199)
CO2 SERPL-SCNC: 24 MMOL/L (ref 20–29)
CREAT SERPL-MCNC: 0.83 MG/DL (ref 0.57–1)
EGFRCR SERPLBLD CKD-EPI 2021: 76 ML/MIN/1.73
EOSINOPHIL # BLD AUTO: 0.4 X10E3/UL (ref 0–0.4)
EOSINOPHIL NFR BLD AUTO: 6 %
ERYTHROCYTE [DISTWIDTH] IN BLOOD BY AUTOMATED COUNT: 14.7 % (ref 11.7–15.4)
GLOBULIN SER CALC-MCNC: 2.2 G/DL (ref 1.5–4.5)
GLUCOSE SERPL-MCNC: 81 MG/DL (ref 70–99)
HBA1C MFR BLD: 6 % (ref 4.8–5.6)
HCT VFR BLD AUTO: 45.6 % (ref 34–46.6)
HDLC SERPL-MCNC: 47 MG/DL
HGB BLD-MCNC: 13.8 G/DL (ref 11.1–15.9)
IMM GRANULOCYTES # BLD AUTO: 0 X10E3/UL (ref 0–0.1)
IMM GRANULOCYTES NFR BLD AUTO: 0 %
LDLC SERPL CALC-MCNC: 54 MG/DL (ref 0–99)
LYMPHOCYTES # BLD AUTO: 3.2 X10E3/UL (ref 0.7–3.1)
LYMPHOCYTES NFR BLD AUTO: 54 %
MCH RBC QN AUTO: 28.8 PG (ref 26.6–33)
MCHC RBC AUTO-ENTMCNC: 30.3 G/DL (ref 31.5–35.7)
MCV RBC AUTO: 95 FL (ref 79–97)
MONOCYTES # BLD AUTO: 0.4 X10E3/UL (ref 0.1–0.9)
MONOCYTES NFR BLD AUTO: 7 %
NEUTROPHILS # BLD AUTO: 1.9 X10E3/UL (ref 1.4–7)
NEUTROPHILS NFR BLD AUTO: 32 %
PLATELET # BLD AUTO: 242 X10E3/UL (ref 150–450)
POTASSIUM SERPL-SCNC: 4.7 MMOL/L (ref 3.5–5.2)
PROT SERPL-MCNC: 6.5 G/DL (ref 6–8.5)
RBC # BLD AUTO: 4.8 X10E6/UL (ref 3.77–5.28)
SODIUM SERPL-SCNC: 142 MMOL/L (ref 134–144)
T3FREE SERPL-MCNC: 2.3 PG/ML (ref 2–4.4)
T4 FREE SERPL-MCNC: 1.12 NG/DL (ref 0.82–1.77)
TRIGL SERPL-MCNC: 225 MG/DL (ref 0–149)
TSH SERPL DL<=0.005 MIU/L-ACNC: 7.52 UIU/ML (ref 0.45–4.5)
VIT B12 SERPL-MCNC: 502 PG/ML (ref 232–1245)
VLDLC SERPL CALC-MCNC: 36 MG/DL (ref 5–40)
WBC # BLD AUTO: 6 X10E3/UL (ref 3.4–10.8)

## 2025-02-04 NOTE — PROGRESS NOTES
The patient has a pain history of the following:  Chronic pain syndrome  Fibromyalgia     Previous interventions that the patient has received include:   Unknown     Pain medications include:  Duloxetine 60mg   Pregabalin 150mg BID     Previously: Hydrocodone-acetaminohpen 7.5-325mg    Other conservative modalities which the patient reports using include:  Physical Therapy: yes  Chiropractor: no  Massage Therapy: no  TENS: yes  Neck or back surgery: no  Past pain management: no    Past Significant Surgical History:  Bilateral total knee replacement  Right shoulder surgery     HPI:       CHIEF COMPLAINT: Fibromyalgia    Sonya Marcus is a 69 y.o. female referred here by CHANELLE Mosley. Sonya Marcus presents to the office for evaluation and treatment of Fibromyalgia      History of Present Illness  Onset:  20 years   Inciting Event:  None   Location:  Upper arms and thighs  Pain: Pain described as overall weakness, achy like the flu.   Severity:  Pain rated as a 4 /10.  Symptoms have been constant.  Exacerbation:  Stress.   Alleviation:  Lyrica helps a lot, Cymbalta   Ambulates: Without assistive device.     She had right knee surgery August 22 last year - right knee total arthroplasty.  Then shortly after, she had a fall and fractured her tibia below her hardware.  She's been struggling getting off the hydrocodone since then.  She continues to have right knee pain.      She states that she was addicted to opioids for 10 years.  She last used ~2013 and was in rehab.  She was prescribed opioids after her knee surgery last year and wants to be tapered off of it. She states that she feels that she's ready to discontinue it.  She mentions possibility of being addicted but denies need for treatment for substance abuse.  She states that she continued to take it because no one prescribed her a weaning dose.     She feels that her fibromyalgia is well managed with her current regimen.  During our discussion  about her fibromyalgia, she agreed that she was on Cymbalta 60 mg twice daily and Lyrica 150 mg twice daily.  Upon review of her Reid, I saw a different prescriber for her last refill of Lyrica.  This prescription was Lyrica 300 mg twice daily dosing.  She states that her Lyrica prescription mishap is why she is being seen in our clinic today.  Her original prescriber for this felt that she was requesting to have a dose of this medication.       Opioid Risk Tool:       Opioid Risk Tool:  Family history of substance abuse: Alcohol - No  Illegal drug - No  Prescription drugs - No   Personal history of substance abuse: Alcohol - No  Illegal drugs - No  Prescription drugs - Yes   Patient is between 16 and 45 years of age: No   History of preadolescent sexual abuse: No   Psychological disease: ADD/OCD/Bipolar/Schizophrenia - No  Depression - Yes           Opioid Risk: 6       Scorin - 3 = Low Risk    4 - 7 = Moderate Risk    8+ = High Risk           PEG Assessment   What number best describes your pain on average in the past week?7  What number best describes how, during the past week, pain has interfered with your enjoyment of life?9  What number best describes how, during the past week, pain has interfered with your general activity?  8        Current Outpatient Medications:     ALPRAZolam (XANAX) 0.5 MG tablet, Take 1 tablet by mouth 3 (Three) Times a Day As Needed for Anxiety., Disp: , Rfl:     aspirin 81 MG EC tablet, Take 1 tablet by mouth Daily., Disp: , Rfl:     buPROPion XL (WELLBUTRIN XL) 150 MG 24 hr tablet, Take 2 tablets by mouth Every Night., Disp: , Rfl:     cetirizine (zyrTEC) 5 MG tablet, Take 1 tablet by mouth Daily., Disp: , Rfl:     cyanocobalamin 1000 MCG/ML injection, Inject 1 mL under the skin into the appropriate area as directed Every 30 (Thirty) Days., Disp: , Rfl:     docusate sodium (Colace) 100 MG capsule, Take 1 capsule by mouth Daily., Disp: 90 capsule, Rfl: 3    DULoxetine  (CYMBALTA) 60 MG capsule, Take 1 capsule by mouth 2 (Two) Times a Day., Disp: , Rfl:     levothyroxine (SYNTHROID, LEVOTHROID) 88 MCG tablet, Take 1 tablet by mouth Every Morning., Disp: 90 tablet, Rfl: 1    metoprolol succinate XL (TOPROL-XL) 50 MG 24 hr tablet, Take 1 tablet by mouth Daily., Disp: , Rfl:     naloxone (NARCAN) 4 MG/0.1ML nasal spray, Call 911. Don't prime. [place] in 1 nostril for overdose. Repeat in 2-3 minutes in other nostril if no or minimal breathing/responsiveness., Disp: 1 each, Rfl: 0    nitrofurantoin (MACRODANTIN) 50 MG capsule, Take 1 capsule by mouth Every Night., Disp: 30 capsule, Rfl: 11    omeprazole (priLOSEC) 20 MG capsule, Take 1 capsule by mouth Daily., Disp: 90 capsule, Rfl: 1    ondansetron (Zofran) 4 MG tablet, Take 1 tablet by mouth Every 6 (Six) Hours As Needed for Nausea or Vomiting., Disp: 30 tablet, Rfl: 0    pregabalin (Lyrica) 150 MG capsule, Take 1 capsule by mouth 3 (Three) Times a Day., Disp: 90 capsule, Rfl: 0    rosuvastatin (CRESTOR) 10 MG tablet, Take 1 tablet by mouth Every Night., Disp: 90 tablet, Rfl: 1    traZODone (DESYREL) 100 MG tablet, Take 1 tablet by mouth every night at bedtime. (Patient taking differently: Take 2 tablets by mouth every night at bedtime.), Disp: 30 tablet, Rfl: 3    HYDROcodone-acetaminophen (NORCO) 5-325 MG per tablet, Take 1 tab PO 3x/day for 1 week.  Then take 1 tab PO 2x/day for 1 week.  Then take 0.5 tab 2x/day for 1 week.  Then take 0.5 tab daily for 1 week.  Then discontinue, Disp: 46 tablet, Rfl: 0    The following portions of the patient's history were reviewed and updated as appropriate: allergies, current medications, past family history, past medical history, past social history, past surgical history, and problem list.      REVIEW OF PERTINENT MEDICAL DATA    10/10/24   Narrative & Impression   XR KNEE 1 OR 2 VW RIGHT     Date of Exam: 10/10/2024 3:25 PM EDT     Indication: Right knee pain, previous knee arthroplasty.    "  Comparison: Right knee x-ray performed on 8/22/2024 at [facility].     Findings:  The patient is status post a total right knee arthroplasty. Alignment of the prosthesis is anatomic. There is no periprosthetic fracture or loosening. There are defects in the proximal right tibial shaft from previous hardware placement and removal.   There is no knee joint effusion.     IMPRESSION:  Impression:  1. Status post a total right knee arthroplasty.  2. No acute findings.        Electronically Signed: Dejuan Hernandez MD    10/11/2024 5:53 PM EDT    Workstation ID: [id number]       2/3/25 Creatinine 0.83,  Platelets 242 (10*3)    Review of Systems   Gastrointestinal:  Positive for constipation. Negative for diarrhea.   Genitourinary:  Positive for difficulty urinating.   Musculoskeletal:  Positive for arthralgias (right knee).   Neurological:  Positive for weakness. Negative for numbness.   Psychiatric/Behavioral:  Positive for sleep disturbance. Negative for suicidal ideas. The patient is not nervous/anxious.      I have reviewed and confirmed the accuracy of the ROS as documented by the MA/LPN/RN Ele Mc MD      Vitals:    02/06/25 1013   BP: 112/75   Pulse: 54   Resp: 18   Temp: 98.2 °F (36.8 °C)   SpO2: 97%   Weight: 79.5 kg (175 lb 3.2 oz)   Height: 154.9 cm (60.98\")   PainSc:   4   PainLoc: Leg         Objective   Physical Exam  Constitutional:       General: She is not in acute distress.  Pulmonary:      Effort: Pulmonary effort is normal. No respiratory distress.   Neurological:      Mental Status: She is alert.   Psychiatric:         Mood and Affect: Mood normal.         Thought Content: Thought content normal.         Assessment & Plan   Diagnoses and all orders for this visit:    1. Chronic pain syndrome (Primary)  -     Urine Drug Screen Confirmation - Urine, Clean Catch; Future  -     POC Urine Drug Screen, Triage    2. Fibromyalgia  -     pregabalin (Lyrica) 150 MG capsule; Take 1 capsule by " mouth 3 (Three) Times a Day.  Dispense: 90 capsule; Refill: 0    3. Chronic knee pain after total replacement of right knee joint  -     HYDROcodone-acetaminophen (NORCO) 5-325 MG per tablet; Take 1 tab PO 3x/day for 1 week.  Then take 1 tab PO 2x/day for 1 week.  Then take 0.5 tab 2x/day for 1 week.  Then take 0.5 tab daily for 1 week.  Then discontinue  Dispense: 46 tablet; Refill: 0        - Baseline urine drug screen was obtained.  Routine UDS in office today as part of monitoring requirements for controlled substances.  The specimen was viewed and the immunoassay result reviewed and is positive for opi and benzo.  This specimen will be sent to [facility] laboratory for confirmation.      - Pertinent labs reviewed.   - Pertinent imaging reviewed.   - Opioid risk assessment performed today showing moderate risk.   - Controlled substance agreement signed today for hydrocodone for knee pain. I agreed to prescribe Hydrocodone-acetaminophen 5-325mg with a taper schedule for this month.  TID x1wk, BID x1wk, 0.5 tab BID x1 wk, 0.5 tab x1wk, then discontinue.  This taper is non-negotiable.  She may seek a second opinion if needed.  Explained that she will need to see someone for treatment of substance abuse if she feels addiction is the current issue.   - She has Narcan at home and is aware of how to use it and her family knows where it is and how to use it.   -Explained that since she had a dose increase at her last refill of Lyrica, we will step that down to a more reasonable prescription of 150 mg 3 times daily.  -Discussed that she is on high risk medications with an opioid, Lyrica, and benzodiazepine.  She understands this and our goal is to stop her opioid within the next month.  -If needed, I can take over her Cymbalta prescription.  Since she is taking this also for anxiety and depression, I will leave that in the hands of her psychiatrist at this time.  -May be a candidate for low-dose naltrexone to help with  her fibromyalgia.  Discussed this with her today.  -May be a candidate for genicular nerve block and radiofrequency ablation if her knee pain continues to be an issue.  - Sonya Marcus reports a pain score of 4.  Given her pain assessment as noted, treatment options were discussed and the following options were decided upon as a follow-up plan to address the patient's pain: prescription for non-opiod analgesics and prescription for opiod analgesics.  - Will consider low dose naltrexone in the future to help with fibromyalgia.   - Patient screened positive for depression based on a PHQ-9 score of  on . Follow-up recommendations include:  seeing a psychiatrist .  - Sonya Marcus  reports that she has never smoked. She has never been exposed to tobacco smoke. She has never used smokeless tobacco.    --- Follow-up 1 month           SALVATORE REPORT    As part of the patient's treatment plan, I am prescribing controlled substances. The patient has been made aware of appropriate use of such medications, including potential risk of somnolence, limited ability to drive and/or work safely, and the potential for dependence or overdose. It has also bee made clear that these medications are for use by this patient only, without concomitant use of alcohol or other substances unless prescribed.     Patient has completed prescribing agreement detailing terms of continued prescribing of controlled substances, including monitoring SALVATORE reports, urine drug screening, and pill counts if necessary. The patient is aware that inappropriate use will results in cessation of prescribing such medications.    As the clinician, I personally reviewed the SALVATORE from 2/6/25 while the patient was in the office today.    History and physical exam exhibit continued safe and appropriate use of controlled substances.        Ele Mc MD  Pain Management    EMR Dragon/Transcription disclaimer:   Much of this encounter note is an  electronic transcription/translation of spoken language to printed text. The electronic translation of spoken language may permit erroneous, or at times, nonsensical words or phrases to be inadvertently transcribed; Although I have reviewed the note for such errors, some may still exist.

## 2025-02-06 ENCOUNTER — PATIENT ROUNDING (BHMG ONLY) (OUTPATIENT)
Dept: FAMILY MEDICINE CLINIC | Facility: CLINIC | Age: 70
End: 2025-02-06
Payer: MEDICARE

## 2025-02-06 ENCOUNTER — OFFICE VISIT (OUTPATIENT)
Dept: PAIN MEDICINE | Facility: CLINIC | Age: 70
End: 2025-02-06
Payer: MEDICARE

## 2025-02-06 VITALS
OXYGEN SATURATION: 97 % | RESPIRATION RATE: 18 BRPM | TEMPERATURE: 98.2 F | SYSTOLIC BLOOD PRESSURE: 112 MMHG | DIASTOLIC BLOOD PRESSURE: 75 MMHG | HEART RATE: 54 BPM | HEIGHT: 61 IN | BODY MASS INDEX: 33.08 KG/M2 | WEIGHT: 175.2 LBS

## 2025-02-06 DIAGNOSIS — Z96.651 CHRONIC KNEE PAIN AFTER TOTAL REPLACEMENT OF RIGHT KNEE JOINT: ICD-10-CM

## 2025-02-06 DIAGNOSIS — G89.29 CHRONIC KNEE PAIN AFTER TOTAL REPLACEMENT OF RIGHT KNEE JOINT: ICD-10-CM

## 2025-02-06 DIAGNOSIS — M79.7 FIBROMYALGIA: ICD-10-CM

## 2025-02-06 DIAGNOSIS — G89.4 CHRONIC PAIN SYNDROME: Primary | ICD-10-CM

## 2025-02-06 DIAGNOSIS — M25.561 CHRONIC KNEE PAIN AFTER TOTAL REPLACEMENT OF RIGHT KNEE JOINT: ICD-10-CM

## 2025-02-06 LAB
POC AMPHETAMINES: NEGATIVE
POC BARBITURATES: NEGATIVE
POC BENZODIAZEPHINES: POSITIVE
POC COCAINE: NEGATIVE
POC METHADONE: NEGATIVE
POC METHAMPHETAMINE SCREEN URINE: NEGATIVE
POC OPIATES: POSITIVE
POC OXYCODONE: NEGATIVE
POC PHENCYCLIDINE: NEGATIVE
POC PROPOXYPHENE: NEGATIVE
POC THC: NEGATIVE

## 2025-02-06 RX ORDER — PREGABALIN 150 MG/1
150 CAPSULE ORAL 3 TIMES DAILY
Qty: 90 CAPSULE | Refills: 0 | Status: SHIPPED | OUTPATIENT
Start: 2025-02-06

## 2025-02-06 RX ORDER — HYDROCODONE BITARTRATE AND ACETAMINOPHEN 5; 325 MG/1; MG/1
TABLET ORAL
Qty: 46 TABLET | Refills: 0 | Status: SHIPPED | OUTPATIENT
Start: 2025-02-06

## 2025-02-06 RX ORDER — ALPRAZOLAM 0.5 MG
0.5 TABLET ORAL 3 TIMES DAILY PRN
COMMUNITY
Start: 2025-02-05

## 2025-02-06 NOTE — PROGRESS NOTES
A My-Chart message has been sent to the patient for PATIENT ROUNDING with [facility]

## 2025-02-07 ENCOUNTER — TELEPHONE (OUTPATIENT)
Dept: PAIN MEDICINE | Facility: CLINIC | Age: 70
End: 2025-02-07

## 2025-02-07 NOTE — TELEPHONE ENCOUNTER
Caller: MIKE   Relationship to Patient: SELF  Phone Number: [phone]  Reason for Call: STATES SHE MISSED A PHONE CALL, UNSURE WHO IT WAS

## 2025-02-24 ENCOUNTER — APPOINTMENT (OUTPATIENT)
Dept: GENERAL RADIOLOGY | Facility: HOSPITAL | Age: 70
End: 2025-02-24
Payer: MEDICARE

## 2025-02-24 ENCOUNTER — HOSPITAL ENCOUNTER (OUTPATIENT)
Facility: HOSPITAL | Age: 70
Setting detail: OBSERVATION
Discharge: HOME | End: 2025-02-26
Attending: EMERGENCY MEDICINE | Admitting: HOSPITALIST
Payer: MEDICARE

## 2025-02-24 DIAGNOSIS — R53.1 GENERALIZED WEAKNESS: Primary | ICD-10-CM

## 2025-02-24 DIAGNOSIS — R19.7 DIARRHEA, UNSPECIFIED TYPE: ICD-10-CM

## 2025-02-24 DIAGNOSIS — E86.0 DEHYDRATION: ICD-10-CM

## 2025-02-24 DIAGNOSIS — R11.0 NAUSEA: ICD-10-CM

## 2025-02-24 PROBLEM — F11.20 OPIOID DEPENDENCE: Status: ACTIVE | Noted: 2025-02-24

## 2025-02-24 LAB
ALBUMIN SERPL-MCNC: 3.9 G/DL (ref 3.5–5.2)
ALBUMIN/GLOB SERPL: 1.7 G/DL
ALP SERPL-CCNC: 96 U/L (ref 39–117)
ALT SERPL W P-5'-P-CCNC: 8 U/L (ref 1–33)
ANION GAP SERPL CALCULATED.3IONS-SCNC: 9.4 MMOL/L (ref 5–15)
AST SERPL-CCNC: 12 U/L (ref 1–32)
B PARAPERT DNA SPEC QL NAA+PROBE: NOT DETECTED
B PERT DNA SPEC QL NAA+PROBE: NOT DETECTED
BACTERIA UR QL AUTO: ABNORMAL /HPF
BASOPHILS # BLD AUTO: 0.05 10*3/MM3 (ref 0–0.2)
BASOPHILS NFR BLD AUTO: 1.2 % (ref 0–1.5)
BILIRUB SERPL-MCNC: 0.5 MG/DL (ref 0–1.2)
BILIRUB UR QL STRIP: NEGATIVE
BUN SERPL-MCNC: 15 MG/DL (ref 8–23)
BUN/CREAT SERPL: 17.6 (ref 7–25)
C PNEUM DNA NPH QL NAA+NON-PROBE: NOT DETECTED
CALCIUM SPEC-SCNC: 9.6 MG/DL (ref 8.6–10.5)
CHLORIDE SERPL-SCNC: 106 MMOL/L (ref 98–107)
CLARITY UR: CLEAR
CO2 SERPL-SCNC: 22.6 MMOL/L (ref 22–29)
COLOR UR: ABNORMAL
CREAT SERPL-MCNC: 0.85 MG/DL (ref 0.57–1)
DEPRECATED RDW RBC AUTO: 51 FL (ref 37–54)
EGFRCR SERPLBLD CKD-EPI 2021: 73.8 ML/MIN/1.73
EOSINOPHIL # BLD AUTO: 0.06 10*3/MM3 (ref 0–0.4)
EOSINOPHIL NFR BLD AUTO: 1.4 % (ref 0.3–6.2)
ERYTHROCYTE [DISTWIDTH] IN BLOOD BY AUTOMATED COUNT: 14.7 % (ref 12.3–15.4)
FLUAV SUBTYP SPEC NAA+PROBE: NOT DETECTED
FLUBV RNA ISLT QL NAA+PROBE: NOT DETECTED
GEN 5 1HR TROPONIN T REFLEX: 8 NG/L
GLOBULIN UR ELPH-MCNC: 2.3 GM/DL
GLUCOSE SERPL-MCNC: 131 MG/DL (ref 65–99)
GLUCOSE UR STRIP-MCNC: NEGATIVE MG/DL
HADV DNA SPEC NAA+PROBE: NOT DETECTED
HCOV 229E RNA SPEC QL NAA+PROBE: NOT DETECTED
HCOV HKU1 RNA SPEC QL NAA+PROBE: NOT DETECTED
HCOV NL63 RNA SPEC QL NAA+PROBE: NOT DETECTED
HCOV OC43 RNA SPEC QL NAA+PROBE: NOT DETECTED
HCT VFR BLD AUTO: 43.2 % (ref 34–46.6)
HGB BLD-MCNC: 13.3 G/DL (ref 12–15.9)
HGB UR QL STRIP.AUTO: NEGATIVE
HMPV RNA NPH QL NAA+NON-PROBE: NOT DETECTED
HOLD SPECIMEN: NORMAL
HOLD SPECIMEN: NORMAL
HPIV1 RNA ISLT QL NAA+PROBE: NOT DETECTED
HPIV2 RNA SPEC QL NAA+PROBE: NOT DETECTED
HPIV3 RNA NPH QL NAA+PROBE: NOT DETECTED
HPIV4 P GENE NPH QL NAA+PROBE: NOT DETECTED
HYALINE CASTS UR QL AUTO: ABNORMAL /LPF
IMM GRANULOCYTES # BLD AUTO: 0.01 10*3/MM3 (ref 0–0.05)
IMM GRANULOCYTES NFR BLD AUTO: 0.2 % (ref 0–0.5)
KETONES UR QL STRIP: ABNORMAL
LEUKOCYTE ESTERASE UR QL STRIP.AUTO: ABNORMAL
LYMPHOCYTES # BLD AUTO: 1.27 10*3/MM3 (ref 0.7–3.1)
LYMPHOCYTES NFR BLD AUTO: 30.5 % (ref 19.6–45.3)
M PNEUMO IGG SER IA-ACNC: NOT DETECTED
MAGNESIUM SERPL-MCNC: 1.8 MG/DL (ref 1.6–2.4)
MCH RBC QN AUTO: 28.5 PG (ref 26.6–33)
MCHC RBC AUTO-ENTMCNC: 30.8 G/DL (ref 31.5–35.7)
MCV RBC AUTO: 92.7 FL (ref 79–97)
MONOCYTES # BLD AUTO: 0.24 10*3/MM3 (ref 0.1–0.9)
MONOCYTES NFR BLD AUTO: 5.8 % (ref 5–12)
NEUTROPHILS NFR BLD AUTO: 2.53 10*3/MM3 (ref 1.7–7)
NEUTROPHILS NFR BLD AUTO: 60.9 % (ref 42.7–76)
NITRITE UR QL STRIP: NEGATIVE
NRBC BLD AUTO-RTO: 0 /100 WBC (ref 0–0.2)
PH UR STRIP.AUTO: 6 [PH] (ref 5–8)
PLATELET # BLD AUTO: 195 10*3/MM3 (ref 140–450)
PMV BLD AUTO: 10.9 FL (ref 6–12)
POTASSIUM SERPL-SCNC: 3.9 MMOL/L (ref 3.5–5.2)
PROT SERPL-MCNC: 6.2 G/DL (ref 6–8.5)
PROT UR QL STRIP: ABNORMAL
QT INTERVAL: 449 MS
QT INTERVAL: 527 MS
QTC INTERVAL: 464 MS
QTC INTERVAL: 552 MS
RBC # BLD AUTO: 4.66 10*6/MM3 (ref 3.77–5.28)
RBC # UR STRIP: ABNORMAL /HPF
REF LAB TEST METHOD: ABNORMAL
RHINOVIRUS RNA SPEC NAA+PROBE: NOT DETECTED
RSV RNA NPH QL NAA+NON-PROBE: NOT DETECTED
SARS-COV-2 RNA NPH QL NAA+NON-PROBE: NOT DETECTED
SODIUM SERPL-SCNC: 138 MMOL/L (ref 136–145)
SP GR UR STRIP: >1.03 (ref 1–1.03)
SQUAMOUS #/AREA URNS HPF: ABNORMAL /HPF
TROPONIN T NUMERIC DELTA: 0 NG/L
TROPONIN T SERPL HS-MCNC: 8 NG/L
UROBILINOGEN UR QL STRIP: ABNORMAL
WBC # UR STRIP: ABNORMAL /HPF
WBC NRBC COR # BLD AUTO: 4.16 10*3/MM3 (ref 3.4–10.8)
WHOLE BLOOD HOLD COAG: NORMAL
WHOLE BLOOD HOLD SPECIMEN: NORMAL

## 2025-02-24 PROCEDURE — 84484 ASSAY OF TROPONIN QUANT: CPT | Performed by: EMERGENCY MEDICINE

## 2025-02-24 PROCEDURE — 80053 COMPREHEN METABOLIC PANEL: CPT

## 2025-02-24 PROCEDURE — 96374 THER/PROPH/DIAG INJ IV PUSH: CPT

## 2025-02-24 PROCEDURE — 85025 COMPLETE CBC W/AUTO DIFF WBC: CPT

## 2025-02-24 PROCEDURE — 36415 COLL VENOUS BLD VENIPUNCTURE: CPT

## 2025-02-24 PROCEDURE — 81001 URINALYSIS AUTO W/SCOPE: CPT | Performed by: EMERGENCY MEDICINE

## 2025-02-24 PROCEDURE — 25810000003 SODIUM CHLORIDE 0.9 % SOLUTION: Performed by: EMERGENCY MEDICINE

## 2025-02-24 PROCEDURE — G0378 HOSPITAL OBSERVATION PER HR: HCPCS

## 2025-02-24 PROCEDURE — 71045 X-RAY EXAM CHEST 1 VIEW: CPT

## 2025-02-24 PROCEDURE — 93005 ELECTROCARDIOGRAM TRACING: CPT | Performed by: EMERGENCY MEDICINE

## 2025-02-24 PROCEDURE — 99285 EMERGENCY DEPT VISIT HI MDM: CPT

## 2025-02-24 PROCEDURE — 0202U NFCT DS 22 TRGT SARS-COV-2: CPT

## 2025-02-24 PROCEDURE — 96361 HYDRATE IV INFUSION ADD-ON: CPT

## 2025-02-24 PROCEDURE — 84484 ASSAY OF TROPONIN QUANT: CPT

## 2025-02-24 PROCEDURE — 25810000003 SODIUM CHLORIDE 0.9 % SOLUTION: Performed by: HOSPITALIST

## 2025-02-24 PROCEDURE — 25010000002 KETOROLAC TROMETHAMINE PER 15 MG: Performed by: EMERGENCY MEDICINE

## 2025-02-24 PROCEDURE — 96375 TX/PRO/DX INJ NEW DRUG ADDON: CPT

## 2025-02-24 PROCEDURE — 25010000002 ENOXAPARIN PER 10 MG: Performed by: HOSPITALIST

## 2025-02-24 PROCEDURE — 93010 ELECTROCARDIOGRAM REPORT: CPT | Performed by: INTERNAL MEDICINE

## 2025-02-24 PROCEDURE — 25010000002 ONDANSETRON PER 1 MG: Performed by: EMERGENCY MEDICINE

## 2025-02-24 PROCEDURE — 96376 TX/PRO/DX INJ SAME DRUG ADON: CPT

## 2025-02-24 PROCEDURE — 96372 THER/PROPH/DIAG INJ SC/IM: CPT

## 2025-02-24 PROCEDURE — 83735 ASSAY OF MAGNESIUM: CPT

## 2025-02-24 PROCEDURE — 93005 ELECTROCARDIOGRAM TRACING: CPT

## 2025-02-24 RX ORDER — METOPROLOL SUCCINATE 50 MG/1
50 TABLET, EXTENDED RELEASE ORAL DAILY
Status: DISCONTINUED | OUTPATIENT
Start: 2025-02-24 | End: 2025-02-26 | Stop reason: HOSPADM

## 2025-02-24 RX ORDER — ONDANSETRON 4 MG/1
4 TABLET, ORALLY DISINTEGRATING ORAL EVERY 6 HOURS PRN
Status: DISCONTINUED | OUTPATIENT
Start: 2025-02-24 | End: 2025-02-24

## 2025-02-24 RX ORDER — PANTOPRAZOLE SODIUM 40 MG/1
40 TABLET, DELAYED RELEASE ORAL
Status: DISCONTINUED | OUTPATIENT
Start: 2025-02-25 | End: 2025-02-26 | Stop reason: HOSPADM

## 2025-02-24 RX ORDER — BISACODYL 5 MG/1
5 TABLET, DELAYED RELEASE ORAL DAILY PRN
Status: DISCONTINUED | OUTPATIENT
Start: 2025-02-24 | End: 2025-02-26 | Stop reason: HOSPADM

## 2025-02-24 RX ORDER — HYDROCODONE BITARTRATE AND ACETAMINOPHEN 7.5; 325 MG/1; MG/1
1 TABLET ORAL EVERY 6 HOURS PRN
Status: DISCONTINUED | OUTPATIENT
Start: 2025-02-24 | End: 2025-02-26 | Stop reason: HOSPADM

## 2025-02-24 RX ORDER — SODIUM CHLORIDE 0.9 % (FLUSH) 0.9 %
10 SYRINGE (ML) INJECTION AS NEEDED
Status: DISCONTINUED | OUTPATIENT
Start: 2025-02-24 | End: 2025-02-26 | Stop reason: HOSPADM

## 2025-02-24 RX ORDER — HYDROCODONE BITARTRATE AND ACETAMINOPHEN 7.5; 325 MG/1; MG/1
1 TABLET ORAL ONCE
Status: COMPLETED | OUTPATIENT
Start: 2025-02-24 | End: 2025-02-24

## 2025-02-24 RX ORDER — SODIUM CHLORIDE 9 MG/ML
125 INJECTION, SOLUTION INTRAVENOUS CONTINUOUS
Status: DISCONTINUED | OUTPATIENT
Start: 2025-02-24 | End: 2025-02-25

## 2025-02-24 RX ORDER — ENOXAPARIN SODIUM 100 MG/ML
40 INJECTION SUBCUTANEOUS DAILY
Status: DISCONTINUED | OUTPATIENT
Start: 2025-02-24 | End: 2025-02-26 | Stop reason: HOSPADM

## 2025-02-24 RX ORDER — AMOXICILLIN 250 MG
2 CAPSULE ORAL 2 TIMES DAILY PRN
Status: DISCONTINUED | OUTPATIENT
Start: 2025-02-24 | End: 2025-02-26 | Stop reason: HOSPADM

## 2025-02-24 RX ORDER — LEVOTHYROXINE SODIUM 88 UG/1
88 TABLET ORAL
Status: DISCONTINUED | OUTPATIENT
Start: 2025-02-25 | End: 2025-02-26 | Stop reason: HOSPADM

## 2025-02-24 RX ORDER — CETIRIZINE HYDROCHLORIDE 10 MG/1
5 TABLET ORAL DAILY
Status: DISCONTINUED | OUTPATIENT
Start: 2025-02-24 | End: 2025-02-26 | Stop reason: HOSPADM

## 2025-02-24 RX ORDER — ASPIRIN 81 MG/1
81 TABLET ORAL DAILY
Status: DISCONTINUED | OUTPATIENT
Start: 2025-02-24 | End: 2025-02-26 | Stop reason: HOSPADM

## 2025-02-24 RX ORDER — ESZOPICLONE 1 MG/1
1 TABLET, FILM COATED ORAL NIGHTLY
COMMUNITY

## 2025-02-24 RX ORDER — ALPRAZOLAM 0.5 MG
0.5 TABLET ORAL 3 TIMES DAILY PRN
Status: DISCONTINUED | OUTPATIENT
Start: 2025-02-24 | End: 2025-02-26 | Stop reason: HOSPADM

## 2025-02-24 RX ORDER — POLYETHYLENE GLYCOL 3350 17 G/17G
17 POWDER, FOR SOLUTION ORAL DAILY PRN
Status: DISCONTINUED | OUTPATIENT
Start: 2025-02-24 | End: 2025-02-26 | Stop reason: HOSPADM

## 2025-02-24 RX ORDER — ACETAMINOPHEN 500 MG
1000 TABLET ORAL ONCE
Status: COMPLETED | OUTPATIENT
Start: 2025-02-24 | End: 2025-02-24

## 2025-02-24 RX ORDER — ONDANSETRON 2 MG/ML
4 INJECTION INTRAMUSCULAR; INTRAVENOUS ONCE
Status: COMPLETED | OUTPATIENT
Start: 2025-02-24 | End: 2025-02-24

## 2025-02-24 RX ORDER — ROSUVASTATIN CALCIUM 5 MG/1
10 TABLET, COATED ORAL NIGHTLY
Status: DISCONTINUED | OUTPATIENT
Start: 2025-02-24 | End: 2025-02-26 | Stop reason: HOSPADM

## 2025-02-24 RX ORDER — NITROGLYCERIN 0.4 MG/1
0.4 TABLET SUBLINGUAL
Status: DISCONTINUED | OUTPATIENT
Start: 2025-02-24 | End: 2025-02-26 | Stop reason: HOSPADM

## 2025-02-24 RX ORDER — TRAZODONE HYDROCHLORIDE 100 MG/1
100 TABLET ORAL NIGHTLY
Status: DISCONTINUED | OUTPATIENT
Start: 2025-02-24 | End: 2025-02-26 | Stop reason: HOSPADM

## 2025-02-24 RX ORDER — ACETAMINOPHEN 325 MG/1
650 TABLET ORAL EVERY 4 HOURS PRN
Status: DISCONTINUED | OUTPATIENT
Start: 2025-02-24 | End: 2025-02-26 | Stop reason: HOSPADM

## 2025-02-24 RX ORDER — PREGABALIN 75 MG/1
150 CAPSULE ORAL 3 TIMES DAILY
Status: DISCONTINUED | OUTPATIENT
Start: 2025-02-24 | End: 2025-02-26 | Stop reason: HOSPADM

## 2025-02-24 RX ORDER — DULOXETIN HYDROCHLORIDE 60 MG/1
60 CAPSULE, DELAYED RELEASE ORAL 2 TIMES DAILY
Status: DISCONTINUED | OUTPATIENT
Start: 2025-02-24 | End: 2025-02-26 | Stop reason: HOSPADM

## 2025-02-24 RX ORDER — BISACODYL 10 MG
10 SUPPOSITORY, RECTAL RECTAL DAILY PRN
Status: DISCONTINUED | OUTPATIENT
Start: 2025-02-24 | End: 2025-02-26 | Stop reason: HOSPADM

## 2025-02-24 RX ORDER — BUPROPION HYDROCHLORIDE 150 MG/1
300 TABLET ORAL NIGHTLY
Status: DISCONTINUED | OUTPATIENT
Start: 2025-02-24 | End: 2025-02-26 | Stop reason: HOSPADM

## 2025-02-24 RX ORDER — ONDANSETRON 2 MG/ML
4 INJECTION INTRAMUSCULAR; INTRAVENOUS EVERY 6 HOURS PRN
Status: DISCONTINUED | OUTPATIENT
Start: 2025-02-24 | End: 2025-02-24

## 2025-02-24 RX ORDER — KETOROLAC TROMETHAMINE 15 MG/ML
15 INJECTION, SOLUTION INTRAMUSCULAR; INTRAVENOUS ONCE
Status: COMPLETED | OUTPATIENT
Start: 2025-02-24 | End: 2025-02-24

## 2025-02-24 RX ADMIN — SODIUM CHLORIDE 125 ML/HR: 9 INJECTION, SOLUTION INTRAVENOUS at 15:45

## 2025-02-24 RX ADMIN — DULOXETINE 60 MG: 60 CAPSULE, DELAYED RELEASE ORAL at 20:39

## 2025-02-24 RX ADMIN — SODIUM CHLORIDE 1000 ML: 9 INJECTION, SOLUTION INTRAVENOUS at 08:32

## 2025-02-24 RX ADMIN — ACETAMINOPHEN 1000 MG: 500 TABLET, FILM COATED ORAL at 08:31

## 2025-02-24 RX ADMIN — ROSUVASTATIN CALCIUM 10 MG: 5 TABLET, FILM COATED ORAL at 20:39

## 2025-02-24 RX ADMIN — HYDROCODONE BITARTRATE AND ACETAMINOPHEN 1 TABLET: 7.5; 325 TABLET ORAL at 12:36

## 2025-02-24 RX ADMIN — BUPROPION HYDROCHLORIDE 300 MG: 150 TABLET, EXTENDED RELEASE ORAL at 20:39

## 2025-02-24 RX ADMIN — ONDANSETRON 4 MG: 2 INJECTION, SOLUTION INTRAMUSCULAR; INTRAVENOUS at 12:35

## 2025-02-24 RX ADMIN — KETOROLAC TROMETHAMINE 15 MG: 15 INJECTION, SOLUTION INTRAMUSCULAR; INTRAVENOUS at 08:33

## 2025-02-24 RX ADMIN — ENOXAPARIN SODIUM 40 MG: 100 INJECTION SUBCUTANEOUS at 15:44

## 2025-02-24 RX ADMIN — PREGABALIN 150 MG: 75 CAPSULE ORAL at 20:39

## 2025-02-24 RX ADMIN — ONDANSETRON 4 MG: 2 INJECTION, SOLUTION INTRAMUSCULAR; INTRAVENOUS at 08:33

## 2025-02-24 NOTE — ED PROVIDER NOTES
EMERGENCY DEPARTMENT ENCOUNTER    Room Number:  12/12  PCP: Regina Ferro APRN ([place])  Historian: Patient      HPI:  Chief Complaint: Weakness  A complete HPI/ROS/PMH/PSH/SH/FH are unobtainable due to: None  Context: Sonya Marcus is a 69 y.o. female who presents to the ED c/o fairly sudden onset of generalized weakness that is now been present for the past 3 to 4 days.  She reports diarrhea with associated nausea along with the symptoms.  She states that the symptoms are moderate if not severe in intensity and have been worsening.  She states that she has not been able to eat or drink anything now for the last several days.  She does have a sick contact with several family members having the flu very recently.  She denies chest pain, shortness of breath, abdominal pain, back pain, or dysuria/hematuria.            PAST MEDICAL HISTORY  Active Ambulatory Problems     Diagnosis Date Noted    Altered mental status 09/04/2023    Polypharmacy 09/04/2023    Positive urine drug screen 09/04/2023    MICHAEL (acute kidney injury) 09/04/2023    Left ureteral stone 09/06/2023    Functional tremor 05/07/2024    Knee joint replacement status 08/22/2024    Fibromyalgia, primary 10/16/2023    Coronary arteriosclerosis 02/01/2024    Hypothyroidism 10/23/2017    Genitourinary syndrome of menopause 10/23/2023    Hyperlipidemia 10/23/2017    Osteoarthritis of right knee 06/25/2024    Pernicious anemia 10/23/2017    Urinary tract infection in female 09/03/2024    Absolute anemia 09/16/2024    Gastroesophageal reflux disease 10/23/2017    Angina pectoris 02/01/2024    Anxiety disorder 10/23/2017    Arthritis or polyarthritis, rheumatoid 09/16/2024    Avitaminosis D 09/16/2024    B12 deficiency 09/16/2024    Bilateral primary osteoarthritis of knee 02/01/2024    BP (high blood pressure) 09/16/2024    Cardiac murmur 09/16/2024    Stage 2 chronic kidney disease 02/01/2024    Aftercare for healing traumatic closed fracture of right  lower extremity 11/17/2024    Delayed union of tibial shaft fracture 12/07/2024     Resolved Ambulatory Problems     Diagnosis Date Noted    UTI (urinary tract infection) 09/04/2023    Bacteremia due to Escherichia coli 09/05/2023    Arthralgia of right knee 09/10/2024    Encephalopathy, toxic 11/15/2024     Past Medical History:   Diagnosis Date    Anxiety and depression     Arthritis     Carotid artery occlusion 2021    Chronic urinary tract infection     CKD (chronic kidney disease)     Coronary artery disease 2020    Disease of thyroid gland     Fractures 09/20/2024    GERD (gastroesophageal reflux disease)     History of kidney stones     History of pulmonary embolism     HL (hearing loss) 2024    Hypertension     Left hip pain     Memory loss     MVP (mitral valve prolapse)     Prediabetes     Right knee pain     Right shoulder pain     Sleep apnea     Substance abuse 2010         PAST SURGICAL HISTORY  Past Surgical History:   Procedure Laterality Date    APPENDECTOMY      BREAST BIOPSY      COLONOSCOPY  01/01/2022    CYSTOSCOPY W/ URETERAL STENT PLACEMENT Left 09/05/2023    Procedure: LEFT CYSTOSCOPY URETERAL CATHETER/STENT INSERTION;  Surgeon: Quinton Rosa MD;  Location: [facility] OR;  Service: Urology;  Laterality: Left;    EYE SURGERY      HYSTERECTOMY      REPLACEMENT TOTAL KNEE Left 2021    SHOULDER SURGERY Right     TONSILLECTOMY      TOTAL KNEE ARTHROPLASTY Right 08/22/2024    Procedure: TOTAL KNEE ARTHROPLASTY WITH CORI ROBOT;  Surgeon: Steven Sumner II, MD;  Location: [facility] [place] [facility] [place];  Service: Robotics - Ortho;  Laterality: Right;         FAMILY HISTORY  Family History   Problem Relation Age of Onset    Stroke Sister     Breast cancer Daughter     Breast cancer Daughter     Stroke Maternal Grandmother     Malig Hyperthermia Neg Hx          SOCIAL HISTORY  Social History     Socioeconomic History    Marital status:    Tobacco Use    Smoking status: Never     Passive  exposure: Never    Smokeless tobacco: Never   Vaping Use    Vaping status: Never Used   Substance and Sexual Activity    Alcohol use: Yes     Comment: OCCASIONALLY    Drug use: Not Currently     Types: Hydrocodone, Oxycodone     Comment: 2010    Sexual activity: Not Currently     Birth control/protection: Post-menopausal     Comment: Post Menopausal with no immediate partner         ALLERGIES  Fish allergy; Iodine; Penicillins; Prochlorperazine; Shellfish allergy; Shellfish-derived products; Sulfate; Glycerol, iodinated; Octacosanol; Latex; Levofloxacin; and Sulfa antibiotics        REVIEW OF SYSTEMS  Review of Systems   Constitutional:  Negative for fever.   HENT:  Negative for sore throat.    Eyes: Negative.    Respiratory:  Negative for cough and shortness of breath.    Cardiovascular:  Negative for chest pain.   Gastrointestinal:  Positive for diarrhea and nausea. Negative for abdominal pain and vomiting.   Genitourinary:  Negative for dysuria.   Musculoskeletal:  Negative for neck pain.   Skin:  Negative for rash.   Allergic/Immunologic: Negative.    Neurological:  Positive for weakness. Negative for numbness and headaches.   Hematological: Negative.    Psychiatric/Behavioral: Negative.     All other systems reviewed and are negative.         PHYSICAL EXAM  ED Triage Vitals [02/24/25 0613]   Temp Heart Rate Resp BP SpO2   97.7 °F (36.5 °C) 65 16 135/63 96 %      Temp src Heart Rate Source Patient Position BP Location FiO2 (%)   Tympanic -- -- -- --       Physical Exam  Constitutional:       General: She is not in acute distress.     Appearance: Normal appearance. She is not ill-appearing or toxic-appearing.   HENT:      Head: Normocephalic and atraumatic.   Eyes:      Extraocular Movements: Extraocular movements intact.      Pupils: Pupils are equal, round, and reactive to light.   Cardiovascular:      Rate and Rhythm: Normal rate and regular rhythm.      Heart sounds: No murmur heard.     No friction rub. No  gallop.   Pulmonary:      Effort: Pulmonary effort is normal.      Breath sounds: Normal breath sounds.   Abdominal:      General: Abdomen is flat. There is no distension.      Palpations: Abdomen is soft.      Tenderness: There is no abdominal tenderness.   Musculoskeletal:         General: No swelling or tenderness. Normal range of motion.      Cervical back: Normal range of motion and neck supple.   Skin:     General: Skin is warm and dry.   Neurological:      General: No focal deficit present.      Mental Status: She is alert and oriented to person, place, and time.      Sensory: No sensory deficit.      Motor: No weakness.   Psychiatric:         Mood and Affect: Mood normal.         Behavior: Behavior normal.         Vital signs and nursing notes reviewed.          LAB RESULTS  Recent Results (from the past 24 hours)   ECG 12 Lead ED Triage Standing Order; Weak / Dizzy / AMS    Collection Time: 02/24/25  6:29 AM   Result Value Ref Range    QT Interval 449 ms    QTC Interval 464 ms   Respiratory Panel PCR w/COVID-19(SARS-CoV-2) BRITTANIE/NEW/AJAY/PAD/COR/SEBASTIAN In-House, NP Swab in UTM/VTM, 2 HR TAT - Swab, Nasopharynx    Collection Time: 02/24/25  6:31 AM    Specimen: Nasopharynx; Swab   Result Value Ref Range    ADENOVIRUS, PCR Not Detected Not Detected    Coronavirus 229E Not Detected Not Detected    Coronavirus HKU1 Not Detected Not Detected    Coronavirus NL63 Not Detected Not Detected    Coronavirus OC43 Not Detected Not Detected    COVID19 Not Detected Not Detected - Ref. Range    Human Metapneumovirus Not Detected Not Detected    Human Rhinovirus/Enterovirus Not Detected Not Detected    Influenza A PCR Not Detected Not Detected    Influenza B PCR Not Detected Not Detected    Parainfluenza Virus 1 Not Detected Not Detected    Parainfluenza Virus 2 Not Detected Not Detected    Parainfluenza Virus 3 Not Detected Not Detected    Parainfluenza Virus 4 Not Detected Not Detected    RSV, PCR Not Detected Not Detected     Bordetella pertussis pcr Not Detected Not Detected    Bordetella parapertussis PCR Not Detected Not Detected    Chlamydophila pneumoniae PCR Not Detected Not Detected    Mycoplasma pneumo by PCR Not Detected Not Detected   Comprehensive Metabolic Panel    Collection Time: 02/24/25  6:36 AM    Specimen: Blood   Result Value Ref Range    Glucose 131 (H) 65 - 99 mg/dL    BUN 15 8 - 23 mg/dL    Creatinine 0.85 0.57 - 1.00 mg/dL    Sodium 138 136 - 145 mmol/L    Potassium 3.9 3.5 - 5.2 mmol/L    Chloride 106 98 - 107 mmol/L    CO2 22.6 22.0 - 29.0 mmol/L    Calcium 9.6 8.6 - 10.5 mg/dL    Total Protein 6.2 6.0 - 8.5 g/dL    Albumin 3.9 3.5 - 5.2 g/dL    ALT (SGPT) 8 1 - 33 U/L    AST (SGOT) 12 1 - 32 U/L    Alkaline Phosphatase 96 39 - 117 U/L    Total Bilirubin 0.5 0.0 - 1.2 mg/dL    Globulin 2.3 gm/dL    A/G Ratio 1.7 g/dL    BUN/Creatinine Ratio 17.6 7.0 - 25.0    Anion Gap 9.4 5.0 - 15.0 mmol/L    eGFR 73.8 >60.0 mL/min/1.73   High Sensitivity Troponin T    Collection Time: 02/24/25  6:36 AM    Specimen: Blood   Result Value Ref Range    HS Troponin T 8 <14 ng/L   Magnesium    Collection Time: 02/24/25  6:36 AM    Specimen: Blood   Result Value Ref Range    Magnesium 1.8 1.6 - 2.4 mg/dL   Green Top (Gel)    Collection Time: 02/24/25  6:36 AM   Result Value Ref Range    Extra Tube Hold for add-ons.    Lavender Top    Collection Time: 02/24/25  6:36 AM   Result Value Ref Range    Extra Tube hold for add-on    Gold Top - SST    Collection Time: 02/24/25  6:36 AM   Result Value Ref Range    Extra Tube Hold for add-ons.    Light Blue Top    Collection Time: 02/24/25  6:36 AM   Result Value Ref Range    Extra Tube Hold for add-ons.    CBC Auto Differential    Collection Time: 02/24/25  6:36 AM    Specimen: Blood   Result Value Ref Range    WBC 4.16 3.40 - 10.80 10*3/mm3    RBC 4.66 3.77 - 5.28 10*6/mm3    Hemoglobin 13.3 12.0 - 15.9 g/dL    Hematocrit 43.2 34.0 - 46.6 %    MCV 92.7 79.0 - 97.0 fL    MCH 28.5 26.6 - 33.0 pg     MCHC 30.8 (L) 31.5 - 35.7 g/dL    RDW 14.7 12.3 - 15.4 %    RDW-SD 51.0 37.0 - 54.0 fl    MPV 10.9 6.0 - 12.0 fL    Platelets 195 140 - 450 10*3/mm3    Neutrophil % 60.9 42.7 - 76.0 %    Lymphocyte % 30.5 19.6 - 45.3 %    Monocyte % 5.8 5.0 - 12.0 %    Eosinophil % 1.4 0.3 - 6.2 %    Basophil % 1.2 0.0 - 1.5 %    Immature Grans % 0.2 0.0 - 0.5 %    Neutrophils, Absolute 2.53 1.70 - 7.00 10*3/mm3    Lymphocytes, Absolute 1.27 0.70 - 3.10 10*3/mm3    Monocytes, Absolute 0.24 0.10 - 0.90 10*3/mm3    Eosinophils, Absolute 0.06 0.00 - 0.40 10*3/mm3    Basophils, Absolute 0.05 0.00 - 0.20 10*3/mm3    Immature Grans, Absolute 0.01 0.00 - 0.05 10*3/mm3    nRBC 0.0 0.0 - 0.2 /100 WBC   High Sensitivity Troponin T 1Hr    Collection Time: 02/24/25  7:58 AM    Specimen: Blood   Result Value Ref Range    HS Troponin T 8 <14 ng/L    Troponin T Numeric Delta 0 Abnormal if >/=3 ng/L   ECG 12 Lead Chest Pain    Collection Time: 02/24/25  9:36 AM   Result Value Ref Range    QT Interval 527 ms    QTC Interval 552 ms   Urinalysis With Microscopic If Indicated (No Culture) - Urine, Clean Catch    Collection Time: 02/24/25 10:04 AM    Specimen: Urine, Clean Catch   Result Value Ref Range    Color, UA Dark Yellow (A) Yellow, Straw    Appearance, UA Clear Clear    pH, UA 6.0 5.0 - 8.0    Specific Gravity, UA >1.030 (H) 1.005 - 1.030    Glucose, UA Negative Negative    Ketones, UA 15 mg/dL (1+) (A) Negative    Bilirubin, UA Negative Negative    Blood, UA Negative Negative    Protein, UA 30 mg/dL (1+) (A) Negative    Leuk Esterase, UA Trace (A) Negative    Nitrite, UA Negative Negative    Urobilinogen, UA 1.0 E.U./dL 0.2 - 1.0 E.U./dL   Urinalysis, Microscopic Only - Urine, Clean Catch    Collection Time: 02/24/25 10:04 AM    Specimen: Urine, Clean Catch   Result Value Ref Range    RBC, UA 0-2 None Seen, 0-2 /HPF    WBC, UA 3-5 (A) None Seen, 0-2 /HPF    Bacteria, UA None Seen None Seen /HPF    Squamous Epithelial Cells, UA 0-2 None Seen,  0-2 /HPF    Hyaline Casts, UA 0-2 None Seen /LPF    Methodology Automated Microscopy        Ordered the above labs and reviewed the results.        RADIOLOGY  XR Chest 1 View    Result Date: 2/24/2025  XR CHEST 1 VW-  HISTORY: Female who is 69 years-old, weakness  TECHNIQUE: Frontal view of the chest  COMPARISON: 11/15/2024  FINDINGS: Heart, mediastinum and pulmonary vasculature are unremarkable. No focal pulmonary consolidation, pleural effusion, or pneumothorax. Right hemidiaphragm remains elevated. No acute osseous process.      No focal pulmonary consolidation. Follow-up as clinical indications persist.  This report was finalized on 2/24/2025 8:00 AM by Dr. Michele Christian M.D on Workstation: [id number]       Ordered the above noted radiological studies. Reviewed by me in PACS.            PROCEDURES  Procedures    EKG independently interpreted by myself as follows:    EKG          EKG time: 0629  Rhythm/Rate: NSR, 64  P waves and DE: nml  QRS, axis: nml, nml   ST and T waves: Normal ST segments, nonspecific T wave abnormalities    Interpreted Contemporaneously by me, independently viewed  changed compared to prior 11/15/24            MEDICATIONS GIVEN IN ER  Medications   sodium chloride 0.9 % flush 10 mL (has no administration in time range)   sodium chloride 0.9 % bolus 1,000 mL (1,000 mL Intravenous New Bag 2/24/25 0832)   ondansetron (ZOFRAN) injection 4 mg (4 mg Intravenous Given 2/24/25 0833)   acetaminophen (TYLENOL) tablet 1,000 mg (1,000 mg Oral Given 2/24/25 0831)   ketorolac (TORADOL) injection 15 mg (15 mg Intravenous Given 2/24/25 0833)                   MEDICAL DECISION MAKING, PROGRESS, and CONSULTS    All labs have been independently reviewed by me.  All radiology studies have been reviewed by me and I have also reviewed the radiology report.   EKG's independently viewed and interpreted by me.  Discussion below represents my analysis of pertinent findings related to patient's condition,  differential diagnosis, treatment plan and final disposition.      Additional sources:  - Discussed/ obtained information from independent historians: History obtained from the patient herself at bedside.    - External (non-ED) record review: Upon medical records review, the patient was last seen and evaluated in the outpatient office of family medicine on 2/3/2025 in follow-up for her known hyperlipidemia as well as hypothyroidism.    - Chronic or social conditions impacting care: Pretension, CAD, CKD    - Shared decision making: Admission decision based on shared conversations have between myself, the patient and family at bedside, as well as Dr. Portillo with [facility].      Orders placed during this visit:  Orders Placed This Encounter   Procedures    Respiratory Panel PCR w/COVID-19(SARS-CoV-2) BRITTANIE/NEW/AJAY/PAD/COR/SEBASTIAN In-House, NP Swab in UTM/VTM, 2 HR TAT - Swab, Nasopharynx    XR Chest 1 View    [place] Draw    Comprehensive Metabolic Panel    High Sensitivity Troponin T    Magnesium    Urinalysis With Microscopic If Indicated (No Culture) - Urine, Clean Catch    CBC Auto Differential    High Sensitivity Troponin T 1Hr    Urinalysis, Microscopic Only - Urine, Clean Catch    NPO Diet NPO Type: Strict NPO    Undress & Gown    Continuous Pulse Oximetry    Vital Signs    [facility] (on-call MD unless specified) Details    Oxygen Therapy- Nasal Cannula; Titrate 1-6 LPM Per SpO2; 90 - 95%    POC Glucose Once    ECG 12 Lead ED Triage Standing Order; Weak / Dizzy / AMS    ECG 12 Lead Chest Pain    Insert Peripheral IV    Inpatient Admission    Fall Precautions    CBC & Differential    Green Top (Gel)    Lavender Top    Gold Top - SST    Light Blue Top           Differential diagnosis includes but is not limited to:    Sepsis, acute bacteremia, COVID-19, viral upper respiratory infection, urinary tract infection, pneumonia, acute bronchitis, acute anemia, or electrolyte disturbance      Independent interpretation of labs, radiology  studies, and discussions with consultants:    X-ray independently interpreted by myself with my interpretation showing no cardiomegaly nor air of edema, infiltrate, or pneumothorax.      ED Course as of 02/24/25 1144   Mon Feb 24, 2025   1119 On reevaluation, the patient is resting comfortably with stable vital signs.  She continues to report that she feels nauseated and extremely weak.  She states that she does not feel a possible in any way that she could go home today due to the overwhelming weakness.  I recommend that we continue to treat her with IV fluids as well as antiemetics along with treatment of any other symptom that progresses.  We will admit her to the hospital today for further management and treatment.  All questions answered. [BM]   1140 The patient's presentation, workup, as well as diagnosis and treatment plan was discussed at length with Dr. Portillo of [facility].  He agrees to admit the patient to the hospital today for further management and treatment. [BM]      ED Course User Index  [BM] Lowell Hart MD           DIAGNOSIS  Final diagnoses:   Generalized weakness   Nausea   Diarrhea, unspecified type   Dehydration         DISPOSITION  ADMISSION    Discussed treatment plan and reason for admission with pt/family and admitting physician.  Pt/family voiced understanding of the plan for admission for further testing/treatment as needed.               Latest Documented Vital Signs:  As of 11:44 EST  BP- 140/75 HR- 61 Temp- 97.7 °F (36.5 °C) (Tympanic) O2 sat- 91%              --    Please note that portions of this were completed with a voice recognition program.       Note Disclaimer: At [facility], we believe that sharing information builds trust and better relationships. You are receiving this note because you are receiving care at [facility] or recently visited. It is possible you will see health information before a provider has talked with you about it. This kind of information can be  easy to misunderstand. To help you fully understand what it means for your health, we urge you to discuss this note with your provider.             Lowell Hart MD  02/24/25 1141

## 2025-02-24 NOTE — ED NOTES
..Nursing report ED to floor  Sonya Marcus  69 y.o.  female    HPI :  HPI  Stated Reason for Visit: Patient from home via EMS reporting nausea, diarrhea, generalized body aches, and generalized weakness for 1 week. Patient was exposed to a similar illness through her daughter last week, who tested positive for Flu  History Obtained From: patient, EMS    Chief Complaint  Chief Complaint   Patient presents with    Flu Symptoms    Weakness - Generalized       Admitting doctor:   Hayden Portillo MD    Admitting diagnosis:   The primary encounter diagnosis was Generalized weakness. Diagnoses of Nausea, Diarrhea, unspecified type, and Dehydration were also pertinent to this visit.    Code status:   Current Code Status       Date Active Code Status Order ID Comments User Context       2/24/2025 1238 CPR (Attempt to Resuscitate) [id number]  Hayden Portillo MD ED        Question Answer    Code Status (Patient has no pulse and is not breathing) CPR (Attempt to Resuscitate)    Medical Interventions (Patient has pulse or is breathing) Full    Level Of Support Discussed With Patient                    Allergies:   Fish allergy; Iodine; Penicillins; Prochlorperazine; Shellfish allergy; Shellfish-derived products; Sulfate; Glycerol, iodinated; Octacosanol; Latex; Levofloxacin; and Sulfa antibiotics    Isolation:   Enhanced Droplet/Contact     Intake and Output  No intake or output data in the 24 hours ending 02/24/25 1318    Weight:       02/24/25  0614   Weight: 80.3 kg (177 lb)       Most recent vitals:   Vitals:    02/24/25 1239 02/24/25 1240 02/24/25 1306 02/24/25 1307   BP:   142/74    Pulse: 56 55  58   Resp:       Temp:       TempSrc:       SpO2:  94%     Weight:       Height:           Active LDAs/IV Access:   Lines, Drains & Airways       Active LDAs       Name Placement date Placement time Site Days    Peripheral IV 02/24/25 0758 Left Antecubital 02/24/25  0758  Antecubital  less than 1                    Labs (abnormal  labs have a star):   Labs Reviewed   COMPREHENSIVE METABOLIC PANEL - Abnormal; Notable for the following components:       Result Value    Glucose 131 (*)     All other components within normal limits    Narrative:     GFR Categories in Chronic Kidney Disease (CKD)      GFR Category          GFR (mL/min/1.73)    Interpretation  G1                     90 or greater         Normal or high (1)  G2                      60-89                Mild decrease (1)  G3a                   45-59                Mild to moderate decrease  G3b                   30-44                Moderate to severe decrease  G4                    15-29                Severe decrease  G5                    14 or less           Kidney failure          (1)In the absence of evidence of kidney disease, neither GFR category G1 or G2 fulfill the criteria for CKD.    eGFR calculation 2021 CKD-EPI creatinine equation, which does not include race as a factor   URINALYSIS W/ MICROSCOPIC IF INDICATED (NO CULTURE) - Abnormal; Notable for the following components:    Color, UA Dark Yellow (*)     Specific Gravity, UA >1.030 (*)     Ketones, UA 15 mg/dL (1+) (*)     Protein, UA 30 mg/dL (1+) (*)     Leuk Esterase, UA Trace (*)     All other components within normal limits   CBC WITH AUTO DIFFERENTIAL - Abnormal; Notable for the following components:    MCHC 30.8 (*)     All other components within normal limits   URINALYSIS, MICROSCOPIC ONLY - Abnormal; Notable for the following components:    WBC, UA 3-5 (*)     All other components within normal limits   RESPIRATORY PANEL PCR W/ COVID-19 (SARS-COV-2), NP SWAB IN UTM/VTP, 2 HR TAT - Normal    Narrative:     In the setting of a positive respiratory panel with a viral infection PLUS a negative procalcitonin without other underlying concern for bacterial infection, consider observing off antibiotics or discontinuation of antibiotics and continue supportive care. If the respiratory panel is positive for atypical  bacterial infection (Bordetella pertussis, Chlamydophila pneumoniae, or Mycoplasma pneumoniae), consider antibiotic de-escalation to target atypical bacterial infection.   TROPONIN - Normal    Narrative:     High Sensitive Troponin T Reference Range:  <14.0 ng/L- Negative Female for AMI  <22.0 ng/L- Negative Male for AMI  >=14 - Abnormal Female indicating possible myocardial injury.  >=22 - Abnormal Male indicating possible myocardial injury.   Clinicians would have to utilize clinical acumen, EKG, Troponin, and serial changes to determine if it is an Acute Myocardial Infarction or myocardial injury due to an underlying chronic condition.        MAGNESIUM - Normal   HIGH SENSITIVITIY TROPONIN T 1HR - Normal    Narrative:     High Sensitive Troponin T Reference Range:  <14.0 ng/L- Negative Female for AMI  <22.0 ng/L- Negative Male for AMI  >=14 - Abnormal Female indicating possible myocardial injury.  >=22 - Abnormal Male indicating possible myocardial injury.   Clinicians would have to utilize clinical acumen, EKG, Troponin, and serial changes to determine if it is an Acute Myocardial Infarction or myocardial injury due to an underlying chronic condition.        RAINBOW DRAW    Narrative:     The following orders were created for panel order [place] Draw.  Procedure                               Abnormality         Status                     ---------                               -----------         ------                     Green Top (Gel)[[id number]]                                  Final result               Lavender Top[[id number]]                                     Final result               Gold Top - SST[[id number]]                                   Final result               Light Blue Top[[id number]]                                   Final result                 Please view results for these tests on the individual orders.   POCT GLUCOSE FINGERSTICK   CBC AND DIFFERENTIAL    Narrative:     The following  orders were created for panel order CBC & Differential.  Procedure                               Abnormality         Status                     ---------                               -----------         ------                     CBC Auto Differential[[id number]]        Abnormal            Final result                 Please view results for these tests on the individual orders.   GREEN TOP   LAVENDER TOP   GOLD TOP - SST   LIGHT BLUE TOP       EKG:   ECG 12 Lead Chest Pain   Preliminary Result   HEART RATE=66  bpm   RR Tlfwrhvj=892  ms   GA Interval=54  ms   P Horizontal Axis=28  deg   P Front Axis=0  deg   QRSD Kbrdzddr=980  ms   QT Ciljbltn=037  ms   NNwF=876  ms   QRS Axis=29  deg   T Wave Axis=25  deg   - ABNORMAL ECG -   Sinus rhythm   Short GA interval   Nonspecific intraventricular conduction delay   Nonspecific T abnormalities, anterior leads   Prolonged QT interval   Date and Time of Study:2025-02-24 09:36:33      ECG 12 Lead ED Triage Standing Order; Weak / Dizzy / AMS   Preliminary Result   HEART RATE=64  bpm   RR Wuwmssau=356  ms   GA Bcaviqxr=889  ms   P Horizontal Axis=4  deg   P Front Axis=32  deg   QRSD Pctjtave=895  ms   QT Juxijlhh=337  ms   FNqG=955  ms   QRS Axis=-30  deg   T Wave Axis=-28  deg   - ABNORMAL ECG -   Sinus rhythm   Left axis deviation   Low voltage, precordial leads   Abnormal R-wave progression, late transition   Nonspecific T abnormalities, diffuse leads   Date and Time of Study:2025-02-24 06:29:34          Meds given in ED:   Medications   sodium chloride 0.9 % flush 10 mL (has no administration in time range)   sodium chloride 0.9 % bolus 1,000 mL (1,000 mL Intravenous New Bag 2/24/25 0832)   ondansetron (ZOFRAN) injection 4 mg (4 mg Intravenous Given 2/24/25 0833)   acetaminophen (TYLENOL) tablet 1,000 mg (1,000 mg Oral Given 2/24/25 0831)   ketorolac (TORADOL) injection 15 mg (15 mg Intravenous Given 2/24/25 0833)   ondansetron (ZOFRAN) injection 4 mg (4 mg Intravenous Given  2/24/25 1235)   HYDROcodone-acetaminophen (NORCO) 7.5-325 MG per tablet 1 tablet (1 tablet Oral Given 2/24/25 1236)       Imaging results:  XR Chest 1 View    Result Date: 2/24/2025  No focal pulmonary consolidation. Follow-up as clinical indications persist.  This report was finalized on 2/24/2025 8:00 AM by Dr. Michele Christian M.D on Workstation: [employer]       Ambulatory status:   - up with assist x1    Social issues:   Social History     Socioeconomic History    Marital status:    Tobacco Use    Smoking status: Never     Passive exposure: Never    Smokeless tobacco: Never   Vaping Use    Vaping status: Never Used   Substance and Sexual Activity    Alcohol use: Yes     Comment: OCCASIONALLY    Drug use: Not Currently     Types: Hydrocodone, Oxycodone     Comment: 2010    Sexual activity: Not Currently     Birth control/protection: Post-menopausal     Comment: Post Menopausal with no immediate partner       Peripheral Neurovascular  Peripheral Neurovascular (Adult)  Peripheral Neurovascular WDL: WDL    Neuro Cognitive  Neuro Cognitive (Adult)  Cognitive/Neuro/Behavioral WDL: .WDL except, orientation  Orientation: oriented x 4  Additional Documentation: Headache Assessment (Group)  Headache Assessment  Headache Location: generalized  Description/Character: pressure    Learning  Learning Assessment  Learning Readiness and Ability: no barriers identified    Respiratory  Respiratory WDL  Respiratory WDL: .WDL except, all  Rhythm/Pattern, Respiratory: unlabored, pattern regular  Cough Type: dry, nonproductive    Abdominal Pain       Pain Assessments  Pain (Adult)  (0-10) Pain Rating: Rest: 7  (0-10) Pain Rating: Activity: 7  Pain Location: other (see comments) (generalized body aches)    NIH Stroke Scale       Cady Lopes RN  02/24/25 13:18 EST

## 2025-02-24 NOTE — PLAN OF CARE
Goal Outcome Evaluation:  Plan of Care Reviewed With: patient           Outcome Evaluation: admitted for diarrhea and generalized weakness, iv fluids, awaiting stool sample, possible norovirus, A&O x4, assist x1, RA, SB with wide QT, vss, will continue to monitor

## 2025-02-24 NOTE — H&P
Patient Name:  Sonya Marcus  YOB: 1955  MRN:  [id number]  Admit Date:  2/24/2025  Patient Care Team:  Regina Ferro (Sivan)CHANELLE as PCP - General (Family Medicine)      Subjective   History Present Illness     Chief Complaint   Patient presents with    Flu Symptoms    Weakness - Generalized       Ms. Marcus is a 69 y.o. female that presents to [facility] complaining of weakness, nausea, vomiting and diarrhea.    She has felt poorly since at least last weekend.  She says her daughter had the flu but is recovering.  She described sore throat and congestion last weekend but those symptoms have improved.  Throughout this course of the week she has had worsening nausea, vomiting and diarrhea.  She has decreased appetite.  She has mild abdominal discomfort primarily cramping with bowel movements.  She had 3-4 watery stools yesterday.  She does not think she has had fever or chills.  No blood in her stool or emesis.  No suspect food ingestions.  No significant cough or shortness of breath.    She has chronic pain and is followed by pain management.  Try to get clarification from her on how she is taking her hydrocodone.  It seems she saw a pain management specialist on 2/6 who wrote her a tapering of hydrocodone with the intent of tapering her off of this.  A week later she was back in seeing someone else and put back on high-dose hydrocodone 120 tablets.  This resulted in her getting fired from the pain management clinic.  She says she takes them about 4 times per day and has been taking them regularly.      History of Present Illness      Review of Systems     Personal History     Past Medical History:   Diagnosis Date    Angina pectoris     Anxiety and depression     Arthralgia of right knee 09/10/2024    Arthritis     Carotid artery occlusion 2021    Chronic urinary tract infection     CKD (chronic kidney disease)     Coronary arteriosclerosis     Coronary artery disease 2020     Disease of thyroid gland     Fibromyalgia, primary 2000    Fractures 09/20/2024    Fell at home    Genitourinary syndrome of menopause     GERD (gastroesophageal reflux disease)     History of kidney stones     History of pulmonary embolism     AFTER HYSTERECTOMY    HL (hearing loss) 2024    Hyperlipidemia 2020    Hypertension     Hypothyroidism     Left hip pain     Memory loss     MVP (mitral valve prolapse)     DR LUZ LAST OFFICE NOTE WITH CHART 7/30/2024    Prediabetes     Right knee pain     Right shoulder pain     Sleep apnea     PT IS NOT USING CPAP    Substance abuse 2010    Hydrocodone and Oxycodone     Past Surgical History:   Procedure Laterality Date    APPENDECTOMY      BREAST BIOPSY      COLONOSCOPY  01/01/2022    CYSTOSCOPY W/ URETERAL STENT PLACEMENT Left 09/05/2023    Procedure: LEFT CYSTOSCOPY URETERAL CATHETER/STENT INSERTION;  Surgeon: Quinton Rosa MD;  Location: [facility]  Service: Urology;  Laterality: Left;    EYE SURGERY      HYSTERECTOMY      REPLACEMENT TOTAL KNEE Left 2021    SHOULDER SURGERY Right     TONSILLECTOMY      TOTAL KNEE ARTHROPLASTY Right 08/22/2024    Procedure: TOTAL KNEE ARTHROPLASTY WITH CORI ROBOT;  Surgeon: Steven Sumner II, MD;  [place] [facility] [place]  Service: Robotics - Ortho;  Laterality: Right;     Family History   Problem Relation Age of Onset    Stroke Sister     Breast cancer Daughter     Breast cancer Daughter     Stroke Maternal Grandmother     Malig Hyperthermia Neg Hx      Social History     Tobacco Use    Smoking status: Never     Passive exposure: Never    Smokeless tobacco: Never   Vaping Use    Vaping status: Never Used   Substance Use Topics    Alcohol use: Yes     Comment: OCCASIONALLY    Drug use: Not Currently     Types: Hydrocodone, Oxycodone     Comment: 2010     No current facility-administered medications on file prior to encounter.     Current Outpatient Medications on File Prior to Encounter   Medication Sig  Dispense Refill    ALPRAZolam (XANAX) 0.5 MG tablet Take 1 tablet by mouth 3 (Three) Times a Day As Needed for Anxiety.      aspirin 81 MG EC tablet Take 1 tablet by mouth Daily.      buPROPion XL (WELLBUTRIN XL) 150 MG 24 hr tablet Take 2 tablets by mouth Every Night.      cetirizine (zyrTEC) 5 MG tablet Take 1 tablet by mouth Daily.      cyanocobalamin 1000 MCG/ML injection Inject 1 mL under the skin into the appropriate area as directed Every 30 (Thirty) Days.      docusate sodium (Colace) 100 MG capsule Take 1 capsule by mouth Daily. 90 capsule 3    DULoxetine (CYMBALTA) 60 MG capsule Take 1 capsule by mouth 2 (Two) Times a Day.      HYDROcodone-acetaminophen (NORCO) 5-325 MG per tablet Take 1 tab PO 3x/day for 1 week.  Then take 1 tab PO 2x/day for 1 week.  Then take 0.5 tab 2x/day for 1 week.  Then take 0.5 tab daily for 1 week.  Then discontinue 46 tablet 0    levothyroxine (SYNTHROID, LEVOTHROID) 88 MCG tablet Take 1 tablet by mouth Every Morning. 90 tablet 1    metoprolol succinate XL (TOPROL-XL) 50 MG 24 hr tablet Take 1 tablet by mouth Daily.      naloxone (NARCAN) 4 MG/0.1ML nasal spray Call 911. Don't prime. Lynx in 1 nostril for overdose. Repeat in 2-3 minutes in other nostril if no or minimal breathing/responsiveness. 1 each 0    nitrofurantoin (MACRODANTIN) 50 MG capsule Take 1 capsule by mouth Every Night. 30 capsule 11    omeprazole (priLOSEC) 20 MG capsule Take 1 capsule by mouth Daily. 90 capsule 1    ondansetron (Zofran) 4 MG tablet Take 1 tablet by mouth Every 6 (Six) Hours As Needed for Nausea or Vomiting. 30 tablet 0    pregabalin (Lyrica) 150 MG capsule Take 1 capsule by mouth 3 (Three) Times a Day. 90 capsule 0    rosuvastatin (CRESTOR) 10 MG tablet Take 1 tablet by mouth Every Night. 90 tablet 1    traZODone (DESYREL) 100 MG tablet Take 1 tablet by mouth every night at bedtime. (Patient taking differently: Take 2 tablets by mouth every night at bedtime.) 30 tablet 3     Allergies    Allergen Reactions    Fish Allergy Anaphylaxis     Has epi pen    Iodine Anaphylaxis    Penicillins Nausea And Vomiting and Rash     Tolerated rocephin 9/2023 admission    Prochlorperazine Dystonia, Anaphylaxis, Rash and Other (See Comments)     Paralysis    Muscle twisting    Shellfish Allergy Anaphylaxis, Rash and Swelling    Shellfish-Derived Products Anaphylaxis    Sulfate Rash    Glycerol, Iodinated Unknown - Low Severity     Anaphylaxis   (also to shellfish)    Octacosanol Dizziness     HEADACHE    Latex Rash    Levofloxacin Hives    Sulfa Antibiotics GI Intolerance       Objective    Objective     Vital Signs  Temp:  [97.7 °F (36.5 °C)] 97.7 °F (36.5 °C)  Heart Rate:  [59-73] 59  Resp:  [16] 16  BP: (120-145)/(63-84) 145/70  SpO2:  [91 %-98 %] 98 %  on   ;   Device (Oxygen Therapy): room air  Body mass index is 33.44 kg/m².    Physical Exam  Vitals and nursing note reviewed.   Constitutional:       General: She is not in acute distress.     Appearance: She is obese. She is ill-appearing (Chronically).   HENT:      Head: Normocephalic and atraumatic.      Mouth/Throat:      Mouth: Mucous membranes are dry.   Cardiovascular:      Rate and Rhythm: Normal rate and regular rhythm.   Pulmonary:      Effort: Pulmonary effort is normal.      Breath sounds: Normal breath sounds.   Abdominal:      General: Bowel sounds are normal. There is no distension.      Palpations: Abdomen is soft.      Tenderness: There is abdominal tenderness. There is no guarding or rebound.   Musculoskeletal:      Right lower leg: Edema present.      Left lower leg: Edema present.   Skin:     General: Skin is warm and dry.   Neurological:      Mental Status: She is alert and oriented to person, place, and time.   Psychiatric:         Behavior: Behavior normal.         Results Review:  I reviewed the patient's new clinical results.  I reviewed the patient's new imaging results and agree with the interpretation.  I reviewed the patient's  other test results and agree with the interpretation  I personally viewed and interpreted the patient's EKG/Telemetry data  Discussed with ED provider.    Lab Results (last 24 hours)       Procedure Component Value Units Date/Time    Respiratory Panel PCR w/COVID-19(SARS-CoV-2) BRITTANIE/NEW/AJAY/PAD/COR/SEBASTIAN In-House, NP Swab in UTM/VTM, 2 HR TAT - Swab, Nasopharynx [[id number]]  (Normal) Collected: 02/24/25 0631    Specimen: Swab from Nasopharynx Updated: 02/24/25 0755     ADENOVIRUS, PCR Not Detected     Coronavirus 229E Not Detected     Coronavirus HKU1 Not Detected     Coronavirus NL63 Not Detected     Coronavirus OC43 Not Detected     COVID19 Not Detected     Human Metapneumovirus Not Detected     Human Rhinovirus/Enterovirus Not Detected     Influenza A PCR Not Detected     Influenza B PCR Not Detected     Parainfluenza Virus 1 Not Detected     Parainfluenza Virus 2 Not Detected     Parainfluenza Virus 3 Not Detected     Parainfluenza Virus 4 Not Detected     RSV, PCR Not Detected     Bordetella pertussis pcr Not Detected     Bordetella parapertussis PCR Not Detected     Chlamydophila pneumoniae PCR Not Detected     Mycoplasma pneumo by PCR Not Detected    Narrative:      In the setting of a positive respiratory panel with a viral infection PLUS a negative procalcitonin without other underlying concern for bacterial infection, consider observing off antibiotics or discontinuation of antibiotics and continue supportive care. If the respiratory panel is positive for atypical bacterial infection (Bordetella pertussis, Chlamydophila pneumoniae, or Mycoplasma pneumoniae), consider antibiotic de-escalation to target atypical bacterial infection.    CBC & Differential [[id number]]  (Abnormal) Collected: 02/24/25 0636    Specimen: Blood Updated: 02/24/25 0659    Narrative:      The following orders were created for panel order CBC & Differential.  Procedure                               Abnormality         Status                      ---------                               -----------         ------                     CBC Auto Differential[[id number]]        Abnormal            Final result                 Please view results for these tests on the individual orders.    Comprehensive Metabolic Panel [[id number]]  (Abnormal) Collected: 02/24/25 0636    Specimen: Blood Updated: 02/24/25 0713     Glucose 131 mg/dL      BUN 15 mg/dL      Creatinine 0.85 mg/dL      Sodium 138 mmol/L      Potassium 3.9 mmol/L      Chloride 106 mmol/L      CO2 22.6 mmol/L      Calcium 9.6 mg/dL      Total Protein 6.2 g/dL      Albumin 3.9 g/dL      ALT (SGPT) 8 U/L      AST (SGOT) 12 U/L      Alkaline Phosphatase 96 U/L      Total Bilirubin 0.5 mg/dL      Globulin 2.3 gm/dL      A/G Ratio 1.7 g/dL      BUN/Creatinine Ratio 17.6     Anion Gap 9.4 mmol/L      eGFR 73.8 mL/min/1.73     Narrative:      GFR Categories in Chronic Kidney Disease (CKD)      GFR Category          GFR (mL/min/1.73)    Interpretation  G1                     90 or greater         Normal or high (1)  G2                      60-89                Mild decrease (1)  G3a                   45-59                Mild to moderate decrease  G3b                   30-44                Moderate to severe decrease  G4                    15-29                Severe decrease  G5                    14 or less           Kidney failure          (1)In the absence of evidence of kidney disease, neither GFR category G1 or G2 fulfill the criteria for CKD.    eGFR calculation 2021 CKD-EPI creatinine equation, which does not include race as a factor    High Sensitivity Troponin T [[id number]]  (Normal) Collected: 02/24/25 0636    Specimen: Blood Updated: 02/24/25 0713     HS Troponin T 8 ng/L     Narrative:      High Sensitive Troponin T Reference Range:  <14.0 ng/L- Negative Female for AMI  <22.0 ng/L- Negative Male for AMI  >=14 - Abnormal Female indicating possible myocardial injury.  >=22 - Abnormal Male  indicating possible myocardial injury.   Clinicians would have to utilize clinical acumen, EKG, Troponin, and serial changes to determine if it is an Acute Myocardial Infarction or myocardial injury due to an underlying chronic condition.         Magnesium [[id number]]  (Normal) Collected: 02/24/25 0636    Specimen: Blood Updated: 02/24/25 0713     Magnesium 1.8 mg/dL     CBC Auto Differential [[id number]]  (Abnormal) Collected: 02/24/25 0636    Specimen: Blood Updated: 02/24/25 0659     WBC 4.16 10*3/mm3      RBC 4.66 10*6/mm3      Hemoglobin 13.3 g/dL      Hematocrit 43.2 %      MCV 92.7 fL      MCH 28.5 pg      MCHC 30.8 g/dL      RDW 14.7 %      RDW-SD 51.0 fl      MPV 10.9 fL      Platelets 195 10*3/mm3      Neutrophil % 60.9 %      Lymphocyte % 30.5 %      Monocyte % 5.8 %      Eosinophil % 1.4 %      Basophil % 1.2 %      Immature Grans % 0.2 %      Neutrophils, Absolute 2.53 10*3/mm3      Lymphocytes, Absolute 1.27 10*3/mm3      Monocytes, Absolute 0.24 10*3/mm3      Eosinophils, Absolute 0.06 10*3/mm3      Basophils, Absolute 0.05 10*3/mm3      Immature Grans, Absolute 0.01 10*3/mm3      nRBC 0.0 /100 WBC     High Sensitivity Troponin T 1Hr [[id number]]  (Normal) Collected: 02/24/25 0758    Specimen: Blood Updated: 02/24/25 0837     HS Troponin T 8 ng/L      Troponin T Numeric Delta 0 ng/L     Narrative:      High Sensitive Troponin T Reference Range:  <14.0 ng/L- Negative Female for AMI  <22.0 ng/L- Negative Male for AMI  >=14 - Abnormal Female indicating possible myocardial injury.  >=22 - Abnormal Male indicating possible myocardial injury.   Clinicians would have to utilize clinical acumen, EKG, Troponin, and serial changes to determine if it is an Acute Myocardial Infarction or myocardial injury due to an underlying chronic condition.         Urinalysis With Microscopic If Indicated (No Culture) - Urine, Clean Catch [[id number]]  (Abnormal) Collected: 02/24/25 1004    Specimen: Urine, Clean Catch Updated:  02/24/25 1029     Color, UA Dark Yellow     Appearance, UA Clear     pH, UA 6.0     Specific Gravity, UA >1.030     Glucose, UA Negative     Ketones, UA 15 mg/dL (1+)     Bilirubin, UA Negative     Blood, UA Negative     Protein, UA 30 mg/dL (1+)     Leuk Esterase, UA Trace     Nitrite, UA Negative     Urobilinogen, UA 1.0 E.U./dL    Urinalysis, Microscopic Only - Urine, Clean Catch [[id number]]  (Abnormal) Collected: 02/24/25 1004    Specimen: Urine, Clean Catch Updated: 02/24/25 1029     RBC, UA 0-2 /HPF      WBC, UA 3-5 /HPF      Bacteria, UA None Seen /HPF      Squamous Epithelial Cells, UA 0-2 /HPF      Hyaline Casts, UA 0-2 /LPF      Methodology Automated Microscopy            Imaging Results (Last 24 Hours)       Procedure Component Value Units Date/Time    XR Chest 1 View [[id number]] Collected: 02/24/25 0756     Updated: 02/24/25 0803    Narrative:      XR CHEST 1 VW-     HISTORY: Female who is 69 years-old, weakness     TECHNIQUE: Frontal view of the chest     COMPARISON: 11/15/2024     FINDINGS: Heart, mediastinum and pulmonary vasculature are unremarkable.  No focal pulmonary consolidation, pleural effusion, or pneumothorax.  Right hemidiaphragm remains elevated. No acute osseous process.       Impression:      No focal pulmonary consolidation. Follow-up as clinical  indications persist.     This report was finalized on 2/24/2025 8:00 AM by Dr. Michele Christian M.D on Workstation: [id number]                 ECG 12 Lead Chest Pain   Preliminary Result   HEART RATE=66  bpm   RR Hjaelxsd=114  ms   WI Interval=54  ms   P Horizontal Axis=28  deg   P Front Axis=0  deg   QRSD Fuzquojc=093  ms   QT Xchuomcs=952  ms   BNvE=980  ms   QRS Axis=29  deg   T Wave Axis=25  deg   - ABNORMAL ECG -   Sinus rhythm   Short WI interval   Nonspecific intraventricular conduction delay   Nonspecific T abnormalities, anterior leads   Prolonged QT interval   Date and Time of Study:2025-02-24 09:36:33      ECG 12 Lead ED Triage  Standing Order; Weak / Dizzy / AMS   Preliminary Result   HEART RATE=64  bpm   RR Jmexkqhj=942  ms   OR Qgfvymgn=158  ms   P Horizontal Axis=4  deg   P Front Axis=32  deg   QRSD Fpnwvanz=308  ms   QT Nsvojcjp=568  ms   RMxD=011  ms   QRS Axis=-30  deg   T Wave Axis=-28  deg   - ABNORMAL ECG -   Sinus rhythm   Left axis deviation   Low voltage, precordial leads   Abnormal R-wave progression, late transition   Nonspecific T abnormalities, diffuse leads   Date and Time of Study:2025-02-24 06:29:34        Assessment/Plan   Assessment & Plan   Active Hospital Problems    Diagnosis  POA    **Generalized weakness [R53.1]  Yes    Opioid dependence [F11.20]  Yes    Dehydration [E86.0]  Yes    Stage 2 chronic kidney disease [N18.2]  Yes    Fibromyalgia, primary [M79.7]  Yes    Polypharmacy [Z79.899]  Not Applicable    Anxiety disorder [F41.9]  Yes      Resolved Hospital Problems   No resolved problems to display.       69 y.o. female admitted with Generalized weakness.    She will be admitted for observation.  Viral respiratory panel is negative and she could just be recovering from an upper respiratory viral infection but seems her symptoms are primarily GI.  If she continues to have diarrhea will send for GI PCR.  She could have a norovirus.  Will continue with IV fluids as well as nausea medication.  Give IV fluids have physical therapy see.     She certainly has opioid dependency and most symptoms sould like could be r/t opioid withdrawal but she should have abundance of hydrocodone tablets at home and states that her daughter stores them and dispenses to her weekly.      SCDs for DVT prophylaxis.  Full code.  Discussed with patient and ED provider.      Hayden Portillo MD  [facility]  02/24/25  12:31 EST

## 2025-02-24 NOTE — Clinical Note
Level of Care: Telemetry [5]   Diagnosis: Generalized weakness [291395]   Admitting Physician: NICHOLAS KELLEY [[id number]]   Attending Physician: NICHOLAS KELLEY [[id number]]   Certification: I Certify That Inpatient Hospital Services Are Medically Necessary For Greater Than 2 Midnights

## 2025-02-25 LAB
ANION GAP SERPL CALCULATED.3IONS-SCNC: 9 MMOL/L (ref 5–15)
BUN SERPL-MCNC: 13 MG/DL (ref 8–23)
BUN/CREAT SERPL: 18.3 (ref 7–25)
CALCIUM SPEC-SCNC: 8.2 MG/DL (ref 8.6–10.5)
CHLORIDE SERPL-SCNC: 113 MMOL/L (ref 98–107)
CO2 SERPL-SCNC: 17 MMOL/L (ref 22–29)
CREAT SERPL-MCNC: 0.71 MG/DL (ref 0.57–1)
DEPRECATED RDW RBC AUTO: 49.7 FL (ref 37–54)
EGFRCR SERPLBLD CKD-EPI 2021: 91.6 ML/MIN/1.73
ERYTHROCYTE [DISTWIDTH] IN BLOOD BY AUTOMATED COUNT: 14.7 % (ref 12.3–15.4)
GLUCOSE SERPL-MCNC: 80 MG/DL (ref 65–99)
HCT VFR BLD AUTO: 35.7 % (ref 34–46.6)
HGB BLD-MCNC: 11.6 G/DL (ref 12–15.9)
MCH RBC QN AUTO: 29.8 PG (ref 26.6–33)
MCHC RBC AUTO-ENTMCNC: 32.5 G/DL (ref 31.5–35.7)
MCV RBC AUTO: 91.8 FL (ref 79–97)
PLATELET # BLD AUTO: 135 10*3/MM3 (ref 140–450)
PMV BLD AUTO: 11.6 FL (ref 6–12)
POTASSIUM SERPL-SCNC: 3.9 MMOL/L (ref 3.5–5.2)
RBC # BLD AUTO: 3.89 10*6/MM3 (ref 3.77–5.28)
SODIUM SERPL-SCNC: 139 MMOL/L (ref 136–145)
WBC NRBC COR # BLD AUTO: 3.74 10*3/MM3 (ref 3.4–10.8)

## 2025-02-25 PROCEDURE — 85027 COMPLETE CBC AUTOMATED: CPT | Performed by: HOSPITALIST

## 2025-02-25 PROCEDURE — 25010000002 ENOXAPARIN PER 10 MG: Performed by: HOSPITALIST

## 2025-02-25 PROCEDURE — 80048 BASIC METABOLIC PNL TOTAL CA: CPT | Performed by: HOSPITALIST

## 2025-02-25 PROCEDURE — 96361 HYDRATE IV INFUSION ADD-ON: CPT

## 2025-02-25 PROCEDURE — G0378 HOSPITAL OBSERVATION PER HR: HCPCS

## 2025-02-25 PROCEDURE — 25810000003 SODIUM CHLORIDE 0.9 % SOLUTION: Performed by: HOSPITALIST

## 2025-02-25 PROCEDURE — 96372 THER/PROPH/DIAG INJ SC/IM: CPT

## 2025-02-25 PROCEDURE — 97162 PT EVAL MOD COMPLEX 30 MIN: CPT

## 2025-02-25 RX ORDER — SODIUM BICARBONATE 650 MG/1
1300 TABLET ORAL 2 TIMES DAILY
Status: COMPLETED | OUTPATIENT
Start: 2025-02-25 | End: 2025-02-25

## 2025-02-25 RX ADMIN — PANTOPRAZOLE SODIUM 40 MG: 40 TABLET, DELAYED RELEASE ORAL at 05:03

## 2025-02-25 RX ADMIN — PREGABALIN 150 MG: 75 CAPSULE ORAL at 20:26

## 2025-02-25 RX ADMIN — SODIUM BICARBONATE 1300 MG: 650 TABLET ORAL at 20:26

## 2025-02-25 RX ADMIN — PREGABALIN 150 MG: 75 CAPSULE ORAL at 08:08

## 2025-02-25 RX ADMIN — METOPROLOL SUCCINATE 50 MG: 50 TABLET, EXTENDED RELEASE ORAL at 08:08

## 2025-02-25 RX ADMIN — ACETAMINOPHEN 650 MG: 325 TABLET, FILM COATED ORAL at 16:08

## 2025-02-25 RX ADMIN — ASPIRIN 81 MG: 81 TABLET, COATED ORAL at 08:08

## 2025-02-25 RX ADMIN — SODIUM CHLORIDE 125 ML/HR: 9 INJECTION, SOLUTION INTRAVENOUS at 01:58

## 2025-02-25 RX ADMIN — TRAZODONE HYDROCHLORIDE 100 MG: 100 TABLET ORAL at 20:24

## 2025-02-25 RX ADMIN — PREGABALIN 150 MG: 75 CAPSULE ORAL at 17:10

## 2025-02-25 RX ADMIN — DULOXETINE 60 MG: 60 CAPSULE, DELAYED RELEASE ORAL at 20:24

## 2025-02-25 RX ADMIN — ENOXAPARIN SODIUM 40 MG: 100 INJECTION SUBCUTANEOUS at 08:09

## 2025-02-25 RX ADMIN — SODIUM BICARBONATE 1300 MG: 650 TABLET ORAL at 13:21

## 2025-02-25 RX ADMIN — DULOXETINE 60 MG: 60 CAPSULE, DELAYED RELEASE ORAL at 08:09

## 2025-02-25 RX ADMIN — BUPROPION HYDROCHLORIDE 300 MG: 150 TABLET, EXTENDED RELEASE ORAL at 20:23

## 2025-02-25 RX ADMIN — HYDROCODONE BITARTRATE AND ACETAMINOPHEN 1 TABLET: 7.5; 325 TABLET ORAL at 20:26

## 2025-02-25 RX ADMIN — HYDROCODONE BITARTRATE AND ACETAMINOPHEN 1 TABLET: 7.5; 325 TABLET ORAL at 05:02

## 2025-02-25 RX ADMIN — ACETAMINOPHEN 650 MG: 325 TABLET, FILM COATED ORAL at 09:39

## 2025-02-25 RX ADMIN — HYDROCODONE BITARTRATE AND ACETAMINOPHEN 1 TABLET: 7.5; 325 TABLET ORAL at 11:11

## 2025-02-25 RX ADMIN — CETIRIZINE HYDROCHLORIDE 5 MG: 10 TABLET ORAL at 08:08

## 2025-02-25 RX ADMIN — ROSUVASTATIN CALCIUM 10 MG: 5 TABLET, FILM COATED ORAL at 20:25

## 2025-02-25 RX ADMIN — LEVOTHYROXINE SODIUM 88 MCG: 88 TABLET ORAL at 05:03

## 2025-02-25 NOTE — PLAN OF CARE
Goal Outcome Evaluation:              Outcome Evaluation: vss. on 1L while sleeping. ivf. no c/o nausea/vomitting. up with assist to bathroom. hr remains 40-50bpm.

## 2025-02-25 NOTE — THERAPY EVALUATION
Patient Name: Sonya Marcus  : 1955    MRN: [id number]                              Today's Date: 2025       Admit Date: 2025    Visit Dx:     ICD-10-CM ICD-9-CM   1. Generalized weakness  R53.1 780.79   2. Nausea  R11.0 787.02   3. Diarrhea, unspecified type  R19.7 787.91   4. Dehydration  E86.0 276.51     Patient Active Problem List   Diagnosis    Altered mental status    Polypharmacy    Positive urine drug screen    MICHAEL (acute kidney injury)    Left ureteral stone    Functional tremor    Knee joint replacement status    Fibromyalgia, primary    Coronary arteriosclerosis    Hypothyroidism    Genitourinary syndrome of menopause    Hyperlipidemia    Osteoarthritis of right knee    Pernicious anemia    Urinary tract infection in female    Absolute anemia    Gastroesophageal reflux disease    Angina pectoris    Anxiety disorder    Arthritis or polyarthritis, rheumatoid    Avitaminosis D    B12 deficiency    Bilateral primary osteoarthritis of knee    BP (high blood pressure)    Cardiac murmur    Stage 2 chronic kidney disease    Aftercare for healing traumatic closed fracture of right lower extremity    Delayed union of tibial shaft fracture    Generalized weakness    Opioid dependence    Dehydration     Past Medical History:   Diagnosis Date    Angina pectoris     Anxiety and depression     Arthralgia of right knee 09/10/2024    Arthritis     Carotid artery occlusion     Chronic urinary tract infection     CKD (chronic kidney disease)     Coronary arteriosclerosis     Coronary artery disease 2020    Disease of thyroid gland     Fibromyalgia, primary 2000    Fractures 2024    Fell at home    Genitourinary syndrome of menopause     GERD (gastroesophageal reflux disease)     History of kidney stones     History of pulmonary embolism     AFTER HYSTERECTOMY    HL (hearing loss)     Hyperlipidemia 2020    Hypertension     Hypothyroidism     Left hip pain     Memory loss     MVP (mitral  valve prolapse)     DR LUZ LAST OFFICE NOTE WITH CHART 7/30/2024    Prediabetes     Right knee pain     Right shoulder pain     Sleep apnea     PT IS NOT USING CPAP    Substance abuse 2010    Hydrocodone and Oxycodone     Past Surgical History:   Procedure Laterality Date    APPENDECTOMY      BREAST BIOPSY      COLONOSCOPY  01/01/2022    CYSTOSCOPY W/ URETERAL STENT PLACEMENT Left 09/05/2023    Procedure: LEFT CYSTOSCOPY URETERAL CATHETER/STENT INSERTION;  Surgeon: Quinton Rosa MD;  Location: [facility] OR;  Service: Urology;  Laterality: Left;    EYE SURGERY      HYSTERECTOMY      REPLACEMENT TOTAL KNEE Left 2021    SHOULDER SURGERY Right     TONSILLECTOMY      TOTAL KNEE ARTHROPLASTY Right 08/22/2024    Procedure: TOTAL KNEE ARTHROPLASTY WITH CORI ROBOT;  Surgeon: Steven Sumner II, MD;  Location: [facility] OR [facility];  Service: Robotics - Ortho;  Laterality: Right;      General Information       Row Name 02/25/25 0906          Physical Therapy Time and Intention    Document Type evaluation  -ST     Mode of Treatment physical therapy  -       Row Name 02/25/25 0906          General Information    Patient Profile Reviewed yes  -ST     Prior Level of Function independent:;all household mobility  -ST     Existing Precautions/Restrictions fall  -ST     Barriers to Rehab medically complex  -ST       Row Name 02/25/25 0906          Living Environment    People in Home alone  -ST       Row Name 02/25/25 0906          Home Main Entrance    Number of Stairs, Main Entrance none  -ST       Row Name 02/25/25 0906          Cognition    Orientation Status (Cognition) oriented x 4  -ST       Row Name 02/25/25 0906          Safety Issues/Impairments Affecting Functional Mobility    Safety Issues Affecting Function (Mobility) safety precaution awareness;safety precautions follow-through/compliance  -ST     Impairments Affecting Function (Mobility) balance;endurance/activity tolerance;pain  -ST     Comment,  Safety Issues/Impairments (Mobility) gait belt, nonskid socks donned  -ST               User Key  (r) = Recorded By, (t) = Taken By, (c) = Cosigned By      Initials Name Provider Type    Tegan Bowman PT Physical Therapist                   Mobility       Row Name 02/25/25 0907          Bed Mobility    Bed Mobility supine-sit;sit-supine  -ST     Supine-Sit [place] (Bed Mobility) supervision  -ST     Assistive Device (Bed Mobility) bed rails;head of bed elevated  -ST     Comment, (Bed Mobility) increased time to complete  -ST       Row Name 02/25/25 0907          Sit-Stand Transfer    Sit-Stand [place] (Transfers) standby assist  -ST       Row Name 02/25/25 0907          Gait/Stairs (Locomotion)    [place] Level (Gait) standby assist  -ST     Distance in Feet (Gait) 30  -ST     Deviations/Abnormal Patterns (Gait) gait speed decreased;stride length decreased;yuan decreased;antalgic  -ST     Bilateral Gait Deviations forward flexed posture;heel strike decreased  -ST     Right Sided Gait Deviations weight shift ability decreased  -ST     Comment, (Gait/Stairs) slow pace, guarded gait noted d/t R LE hx  -ST               User Key  (r) = Recorded By, (t) = Taken By, (c) = Cosigned By      Initials Name Provider Type    Tegan Bowman PT Physical Therapist                   Obj/Interventions       Row Name 02/25/25 0910          Range of Motion Comprehensive    Comment, General Range of Motion bilat LE WFL  -ST       Row Name 02/25/25 0910          Strength Comprehensive (MMT)    Comment, General Manual Muscle Testing (MMT) Assessment bilat LE WFL - R LE limited by pain - chronic from TKA/tibia fx  -ST       Row Name 02/25/25 0910          Balance    Comment, Balance no overt LOB, mild unsteadiness initially but improved with activity  -ST               User Key  (r) = Recorded By, (t) = Taken By, (c) = Cosigned By      Initials Name Provider Type    Tegan Bowman PT Physical  Therapist                   Goals/Plan       Row Name 02/25/25 0911          Bed Mobility Goal 1 (PT)    Activity/Assistive Device (Bed Mobility Goal 1, PT) bed mobility activities, all  -ST     [place] Level/Cues Needed (Bed Mobility Goal 1, PT) modified independence  -ST     Time Frame (Bed Mobility Goal 1, PT) 1 week  -ST     Progress/Outcomes (Bed Mobility Goal 1, PT) new goal  -ST       Row Name 02/25/25 0911          Transfer Goal 1 (PT)    Activity/Assistive Device (Transfer Goal 1, PT) sit-to-stand/stand-to-sit;bed-to-chair/chair-to-bed  -ST     [place] Level/Cues Needed (Transfer Goal 1, PT) modified independence  -ST     Time Frame (Transfer Goal 1, PT) 1 week  -ST     Progress/Outcome (Transfer Goal 1, PT) new goal  -ST       Row Name 02/25/25 0911          Gait Training Goal 1 (PT)    Activity/Assistive Device (Gait Training Goal 1, PT) gait (walking locomotion);assistive device use  -ST     [place] Level (Gait Training Goal 1, PT) modified independence  -ST     Distance (Gait Training Goal 1, PT) 50'  -ST     Time Frame (Gait Training Goal 1, PT) 1 week  -ST     Progress/Outcome (Gait Training Goal 1, PT) new goal  -ST               User Key  (r) = Recorded By, (t) = Taken By, (c) = Cosigned By      Initials Name Provider Type    ST Tegan Rodriguez, PT Physical Therapist                   Clinical Impression       Row Name 02/25/25 0911          Pain    Pain Location knee  -ST     Pain Side/Orientation lower;right  -ST     Pain Management Interventions exercise or physical activity utilized  -ST     Response to Pain Interventions activity participation with tolerable pain  -ST     Pre/Posttreatment Pain Comment does not rate pain but reports always hurts  -ST       Row Name 02/25/25 0911          Plan of Care Review    Plan of Care Reviewed With patient  -ST     Outcome Evaluation Pt is 68 y/o F admitted to [facility] on 2/24/25 with weakness, n/v and diarrhea. PMH: CKD, CAD, fibromyalgia,  HTN, HLD. At baseline pt is from home alone, reports she moves slow d/t R TKA with subsequent fall and tibia fx, but is indep without AD. Pt currently demos SBA for bed mobility, transfers, and room ambulation. No overt LOB but mild unsteadiness initially. On 1.5 L O2 upon arrival, sats WNL throughout. Pt demos mobility below baseline and therefore would benefit from acute skilled PT to address functional mobility deficits. Anticipate d/c home. Encouraged pt to mobilize with [facility] staff throughout the day and sit up in recliner or at EOB for meals.  -ST       Row Name 02/25/25 0911          Therapy Assessment/Plan (PT)    Rehab Potential (PT) good  -ST     Criteria for Skilled Interventions Met (PT) yes  -ST     Therapy Frequency (PT) 3 times/wk  -ST     Predicted Duration of Therapy Intervention (PT) 1 week  -ST       Row Name 02/25/25 0911          Positioning and Restraints    Pre-Treatment Position in bed  -ST     Post Treatment Position chair  -ST     In Chair reclined;encouraged to call for assist;call light within reach;exit alarm on  -ST               User Key  (r) = Recorded By, (t) = Taken By, (c) = Cosigned By      Initials Name Provider Type    Tegan Bowman, PT Physical Therapist                   Outcome Measures       Row Name 02/25/25 0912          How much help from another person do you currently need...    Turning from your back to your side while in flat bed without using bedrails? 4  -ST     Moving from lying on back to sitting on the side of a flat bed without bedrails? 3  -ST     Moving to and from a bed to a chair (including a wheelchair)? 3  -ST     Standing up from a chair using your arms (e.g., wheelchair, bedside chair)? 4  -ST     Climbing 3-5 steps with a railing? 3  -ST     To walk in hospital room? 3  -ST     AM-PAC 6 Clicks Score (PT) 20  -ST               User Key  (r) = Recorded By, (t) = Taken By, (c) = Cosigned By      Initials Name Provider Type    Tegan Bowman  PT Physical Therapist                                 Physical Therapy Education       Title: PT OT SLP Therapies (In Progress)       Topic: Physical Therapy (In Progress)       Point: Mobility training (Done)       Learning Progress Summary            Patient Acceptance, E,TB, VU,NR by  at 2/25/2025 0912                      Point: Home exercise program (Not Started)       Learner Progress:  Not documented in this visit.              Point: Body mechanics (Done)       Learning Progress Summary            Patient Acceptance, E,TB, VU,NR by  at 2/25/2025 0912                      Point: Precautions (Not Started)       Learner Progress:  Not documented in this visit.                              User Key       Initials Effective Dates Name Provider Type Discipline     09/22/22 -  Tegan Rodriguez, BLAISE Physical Therapist PT                  PT Recommendation and Plan     Outcome Evaluation: Pt is 68 y/o F admitted to [facility] on 2/24/25 with weakness, n/v and diarrhea. PMH: CKD, CAD, fibromyalgia, HTN, HLD. At baseline pt is from home alone, reports she moves slow d/t R TKA with subsequent fall and tibia fx, but is indep without AD. Pt currently demos SBA for bed mobility, transfers, and room ambulation. No overt LOB but mild unsteadiness initially. On 1.5 L O2 upon arrival, sats WNL throughout. Pt demos mobility below baseline and therefore would benefit from acute skilled PT to address functional mobility deficits. Anticipate d/c home. Encouraged pt to mobilize with [facility] staff throughout the day and sit up in recliner or at EOB for meals.     Time Calculation:         PT Charges       Row Name 02/25/25 0914             Time Calculation    Start Time 0845  -ST      Stop Time 0903  -ST      Time Calculation (min) 18 min  -ST      PT Received On 02/25/25  -ST      PT - Next Appointment 02/27/25  -ST      PT Goal Re-Cert Due Date 03/04/25  -ST         Time Calculation- PT    Total Timed Code Minutes- PT 0 minute(s)   -ST                User Key  (r) = Recorded By, (t) = Taken By, (c) = Cosigned By      Initials Name Provider Type    ST Tegan Rodriguez, PT Physical Therapist                  Therapy Charges for Today       Code Description Service Date Service Provider Modifiers Qty    48614931396 HC PT EVAL MOD COMPLEXITY 2 2/25/2025 Tegan Rodriguez, PT GP 1            PT G-Codes  AM-PAC 6 Clicks Score (PT): 20  PT Discharge Summary  Anticipated Discharge Disposition (PT): home    Tegan Rodriguez, PT  2/25/2025

## 2025-02-25 NOTE — CASE MANAGEMENT/SOCIAL WORK
Discharge Planning Assessment  [facility]     Patient Name: Sonya Marcus  MRN: [id number]  Today's Date: 2/25/2025    Admit Date: 2/24/2025    Plan: Home, family to transport.   Discharge Needs Assessment       Row Name 02/25/25 1147       Living Environment    People in Home alone    Current Living Arrangements apartment    Family Caregiver if Needed child(janae), adult    Quality of Family Relationships helpful;involved;supportive    Able to Return to Prior Arrangements yes       Transition Planning    Patient/Family Anticipates Transition to home    Patient/Family Anticipated Services at Transition none    Transportation Anticipated car, drives self;family or friend will provide       Discharge Needs Assessment    Readmission Within the Last 30 Days no previous admission in last 30 days    Equipment Currently Used at Home cane, quad tip    Concerns to be Addressed discharge planning    Equipment Needed After Discharge oxygen                   Discharge Plan       Row Name 02/25/25 1147       Plan    Plan Home, family to transport.    Patient/Family in Agreement with Plan yes    Plan Comments CCP met with pt at the bedside, introduced self and role of CCP. Face sheet information and pharmacy verified. Pt lives in a single-story apartment alone. Pt still drives, IADL's and has a cane at home. Pt denies having a living will. Pt is enrolled in meds to beds and states sometimes she has trouble affording medications. Pt has used [facility] in the past and denies SNF history. DC plan is to return home, family to transport. Kimberley RN/CCP                  Continued Care and Services - Admitted Since 2/24/2025    No active coordination exists for this encounter.          Demographic Summary       Row Name 02/25/25 1147       General Information    Admission Type observation    Arrived From home    Required Notices Provided Observation Status Notice    Referral Source admission list;case finding    Reason for Consult  discharge planning    Preferred Language English       Contact Information    Permission Granted to Share Info With                    Functional Status       Row Name 02/25/25 1147       Functional Status    Usual Activity Tolerance excellent    Current Activity Tolerance good       Assessment of Health Literacy    How often do you have someone help you read hospital materials? Never    How often do you have problems learning about your medical condition because of difficulty understanding written information? Never    How often do you have a problem understanding what is told to you about your medical condition? Never    How confident are you filling out medical forms by yourself? Extremely    Health Literacy Excellent       Functional Status, IADL    Medications independent    Meal Preparation independent    Housekeeping independent    Laundry independent    Shopping independent                               Sapna Tabor RN

## 2025-02-25 NOTE — PLAN OF CARE
Goal Outcome Evaluation:  Plan of Care Reviewed With: patient      Pt is 70 y/o F admitted to [facility] on 2/24/25 with weakness, n/v and diarrhea. PMH: CKD, CAD, fibromyalgia, HTN, HLD. At baseline pt is from home alone, reports she moves slow d/t R TKA with subsequent fall and tibia fx, but is indep without AD. Pt currently demos SBA for bed mobility, transfers, and room ambulation. No overt LOB but mild unsteadiness initially. On 1.5 L O2 upon arrival, sats WNL throughout. Pt demos mobility below baseline and therefore would benefit from acute skilled PT to address functional mobility deficits. Anticipate d/c home. Encouraged pt to mobilize with nsg staff throughout the day and sit up in recliner or at EOB for meals.            Anticipated Discharge Disposition (PT): home

## 2025-02-26 ENCOUNTER — READMISSION MANAGEMENT (OUTPATIENT)
Dept: CALL CENTER | Facility: HOSPITAL | Age: 70
End: 2025-02-26
Payer: MEDICARE

## 2025-02-26 VITALS
OXYGEN SATURATION: 93 % | HEART RATE: 58 BPM | WEIGHT: 173.5 LBS | SYSTOLIC BLOOD PRESSURE: 137 MMHG | RESPIRATION RATE: 18 BRPM | HEIGHT: 61 IN | TEMPERATURE: 99 F | BODY MASS INDEX: 32.76 KG/M2 | DIASTOLIC BLOOD PRESSURE: 49 MMHG

## 2025-02-26 LAB
ANION GAP SERPL CALCULATED.3IONS-SCNC: 6 MMOL/L (ref 5–15)
BUN SERPL-MCNC: 9 MG/DL (ref 8–23)
BUN/CREAT SERPL: 15.3 (ref 7–25)
CALCIUM SPEC-SCNC: 8.7 MG/DL (ref 8.6–10.5)
CHLORIDE SERPL-SCNC: 112 MMOL/L (ref 98–107)
CO2 SERPL-SCNC: 24 MMOL/L (ref 22–29)
CREAT SERPL-MCNC: 0.59 MG/DL (ref 0.57–1)
DEPRECATED RDW RBC AUTO: 49.7 FL (ref 37–54)
EGFRCR SERPLBLD CKD-EPI 2021: 97.1 ML/MIN/1.73
ERYTHROCYTE [DISTWIDTH] IN BLOOD BY AUTOMATED COUNT: 14.5 % (ref 12.3–15.4)
GLUCOSE SERPL-MCNC: 96 MG/DL (ref 65–99)
HCT VFR BLD AUTO: 37.2 % (ref 34–46.6)
HGB BLD-MCNC: 11.4 G/DL (ref 12–15.9)
MCH RBC QN AUTO: 28.6 PG (ref 26.6–33)
MCHC RBC AUTO-ENTMCNC: 30.6 G/DL (ref 31.5–35.7)
MCV RBC AUTO: 93.2 FL (ref 79–97)
PLATELET # BLD AUTO: 165 10*3/MM3 (ref 140–450)
PMV BLD AUTO: 11 FL (ref 6–12)
POTASSIUM SERPL-SCNC: 3.6 MMOL/L (ref 3.5–5.2)
RBC # BLD AUTO: 3.99 10*6/MM3 (ref 3.77–5.28)
SODIUM SERPL-SCNC: 142 MMOL/L (ref 136–145)
WBC NRBC COR # BLD AUTO: 4.74 10*3/MM3 (ref 3.4–10.8)

## 2025-02-26 PROCEDURE — 80048 BASIC METABOLIC PNL TOTAL CA: CPT | Performed by: HOSPITALIST

## 2025-02-26 PROCEDURE — 96372 THER/PROPH/DIAG INJ SC/IM: CPT

## 2025-02-26 PROCEDURE — 25010000002 ENOXAPARIN PER 10 MG: Performed by: HOSPITALIST

## 2025-02-26 PROCEDURE — G0378 HOSPITAL OBSERVATION PER HR: HCPCS

## 2025-02-26 PROCEDURE — 85027 COMPLETE CBC AUTOMATED: CPT | Performed by: HOSPITALIST

## 2025-02-26 RX ADMIN — PANTOPRAZOLE SODIUM 40 MG: 40 TABLET, DELAYED RELEASE ORAL at 06:01

## 2025-02-26 RX ADMIN — HYDROCODONE BITARTRATE AND ACETAMINOPHEN 1 TABLET: 7.5; 325 TABLET ORAL at 04:20

## 2025-02-26 RX ADMIN — CETIRIZINE HYDROCHLORIDE 5 MG: 10 TABLET ORAL at 08:31

## 2025-02-26 RX ADMIN — ASPIRIN 81 MG: 81 TABLET, COATED ORAL at 08:31

## 2025-02-26 RX ADMIN — LEVOTHYROXINE SODIUM 88 MCG: 88 TABLET ORAL at 06:00

## 2025-02-26 RX ADMIN — ENOXAPARIN SODIUM 40 MG: 100 INJECTION SUBCUTANEOUS at 08:32

## 2025-02-26 RX ADMIN — PREGABALIN 150 MG: 75 CAPSULE ORAL at 08:32

## 2025-02-26 RX ADMIN — DULOXETINE 60 MG: 60 CAPSULE, DELAYED RELEASE ORAL at 08:31

## 2025-02-26 NOTE — OUTREACH NOTE
Prep Survey      Flowsheet Row Responses   List of hospitals in [place] patient discharged from? [place]   Is LACE score < 7 ? No   Eligibility [facility]   Date of Admission 02/24/25   Date of Discharge 02/26/25   Discharge Disposition Home or Self Care   Discharge diagnosis Generalized weakness   Does the patient have one of the following disease processes/diagnoses(primary or secondary)? Other   Does the patient have Home health ordered? No   Is there a DME ordered? No   Prep survey completed? Yes            Bree ZIMMER - Registered Nurse

## 2025-02-26 NOTE — PLAN OF CARE
Goal Outcome Evaluation:           Progress: no change  Outcome Evaluation: pt a/o x4. vss. 1L NC. prn Norco given for headache. sinus deepti on the monitor. calm and coperative. call light within reach.

## 2025-02-26 NOTE — CASE MANAGEMENT/SOCIAL WORK
Case Management Discharge Note      Final Note: Home, no additional CCP needs.         Selected Continued Care - Admitted Since 2/24/2025       Destination    No services have been selected for the patient.                Durable Medical Equipment    No services have been selected for the patient.                Dialysis/Infusion    No services have been selected for the patient.                Home Medical Care    No services have been selected for the patient.                Therapy    No services have been selected for the patient.                Community Resources    No services have been selected for the patient.                Community & DME    No services have been selected for the patient.                         Final Discharge Disposition Code: 01 - home or self-care

## 2025-02-26 NOTE — DISCHARGE SUMMARY
Patient Name: Sonya Marcus  : 1955  MRN: [id number]    Date of Admission: 2025  Date of Discharge:  2025  Primary Care Physician: Regina Ferro APRN ([place])      Chief Complaint:   Flu Symptoms and Weakness - Generalized      Discharge Diagnoses     Active Hospital Problems    Diagnosis  POA    **Generalized weakness [R53.1]  Yes    Opioid dependence [F11.20]  Yes    Dehydration [E86.0]  Yes    Stage 2 chronic kidney disease [N18.2]  Yes    Fibromyalgia, primary [M79.7]  Yes    Polypharmacy [Z79.899]  Not Applicable    Anxiety disorder [F41.9]  Yes      Resolved Hospital Problems   No resolved problems to display.        Hospital Course     Ms. Marcus is a 69 y.o. female with a history of multiple medical problems who presented to [facility] initially complaining of nausea, vomiting and diarrhea..  Please see the admitting history and physical for further details.  She was found to have dehydration and was admitted to the hospital for further evaluation and treatment.  She was admitted to the hospital and given supportive care with antiemetics and IV fluids.  Her diarrhea resolved and we were not able to collect a stool specimen.  Viral respiratory panel was negative.  With conservative therapy her symptoms have improved significantly.  She is now tolerating a diet with no further vomiting or diarrhea.    She has significant polypharmacy and is under the care of at least 1 if not to pain management doctors.  There had been an attempt to wean her off hydrocodone but she states she could not tolerate this so she went to see a different pain management provider who put her back on full dose hydrocodone but is supposedly tapering her Lyrica.  Withdrawal symptoms could have played a role.    She will follow-up with her primary care provider and pain management specialist.      Day of Discharge     Subjective:  Feels much better today and agreeable to go home.    Physical  Exam:  Temp:  [97.9 °F (36.6 °C)-98.4 °F (36.9 °C)] 98.1 °F (36.7 °C)  Heart Rate:  [47-54] 47  Resp:  [18-20] 18  BP: (124-145)/(46-69) 124/46  Body mass index is 32.8 kg/m².  Physical Exam  Vitals and nursing note reviewed.   Constitutional:       Appearance: Normal appearance.   Pulmonary:      Effort: Pulmonary effort is normal.   Neurological:      Mental Status: She is alert. Mental status is at baseline.   Psychiatric:         Mood and Affect: Mood normal.         Consultants     Consult Orders (all) (From admission, onward)       Start     Ordered    02/24/25 1525  Inpatient Case Management  Consult  Once        Provider:  (Not yet assigned)    02/24/25 1524    02/24/25 1121  [facility] (on-call MD unless specified) Details  Once,   Status:  Canceled        Specialty:  Hospitalist  Provider:  Hayden Portillo MD    02/24/25 1120                  Procedures     * Surgery not found *    Imaging Results (All)       Procedure Component Value Units Date/Time    XR Chest 1 View [[id number]] Collected: 02/24/25 0756     Updated: 02/24/25 0803    Narrative:      XR CHEST 1 VW-     HISTORY: Female who is 69 years-old, weakness     TECHNIQUE: Frontal view of the chest     COMPARISON: 11/15/2024     FINDINGS: Heart, mediastinum and pulmonary vasculature are unremarkable.  No focal pulmonary consolidation, pleural effusion, or pneumothorax.  Right hemidiaphragm remains elevated. No acute osseous process.       Impression:      No focal pulmonary consolidation. Follow-up as clinical  indications persist.     This report was finalized on 2/24/2025 8:00 AM by Dr. Michele Christian M.D on Workstation: [id number]                 Pertinent Labs     Results from last 7 days   Lab Units 02/26/25  0345 02/25/25  0319 02/24/25  0636   WBC 10*3/mm3 4.74 3.74 4.16   HEMOGLOBIN g/dL 11.4* 11.6* 13.3   PLATELETS 10*3/mm3 165 135* 195     Results from last 7 days   Lab Units 02/26/25  0345 02/25/25  0319 02/24/25  0636   SODIUM  "mmol/L 142 139 138   POTASSIUM mmol/L 3.6 3.9 3.9   CHLORIDE mmol/L 112* 113* 106   CO2 mmol/L 24.0 17.0* 22.6   BUN mg/dL 9 13 15   CREATININE mg/dL 0.59 0.71 0.85   GLUCOSE mg/dL 96 80 131*   EGFR mL/min/1.73 97.1 91.6 73.8     Results from last 7 days   Lab Units 02/24/25  0636   ALBUMIN g/dL 3.9   BILIRUBIN mg/dL 0.5   ALK PHOS U/L 96   AST (SGOT) U/L 12   ALT (SGPT) U/L 8     Results from last 7 days   Lab Units 02/26/25  0345 02/25/25  0319 02/24/25  0636   CALCIUM mg/dL 8.7 8.2* 9.6   ALBUMIN g/dL  --   --  3.9   MAGNESIUM mg/dL  --   --  1.8       Results from last 7 days   Lab Units 02/24/25  0758 02/24/25  0636   HSTROP T ng/L 8 8           Invalid input(s): \"LDLCALC\"      Results from last 7 days   Lab Units 02/24/25  0631   COVID19  Not Detected       Test Results Pending at Discharge     Pending Results       Procedure [Order ID] Specimen - Date/Time    Gastrointestinal Panel, PCR - Stool, Per Rectum [[id number]]     Specimen: Stool from Per Rectum               Discharge Details        Discharge Medications        ASK your doctor about these medications        Instructions Start Date   ALPRAZolam 0.5 MG tablet  Commonly known as: XANAX   0.5 mg, 3 Times Daily PRN      aspirin 81 MG EC tablet   81 mg, Daily      buPROPion  MG 24 hr tablet  Commonly known as: WELLBUTRIN XL   300 mg, Nightly      cetirizine 5 MG tablet  Commonly known as: zyrTEC   5 mg, Daily      cyanocobalamin 1000 MCG/ML injection   1,000 mcg, Every 30 Days      docusate sodium 100 MG capsule  Commonly known as: Colace   100 mg, Oral, Daily      DULoxetine 60 MG capsule  Commonly known as: CYMBALTA   60 mg, 2 Times Daily      HYDROcodone-acetaminophen 5-325 MG per tablet  Commonly known as: NORCO   Take 1 tab PO 3x/day for 1 week.  Then take 1 tab PO 2x/day for 1 week.  Then take 0.5 tab 2x/day for 1 week.  Then take 0.5 tab daily for 1 week.  Then discontinue      levothyroxine 88 MCG tablet  Commonly known as: SYNTHROID, " LEVOTHROID   88 mcg, Oral, Every Early Morning      Lunesta 1 MG tablet  Generic drug: eszopiclone   1 mg, Nightly      metoprolol succinate XL 50 MG 24 hr tablet  Commonly known as: TOPROL-XL   50 mg, Daily      naloxone 4 MG/0.1ML nasal spray  Commonly known as: NARCAN   Call 911. Don't prime. [place] in 1 nostril for overdose. Repeat in 2-3 minutes in other nostril if no or minimal breathing/responsiveness.      nitrofurantoin 50 MG capsule  Commonly known as: MACRODANTIN   50 mg, Oral, Nightly      omeprazole 20 MG capsule  Commonly known as: priLOSEC   20 mg, Oral, Daily      ondansetron 4 MG tablet  Commonly known as: Zofran   4 mg, Oral, Every 6 Hours PRN      pregabalin 150 MG capsule  Commonly known as: Lyrica   150 mg, Oral, 3 Times Daily      rosuvastatin 10 MG tablet  Commonly known as: CRESTOR   10 mg, Oral, Nightly      traZODone 100 MG tablet  Commonly known as: DESYREL   100 mg, Oral, Every Night at Bedtime               Allergies   Allergen Reactions    Fish Allergy Anaphylaxis     Has epi pen    Iodine Anaphylaxis    Penicillins Nausea And Vomiting and Rash     Tolerated rocephin 9/2023 admission    Prochlorperazine Dystonia, Anaphylaxis, Rash and Other (See Comments)     Paralysis    Muscle twisting    Shellfish Allergy Anaphylaxis, Rash and Swelling    Shellfish-Derived Products Anaphylaxis    Sulfate Rash    Glycerol, Iodinated Unknown - Low Severity     Anaphylaxis   (also to shellfish)    Octacosanol Dizziness     HEADACHE    Latex Rash    Levofloxacin Hives    Sulfa Antibiotics GI Intolerance       Discharge Disposition:        Discharge Diet:  Diet Order   Procedures    Diet: Cardiac; Healthy Heart (2-3 Na+); Fluid Consistency: Thin (IDDSI 0)       Discharge Activity:       CODE STATUS:    Code Status and Medical Interventions: CPR (Attempt to Resuscitate); Full   Ordered at: 02/24/25 1238     Level Of Support Discussed With:    Patient     Code Status (Patient has no pulse and is not  breathing):    CPR (Attempt to Resuscitate)     Medical Interventions (Patient has pulse or is breathing):    Full       Future Appointments   Date Time Provider Department Center   3/10/2025  2:30 PM Hortencia Dawson APRN [facility]       Time Spent on Discharge:  Greater than 30 minutes      Hayden Portillo MD  [facility]  02/26/25  06:11 EST

## 2025-02-27 ENCOUNTER — TRANSITIONAL CARE MANAGEMENT TELEPHONE ENCOUNTER (OUTPATIENT)
Dept: CALL CENTER | Facility: HOSPITAL | Age: 70
End: 2025-02-27
Payer: MEDICARE

## 2025-02-27 NOTE — OUTREACH NOTE
Call Center TCM Note      Flowsheet Row Responses   [facility] patient discharged from? [place]   Does the patient have one of the following disease processes/diagnoses(primary or secondary)? Other   TCM attempt successful? No   Unsuccessful attempts Attempt 1            Genny Saravia RN    2/27/2025, 13:49 EST

## 2025-02-27 NOTE — OUTREACH NOTE
Call Center TCM Note      Flowsheet Row Responses   [facility] patient discharged from? [place]   Does the patient have one of the following disease processes/diagnoses(primary or secondary)? Other   TCM attempt successful? No   Unsuccessful attempts Attempt 2            Genny Saravia RN    2/27/2025, 15:06 EST

## 2025-02-28 ENCOUNTER — TRANSITIONAL CARE MANAGEMENT TELEPHONE ENCOUNTER (OUTPATIENT)
Dept: CALL CENTER | Facility: HOSPITAL | Age: 70
End: 2025-02-28
Payer: MEDICARE

## 2025-02-28 NOTE — OUTREACH NOTE
Call Center TCM Note      Flowsheet Row Responses   [facility] patient discharged from? [place]   Does the patient have one of the following disease processes/diagnoses(primary or secondary)? Other   TCM attempt successful? Yes   Call start time 1154   Call end time 1156   Discharge diagnosis Generalized weakness   Person spoke with today (if not patient) and relationship pt   Meds reviewed with patient/caregiver? Yes   Is the patient having any side effects they believe may be caused by any medication additions or changes? No   Does the patient have all medications ordered at discharge? N/A   Is the patient taking all medications as directed (includes completed medication regime)? Yes   Comments Neurology 3/10/25   Does the patient have an appointment with their PCP within 7-14 days of discharge? No  [3/4/2025  1:15 PM]   Nursing Interventions Routed TCM call to PCP office, PCP office requested to make appointment - message sent   Psychosocial issues? No   Did the patient receive a copy of their discharge instructions? Yes   Nursing interventions Reviewed instructions with patient   What is the patient's perception of their health status since discharge? Improving   Is the patient/caregiver able to teach back signs and symptoms related to disease process for when to call PCP? Yes   Is the patient/caregiver able to teach back signs and symptoms related to disease process for when to call 911? Yes   Is the patient/caregiver able to teach back the hierarchy of who to call/visit for symptoms/problems? PCP, Specialist, Home health nurse, Urgent Care, ED, 911 Yes   If the patient is a current smoker, are they able to teach back resources for cessation? Not a smoker   TCM call completed? Yes   Wrap up additional comments Pt states she is feeling better, and is not weak as pt is out playing Bingo. No medication changes. Pt verified PCP fu appt   Call end time 1156   Would this patient benefit from a Referral to  Amb Social Work? No   Is the patient interested in additional calls from an ambulatory ? No            Megha Saravia RN    2/28/2025, 11:58 EST

## 2025-03-04 ENCOUNTER — OFFICE VISIT (OUTPATIENT)
Dept: FAMILY MEDICINE CLINIC | Facility: CLINIC | Age: 70
End: 2025-03-04
Payer: MEDICARE

## 2025-03-04 VITALS
OXYGEN SATURATION: 96 % | HEART RATE: 76 BPM | SYSTOLIC BLOOD PRESSURE: 110 MMHG | RESPIRATION RATE: 14 BRPM | HEIGHT: 61 IN | BODY MASS INDEX: 32.93 KG/M2 | WEIGHT: 174.4 LBS | TEMPERATURE: 96.9 F | DIASTOLIC BLOOD PRESSURE: 70 MMHG

## 2025-03-04 DIAGNOSIS — K21.9 GASTROESOPHAGEAL REFLUX DISEASE, UNSPECIFIED WHETHER ESOPHAGITIS PRESENT: Chronic | ICD-10-CM

## 2025-03-04 DIAGNOSIS — E78.2 MIXED HYPERLIPIDEMIA: Primary | Chronic | ICD-10-CM

## 2025-03-04 DIAGNOSIS — E03.9 ACQUIRED HYPOTHYROIDISM: Chronic | ICD-10-CM

## 2025-03-04 DIAGNOSIS — N39.0 CHRONIC UTI (URINARY TRACT INFECTION): ICD-10-CM

## 2025-03-04 DIAGNOSIS — F41.1 GENERALIZED ANXIETY DISORDER: ICD-10-CM

## 2025-03-04 RX ORDER — NITROFURANTOIN MACROCRYSTALS 50 MG/1
50 CAPSULE ORAL NIGHTLY
Qty: 90 CAPSULE | Refills: 1 | Status: SHIPPED | OUTPATIENT
Start: 2025-03-04

## 2025-03-04 RX ORDER — DULOXETIN HYDROCHLORIDE 60 MG/1
60 CAPSULE, DELAYED RELEASE ORAL 2 TIMES DAILY
Qty: 180 CAPSULE | Refills: 1 | Status: SHIPPED | OUTPATIENT
Start: 2025-03-04

## 2025-03-04 RX ORDER — NITROFURANTOIN MACROCRYSTALS 50 MG/1
50 CAPSULE ORAL NIGHTLY
Qty: 90 CAPSULE | Refills: 1 | Status: CANCELLED | OUTPATIENT
Start: 2025-03-04

## 2025-03-04 RX ORDER — OMEPRAZOLE 20 MG/1
20 CAPSULE, DELAYED RELEASE ORAL DAILY
Qty: 90 CAPSULE | Refills: 1 | Status: SHIPPED | OUTPATIENT
Start: 2025-03-04

## 2025-03-04 RX ORDER — LEVOTHYROXINE SODIUM 88 UG/1
88 TABLET ORAL
Qty: 90 TABLET | Refills: 1 | Status: SHIPPED | OUTPATIENT
Start: 2025-03-04

## 2025-03-04 RX ORDER — ROSUVASTATIN CALCIUM 10 MG/1
10 TABLET, COATED ORAL NIGHTLY
Qty: 90 TABLET | Refills: 1 | Status: CANCELLED | OUTPATIENT
Start: 2025-03-04

## 2025-03-04 NOTE — PROGRESS NOTES
Subjective   Sonya Marcus is a 69 y.o. female.     History of Present Illness   Chief Complaint     Fatigue (Hosp fup )  Med Refill  Hyperlipidemia      Sonya Marcus 69 y.o. female presents today for hosptial follow up.  she was treated [facility] 25-25 for dehydration and weakness  .  I reviewed all of the labs and diagnostic testing.  The patient's medications were not changed:  Current outpatient and discharge medications have been reconciled for the patient.  Reviewed by: CHANELLE Beltran ([place])    she does not have a follow up appointment with a specialist:     Hospital note as follows:    Patient Name: Sonya Marcus  : 1955  MRN: [id number]     Date of Admission: 2025  Date of Discharge:  2025  Primary Care Physician: Regina Ferro ([place]CHANELLE        Chief Complaint:   Flu Symptoms and Weakness - Generalized        Discharge Diagnoses            Active Hospital Problems     Diagnosis   POA    **Generalized weakness [R53.1]   Yes    Opioid dependence [F11.20]   Yes    Dehydration [E86.0]   Yes    Stage 2 chronic kidney disease [N18.2]   Yes    Fibromyalgia, primary [M79.7]   Yes    Polypharmacy [Z79.899]   Not Applicable    Anxiety disorder [F41.9]   Yes       Resolved Hospital Problems   No resolved problems to display.         Hospital Course      Ms. Marcus is a 69 y.o. female with a history of multiple medical problems who presented to [facility] initially complaining of nausea, vomiting and diarrhea..  Please see the admitting history and physical for further details.  She was found to have dehydration and was admitted to the hospital for further evaluation and treatment.  She was admitted to the hospital and given supportive care with antiemetics and IV fluids.  Her diarrhea resolved and we were not able to collect a stool specimen.  Viral respiratory panel was negative.  With conservative therapy her symptoms have improved significantly.  She is  now tolerating a diet with no further vomiting or diarrhea.     She has significant polypharmacy and is under the care of at least 1 if not to pain management doctors.  There had been an attempt to wean her off hydrocodone but she states she could not tolerate this so she went to see a different pain management provider who put her back on full dose hydrocodone but is supposedly tapering her Lyrica.  Withdrawal symptoms could have played a role.     She will follow-up with her primary care provider and pain management specialist.        She is weaning Lyrica.  Was at 300 mg BID and is down to 150 mg BID for 3 weeks.  She feels this is going well so far.  She denies any recurrence of weakness, nausea/vomiting or diarrhea.      She is needing refills on her medications for hypothyroidism which has been well controlled.  She needs prophylactic nitrofurantoin refilled as well as omeprazole for GERD which is working well.  She needs rosuvastatin which she takes for hyperlipidemia and duloxetine which she takes for anxiety as well as pain.      The following portions of the patient's history were reviewed and updated as appropriate: allergies, current medications, past family history, past medical history, past social history, past surgical history, and problem list.    Review of Systems   Constitutional:  Positive for fatigue. Negative for chills, diaphoresis and fever.   HENT:  Negative for sore throat.    Respiratory:  Negative for cough.    Cardiovascular:  Negative for chest pain.   Gastrointestinal:  Negative for abdominal pain, nausea and vomiting.   Genitourinary:  Negative for dysuria.   Musculoskeletal:  Positive for myalgias. Negative for neck pain.   Skin:  Negative for rash.   Neurological:  Positive for weakness. Negative for numbness and headaches.       Objective   Physical Exam  Vitals and nursing note reviewed.   Constitutional:       Appearance: Normal appearance. She is well-developed.   Neck:       Vascular: No carotid bruit.   Cardiovascular:      Rate and Rhythm: Normal rate and regular rhythm.   Pulmonary:      Effort: Pulmonary effort is normal.      Breath sounds: Normal breath sounds.   Neurological:      Mental Status: She is alert and oriented to person, place, and time.   Psychiatric:         Mood and Affect: Mood normal.         Behavior: Behavior normal.         Thought Content: Thought content normal.         Judgment: Judgment normal.         Assessment & Plan   Diagnoses and all orders for this visit:    1. Mixed hyperlipidemia (Primary)    2. Chronic UTI (urinary tract infection)  -     nitrofurantoin (MACRODANTIN) 50 MG capsule; Take 1 capsule by mouth Every Night.  Dispense: 90 capsule; Refill: 1    3. Acquired hypothyroidism  -     levothyroxine (SYNTHROID, LEVOTHROID) 88 MCG tablet; Take 1 tablet by mouth Every Morning.  Dispense: 90 tablet; Refill: 1    4. Gastroesophageal reflux disease, unspecified whether esophagitis present  -     omeprazole (priLOSEC) 20 MG capsule; Take 1 capsule by mouth Daily.  Dispense: 90 capsule; Refill: 1    5. Generalized anxiety disorder  -     DULoxetine (CYMBALTA) 60 MG capsule; Take 1 capsule by mouth 2 (Two) Times a Day.  Dispense: 180 capsule; Refill: 1

## 2025-03-10 ENCOUNTER — HOSPITAL ENCOUNTER (EMERGENCY)
Facility: HOSPITAL | Age: 70
Discharge: HOME | End: 2025-03-10
Attending: STUDENT IN AN ORGANIZED HEALTH CARE EDUCATION/TRAINING PROGRAM | Admitting: STUDENT IN AN ORGANIZED HEALTH CARE EDUCATION/TRAINING PROGRAM
Payer: MEDICARE

## 2025-03-10 ENCOUNTER — APPOINTMENT (OUTPATIENT)
Dept: CT IMAGING | Facility: HOSPITAL | Age: 70
End: 2025-03-10
Payer: MEDICARE

## 2025-03-10 VITALS
HEART RATE: 56 BPM | RESPIRATION RATE: 16 BRPM | OXYGEN SATURATION: 95 % | SYSTOLIC BLOOD PRESSURE: 145 MMHG | DIASTOLIC BLOOD PRESSURE: 72 MMHG | TEMPERATURE: 98.3 F

## 2025-03-10 DIAGNOSIS — R11.2 NAUSEA VOMITING AND DIARRHEA: Primary | ICD-10-CM

## 2025-03-10 DIAGNOSIS — R19.7 NAUSEA VOMITING AND DIARRHEA: Primary | ICD-10-CM

## 2025-03-10 LAB
ALBUMIN SERPL-MCNC: 4 G/DL (ref 3.5–5.2)
ALBUMIN/GLOB SERPL: 1.6 G/DL
ALP SERPL-CCNC: 92 U/L (ref 39–117)
ALT SERPL W P-5'-P-CCNC: 8 U/L (ref 1–33)
ANION GAP SERPL CALCULATED.3IONS-SCNC: 9.1 MMOL/L (ref 5–15)
AST SERPL-CCNC: 14 U/L (ref 1–32)
BACTERIA UR QL AUTO: ABNORMAL /HPF
BASOPHILS # BLD AUTO: 0.04 10*3/MM3 (ref 0–0.2)
BASOPHILS NFR BLD AUTO: 0.8 % (ref 0–1.5)
BILIRUB SERPL-MCNC: 0.3 MG/DL (ref 0–1.2)
BILIRUB UR QL STRIP: NEGATIVE
BUN SERPL-MCNC: 11 MG/DL (ref 8–23)
BUN/CREAT SERPL: 12 (ref 7–25)
CALCIUM SPEC-SCNC: 9.9 MG/DL (ref 8.6–10.5)
CHLORIDE SERPL-SCNC: 106 MMOL/L (ref 98–107)
CLARITY UR: CLEAR
CO2 SERPL-SCNC: 22.9 MMOL/L (ref 22–29)
COLOR UR: YELLOW
CREAT SERPL-MCNC: 0.92 MG/DL (ref 0.57–1)
DEPRECATED RDW RBC AUTO: 48.4 FL (ref 37–54)
EGFRCR SERPLBLD CKD-EPI 2021: 67.1 ML/MIN/1.73
EOSINOPHIL # BLD AUTO: 0.04 10*3/MM3 (ref 0–0.4)
EOSINOPHIL NFR BLD AUTO: 0.8 % (ref 0.3–6.2)
ERYTHROCYTE [DISTWIDTH] IN BLOOD BY AUTOMATED COUNT: 14.4 % (ref 12.3–15.4)
GLOBULIN UR ELPH-MCNC: 2.5 GM/DL
GLUCOSE SERPL-MCNC: 112 MG/DL (ref 65–99)
GLUCOSE UR STRIP-MCNC: NEGATIVE MG/DL
HCT VFR BLD AUTO: 42.5 % (ref 34–46.6)
HGB BLD-MCNC: 13.7 G/DL (ref 12–15.9)
HGB UR QL STRIP.AUTO: NEGATIVE
HYALINE CASTS UR QL AUTO: ABNORMAL /LPF
IMM GRANULOCYTES # BLD AUTO: 0.01 10*3/MM3 (ref 0–0.05)
IMM GRANULOCYTES NFR BLD AUTO: 0.2 % (ref 0–0.5)
KETONES UR QL STRIP: ABNORMAL
LEUKOCYTE ESTERASE UR QL STRIP.AUTO: ABNORMAL
LIPASE SERPL-CCNC: 11 U/L (ref 13–60)
LYMPHOCYTES # BLD AUTO: 1.63 10*3/MM3 (ref 0.7–3.1)
LYMPHOCYTES NFR BLD AUTO: 31.9 % (ref 19.6–45.3)
MAGNESIUM SERPL-MCNC: 1.8 MG/DL (ref 1.6–2.4)
MCH RBC QN AUTO: 29.5 PG (ref 26.6–33)
MCHC RBC AUTO-ENTMCNC: 32.2 G/DL (ref 31.5–35.7)
MCV RBC AUTO: 91.6 FL (ref 79–97)
MONOCYTES # BLD AUTO: 0.34 10*3/MM3 (ref 0.1–0.9)
MONOCYTES NFR BLD AUTO: 6.7 % (ref 5–12)
NEUTROPHILS NFR BLD AUTO: 3.05 10*3/MM3 (ref 1.7–7)
NEUTROPHILS NFR BLD AUTO: 59.6 % (ref 42.7–76)
NITRITE UR QL STRIP: NEGATIVE
NRBC BLD AUTO-RTO: 0 /100 WBC (ref 0–0.2)
PH UR STRIP.AUTO: 6.5 [PH] (ref 5–8)
PLATELET # BLD AUTO: 245 10*3/MM3 (ref 140–450)
PMV BLD AUTO: 11.1 FL (ref 6–12)
POTASSIUM SERPL-SCNC: 3.7 MMOL/L (ref 3.5–5.2)
PROT SERPL-MCNC: 6.5 G/DL (ref 6–8.5)
PROT UR QL STRIP: NEGATIVE
RBC # BLD AUTO: 4.64 10*6/MM3 (ref 3.77–5.28)
RBC # UR STRIP: ABNORMAL /HPF
REF LAB TEST METHOD: ABNORMAL
SODIUM SERPL-SCNC: 138 MMOL/L (ref 136–145)
SP GR UR STRIP: 1.01 (ref 1–1.03)
SQUAMOUS #/AREA URNS HPF: ABNORMAL /HPF
UROBILINOGEN UR QL STRIP: ABNORMAL
WBC # UR STRIP: ABNORMAL /HPF
WBC NRBC COR # BLD AUTO: 5.11 10*3/MM3 (ref 3.4–10.8)

## 2025-03-10 PROCEDURE — 25010000002 ONDANSETRON PER 1 MG: Performed by: PHYSICIAN ASSISTANT

## 2025-03-10 PROCEDURE — 99284 EMERGENCY DEPT VISIT MOD MDM: CPT

## 2025-03-10 PROCEDURE — 74176 CT ABD & PELVIS W/O CONTRAST: CPT

## 2025-03-10 PROCEDURE — 81001 URINALYSIS AUTO W/SCOPE: CPT | Performed by: PHYSICIAN ASSISTANT

## 2025-03-10 PROCEDURE — 83735 ASSAY OF MAGNESIUM: CPT | Performed by: PHYSICIAN ASSISTANT

## 2025-03-10 PROCEDURE — 36415 COLL VENOUS BLD VENIPUNCTURE: CPT

## 2025-03-10 PROCEDURE — 85025 COMPLETE CBC W/AUTO DIFF WBC: CPT | Performed by: PHYSICIAN ASSISTANT

## 2025-03-10 PROCEDURE — 83690 ASSAY OF LIPASE: CPT | Performed by: PHYSICIAN ASSISTANT

## 2025-03-10 PROCEDURE — 80053 COMPREHEN METABOLIC PANEL: CPT | Performed by: PHYSICIAN ASSISTANT

## 2025-03-10 PROCEDURE — 96374 THER/PROPH/DIAG INJ IV PUSH: CPT

## 2025-03-10 PROCEDURE — 25810000003 SODIUM CHLORIDE 0.9 % SOLUTION: Performed by: PHYSICIAN ASSISTANT

## 2025-03-10 RX ORDER — ONDANSETRON 4 MG/1
4-8 TABLET, ORALLY DISINTEGRATING ORAL EVERY 8 HOURS PRN
Qty: 10 TABLET | Refills: 0 | Status: SHIPPED | OUTPATIENT
Start: 2025-03-10

## 2025-03-10 RX ORDER — HYDROCODONE BITARTRATE AND ACETAMINOPHEN 5; 325 MG/1; MG/1
1 TABLET ORAL EVERY 6 HOURS PRN
Refills: 0 | Status: DISCONTINUED | OUTPATIENT
Start: 2025-03-10 | End: 2025-03-10 | Stop reason: HOSPADM

## 2025-03-10 RX ORDER — ONDANSETRON 2 MG/ML
4 INJECTION INTRAMUSCULAR; INTRAVENOUS ONCE
Status: COMPLETED | OUTPATIENT
Start: 2025-03-10 | End: 2025-03-10

## 2025-03-10 RX ADMIN — ONDANSETRON 4 MG: 2 INJECTION, SOLUTION INTRAMUSCULAR; INTRAVENOUS at 12:29

## 2025-03-10 RX ADMIN — SODIUM CHLORIDE 500 ML: 9 INJECTION, SOLUTION INTRAVENOUS at 12:29

## 2025-03-10 RX ADMIN — HYDROCODONE BITARTRATE AND ACETAMINOPHEN 1 TABLET: 5; 325 TABLET ORAL at 16:01

## 2025-03-10 NOTE — CASE MANAGEMENT/SOCIAL WORK
Discharge Planning Assessment  [facility]     Patient Name: Sonya Marcus  MRN: [id number]  Today's Date: 3/10/2025    Admit Date: 3/10/2025        Discharge Needs Assessment    No documentation.                  Discharge Plan       Row Name 03/10/25 1614       Plan    Plan Comments Spoke with patient at bedside, per her request, regarding community resources. Patient reports that she needs some help in the home with minor chores and/or activities of daily living. I provided her with a list of in home personal care agencies. She also reports that she would like information on substance abuse treatment centers. I provided her with a list of these, as well.                       Demographic Summary    No documentation.                  Functional Status    No documentation.                  Psychosocial    No documentation.                  Abuse/Neglect    No documentation.                  Legal    No documentation.                  Substance Abuse    No documentation.                  Patient Forms    No documentation.                     Guido Piedra RN

## 2025-03-10 NOTE — ED PROVIDER NOTES
EMERGENCY DEPARTMENT MD ATTESTATION NOTE    Room Number:  08/08  PCP: Provider, No Known  Independent Historians: Patient    HPI:    Context: Sonya Marcus is a 69 y.o. female with a medical history of polyphonic, MICHAEL, kidney stones, GERD, CKD, opiate dependence who presents to the ED c/o acute nausea, vomiting, diarrhea.  She states symptoms started this morning.  Patient additionally reports she has run out of her hydrocodone and follows with pain management.  Patient is not due for refill till tomorrow.      PHYSICAL EXAM    I have reviewed the triage vital signs and nursing notes.    ED Triage Vitals [03/10/25 1107]   Temp Heart Rate Resp BP SpO2   98.3 °F (36.8 °C) 66 16 147/81 97 %      Temp src Heart Rate Source Patient Position BP Location FiO2 (%)   -- -- -- -- --       Physical Exam  GENERAL: alert, no acute distress  SKIN: Warm, dry  HENT: Normocephalic, atraumatic  EYES: no scleral icterus  CV: regular rhythm, regular rate  RESPIRATORY: normal effort, lungs clear  ABDOMEN: soft, nontender, nondistended  MUSCULOSKELETAL: no deformity  NEURO: alert, moves all extremities, follows commands            MEDICATIONS GIVEN IN ER  Medications   sodium chloride 0.9 % bolus 500 mL (500 mL Intravenous New Bag 3/10/25 1229)   ondansetron (ZOFRAN) injection 4 mg (4 mg Intravenous Given 3/10/25 1229)         ORDERS PLACED DURING THIS VISIT:  Orders Placed This Encounter   Procedures    Gastrointestinal Panel, PCR - Stool, Per Rectum    CT Abdomen Pelvis Without Contrast    Comprehensive Metabolic Panel    Lipase    Urinalysis With Microscopic If Indicated (No Culture) - Urine, Clean Catch    CBC Auto Differential    Magnesium    CBC & Differential         PROCEDURES  Procedures            PROGRESS, DATA ANALYSIS, CONSULTS, AND MEDICAL DECISION MAKING  All labs have been independently interpreted by me.  All radiology studies have been reviewed by me. All EKG's have been independently viewed and interpreted by me.   Discussion below represents my analysis of pertinent findings related to patient's condition, differential diagnosis, treatment plan and final disposition.    Differential diagnosis includes but is not limited to gastroenteritis, electrolyte abnormality, biliary pathology, opioid dependence    Clinical Scores:                                     ED Course as of 03/10/25 1546   Mon Mar 10, 2025   1339 WBC: 5.11 [KA]   1339 Hemoglobin: 13.7 [KA]   1339 Lipase(!): 11 [KA]   1339 Magnesium: 1.8 [KA]   1339 Glucose(!): 112 [KA]   1339 Creatinine: 0.92 [KA]   1518 Bacteria, UA: None Seen [KA]   1518 WBC, UA(!): 11-20 [KA]   1518 RBC, UA: 0-2 [KA]   1520 Reassessed the patient, she is resting, no distress awake and alert.  Vitals normal on the monitor.  Workup is unremarkable, no UTI, CBC unremarkable, normal lipase and magnesium.  CT abdomen shows no acute intra-abdominal findings and no significant incidentals.  She has had no nausea, vomiting, diarrhea in the emergency department.  She did run out of her hydrocodone, states is due for refill tomorrow, this could be contributing to some of her symptoms.  I have prescribed some Zofran, counseled her on follow-up and indications for repeat evaluation. [KA]      ED Course User Index  [KA] Latoya Izquierdo PA-C       MDM: 69-year-old female presenting for evaluation of nausea, vomiting.  Patient due to meet with her pain management team tomorrow the next day.  Workup in the ER is overall unremarkable and on reassessment patient is asymptomatic.  Will discharge to continue outpatient management.      COMPLEXITY OF CARE  Admission was considered but after careful review of the patient's presentation, physical examination, diagnostic results, and response to treatment the patient may be safely discharged with outpatient follow-up.    Please note that portions of this document were completed with a voice recognition program.    Note Disclaimer: At [facility], we believe that  sharing information builds trust and better relationships. You are receiving this note because you recently visited [facility]. It is possible you will see health information before a provider has talked with you about it. This kind of information can be easy to misunderstand. To help you fully understand what it means for your health, we urge you to discuss this note with your provider.         Arsalan Lee MD  03/10/25 1545

## 2025-03-10 NOTE — ED PROVIDER NOTES
EMERGENCY DEPARTMENT ENCOUNTER  Room Number:  08/08  PCP: Provider, No Known  Independent Historians: Patient      HPI:  Chief Complaint: had concerns including Weakness - Generalized and Diarrhea.     A complete HPI/ROS/PMH/PSH/SH/FH are unobtainable due to: None    Chronic or social conditions impacting patient care (Social Determinants of Health): None      Context: The patient is a 69 y.o. female with a medical history of polypharmacy, MICHAEL, kidney stones, GERD, chronic kidney disease, opioid dependence who presents to the ED c/o acute nausea vomiting and diarrhea that she states started this morning.  Triage notes that his started 1 week ago but she clarifies that it was this morning.  She reports 1 episode of vomiting and 3 episodes of diarrhea.  She states she ran out of her prescribed hydrocodone a few days ago and is due for refill tomorrow.  She is having some generalized abdominal discomfort, denies hematuria dysuria urinary frequency, no fevers.      Review of prior external notes (non-ED) -and- Review of prior external test results outside of this encounter:  Patient was admitted to 20 4/25 to 2/26/2025 for nausea vomiting and diarrhea.  He was dehydrated, improved with antiemetics and IV fluids.  Diarrhea resolved and unable to be tested, respiratory panel negative, polypharmacy.        PAST MEDICAL HISTORY  Active Ambulatory Problems     Diagnosis Date Noted    Altered mental status 09/04/2023    Polypharmacy 09/04/2023    Positive urine drug screen 09/04/2023    MICHAEL (acute kidney injury) 09/04/2023    Left ureteral stone 09/06/2023    Functional tremor 05/07/2024    Knee joint replacement status 08/22/2024    Fibromyalgia, primary 10/16/2023    Coronary arteriosclerosis 02/01/2024    Hypothyroidism 10/23/2017    Genitourinary syndrome of menopause 10/23/2023    Hyperlipidemia 10/23/2017    Osteoarthritis of right knee 06/25/2024    Pernicious anemia 10/23/2017    Urinary tract infection in female  09/03/2024    Absolute anemia 09/16/2024    Gastroesophageal reflux disease 10/23/2017    Angina pectoris 02/01/2024    Anxiety disorder 10/23/2017    Arthritis or polyarthritis, rheumatoid 09/16/2024    Avitaminosis D 09/16/2024    B12 deficiency 09/16/2024    Bilateral primary osteoarthritis of knee 02/01/2024    BP (high blood pressure) 09/16/2024    Cardiac murmur 09/16/2024    Stage 2 chronic kidney disease 02/01/2024    Aftercare for healing traumatic closed fracture of right lower extremity 11/17/2024    Delayed union of tibial shaft fracture 12/07/2024    Generalized weakness 02/24/2025    Opioid dependence 02/24/2025    Dehydration 02/24/2025     Resolved Ambulatory Problems     Diagnosis Date Noted    UTI (urinary tract infection) 09/04/2023    Bacteremia due to Escherichia coli 09/05/2023    Arthralgia of right knee 09/10/2024    Encephalopathy, toxic 11/15/2024     Past Medical History:   Diagnosis Date    Anxiety and depression     Arthritis     Carotid artery occlusion 2021    Chronic urinary tract infection     CKD (chronic kidney disease)     Coronary artery disease 2020    Disease of thyroid gland     Fractures 09/20/2024    GERD (gastroesophageal reflux disease)     History of kidney stones     History of pulmonary embolism     HL (hearing loss) 2024    Hypertension     Left hip pain     Memory loss     MVP (mitral valve prolapse)     Prediabetes     Right knee pain     Right shoulder pain     Sleep apnea     Substance abuse 2010         PAST SURGICAL HISTORY  Past Surgical History:   Procedure Laterality Date    APPENDECTOMY      BREAST BIOPSY      COLONOSCOPY  01/01/2022    CYSTOSCOPY W/ URETERAL STENT PLACEMENT Left 09/05/2023    Procedure: LEFT CYSTOSCOPY URETERAL CATHETER/STENT INSERTION;  Surgeon: Quinton Rosa MD;  Location: [facility];  Service: Urology;  Laterality: Left;    EYE SURGERY      HYSTERECTOMY      REPLACEMENT TOTAL KNEE Left 2021    SHOULDER SURGERY Right      TONSILLECTOMY      TOTAL KNEE ARTHROPLASTY Right 08/22/2024    Procedure: TOTAL KNEE ARTHROPLASTY WITH CORI ROBOT;  Surgeon: Steven Sumner II, MD;  Location[place] [facility] [place] [facility];  Service: Robotics - Ortho;  Laterality: Right;         FAMILY HISTORY  Family History   Problem Relation Age of Onset    Stroke Sister     Breast cancer Daughter     Breast cancer Daughter     Stroke Maternal Grandmother     Malig Hyperthermia Neg Hx          SOCIAL HISTORY  Social History     Socioeconomic History    Marital status:    Tobacco Use    Smoking status: Never     Passive exposure: Never    Smokeless tobacco: Never   Vaping Use    Vaping status: Never Used   Substance and Sexual Activity    Alcohol use: Yes     Comment: OCCASIONALLY    Drug use: Not Currently     Types: Hydrocodone, Oxycodone     Comment: 2010    Sexual activity: Not Currently     Birth control/protection: Post-menopausal     Comment: Post Menopausal with no immediate partner         ALLERGIES  Fish allergy; Iodine; Penicillins; Prochlorperazine; Shellfish allergy; Shellfish-derived products; Sulfate; Glycerol, iodinated; Octacosanol; Latex; Levofloxacin; and Sulfa antibiotics      REVIEW OF SYSTEMS  Review of Systems  Included in HPI  All systems reviewed and negative except for those discussed in HPI.      PHYSICAL EXAM    I have reviewed the triage vital signs and nursing notes.    ED Triage Vitals [03/10/25 1107]   Temp Heart Rate Resp BP SpO2   98.3 °F (36.8 °C) 66 16 147/81 97 %      Temp src Heart Rate Source Patient Position BP Location FiO2 (%)   -- -- -- -- --       Physical Exam  GENERAL: alert, no acute distress  SKIN: Warm, dry  HENT: Normocephalic, atraumatic  EYES: no scleral icterus  CV: regular rhythm, regular rate  RESPIRATORY: normal effort, lungs clear  ABDOMEN: nondistended  MUSCULOSKELETAL: no deformity  NEURO: alert, moves all extremities, follows commands            LAB RESULTS  Recent Results (from the past 24 hours)    Comprehensive Metabolic Panel    Collection Time: 03/10/25 11:49 AM    Specimen: Arm, Left; Blood   Result Value Ref Range    Glucose 112 (H) 65 - 99 mg/dL    BUN 11 8 - 23 mg/dL    Creatinine 0.92 0.57 - 1.00 mg/dL    Sodium 138 136 - 145 mmol/L    Potassium 3.7 3.5 - 5.2 mmol/L    Chloride 106 98 - 107 mmol/L    CO2 22.9 22.0 - 29.0 mmol/L    Calcium 9.9 8.6 - 10.5 mg/dL    Total Protein 6.5 6.0 - 8.5 g/dL    Albumin 4.0 3.5 - 5.2 g/dL    ALT (SGPT) 8 1 - 33 U/L    AST (SGOT) 14 1 - 32 U/L    Alkaline Phosphatase 92 39 - 117 U/L    Total Bilirubin 0.3 0.0 - 1.2 mg/dL    Globulin 2.5 gm/dL    A/G Ratio 1.6 g/dL    BUN/Creatinine Ratio 12.0 7.0 - 25.0    Anion Gap 9.1 5.0 - 15.0 mmol/L    eGFR 67.1 >60.0 mL/min/1.73   Lipase    Collection Time: 03/10/25 11:49 AM    Specimen: Arm, Left; Blood   Result Value Ref Range    Lipase 11 (L) 13 - 60 U/L   CBC Auto Differential    Collection Time: 03/10/25 11:49 AM    Specimen: Arm, Left; Blood   Result Value Ref Range    WBC 5.11 3.40 - 10.80 10*3/mm3    RBC 4.64 3.77 - 5.28 10*6/mm3    Hemoglobin 13.7 12.0 - 15.9 g/dL    Hematocrit 42.5 34.0 - 46.6 %    MCV 91.6 79.0 - 97.0 fL    MCH 29.5 26.6 - 33.0 pg    MCHC 32.2 31.5 - 35.7 g/dL    RDW 14.4 12.3 - 15.4 %    RDW-SD 48.4 37.0 - 54.0 fl    MPV 11.1 6.0 - 12.0 fL    Platelets 245 140 - 450 10*3/mm3    Neutrophil % 59.6 42.7 - 76.0 %    Lymphocyte % 31.9 19.6 - 45.3 %    Monocyte % 6.7 5.0 - 12.0 %    Eosinophil % 0.8 0.3 - 6.2 %    Basophil % 0.8 0.0 - 1.5 %    Immature Grans % 0.2 0.0 - 0.5 %    Neutrophils, Absolute 3.05 1.70 - 7.00 10*3/mm3    Lymphocytes, Absolute 1.63 0.70 - 3.10 10*3/mm3    Monocytes, Absolute 0.34 0.10 - 0.90 10*3/mm3    Eosinophils, Absolute 0.04 0.00 - 0.40 10*3/mm3    Basophils, Absolute 0.04 0.00 - 0.20 10*3/mm3    Immature Grans, Absolute 0.01 0.00 - 0.05 10*3/mm3    nRBC 0.0 0.0 - 0.2 /100 WBC   Magnesium    Collection Time: 03/10/25 11:49 AM    Specimen: Arm, Left; Blood   Result Value Ref  Range    Magnesium 1.8 1.6 - 2.4 mg/dL   Urinalysis With Microscopic If Indicated (No Culture) - Urine, Clean Catch    Collection Time: 03/10/25  2:57 PM    Specimen: Urine, Clean Catch   Result Value Ref Range    Color, UA Yellow Yellow, Straw    Appearance, UA Clear Clear    pH, UA 6.5 5.0 - 8.0    Specific Gravity, UA 1.013 1.005 - 1.030    Glucose, UA Negative Negative    Ketones, UA 15 mg/dL (1+) (A) Negative    Bilirubin, UA Negative Negative    Blood, UA Negative Negative    Protein, UA Negative Negative    Leuk Esterase, UA Small (1+) (A) Negative    Nitrite, UA Negative Negative    Urobilinogen, UA 1.0 E.U./dL 0.2 - 1.0 E.U./dL   Urinalysis, Microscopic Only - Urine, Clean Catch    Collection Time: 03/10/25  2:57 PM    Specimen: Urine, Clean Catch   Result Value Ref Range    RBC, UA 0-2 None Seen, 0-2 /HPF    WBC, UA 11-20 (A) None Seen, 0-2 /HPF    Bacteria, UA None Seen None Seen /HPF    Squamous Epithelial Cells, UA 3-6 (A) None Seen, 0-2 /HPF    Hyaline Casts, UA None Seen None Seen /LPF    Methodology Automated Microscopy          RADIOLOGY  CT Abdomen Pelvis Without Contrast  Result Date: 3/10/2025  CT ABDOMEN PELVIS WO CONTRAST-  INDICATION: Nausea, vomiting and diarrhea  COMPARISON: CT abdomen pelvis December 31, 2023  TECHNIQUE: Routine CT abdomen/pelvis without IV contrast. Coronal and sagittal reformats. Radiation dose reduction techniques were utilized, including automated exposure control and exposure modulation based on body size.  FINDINGS:  Lung bases: Subsegmental atelectasis at the bases. Small amount of pericardial fluid. Coronary artery atherosclerotic calcification.  ABDOMEN: Normal liver. Cholelithiasis. No gallbladder wall thickening or surrounding inflammation. No biliary ductal dilatation. Spleen is normal in size. Moderate pancreatic lipomatosis. No pancreatic ductal dilatation or mass seen. No adrenal nodules. Nonobstructing nephrolithiasis in the right mid kidney, series 2, axial  mage 51, measures 4 mm. No hydronephrosis. No ureterolithiasis.  Pelvis: Underdistended bladder. No bladder calculus. Hysterectomy. No adnexal mass.  Bowel: Small hiatal hernia. No small bowel obstruction. Colonic diverticulosis. Appendix not seen though no secondary findings of appendicitis.  Abdominal wall: Small fat-containing inguinal hernias.  Retroperitoneum: No lymphadenopathy.  Vasculature: Mild aortoiliac atherosclerotic calcification. No abdominal aortic aneurysm.  Osseous structures: Old right superior and inferior pubic rami fractures. Lower lumbar facet degenerative arthropathy. Spondylosis/degenerative disc disease, moderate at L3/L4, severe at L5/S1.       1. No acute findings identified in the abdomen or pelvis. 2. Cholelithiasis. 3. Nonobstructing right nephrolithiasis. 4. Colonic diverticulosis.  This report was finalized on 3/10/2025 1:16 PM by Dr. Guido Smith M.D on Workstation: [id number]          MEDICATIONS GIVEN IN ER  Medications   HYDROcodone-acetaminophen (NORCO) 5-325 MG per tablet 1 tablet (has no administration in time range)   sodium chloride 0.9 % bolus 500 mL (500 mL Intravenous New Bag 3/10/25 1229)   ondansetron (ZOFRAN) injection 4 mg (4 mg Intravenous Given 3/10/25 1229)         ORDERS PLACED DURING THIS VISIT:  Orders Placed This Encounter   Procedures    Gastrointestinal Panel, PCR - Stool, Per Rectum    CT Abdomen Pelvis Without Contrast    Comprehensive Metabolic Panel    Lipase    Urinalysis With Microscopic If Indicated (No Culture) - Urine, Clean Catch    CBC Auto Differential    Magnesium    Urinalysis, Microscopic Only - Urine, Clean Catch    CBC & Differential         OUTPATIENT MEDICATION MANAGEMENT:  Current Facility-Administered Medications Ordered in Epic   Medication Dose Route Frequency Provider Last Rate Last Admin    HYDROcodone-acetaminophen (NORCO) 5-325 MG per tablet 1 tablet  1 tablet Oral Q6H PRN Arsalan Lee MD         Current Outpatient  Medications Ordered in Epic   Medication Sig Dispense Refill    ALPRAZolam (XANAX) 0.5 MG tablet Take 1 tablet by mouth 3 (Three) Times a Day As Needed for Anxiety.      aspirin 81 MG EC tablet Take 1 tablet by mouth Daily.      buPROPion XL (WELLBUTRIN XL) 150 MG 24 hr tablet Take 2 tablets by mouth Every Night.      cetirizine (zyrTEC) 5 MG tablet Take 1 tablet by mouth Daily.      cyanocobalamin 1000 MCG/ML injection Inject 1 mL under the skin into the appropriate area as directed Every 30 (Thirty) Days.      docusate sodium (Colace) 100 MG capsule Take 1 capsule by mouth Daily. 90 capsule 3    DULoxetine (CYMBALTA) 60 MG capsule Take 1 capsule by mouth 2 (Two) Times a Day. 180 capsule 1    eszopiclone (Lunesta) 1 MG tablet Take 1 tablet by mouth Every Night. Take immediately before bedtime      HYDROcodone-acetaminophen (NORCO) 5-325 MG per tablet Take 1 tab PO 3x/day for 1 week.  Then take 1 tab PO 2x/day for 1 week.  Then take 0.5 tab 2x/day for 1 week.  Then take 0.5 tab daily for 1 week.  Then discontinue 46 tablet 0    levothyroxine (SYNTHROID, LEVOTHROID) 88 MCG tablet Take 1 tablet by mouth Every Morning. 90 tablet 1    metoprolol succinate XL (TOPROL-XL) 50 MG 24 hr tablet Take 1 tablet by mouth Daily.      naloxone (NARCAN) 4 MG/0.1ML nasal spray Call 911. Don't prime. [place] in 1 nostril for overdose. Repeat in 2-3 minutes in other nostril if no or minimal breathing/responsiveness. 1 each 0    nitrofurantoin (MACRODANTIN) 50 MG capsule Take 1 capsule by mouth Every Night. 90 capsule 1    omeprazole (priLOSEC) 20 MG capsule Take 1 capsule by mouth Daily. 90 capsule 1    ondansetron ODT (ZOFRAN-ODT) 4 MG disintegrating tablet Place 1-2 tablets on the tongue Every 8 (Eight) Hours As Needed for Nausea or Vomiting. 10 tablet 0    pregabalin (Lyrica) 150 MG capsule Take 1 capsule by mouth 3 (Three) Times a Day. (Patient taking differently: Take 1 capsule by mouth 2 (Two) Times a Day.) 90 capsule 0     rosuvastatin (CRESTOR) 10 MG tablet Take 1 tablet by mouth Every Night. 90 tablet 1    traZODone (DESYREL) 100 MG tablet Take 1 tablet by mouth every night at bedtime. (Patient taking differently: Take 1 tablet by mouth every night at bedtime. 1-2 TABS PO QHS) 30 tablet 3         PROCEDURES  Procedures            PROGRESS, DATA ANALYSIS, CONSULTS, AND MEDICAL DECISION MAKING  All labs have been independently interpreted by me.  All radiology studies have been reviewed by me. All EKG's have been independently viewed and interpreted by me.  Discussion below represents my analysis of pertinent findings related to patient's condition, differential diagnosis, treatment plan and final disposition.    DIFFERENTIAL    Differential diagnosis includes but is not limited to:  - hepatobiliary pathology such as cholecystitis, cholangitis, and symptomatic cholelithiasis  - Pancreatitis  - Dyspepsia  - Small bowel obstruction  - Appendicitis  - Diverticulitis  - UTI including pyelonephritis  - Ureteral stone  - Zoster  - Colitis, including infectious and ischemic  - Atypical ACS      Clinical Scores:                  ED Course as of 03/10/25 1527   Mon Mar 10, 2025   1339 WBC: 5.11 [KA]   1339 Hemoglobin: 13.7 [KA]   1339 Lipase(!): 11 [KA]   1339 Magnesium: 1.8 [KA]   1339 Glucose(!): 112 [KA]   1339 Creatinine: 0.92 [KA]   1518 Bacteria, UA: None Seen [KA]   1518 WBC, UA(!): 11-20 [KA]   1518 RBC, UA: 0-2 [KA]   1520 Reassessed the patient, she is resting, no distress awake and alert.  Vitals normal on the monitor.  Workup is unremarkable, no UTI, CBC unremarkable, normal lipase and magnesium.  CT abdomen shows no acute intra-abdominal findings and no significant incidentals.  She has had no nausea, vomiting, diarrhea in the emergency department.  She did run out of her hydrocodone, states is due for refill tomorrow, this could be contributing to some of her symptoms.  I have prescribed some Zofran, counseled her on follow-up  and indications for repeat evaluation. [KA]      ED Course User Index  [KA] Latoya Izquierdo PA-C             AS OF 15:27 EDT VITALS:    BP - 156/80  HR - 60  TEMP - 98.3 °F (36.8 °C)  O2 SATS - 95%    COMPLEXITY OF CARE  Admission was considered but after careful review of the patient's presentation, physical examination, diagnostic results, and response to treatment the patient may be safely discharged with outpatient follow-up.      DIAGNOSIS  Final diagnoses:   Nausea vomiting and diarrhea         DISPOSITION  ED Disposition       ED Disposition   Discharge    Condition   Stable    Comment   --                  FOLLOW UP  Regina Ferro (Sivan), APRN  [id number] [address]  [address]  [facility] [address] [phone]    Schedule an appointment as soon as possible for a visit       [facility] EMERGENCY DEPARTMENT  [address] [facility] [address] [phone]    If symptoms worsen or any concerns        Prescribed Medications     Medication List        New Prescriptions      ondansetron ODT 4 MG disintegrating tablet  Commonly known as: ZOFRAN-ODT  Place 1-2 tablets on the tongue Every 8 (Eight) Hours As Needed for Nausea or Vomiting.            Changed      pregabalin 150 MG capsule  Commonly known as: Lyrica  Take 1 capsule by mouth 3 (Three) Times a Day.  What changed: when to take this     traZODone 100 MG tablet  Commonly known as: DESYREL  Take 1 tablet by mouth every night at bedtime.  What changed: additional instructions               Where to Get Your Medications        These medications were sent to [facility] - [place], [place] - [address] [address] - [phone]  - [phone]   [address] [address], [facility] [address]      Phone: [phone]   ondansetron ODT 4 MG disintegrating tablet                   Please note that portions of this document were completed with a voice recognition program.    Note Disclaimer: At [facility], we believe that  sharing information builds trust and better relationships. You are receiving this note because you recently visited [facility]. It is possible you will see health information before a provider has talked with you about it. This kind of information can be easy to misunderstand. To help you fully understand what it means for your health, we urge you to discuss this note with your provider.         Latoya Izquierdo PA-C  03/10/25 1527

## 2025-04-03 ENCOUNTER — OFFICE VISIT (OUTPATIENT)
Dept: FAMILY MEDICINE CLINIC | Facility: CLINIC | Age: 70
End: 2025-04-03
Payer: MEDICARE

## 2025-04-03 VITALS
HEIGHT: 61 IN | RESPIRATION RATE: 16 BRPM | OXYGEN SATURATION: 96 % | WEIGHT: 174.2 LBS | HEART RATE: 97 BPM | SYSTOLIC BLOOD PRESSURE: 118 MMHG | DIASTOLIC BLOOD PRESSURE: 78 MMHG | BODY MASS INDEX: 32.89 KG/M2

## 2025-04-03 DIAGNOSIS — K21.9 GASTROESOPHAGEAL REFLUX DISEASE, UNSPECIFIED WHETHER ESOPHAGITIS PRESENT: Chronic | ICD-10-CM

## 2025-04-03 DIAGNOSIS — E03.9 ACQUIRED HYPOTHYROIDISM: Chronic | ICD-10-CM

## 2025-04-03 DIAGNOSIS — E78.2 MIXED HYPERLIPIDEMIA: Chronic | ICD-10-CM

## 2025-04-03 DIAGNOSIS — I25.10 CORONARY ARTERIOSCLEROSIS: Primary | ICD-10-CM

## 2025-04-03 RX ORDER — QUETIAPINE FUMARATE 100 MG/1
200 TABLET, FILM COATED ORAL NIGHTLY
COMMUNITY
End: 2025-04-03 | Stop reason: SDUPTHER

## 2025-04-03 RX ORDER — ROSUVASTATIN CALCIUM 10 MG/1
10 TABLET, COATED ORAL NIGHTLY
Qty: 90 TABLET | Refills: 1 | Status: SHIPPED | OUTPATIENT
Start: 2025-04-03

## 2025-04-03 RX ORDER — ONDANSETRON 4 MG/1
4-8 TABLET, ORALLY DISINTEGRATING ORAL EVERY 8 HOURS PRN
Qty: 90 TABLET | Refills: 0 | Status: SHIPPED | OUTPATIENT
Start: 2025-04-03

## 2025-04-03 RX ORDER — QUETIAPINE FUMARATE 100 MG/1
100 TABLET, FILM COATED ORAL NIGHTLY
Qty: 90 TABLET | Refills: 0 | Status: SHIPPED | OUTPATIENT
Start: 2025-04-03

## 2025-04-03 RX ORDER — METOPROLOL SUCCINATE 50 MG/1
50 TABLET, EXTENDED RELEASE ORAL DAILY
Qty: 90 TABLET | Refills: 0 | Status: SHIPPED | OUTPATIENT
Start: 2025-04-03

## 2025-04-03 NOTE — PROGRESS NOTES
"Subjective   Sonya Marcus is a 69 y.o. female.     History of Present Illness   Chief Complaint     Hyperlipidemia (1 month follow up  Disability paperwork )  [place]     Since the last visit, she has overall felt  achy .  She has Hyperlipidemia with goals met with current Rx, CAD and remains under care of their Cardiologist for mangement, CAD and remains on medication regimen, and Seasonal allergies and doing well on their medication .  she has been compliant with current medications have reviewed them.  The patient denies medication side effects.  Will refill medications. /78 (BP Location: Right arm, Patient Position: Sitting, Cuff Size: Adult)   Pulse 97   Resp 16   Ht 154.9 cm (60.98\")   Wt 79 kg (174 lb 3.2 oz)   SpO2 96%   BMI 32.93 kg/m² .        Results for orders placed or performed during the hospital encounter of 03/10/25   Comprehensive Metabolic Panel    Collection Time: 03/10/25 11:49 AM    Specimen: Arm, Left; Blood   Result Value Ref Range    Glucose 112 (H) 65 - 99 mg/dL    BUN 11 8 - 23 mg/dL    Creatinine 0.92 0.57 - 1.00 mg/dL    Sodium 138 136 - 145 mmol/L    Potassium 3.7 3.5 - 5.2 mmol/L    Chloride 106 98 - 107 mmol/L    CO2 22.9 22.0 - 29.0 mmol/L    Calcium 9.9 8.6 - 10.5 mg/dL    Total Protein 6.5 6.0 - 8.5 g/dL    Albumin 4.0 3.5 - 5.2 g/dL    ALT (SGPT) 8 1 - 33 U/L    AST (SGOT) 14 1 - 32 U/L    Alkaline Phosphatase 92 39 - 117 U/L    Total Bilirubin 0.3 0.0 - 1.2 mg/dL    Globulin 2.5 gm/dL    A/G Ratio 1.6 g/dL    BUN/Creatinine Ratio 12.0 7.0 - 25.0    Anion Gap 9.1 5.0 - 15.0 mmol/L    eGFR 67.1 >60.0 mL/min/1.73   Lipase    Collection Time: 03/10/25 11:49 AM    Specimen: Arm, Left; Blood   Result Value Ref Range    Lipase 11 (L) 13 - 60 U/L   CBC Auto Differential    Collection Time: 03/10/25 11:49 AM    Specimen: Arm, Left; Blood   Result Value Ref Range    WBC 5.11 3.40 - 10.80 10*3/mm3    RBC 4.64 3.77 - 5.28 10*6/mm3    Hemoglobin 13.7 12.0 - 15.9 g/dL    " Hematocrit 42.5 34.0 - 46.6 %    MCV 91.6 79.0 - 97.0 fL    MCH 29.5 26.6 - 33.0 pg    MCHC 32.2 31.5 - 35.7 g/dL    RDW 14.4 12.3 - 15.4 %    RDW-SD 48.4 37.0 - 54.0 fl    MPV 11.1 6.0 - 12.0 fL    Platelets 245 140 - 450 10*3/mm3    Neutrophil % 59.6 42.7 - 76.0 %    Lymphocyte % 31.9 19.6 - 45.3 %    Monocyte % 6.7 5.0 - 12.0 %    Eosinophil % 0.8 0.3 - 6.2 %    Basophil % 0.8 0.0 - 1.5 %    Immature Grans % 0.2 0.0 - 0.5 %    Neutrophils, Absolute 3.05 1.70 - 7.00 10*3/mm3    Lymphocytes, Absolute 1.63 0.70 - 3.10 10*3/mm3    Monocytes, Absolute 0.34 0.10 - 0.90 10*3/mm3    Eosinophils, Absolute 0.04 0.00 - 0.40 10*3/mm3    Basophils, Absolute 0.04 0.00 - 0.20 10*3/mm3    Immature Grans, Absolute 0.01 0.00 - 0.05 10*3/mm3    nRBC 0.0 0.0 - 0.2 /100 WBC   Magnesium    Collection Time: 03/10/25 11:49 AM    Specimen: Arm, Left; Blood   Result Value Ref Range    Magnesium 1.8 1.6 - 2.4 mg/dL   Urinalysis With Microscopic If Indicated (No Culture) - Urine, Clean Catch    Collection Time: 03/10/25  2:57 PM    Specimen: Urine, Clean Catch   Result Value Ref Range    Color, UA Yellow Yellow, Straw    Appearance, UA Clear Clear    pH, UA 6.5 5.0 - 8.0    Specific Gravity, UA 1.013 1.005 - 1.030    Glucose, UA Negative Negative    Ketones, UA 15 mg/dL (1+) (A) Negative    Bilirubin, UA Negative Negative    Blood, UA Negative Negative    Protein, UA Negative Negative    Leuk Esterase, UA Small (1+) (A) Negative    Nitrite, UA Negative Negative    Urobilinogen, UA 1.0 E.U./dL 0.2 - 1.0 E.U./dL   Urinalysis, Microscopic Only - Urine, Clean Catch    Collection Time: 03/10/25  2:57 PM    Specimen: Urine, Clean Catch   Result Value Ref Range    RBC, UA 0-2 None Seen, 0-2 /HPF    WBC, UA 11-20 (A) None Seen, 0-2 /HPF    Bacteria, UA None Seen None Seen /HPF    Squamous Epithelial Cells, UA 3-6 (A) None Seen, 0-2 /HPF    Hyaline Casts, UA None Seen None Seen /LPF    Methodology Automated Microscopy      Patient was in inpatient  rehab at the [facility] recently to help her come off of her hydrocodone, trazodone, Lunesta and Lyrica which she had been on for years.  Patient is no longer taking any of these medications.  She was started on quetiapine 100 mg nightly for sleep instead of trazodone.  She does feel that this works better for her.  Her rosuvastatin was changed to atorvastatin during admission though she is uncertain why.  She never had an issue with rosuvastatin.  She needs these medications refilled.  She will be following up with her psychiatrist but did not want to run out of quetiapine until she is seen.  She needs metoprolol refilled.  She still follows up with cardiology but had to reschedule her appointment due to admission.      She also reports right ear pain and pressure for some time and would like to have it checked out.          The following portions of the patient's history were reviewed and updated as appropriate: allergies, current medications, past family history, past medical history, past social history, past surgical history, and problem list.    Review of Systems   Constitutional:  Negative for unexpected weight change.   HENT:  Positive for ear pain. Negative for ear discharge.    Respiratory:  Negative for shortness of breath.    Cardiovascular:  Negative for palpitations.   Musculoskeletal:  Positive for myalgias.   Psychiatric/Behavioral:  Negative for behavioral problems.        Objective   Physical Exam  Vitals and nursing note reviewed.   Constitutional:       Appearance: She is well-developed.   HENT:      Right Ear: Tympanic membrane, ear canal and external ear normal.   Pulmonary:      Effort: Pulmonary effort is normal.   Neurological:      Mental Status: She is alert and oriented to person, place, and time.   Psychiatric:         Mood and Affect: Mood normal.         Behavior: Behavior normal.         Thought Content: Thought content normal.         Judgment: Judgment normal.         Assessment & Plan    Diagnoses and all orders for this visit:    1. Coronary arteriosclerosis (Primary)  -     rosuvastatin (CRESTOR) 10 MG tablet; Take 1 tablet by mouth Every Night.  Dispense: 90 tablet; Refill: 1  -     metoprolol succinate XL (TOPROL-XL) 50 MG 24 hr tablet; Take 1 tablet by mouth Daily.  Dispense: 90 tablet; Refill: 0    2. Gastroesophageal reflux disease, unspecified whether esophagitis present  -     ondansetron ODT (ZOFRAN-ODT) 4 MG disintegrating tablet; Place 1-2 tablets on the tongue Every 8 (Eight) Hours As Needed for Nausea or Vomiting.  Dispense: 90 tablet; Refill: 0    3. Mixed hyperlipidemia  -     rosuvastatin (CRESTOR) 10 MG tablet; Take 1 tablet by mouth Every Night.  Dispense: 90 tablet; Refill: 1    4. Acquired hypothyroidism    Other orders  -     QUEtiapine (SEROquel) 100 MG tablet; Take 1 tablet by mouth Every Night.  Dispense: 90 tablet; Refill: 0        She also has paperwork from student loan  that she would like to have filled out.  The paperwork that she has needs a physician signature but is specifically stating that the patient has permanent disability and cannot pay student loans.  After discussion with patient and daughter, patient may have gone through the process of disability but not sure if she was deemed permanently disabled.  She is not sure if she has the paperwork at home but her daughter will help her try to find it or contact the Social Security office to retrieve it.  I did explain to her that I could not fill this paperwork out without evidence that she had permanent disability.  I will be glad to fill it out once she finds that supporting documentation.     No fluid or infection noted in right ear.  Discussed eustachian tube dysfunction with her and encouraged using steroid nasal spray to help treat this.

## 2025-04-04 ENCOUNTER — TELEPHONE (OUTPATIENT)
Age: 70
End: 2025-04-04

## 2025-04-04 NOTE — TELEPHONE ENCOUNTER
I called Ms. Marcus this morning after I received several messages in regard to her wanting to come back to the office to be a patient of CHANELLE Mosley after she had our practice willingly and saw another [employer] Physician as her PCP.     I spoke with CHANELLE Mosley about this patient wanting to come back to see her as a patient.    Provider stated that she would accept the patient back given that she does not leave again to establish care elsewhere. As well the provider will not be prescribing any controlled substances for her in the future due to her HX and for her safety. Also she will not fill out any Disability paperwork with out proof that she is on Disability.     She was made aware that she would not be permitted to go back and see the current PCP she established with as it pertains to [employer] Policy and provider discretion.     Her Daughter will need to be put on her Verbal release in order to speak on her behalf.    Patient stated that she understood.

## 2025-04-10 RX ORDER — QUETIAPINE FUMARATE 100 MG/1
200 TABLET, FILM COATED ORAL NIGHTLY
Qty: 180 TABLET | Refills: 0 | Status: SHIPPED | OUTPATIENT
Start: 2025-04-10

## 2025-04-23 ENCOUNTER — TELEPHONE (OUTPATIENT)
Dept: FAMILY MEDICINE CLINIC | Facility: CLINIC | Age: 70
End: 2025-04-23
Payer: MEDICARE

## 2025-05-08 ENCOUNTER — TELEPHONE (OUTPATIENT)
Dept: FAMILY MEDICINE CLINIC | Facility: CLINIC | Age: 70
End: 2025-05-08
Payer: MEDICARE

## 2025-05-08 NOTE — TELEPHONE ENCOUNTER
Patient came in asking about a specific doctor and forgot her hydrocodone.  I called and left her a message that she can pick it up at any time at the .

## 2025-05-12 ENCOUNTER — TRANSCRIBE ORDERS (OUTPATIENT)
Dept: ADMINISTRATIVE | Facility: HOSPITAL | Age: 70
End: 2025-05-12
Payer: MEDICARE

## 2025-05-12 DIAGNOSIS — Z96.659: ICD-10-CM

## 2025-05-12 DIAGNOSIS — M54.16 LUMBAR RADICULOPATHY: Primary | ICD-10-CM

## 2025-05-21 ENCOUNTER — PATIENT MESSAGE (OUTPATIENT)
Dept: FAMILY MEDICINE CLINIC | Facility: CLINIC | Age: 70
End: 2025-05-21
Payer: MEDICARE

## 2025-05-21 ENCOUNTER — DOCUMENTATION (OUTPATIENT)
Dept: FAMILY MEDICINE CLINIC | Facility: CLINIC | Age: 70
End: 2025-05-21
Payer: MEDICARE

## 2025-05-21 NOTE — PROGRESS NOTES
We received a message from patient to request medication.  Upon reviewing her chart, her PCP is listed as Dr. Stephen with [facility]. I called patient to ask if she was seeing Dr. Stephen or staying with our office. Patient said that she is staying with Regina Ferro and doesn't want to be seen at Dr. Stephen office.     Patient said that she is needing Lyrica and the reason for this is because she has been recently dismissed from pain management group (Dr. Alejandra Lee) for not taking her prescribed medications as prescribed.   She is now out of the Lyrica and requesting refills.     I let patient know that if she is going to stay with Regina's office she will need to come in to be seen and talk with Regina. Typically this office doesn't prescribe medications via MyChart without seeing the patient. Patient agreed and understood.   Patient also made aware that if she doesn't have the paperwork showing she is on disability with the state, that Regina can't fill out her student loan paperwork, and she verbally understood.

## 2025-06-19 ENCOUNTER — HOSPITAL ENCOUNTER (INPATIENT)
Facility: HOSPITAL | Age: 70
LOS: 8 days | Discharge: NURSING, REHAB OR LONG-TERM CARE | End: 2025-06-27
Attending: STUDENT IN AN ORGANIZED HEALTH CARE EDUCATION/TRAINING PROGRAM | Admitting: INTERNAL MEDICINE
Payer: MEDICARE

## 2025-06-19 ENCOUNTER — APPOINTMENT (OUTPATIENT)
Dept: CT IMAGING | Facility: HOSPITAL | Age: 70
End: 2025-06-19
Payer: MEDICARE

## 2025-06-19 DIAGNOSIS — E87.6 HYPOKALEMIA: Primary | ICD-10-CM

## 2025-06-19 DIAGNOSIS — F41.9 ANXIETY DISORDER, UNSPECIFIED TYPE: ICD-10-CM

## 2025-06-19 DIAGNOSIS — N30.00 ACUTE CYSTITIS WITHOUT HEMATURIA: ICD-10-CM

## 2025-06-19 DIAGNOSIS — R62.7 FAILURE TO THRIVE IN ADULT: ICD-10-CM

## 2025-06-19 DIAGNOSIS — R53.1 WEAKNESS: ICD-10-CM

## 2025-06-19 DIAGNOSIS — S00.12XA PERIORBITAL ECCHYMOSIS OF LEFT EYE, INITIAL ENCOUNTER: ICD-10-CM

## 2025-06-19 LAB
ALBUMIN SERPL-MCNC: 3.9 G/DL (ref 3.5–5.2)
ALBUMIN/GLOB SERPL: 2.2 G/DL
ALP SERPL-CCNC: 76 U/L (ref 39–117)
ALT SERPL W P-5'-P-CCNC: 6 U/L (ref 1–33)
AMORPH URATE CRY URNS QL MICRO: ABNORMAL /HPF
ANION GAP SERPL CALCULATED.3IONS-SCNC: 11.7 MMOL/L (ref 5–15)
AST SERPL-CCNC: 9 U/L (ref 1–32)
BACTERIA UR QL AUTO: ABNORMAL /HPF
BASOPHILS # BLD AUTO: 0.05 10*3/MM3 (ref 0–0.2)
BASOPHILS NFR BLD AUTO: 1 % (ref 0–1.5)
BILIRUB SERPL-MCNC: 0.3 MG/DL (ref 0–1.2)
BILIRUB UR QL STRIP: ABNORMAL
BUN SERPL-MCNC: 10.8 MG/DL (ref 8–23)
BUN/CREAT SERPL: 15.2 (ref 7–25)
CALCIUM SPEC-SCNC: 9.4 MG/DL (ref 8.6–10.5)
CHLORIDE SERPL-SCNC: 106 MMOL/L (ref 98–107)
CLARITY UR: ABNORMAL
CO2 SERPL-SCNC: 23.3 MMOL/L (ref 22–29)
COLOR UR: YELLOW
CREAT SERPL-MCNC: 0.71 MG/DL (ref 0.57–1)
DEPRECATED RDW RBC AUTO: 54.6 FL (ref 37–54)
EGFRCR SERPLBLD CKD-EPI 2021: 91 ML/MIN/1.73
EOSINOPHIL # BLD AUTO: 0.09 10*3/MM3 (ref 0–0.4)
EOSINOPHIL NFR BLD AUTO: 1.7 % (ref 0.3–6.2)
ERYTHROCYTE [DISTWIDTH] IN BLOOD BY AUTOMATED COUNT: 15.7 % (ref 12.3–15.4)
GLOBULIN UR ELPH-MCNC: 1.8 GM/DL
GLUCOSE SERPL-MCNC: 138 MG/DL (ref 65–99)
GLUCOSE UR STRIP-MCNC: NEGATIVE MG/DL
HCT VFR BLD AUTO: 40.5 % (ref 34–46.6)
HGB BLD-MCNC: 13.2 G/DL (ref 12–15.9)
HGB UR QL STRIP.AUTO: NEGATIVE
HOLD SPECIMEN: NORMAL
HYALINE CASTS UR QL AUTO: ABNORMAL /LPF
IMM GRANULOCYTES # BLD AUTO: 0.02 10*3/MM3 (ref 0–0.05)
IMM GRANULOCYTES NFR BLD AUTO: 0.4 % (ref 0–0.5)
KETONES UR QL STRIP: ABNORMAL
LEUKOCYTE ESTERASE UR QL STRIP.AUTO: ABNORMAL
LYMPHOCYTES # BLD AUTO: 2.22 10*3/MM3 (ref 0.7–3.1)
LYMPHOCYTES NFR BLD AUTO: 42.4 % (ref 19.6–45.3)
MAGNESIUM SERPL-MCNC: 1.6 MG/DL (ref 1.6–2.4)
MCH RBC QN AUTO: 30.6 PG (ref 26.6–33)
MCHC RBC AUTO-ENTMCNC: 32.6 G/DL (ref 31.5–35.7)
MCV RBC AUTO: 94 FL (ref 79–97)
MONOCYTES # BLD AUTO: 0.4 10*3/MM3 (ref 0.1–0.9)
MONOCYTES NFR BLD AUTO: 7.6 % (ref 5–12)
NEUTROPHILS NFR BLD AUTO: 2.46 10*3/MM3 (ref 1.7–7)
NEUTROPHILS NFR BLD AUTO: 46.9 % (ref 42.7–76)
NITRITE UR QL STRIP: NEGATIVE
NRBC BLD AUTO-RTO: 0 /100 WBC (ref 0–0.2)
PH UR STRIP.AUTO: 6.5 [PH] (ref 5–8)
PLATELET # BLD AUTO: 292 10*3/MM3 (ref 140–450)
PMV BLD AUTO: 11 FL (ref 6–12)
POTASSIUM SERPL-SCNC: 2.6 MMOL/L (ref 3.5–5.2)
PROT SERPL-MCNC: 5.7 G/DL (ref 6–8.5)
PROT UR QL STRIP: ABNORMAL
RBC # BLD AUTO: 4.31 10*6/MM3 (ref 3.77–5.28)
RBC # UR STRIP: ABNORMAL /HPF
REF LAB TEST METHOD: ABNORMAL
SODIUM SERPL-SCNC: 141 MMOL/L (ref 136–145)
SP GR UR STRIP: 1.02 (ref 1–1.03)
SQUAMOUS #/AREA URNS HPF: ABNORMAL /HPF
UROBILINOGEN UR QL STRIP: ABNORMAL
WBC # UR STRIP: ABNORMAL /HPF
WBC NRBC COR # BLD AUTO: 5.24 10*3/MM3 (ref 3.4–10.8)

## 2025-06-19 PROCEDURE — 81001 URINALYSIS AUTO W/SCOPE: CPT | Performed by: STUDENT IN AN ORGANIZED HEALTH CARE EDUCATION/TRAINING PROGRAM

## 2025-06-19 PROCEDURE — 25010000002 SODIUM CHLORIDE 0.9 % WITH KCL 20 MEQ 20-0.9 MEQ/L-% SOLUTION: Performed by: INTERNAL MEDICINE

## 2025-06-19 PROCEDURE — 85025 COMPLETE CBC W/AUTO DIFF WBC: CPT | Performed by: STUDENT IN AN ORGANIZED HEALTH CARE EDUCATION/TRAINING PROGRAM

## 2025-06-19 PROCEDURE — 25010000002 POTASSIUM CHLORIDE 10 MEQ/100ML SOLUTION: Performed by: STUDENT IN AN ORGANIZED HEALTH CARE EDUCATION/TRAINING PROGRAM

## 2025-06-19 PROCEDURE — 70486 CT MAXILLOFACIAL W/O DYE: CPT

## 2025-06-19 PROCEDURE — 80053 COMPREHEN METABOLIC PANEL: CPT | Performed by: STUDENT IN AN ORGANIZED HEALTH CARE EDUCATION/TRAINING PROGRAM

## 2025-06-19 PROCEDURE — 99285 EMERGENCY DEPT VISIT HI MDM: CPT

## 2025-06-19 PROCEDURE — 72125 CT NECK SPINE W/O DYE: CPT

## 2025-06-19 PROCEDURE — 87086 URINE CULTURE/COLONY COUNT: CPT | Performed by: STUDENT IN AN ORGANIZED HEALTH CARE EDUCATION/TRAINING PROGRAM

## 2025-06-19 PROCEDURE — 83735 ASSAY OF MAGNESIUM: CPT | Performed by: STUDENT IN AN ORGANIZED HEALTH CARE EDUCATION/TRAINING PROGRAM

## 2025-06-19 PROCEDURE — 25010000002 MAGNESIUM SULFATE 2 GM/50ML SOLUTION: Performed by: STUDENT IN AN ORGANIZED HEALTH CARE EDUCATION/TRAINING PROGRAM

## 2025-06-19 PROCEDURE — 70450 CT HEAD/BRAIN W/O DYE: CPT

## 2025-06-19 PROCEDURE — 25010000002 CEFTRIAXONE PER 250 MG: Performed by: STUDENT IN AN ORGANIZED HEALTH CARE EDUCATION/TRAINING PROGRAM

## 2025-06-19 PROCEDURE — 99285 EMERGENCY DEPT VISIT HI MDM: CPT | Performed by: STUDENT IN AN ORGANIZED HEALTH CARE EDUCATION/TRAINING PROGRAM

## 2025-06-19 PROCEDURE — 25810000003 LACTATED RINGERS SOLUTION: Performed by: STUDENT IN AN ORGANIZED HEALTH CARE EDUCATION/TRAINING PROGRAM

## 2025-06-19 RX ORDER — ASPIRIN 81 MG/1
81 TABLET ORAL DAILY
Status: DISCONTINUED | OUTPATIENT
Start: 2025-06-20 | End: 2025-06-27 | Stop reason: HOSPADM

## 2025-06-19 RX ORDER — POTASSIUM CHLORIDE 1500 MG/1
40 TABLET, EXTENDED RELEASE ORAL ONCE
Status: COMPLETED | OUTPATIENT
Start: 2025-06-19 | End: 2025-06-19

## 2025-06-19 RX ORDER — AMOXICILLIN 250 MG
2 CAPSULE ORAL 2 TIMES DAILY PRN
Status: DISCONTINUED | OUTPATIENT
Start: 2025-06-19 | End: 2025-06-27 | Stop reason: HOSPADM

## 2025-06-19 RX ORDER — POTASSIUM CHLORIDE 7.45 MG/ML
10 INJECTION INTRAVENOUS
Status: DISPENSED | OUTPATIENT
Start: 2025-06-19 | End: 2025-06-19

## 2025-06-19 RX ORDER — ACETAMINOPHEN 325 MG/1
650 TABLET ORAL EVERY 4 HOURS PRN
Status: DISCONTINUED | OUTPATIENT
Start: 2025-06-19 | End: 2025-06-27 | Stop reason: HOSPADM

## 2025-06-19 RX ORDER — BISACODYL 5 MG/1
5 TABLET, DELAYED RELEASE ORAL DAILY PRN
Status: DISCONTINUED | OUTPATIENT
Start: 2025-06-19 | End: 2025-06-27 | Stop reason: HOSPADM

## 2025-06-19 RX ORDER — POLYETHYLENE GLYCOL 3350 17 G/17G
17 POWDER, FOR SOLUTION ORAL DAILY PRN
Status: DISCONTINUED | OUTPATIENT
Start: 2025-06-19 | End: 2025-06-27 | Stop reason: HOSPADM

## 2025-06-19 RX ORDER — MAGNESIUM SULFATE HEPTAHYDRATE 40 MG/ML
2 INJECTION, SOLUTION INTRAVENOUS ONCE
Status: COMPLETED | OUTPATIENT
Start: 2025-06-19 | End: 2025-06-19

## 2025-06-19 RX ORDER — LEVOTHYROXINE SODIUM 88 UG/1
88 TABLET ORAL
Status: DISCONTINUED | OUTPATIENT
Start: 2025-06-20 | End: 2025-06-26

## 2025-06-19 RX ORDER — DOCUSATE SODIUM 100 MG/1
100 CAPSULE, LIQUID FILLED ORAL DAILY
Status: DISCONTINUED | OUTPATIENT
Start: 2025-06-20 | End: 2025-06-27 | Stop reason: HOSPADM

## 2025-06-19 RX ORDER — ONDANSETRON 2 MG/ML
4 INJECTION INTRAMUSCULAR; INTRAVENOUS EVERY 6 HOURS PRN
Status: DISCONTINUED | OUTPATIENT
Start: 2025-06-19 | End: 2025-06-27 | Stop reason: HOSPADM

## 2025-06-19 RX ORDER — DULOXETIN HYDROCHLORIDE 60 MG/1
60 CAPSULE, DELAYED RELEASE ORAL 2 TIMES DAILY
Status: DISCONTINUED | OUTPATIENT
Start: 2025-06-19 | End: 2025-06-27 | Stop reason: HOSPADM

## 2025-06-19 RX ORDER — CHLORDIAZEPOXIDE HYDROCHLORIDE 25 MG/1
25 CAPSULE, GELATIN COATED ORAL 3 TIMES DAILY
Status: DISCONTINUED | OUTPATIENT
Start: 2025-06-19 | End: 2025-06-20

## 2025-06-19 RX ORDER — SODIUM CHLORIDE AND POTASSIUM CHLORIDE 150; 900 MG/100ML; MG/100ML
100 INJECTION, SOLUTION INTRAVENOUS CONTINUOUS
Status: DISPENSED | OUTPATIENT
Start: 2025-06-19 | End: 2025-06-20

## 2025-06-19 RX ORDER — BISACODYL 10 MG
10 SUPPOSITORY, RECTAL RECTAL DAILY PRN
Status: DISCONTINUED | OUTPATIENT
Start: 2025-06-19 | End: 2025-06-27 | Stop reason: HOSPADM

## 2025-06-19 RX ORDER — ACETAMINOPHEN 650 MG/1
650 SUPPOSITORY RECTAL EVERY 4 HOURS PRN
Status: DISCONTINUED | OUTPATIENT
Start: 2025-06-19 | End: 2025-06-27 | Stop reason: HOSPADM

## 2025-06-19 RX ORDER — ONDANSETRON 4 MG/1
4 TABLET, ORALLY DISINTEGRATING ORAL EVERY 6 HOURS PRN
Status: DISCONTINUED | OUTPATIENT
Start: 2025-06-19 | End: 2025-06-27 | Stop reason: HOSPADM

## 2025-06-19 RX ORDER — SODIUM CHLORIDE 0.9 % (FLUSH) 0.9 %
10 SYRINGE (ML) INJECTION AS NEEDED
Status: DISCONTINUED | OUTPATIENT
Start: 2025-06-19 | End: 2025-06-27 | Stop reason: HOSPADM

## 2025-06-19 RX ORDER — METOPROLOL SUCCINATE 50 MG/1
50 TABLET, EXTENDED RELEASE ORAL DAILY
COMMUNITY
End: 2025-06-27 | Stop reason: HOSPADM

## 2025-06-19 RX ORDER — BUPROPION HYDROCHLORIDE 300 MG/1
300 TABLET ORAL NIGHTLY
Status: DISCONTINUED | OUTPATIENT
Start: 2025-06-19 | End: 2025-06-27 | Stop reason: HOSPADM

## 2025-06-19 RX ORDER — CHLORDIAZEPOXIDE HYDROCHLORIDE 25 MG/1
25 CAPSULE, GELATIN COATED ORAL 3 TIMES DAILY
Status: ON HOLD | COMMUNITY
End: 2025-06-27

## 2025-06-19 RX ORDER — ACETAMINOPHEN 160 MG/5ML
650 SOLUTION ORAL EVERY 4 HOURS PRN
Status: DISCONTINUED | OUTPATIENT
Start: 2025-06-19 | End: 2025-06-27 | Stop reason: HOSPADM

## 2025-06-19 RX ADMIN — POTASSIUM CHLORIDE 40 MEQ: 1500 TABLET, EXTENDED RELEASE ORAL at 12:01

## 2025-06-19 RX ADMIN — CEFTRIAXONE SODIUM 1000 MG: 1 INJECTION, POWDER, FOR SOLUTION INTRAMUSCULAR; INTRAVENOUS at 11:46

## 2025-06-19 RX ADMIN — SODIUM CHLORIDE, POTASSIUM CHLORIDE, SODIUM LACTATE AND CALCIUM CHLORIDE 1000 ML: 600; 310; 30; 20 INJECTION, SOLUTION INTRAVENOUS at 14:48

## 2025-06-19 RX ADMIN — DULOXETINE 60 MG: 60 CAPSULE, DELAYED RELEASE ORAL at 22:16

## 2025-06-19 RX ADMIN — ACETAMINOPHEN 325MG 650 MG: 325 TABLET ORAL at 18:33

## 2025-06-19 RX ADMIN — BUPROPION HYDROCHLORIDE 300 MG: 300 TABLET, EXTENDED RELEASE ORAL at 22:16

## 2025-06-19 RX ADMIN — DICLOFENAC SODIUM 2 G: 10 GEL TOPICAL at 22:50

## 2025-06-19 RX ADMIN — POTASSIUM CHLORIDE 10 MEQ: 7.46 INJECTION, SOLUTION INTRAVENOUS at 12:50

## 2025-06-19 RX ADMIN — CHLORDIAZEPOXIDE HYDROCHLORIDE 25 MG: 25 CAPSULE ORAL at 22:16

## 2025-06-19 RX ADMIN — POTASSIUM CHLORIDE 10 MEQ: 7.46 INJECTION, SOLUTION INTRAVENOUS at 14:08

## 2025-06-19 RX ADMIN — Medication 2.5 MG: at 20:30

## 2025-06-19 RX ADMIN — POTASSIUM CHLORIDE AND SODIUM CHLORIDE 100 ML/HR: 900; 150 INJECTION, SOLUTION INTRAVENOUS at 18:28

## 2025-06-19 RX ADMIN — SODIUM CHLORIDE, POTASSIUM CHLORIDE, SODIUM LACTATE AND CALCIUM CHLORIDE 1000 ML: 600; 310; 30; 20 INJECTION, SOLUTION INTRAVENOUS at 11:22

## 2025-06-19 RX ADMIN — MAGNESIUM SULFATE IN WATER FOR 2 G: 40 INJECTION INTRAVENOUS at 12:41

## 2025-06-19 NOTE — FSED PROVIDER NOTE
Subjective   History of Present Illness  This is a 70-year-old female with a history of a chronic UTI on Macrobid maintenance therapy presenting with complaint that she has been confused.  She was discharged with Keflex approximately a month ago from the hospital and states that she did not take it, she also has not been taking her Macrobid.  She states that she has not been taking it because she forgets.  Last week she fell and hit her left eye.  She states that she has not been running a fever no increased urinary frequency and no fever or chills.  Per the daughter at bedside, patient gets like this whenever she has a UTI.     Patient does not complain of any drug use, she does not complain of chest pain shortness of breath abdominal pain or diarrhea.        Review of Systems    Past Medical History:   Diagnosis Date    Allergic 2000    Anemia 2000    Angina pectoris     Anxiety and depression     Arthralgia of right knee 09/10/2024    Arthritis     Carotid artery occlusion 2021    Chronic urinary tract infection     CKD (chronic kidney disease)     Coronary arteriosclerosis     Coronary artery disease 2020    Disease of thyroid gland     Fibromyalgia, primary 2000    Fractures 09/20/2024    Fell at home    Genitourinary syndrome of menopause     GERD (gastroesophageal reflux disease)     History of kidney stones     History of pulmonary embolism     AFTER HYSTERECTOMY    HL (hearing loss) 2024    Hyperlipidemia 2020    Hypertension     Hypothyroidism     Kidney stone 1972    Left hip pain     Memory loss     MVP (mitral valve prolapse)     DR LUZ LAST OFFICE NOTE WITH CHART 7/30/2024    Obesity     Prediabetes     Right knee pain     Right shoulder pain     Sleep apnea     PT IS NOT USING CPAP    Substance abuse 2010    Hydrocodone and Oxycodone       Allergies   Allergen Reactions    Fish Allergy Anaphylaxis     Has epi pen    Iodine Anaphylaxis    Penicillins Nausea And Vomiting and Rash     Tolerated  rocephin 9/2023 admission    Prochlorperazine Dystonia, Anaphylaxis, Rash and Other (See Comments)     Paralysis    Muscle twisting    Shellfish Allergy Anaphylaxis, Rash and Swelling    Shellfish-Derived Products Anaphylaxis    Sulfate Rash    Glycerol, Iodinated Unknown - Low Severity     Anaphylaxis   (also to shellfish)    Octacosanol Dizziness     HEADACHE    Latex Rash    Levofloxacin Hives    Sulfa Antibiotics GI Intolerance       Past Surgical History:   Procedure Laterality Date    APPENDECTOMY      BREAST BIOPSY      COLONOSCOPY  01/01/2022    CYSTOSCOPY W/ URETERAL STENT PLACEMENT Left 09/05/2023    Procedure: LEFT CYSTOSCOPY URETERAL CATHETER/STENT INSERTION;  Surgeon: Quinton Rosa MD;  Location: [facility] MAIN OR;  Service: Urology;  Laterality: Left;    EYE SURGERY      HYSTERECTOMY      JOINT REPLACEMENT  2021 & 2024    REPLACEMENT TOTAL KNEE Left 2021    SHOULDER SURGERY Right     TONSILLECTOMY      TOTAL KNEE ARTHROPLASTY Right 08/22/2024    Procedure: TOTAL KNEE ARTHROPLASTY WITH CORI ROBOT;  Surgeon: Steven Sumner II, MD;  [place]  [facility] [place] [facility] [place]  Service: Robotics - Ortho;  Laterality: Right;       Family History   Problem Relation Age of Onset    Stroke Sister     Anxiety disorder Sister         +son & granddaughter    Breast cancer Daughter     Breast cancer Daughter     Stroke Maternal Grandmother     Arthritis Mother     Asthma Mother     Depression Mother     Arthritis Father     Heart disease Father     Hyperlipidemia Father     Developmental Disability Paternal Grandfather     Thyroid disease Paternal Grandmother     Drug abuse Brother     Malig Hyperthermia Neg Hx        Social History     Socioeconomic History    Marital status:    Tobacco Use    Smoking status: Never     Passive exposure: Never    Smokeless tobacco: Never   Vaping Use    Vaping status: Never Used   Substance and Sexual Activity    Alcohol use: Yes     Alcohol/week: 2.0 standard drinks of  alcohol     Types: 2 Glasses of wine per week     Comment: OCCASIONALLY    Drug use: Not Currently     Types: Benzodiazepines, Hydrocodone, Oxycodone     Comment: 2010    Sexual activity: Not Currently     Partners: Male     Birth control/protection: Post-menopausal     Comment: Post Menopausal with no immediate partner           Objective   Physical Exam  Vitals and nursing note reviewed. Exam conducted with a chaperone present.   Constitutional:       General: She is not in acute distress.     Appearance: Normal appearance. She is not ill-appearing, toxic-appearing or diaphoretic.   HENT:      Head: Normocephalic and atraumatic.      Right Ear: Tympanic membrane normal.      Left Ear: Tympanic membrane normal.      Nose: Nose normal.      Mouth/Throat:      Pharynx: Oropharynx is clear. No oropharyngeal exudate or posterior oropharyngeal erythema.   Eyes:      Extraocular Movements: Extraocular movements intact.      Conjunctiva/sclera: Conjunctivae normal.      Pupils: Pupils are equal, round, and reactive to light.   Cardiovascular:      Rate and Rhythm: Normal rate and regular rhythm.      Pulses: Normal pulses.   Pulmonary:      Effort: Pulmonary effort is normal. No respiratory distress.      Breath sounds: Normal breath sounds. No stridor. No wheezing, rhonchi or rales.   Abdominal:      General: Abdomen is flat. There is no distension.      Tenderness: There is no abdominal tenderness. There is no right CVA tenderness, left CVA tenderness, guarding or rebound.   Musculoskeletal:         General: No swelling, tenderness, deformity or signs of injury. Normal range of motion.      Cervical back: Normal range of motion. No rigidity or tenderness.      Right lower leg: No edema.      Left lower leg: No edema.   Skin:     General: Skin is warm and dry.      Capillary Refill: Capillary refill takes less than 2 seconds.      Findings: No erythema or rash.   Neurological:      General: No focal deficit present.       Mental Status: She is alert and oriented to person, place, and time. Mental status is at baseline.      GCS: GCS eye subscore is 4. GCS verbal subscore is 5. GCS motor subscore is 6.      Cranial Nerves: Cranial nerves 2-12 are intact. No cranial nerve deficit, dysarthria or facial asymmetry.      Sensory: Sensation is intact. No sensory deficit.      Motor: No weakness.      Comments: Patient is slow to answer questions but she is able to answer clearly in full sentences and answers all questions appropriately.  Alert and oriented x 4   Psychiatric:         Mood and Affect: Mood normal.         Procedures           ED Course  ED Course as of 06/19/25 1544   Thu Jun 19, 2025   1205 Potassium(!!): 2.6 [AK]   1233 Rodney searssein [AK]   1543 Family request no benzos or opiates as patient was previously addicted [AK]      ED Course User Index  [AK] Nasim Vincent MD                                           Medical Decision Making  Is a 70-year-old female here for evaluation of possible UTI as she reports that she has been confused but on my evaluation she was ANO x 4.  States that she delights like this when she has a UTI, only no acute leukocyte esterase and unable to have a microscopic run currently.  Will give her empiric Rocephin and plan to admit to the hospital for failure to thrive picture as she has not been eating, drinking, not taking her meds, and according to the daughter may be a component of depression.  Patient may require placement at some point for failure to thrive    Problems Addressed:  Acute cystitis without hematuria: complicated acute illness or injury  Failure to thrive in adult: complicated acute illness or injury  Hypokalemia: complicated acute illness or injury  Periorbital ecchymosis of left eye, initial encounter: complicated acute illness or injury  Weakness: complicated acute illness or injury    Amount and/or Complexity of Data Reviewed  Labs: ordered. Decision-making details documented  in ED Course.  Radiology: ordered.    Risk  Prescription drug management.  Decision regarding hospitalization.        Final diagnoses:   Hypokalemia   Weakness   Acute cystitis without hematuria   Failure to thrive in adult   Periorbital ecchymosis of left eye, initial encounter       ED Disposition  ED Disposition       ED Disposition   Decision to Admit    Condition   --    Comment   Level of Care: Telemetry [5]   Diagnosis: Hypokalemia [841401]   Admitting Physician: MAHENDRA RHOADES [[id number]]   Certification: I Certify That Inpatient Hospital Services Are Medically Necessary For Greater Than 2 Midnights                 No follow-up provider specified.       Medication List      No changes were made to your prescriptions during this visit.

## 2025-06-20 PROBLEM — A49.9 UTI (URINARY TRACT INFECTION), BACTERIAL: Status: ACTIVE | Noted: 2025-06-20
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
## 2025-06-20 PROBLEM — G93.41 METABOLIC ENCEPHALOPATHY: Status: ACTIVE | Noted: 2025-06-20

## 2025-06-20 PROBLEM — N39.0 UTI (URINARY TRACT INFECTION), BACTERIAL: Status: ACTIVE | Noted: 2025-06-20

## 2025-06-20 LAB
ANION GAP SERPL CALCULATED.3IONS-SCNC: 8 MMOL/L (ref 5–15)
BACTERIA SPEC AEROBE CULT: NORMAL
BUN SERPL-MCNC: 8 MG/DL (ref 8–23)
BUN/CREAT SERPL: 12.5 (ref 7–25)
CALCIUM SPEC-SCNC: 8.1 MG/DL (ref 8.6–10.5)
CHLORIDE SERPL-SCNC: 114 MMOL/L (ref 98–107)
CO2 SERPL-SCNC: 22 MMOL/L (ref 22–29)
CREAT SERPL-MCNC: 0.64 MG/DL (ref 0.57–1)
DEPRECATED RDW RBC AUTO: 55.2 FL (ref 37–54)
EGFRCR SERPLBLD CKD-EPI 2021: 94.6 ML/MIN/1.73
ERYTHROCYTE [DISTWIDTH] IN BLOOD BY AUTOMATED COUNT: 16.4 % (ref 12.3–15.4)
GLUCOSE SERPL-MCNC: 107 MG/DL (ref 65–99)
HCT VFR BLD AUTO: 34 % (ref 34–46.6)
HGB BLD-MCNC: 11.3 G/DL (ref 12–15.9)
MCH RBC QN AUTO: 30.8 PG (ref 26.6–33)
MCHC RBC AUTO-ENTMCNC: 33.2 G/DL (ref 31.5–35.7)
MCV RBC AUTO: 92.6 FL (ref 79–97)
PLATELET # BLD AUTO: 229 10*3/MM3 (ref 140–450)
PMV BLD AUTO: 10.7 FL (ref 6–12)
POTASSIUM SERPL-SCNC: 3.5 MMOL/L (ref 3.5–5.2)
RBC # BLD AUTO: 3.67 10*6/MM3 (ref 3.77–5.28)
SODIUM SERPL-SCNC: 144 MMOL/L (ref 136–145)
WBC NRBC COR # BLD AUTO: 5.55 10*3/MM3 (ref 3.4–10.8)

## 2025-06-20 PROCEDURE — 85027 COMPLETE CBC AUTOMATED: CPT | Performed by: INTERNAL MEDICINE

## 2025-06-20 PROCEDURE — 25010000002 CEFTRIAXONE PER 250 MG: Performed by: HOSPITALIST

## 2025-06-20 PROCEDURE — 80048 BASIC METABOLIC PNL TOTAL CA: CPT | Performed by: INTERNAL MEDICINE

## 2025-06-20 PROCEDURE — 97162 PT EVAL MOD COMPLEX 30 MIN: CPT

## 2025-06-20 PROCEDURE — 97530 THERAPEUTIC ACTIVITIES: CPT

## 2025-06-20 PROCEDURE — 97166 OT EVAL MOD COMPLEX 45 MIN: CPT

## 2025-06-20 PROCEDURE — 25010000002 SODIUM CHLORIDE 0.9 % WITH KCL 20 MEQ 20-0.9 MEQ/L-% SOLUTION: Performed by: INTERNAL MEDICINE

## 2025-06-20 RX ORDER — LIDOCAINE 4 G/G
2 PATCH TOPICAL
Status: DISCONTINUED | OUTPATIENT
Start: 2025-06-20 | End: 2025-06-27 | Stop reason: HOSPADM

## 2025-06-20 RX ORDER — CHLORDIAZEPOXIDE HYDROCHLORIDE 25 MG/1
25 CAPSULE, GELATIN COATED ORAL NIGHTLY
Status: DISCONTINUED | OUTPATIENT
Start: 2025-06-20 | End: 2025-06-22

## 2025-06-20 RX ORDER — POTASSIUM CHLORIDE 1500 MG/1
40 TABLET, EXTENDED RELEASE ORAL EVERY 4 HOURS
Status: COMPLETED | OUTPATIENT
Start: 2025-06-20 | End: 2025-06-20

## 2025-06-20 RX ORDER — CHLORDIAZEPOXIDE HYDROCHLORIDE 25 MG/1
25 CAPSULE, GELATIN COATED ORAL 3 TIMES DAILY PRN
Status: DISCONTINUED | OUTPATIENT
Start: 2025-06-20 | End: 2025-06-20

## 2025-06-20 RX ORDER — LIDOCAINE 4 G/G
2 PATCH TOPICAL ONCE
Status: COMPLETED | OUTPATIENT
Start: 2025-06-20 | End: 2025-06-21

## 2025-06-20 RX ORDER — ACETAMINOPHEN 500 MG
500 TABLET ORAL 4 TIMES DAILY
Status: DISCONTINUED | OUTPATIENT
Start: 2025-06-20 | End: 2025-06-27 | Stop reason: HOSPADM

## 2025-06-20 RX ORDER — TRAZODONE HYDROCHLORIDE 100 MG/1
200 TABLET ORAL NIGHTLY
COMMUNITY
End: 2025-06-27 | Stop reason: HOSPADM

## 2025-06-20 RX ADMIN — LIDOCAINE 2 PATCH: 4 PATCH TOPICAL at 12:44

## 2025-06-20 RX ADMIN — POTASSIUM CHLORIDE AND SODIUM CHLORIDE 100 ML/HR: 900; 150 INJECTION, SOLUTION INTRAVENOUS at 06:10

## 2025-06-20 RX ADMIN — POTASSIUM CHLORIDE 40 MEQ: 1500 TABLET, EXTENDED RELEASE ORAL at 12:44

## 2025-06-20 RX ADMIN — DOCUSATE SODIUM 100 MG: 100 CAPSULE, LIQUID FILLED ORAL at 09:35

## 2025-06-20 RX ADMIN — CEFTRIAXONE 2000 MG: 2 INJECTION, POWDER, FOR SOLUTION INTRAMUSCULAR; INTRAVENOUS at 12:43

## 2025-06-20 RX ADMIN — ACETAMINOPHEN 325MG 650 MG: 325 TABLET ORAL at 06:03

## 2025-06-20 RX ADMIN — BUPROPION HYDROCHLORIDE 300 MG: 300 TABLET, EXTENDED RELEASE ORAL at 21:00

## 2025-06-20 RX ADMIN — LIDOCAINE 2 PATCH: 4 PATCH TOPICAL at 18:43

## 2025-06-20 RX ADMIN — DICLOFENAC SODIUM 2 G: 10 GEL TOPICAL at 18:47

## 2025-06-20 RX ADMIN — DULOXETINE 60 MG: 60 CAPSULE, DELAYED RELEASE ORAL at 21:00

## 2025-06-20 RX ADMIN — ACETAMINOPHEN 500 MG: 325 TABLET, FILM COATED ORAL at 21:00

## 2025-06-20 RX ADMIN — ACETAMINOPHEN 325MG 650 MG: 325 TABLET ORAL at 16:48

## 2025-06-20 RX ADMIN — LEVOTHYROXINE SODIUM 88 MCG: 88 TABLET ORAL at 06:04

## 2025-06-20 RX ADMIN — DULOXETINE 60 MG: 60 CAPSULE, DELAYED RELEASE ORAL at 09:35

## 2025-06-20 RX ADMIN — POTASSIUM CHLORIDE 40 MEQ: 1500 TABLET, EXTENDED RELEASE ORAL at 09:35

## 2025-06-20 RX ADMIN — CHLORDIAZEPOXIDE HYDROCHLORIDE 25 MG: 25 CAPSULE ORAL at 21:00

## 2025-06-20 RX ADMIN — ASPIRIN 81 MG: 81 TABLET, COATED ORAL at 09:34

## 2025-06-20 RX ADMIN — Medication 2.5 MG: at 21:00

## 2025-06-20 RX ADMIN — DICLOFENAC SODIUM 2 G: 10 GEL TOPICAL at 12:43

## 2025-06-20 RX ADMIN — DICLOFENAC SODIUM 2 G: 10 GEL TOPICAL at 09:37

## 2025-06-20 RX ADMIN — ACETAMINOPHEN 500 MG: 325 TABLET, FILM COATED ORAL at 12:45

## 2025-06-20 NOTE — THERAPY EVALUATION
Patient Name: Sonya Marcus  : 1955    MRN: [id number]                              Today's Date: 2025       Admit Date: 2025    Visit Dx:     ICD-10-CM ICD-9-CM   1. Hypokalemia  E87.6 276.8   2. Weakness  R53.1 780.79   3. Acute cystitis without hematuria  N30.00 595.0   4. Failure to thrive in adult  R62.7 783.7   5. Periorbital ecchymosis of left eye, initial encounter  S00.12XA 921.1     Patient Active Problem List   Diagnosis    Altered mental status    Polypharmacy    Positive urine drug screen    MICHAEL (acute kidney injury)    Left ureteral stone    Functional tremor    Knee joint replacement status    Fibromyalgia, primary    Coronary arteriosclerosis    Hypothyroidism    Genitourinary syndrome of menopause    Hyperlipidemia    Osteoarthritis of right knee    Pernicious anemia    Urinary tract infection in female    Absolute anemia    Gastroesophageal reflux disease    Angina pectoris    Anxiety disorder    Arthritis or polyarthritis, rheumatoid    Avitaminosis D    B12 deficiency    Bilateral primary osteoarthritis of knee    BP (high blood pressure)    Cardiac murmur    Stage 2 chronic kidney disease    Aftercare for healing traumatic closed fracture of right lower extremity    Delayed union of tibial shaft fracture    Generalized weakness    Opioid dependence    Dehydration    Hypokalemia    Fall    UTI (urinary tract infection), bacterial    Metabolic encephalopathy     Past Medical History:   Diagnosis Date    Allergic     Anemia     Angina pectoris     Anxiety and depression     Arthralgia of right knee 09/10/2024    Arthritis     Carotid artery occlusion     Chronic urinary tract infection     CKD (chronic kidney disease)     Coronary arteriosclerosis     Coronary artery disease     Disease of thyroid gland     Fibromyalgia, primary 2000    Fractures 2024    Fell at home    Genitourinary syndrome of menopause     GERD (gastroesophageal reflux disease)      History of kidney stones     History of pulmonary embolism     AFTER HYSTERECTOMY    HL (hearing loss) 2024    Hyperlipidemia 2020    Hypertension     Hypothyroidism     Kidney stone 1972    Left hip pain     Memory loss     MVP (mitral valve prolapse)     DR LUZ LAST OFFICE NOTE WITH CHART 7/30/2024    Obesity     Prediabetes     Right knee pain     Right shoulder pain     Sleep apnea     PT IS NOT USING CPAP    Substance abuse 2010    Hydrocodone and Oxycodone     Past Surgical History:   Procedure Laterality Date    APPENDECTOMY      BREAST BIOPSY      COLONOSCOPY  01/01/2022    CYSTOSCOPY W/ URETERAL STENT PLACEMENT Left 09/05/2023    Procedure: LEFT CYSTOSCOPY URETERAL CATHETER/STENT INSERTION;  Surgeon: Quinton Rosa MD;  [facility] MAIN OR;  Service: Urology;  Laterality: Left;    EYE SURGERY      HYSTERECTOMY      JOINT REPLACEMENT  2021 & 2024    REPLACEMENT TOTAL KNEE Left 2021    SHOULDER SURGERY Right     TONSILLECTOMY      TOTAL KNEE ARTHROPLASTY Right 08/22/2024    Procedure: TOTAL KNEE ARTHROPLASTY WITH CORI ROBOT;  Surgeon: Steven Sumner II, MD;  [place]  [facility] [place]  Service: Robotics - Ortho;  Laterality: Right;      General Information       Row Name 06/20/25 1235          OT Time and Intention    Document Type evaluation  -JW     Mode of Treatment occupational therapy  -JW     Symptoms Noted During/After Treatment none  -JW       Row Name 06/20/25 1235          General Information    Patient Profile Reviewed yes  -JW     Prior Level of Function independent:;ADL's;all household mobility  2 recent falls  -JW     Existing Precautions/Restrictions fall  -JW     Barriers to Rehab medically complex  -JW       Row Name 06/20/25 1235          Living Environment    Current Living Arrangements home  -JW     People in Home alone  -JW       Row Name 06/20/25 1235          Home Main Entrance    Number of Stairs, Main Entrance none  -JW       Row Name 06/20/25 1235           Cognition    Orientation Status (Cognition) oriented x 3  -       Row Name 06/20/25 1235          Safety Issues/Impairments Affecting Functional Mobility    Safety Issues Affecting Function (Mobility) insight into deficits/self-awareness;judgment;problem-solving  -     Impairments Affecting Function (Mobility) balance;endurance/activity tolerance;strength  -               User Key  (r) = Recorded By, (t) = Taken By, (c) = Cosigned By      Initials Name Provider Type    Denise Dinh OT Occupational Therapist                     Mobility/ADL's       Row Name 06/20/25 1236          Bed Mobility    Bed Mobility supine-sit;sit-supine  -     Supine-Sit [place] (Bed Mobility) standby assist  -     Comment, (Bed Mobility) [facility] at end of session  -       Row Name 06/20/25 [facility]          Transfers    Transfers sit-stand transfer;bed-chair transfer  -       Row Name 06/20/25 [facility]          Bed-Chair Transfer    Bed-Chair [place] (Transfers) minimum assist (75% patient effort);verbal cues  -     Comment, (Bed-Chair Transfer) HHA. Cues for safety  -[facility] Name 06/20/25 [facility]          Sit-Stand Transfer    Sit-Stand [place] (Transfers) contact guard  -     Comment, (Sit-Stand Transfer) w/o AD  -[facility] Name 06/20/25 [facility]          Functional Mobility    Functional Mobility- Comment pt able to take ~2 steps over to the chair, attempting to sit prior to arriving at the chair, Pawel needed for safety and balance. Poor safety awareness  -[facility] Name 06/20/25 [facility]          Activities of Daily Living    BADL Assessment/Intervention toileting  declines participation in ADLs at this time. Agreeable to the chair  -JW       Row Name 06/20/25 [facility]          Toileting Assessment/Training    Comment, (Toileting) [place] [facility] [place] needs with nsg staff  -               User Key  (r) = Recorded By, (t) = Taken By, (c) = Cosigned By      Initials Name Provider Type    Denise Dinh  OT Occupational Therapist                   Obj/Interventions       Row Name 06/20/25 1241          Vision Assessment/Intervention    Visual Impairment/Limitations WNL  -JW       Row Name 06/20/25 1241          Range of Motion Comprehensive    General Range of Motion bilateral upper extremity ROM WNL  -JW       Row Name 06/20/25 1241          Strength Comprehensive (MMT)    Comment, General Manual Muscle Testing (MMT) Assessment generalized weakness  -JW       Row Name 06/20/25 1241          Balance    Static Standing Balance minimal assist;verbal cues  -     Dynamic Standing Balance minimal assist;verbal cues  -     Comment, Balance impaired balance and safety awareness  -               User Key  (r) = Recorded By, (t) = Taken By, (c) = Cosigned By      Initials Name Provider Type    JW Denise Neumann, DANISHA Occupational Therapist                   Goals/Plan       Row Name 06/20/25 1319          Transfer Goal 1 (OT)    Activity/Assistive Device (Transfer Goal 1, OT) transfers, all  -     [place] Level/Cues Needed (Transfer Goal 1, OT) modified independence  -     Time Frame (Transfer Goal 1, OT) 2 weeks  -     Progress/Outcome (Transfer Goal 1, OT) goal ongoing  -JW       Row Name 06/20/25 1319          Bathing Goal 1 (OT)    Activity/Device (Bathing Goal 1, OT) bathing skills, all;grab bar, tub/shower;hand-held shower spray hose;shower chair  -     [place] Level/Cues Needed (Bathing Goal 1, OT) set-up required;supervision required  -     Time Frame (Bathing Goal 1, OT) 2 weeks  -     Progress/Outcomes (Bathing Goal 1, OT) goal ongoing  -JW       Row Name 06/20/25 1319          Dressing Goal 1 (OT)    Activity/Device (Dressing Goal 1, OT) dressing skills, all  -     [place]/Cues Needed (Dressing Goal 1, OT) modified independence  -     Time Frame (Dressing Goal 1, OT) 2 weeks  -     Progress/Outcome (Dressing Goal 1, OT) goal ongoing  -JW       Row Name 06/20/25 5745           Toileting Goal 1 (OT)    Activity/Device (Toileting Goal 1, OT) toileting skills, all  -     [place] Level/Cues Needed (Toileting Goal 1, OT) modified independence  -JW     Time Frame (Toileting Goal 1, OT) 2 weeks  -JW     Progress/Outcome (Toileting Goal 1, OT) goal ongoing  -       Row Name 06/20/25 1319          Grooming Goal 1 (OT)    Activity/Device (Grooming Goal 1, OT) grooming skills, all  -JW     [place] (Grooming Goal 1, OT) independent  -JW     Time Frame (Grooming Goal 1, OT) 2 weeks  -JW     Progress/Outcome (Grooming Goal 1, OT) goal ongoing  -       Row Name 06/20/25 1319          Therapy Assessment/Plan (OT)    Planned Therapy Interventions (OT) activity tolerance training;functional balance retraining;occupation/activity based interventions;BADL retraining;patient/caregiver education/training;transfer/mobility retraining;strengthening exercise  -               User Key  (r) = Recorded By, (t) = Taken By, (c) = Cosigned By      Initials Name Provider Type    JW Denise Neumann, DANISHA Occupational Therapist                   Clinical Impression       Row Name 06/20/25 1245          Pain Assessment    Pretreatment Pain Rating 0/10 - no pain  -     Posttreatment Pain Rating 0/10 - no pain  -       Row Name 06/20/25 1249          Plan of Care Review    Plan of Care Reviewed With patient  -     Outcome Evaluation Pt admitted falls and confusion, found to have hypokalemia. Pt lives alone and is typically independent with ADLs and fxl mobility w/o AD however has had 2 recent falls. Today, pt presents with generalized weakness, poor safety awareness and activity tolerance below baseline. Requiring assist for ADLs and Pawel for TF to chair with cues d/t poor safety awareness, attempting to sit before arriving at the chair. Pt quickly fatigued and declines further mobility attempts. OT will cont to follow, recommending rehab at d/c as she presents below baseline fxn and not safe to return home  alone  -       Row Name 06/20/25 1249          Therapy Assessment/Plan (OT)    Criteria for Skilled Therapeutic Interventions Met (OT) yes;skilled treatment is necessary  -     Therapy Frequency (OT) 5 times/wk  -       Row Name 06/20/25 1249          Therapy Plan Review/Discharge Plan (OT)    Anticipated Discharge Disposition (OT) inpatient rehabilitation facility  -       Row Name 06/20/25 1249          Positioning and Restraints    Pre-Treatment Position in bed  -     Post Treatment Position chair  -JW     In Chair notified nsg;sitting;call light within reach;encouraged to call for assist;with family/caregiver;legs elevated  -               User Key  (r) = Recorded By, (t) = Taken By, (c) = Cosigned By      Initials Name Provider Type    Denise Dinh, DANISHA Occupational Therapist                   Outcome Measures       Row Name 06/20/25 1321          How much help from another is currently needed...    Putting on and taking off regular lower body clothing? 2  -JW     Bathing (including washing, rinsing, and drying) 2  -JW     Toileting (which includes using toilet bed pan or urinal) 3  -JW     Putting on and taking off regular upper body clothing 3  -JW     Taking care of personal grooming (such as brushing teeth) 3  -JW     Eating meals 4  -JW     AM-PAC 6 Clicks Score (OT) 17  -       Row Name 06/20/25 1028          How much help from another person do you currently need...    Turning from your back to your side while in flat bed without using bedrails? 3  -CH     Moving from lying on back to sitting on the side of a flat bed without bedrails? 3  -CH     Moving to and from a bed to a chair (including a wheelchair)? 3  -CH     Standing up from a chair using your arms (e.g., wheelchair, bedside chair)? 3  -CH     Climbing 3-5 steps with a railing? 1  -CH     To walk in hospital room? 2  -CH     AM-PAC 6 Clicks Score (PT) 15  -CH     Highest Level of Mobility Goal Move to Chair/Commode-4  -CH        Row Name 06/20/25 1321 06/20/25 1028       Functional Assessment    Outcome Measure Options AM-PAC 6 Clicks Daily Activity (OT)  - AM-PAC 6 Clicks Basic Mobility (PT)  -              User Key  (r) = Recorded By, (t) = Taken By, (c) = Cosigned By      Initials Name Provider Type    CH Bhumi Osborn, PT Physical Therapist    Denise Dinh OT Occupational Therapist                    Occupational Therapy Education       Title: PT OT SLP Therapies (Done)       Topic: Occupational Therapy (Done)       Point: ADL training (Done)       Learning Progress Summary            Patient Acceptance, E, VU by  at 6/20/2025 1322   Family Acceptance, E, VU by  at 6/20/2025 1322                      Point: Home exercise program (Done)       Learning Progress Summary            Patient Acceptance, E, VU by  at 6/20/2025 1322   Family Acceptance, E, VU by  at 6/20/2025 1322                      Point: Precautions (Done)       Learning Progress Summary            Patient Acceptance, E, VU by  at 6/20/2025 1322   Family Acceptance, E, VU by  at 6/20/2025 1322                      Point: Body mechanics (Done)       Learning Progress Summary            Patient Acceptance, E, VU by  at 6/20/2025 1322   Family Acceptance, E, VU by  at 6/20/2025 1322                                      User Key       Initials Effective Dates Name Provider Type [facility] 06/10/21 -  Denise Neumann OT Occupational Therapist OT                  OT Recommendation and Plan  Planned Therapy Interventions (OT): activity tolerance training, functional balance retraining, occupation/activity based interventions, BADL retraining, patient/caregiver education/training, transfer/mobility retraining, strengthening exercise  Therapy Frequency (OT): 5 times/wk  Plan of Care Review  Plan of Care Reviewed With: patient  Outcome Evaluation: Pt admitted falls and confusion, found to have hypokalemia. Pt lives alone and is typically independent  with ADLs and fxl mobility w/o AD however has had 2 recent falls. Today, pt presents with generalized weakness, poor safety awareness and activity tolerance below baseline. Requiring assist for ADLs and Pawel for TF to chair with cues d/t poor safety awareness, attempting to sit before arriving at the chair. Pt quickly fatigued and declines further mobility attempts. OT will cont to follow, recommending rehab at d/c as she presents below baseline fxn and not safe to return home alone     Time Calculation:   Evaluation Complexity (OT)  Review Occupational Profile/Medical/Therapy History Complexity: expanded/moderate complexity  Assessment, Occupational Performance/Identification of Deficit Complexity: 3-5 performance deficits  Clinical Decision Making Complexity (OT): detailed assessment/moderate complexity  Overall Complexity of Evaluation (OT): moderate complexity     Time Calculation- OT       Row Name 06/20/25 1322             Time Calculation- OT    OT Start Time 0957  -      OT Stop Time 1015  -      OT Time Calculation (min) 18 min  -JW      Total Timed Code Minutes- OT 10 minute(s)  -      OT Received On 06/20/25  -      OT - Next Appointment 06/23/25  -      OT Goal Re-Cert Due Date 07/04/25  -         Timed Charges    41866 - OT Therapeutic Activity Minutes 10  -JW         Untimed Charges    OT Eval/Re-eval Minutes 8  -JW         Total Minutes    Timed Charges Total Minutes 10  -JW      Untimed Charges Total Minutes 8  -JW       Total Minutes 18  -JW                User Key  (r) = Recorded By, (t) = Taken By, (c) = Cosigned By      Initials Name Provider Type    JW Denise Neumann OT Occupational Therapist                  Therapy Charges for Today       Code Description Service Date Service Provider Modifiers Qty    [id number]  OT THERAPEUTIC ACT EA 15 MIN 6/20/2025 Denise Neumnan OT GO 1    [id number]  OT EVAL MOD COMPLEXITY 3 6/20/2025 Denise Neumann OT GO 1                 Denise Neumann  OT  6/20/2025

## 2025-06-20 NOTE — H&P
HISTORY AND PHYSICAL   [facility]        Date of Admission: 2025  Patient Identification:  Name: Sonya Marcus  Age: 70 y.o.  Sex: female  :  1955  MRN: [id number]                     Primary Care Physician: Regina Ferro)CHANELLE    Chief Complaint:  70 year old female presented to the ED at [facility] due to confusion; it has been present for several days but family could not convince her to come in until today; she has a past history of utis and presented in a similar fashion when she has one; she says she has had fevers and chills; no nausea or vomiting; she remains confused and is not able to give a good history; a daughter is present with her; the patient does recall falling a few days ago which resulted in a black left eye    History of Present Illness:   As above    Past Medical History:  Past Medical History:   Diagnosis Date    Allergic     Anemia     Angina pectoris     Anxiety and depression     Arthralgia of right knee 09/10/2024    Arthritis     Carotid artery occlusion     Chronic urinary tract infection     CKD (chronic kidney disease)     Coronary arteriosclerosis     Coronary artery disease     Disease of thyroid gland     Fibromyalgia, primary 2000    Fractures 2024    Fell at home    Genitourinary syndrome of menopause     GERD (gastroesophageal reflux disease)     History of kidney stones     History of pulmonary embolism     AFTER HYSTERECTOMY    HL (hearing loss)     Hyperlipidemia     Hypertension     Hypothyroidism     Kidney stone     Left hip pain     Memory loss     MVP (mitral valve prolapse)     DR LUZ LAST OFFICE NOTE WITH CHART 2024    Obesity     Prediabetes     Right knee pain     Right shoulder pain     Sleep apnea     PT IS NOT USING CPAP    Substance abuse 2010    Hydrocodone and Oxycodone     Past Surgical History:  Past Surgical History:   Procedure Laterality Date    APPENDECTOMY      BREAST  BIOPSY      COLONOSCOPY  01/01/2022    CYSTOSCOPY W/ URETERAL STENT PLACEMENT Left 09/05/2023    Procedure: LEFT CYSTOSCOPY URETERAL CATHETER/STENT INSERTION;  Surgeon: Quinton Rosa MD;  Location: [facility];  Service: Urology;  Laterality: Left;    EYE SURGERY      HYSTERECTOMY      JOINT REPLACEMENT  2021 & 2024    REPLACEMENT TOTAL KNEE Left 2021    SHOULDER SURGERY Right     TONSILLECTOMY      TOTAL KNEE ARTHROPLASTY Right 08/22/2024    Procedure: TOTAL KNEE ARTHROPLASTY WITH CORI ROBOT;  Surgeon: Steven Sumner II, MD;  Location: [facility];  Service: Robotics - Ortho;  Laterality: Right;      Home Meds:  Medications Prior to Admission   Medication Sig Dispense Refill Last Dose/Taking    aspirin 81 MG EC tablet Take 1 tablet by mouth Daily.   6/18/2025 Morning    buPROPion XL (WELLBUTRIN XL) 150 MG 24 hr tablet Take 2 tablets by mouth Every Night.   6/18/2025 Morning    chlordiazePOXIDE (LIBRIUM) 25 MG capsule Take 1 capsule by mouth 3 times a day.   6/19/2025    docusate sodium (Colace) 100 MG capsule Take 1 capsule by mouth Daily. 90 capsule 3 Past Week Morning    DULoxetine (CYMBALTA) 60 MG capsule Take 1 capsule by mouth 2 (Two) Times a Day. 180 capsule 1 6/19/2025 Morning    levothyroxine (SYNTHROID, LEVOTHROID) 88 MCG tablet Take 1 tablet by mouth Every Morning. 90 tablet 1 6/19/2025 Morning    metoprolol succinate XL (TOPROL-XL) 50 MG 24 hr tablet Take 1 tablet by mouth Daily.   6/19/2025    nitrofurantoin (MACRODANTIN) 50 MG capsule Take 1 capsule by mouth Every Night. 90 capsule 1 6/19/2025 Morning    ALPRAZolam (XANAX) 0.5 MG tablet Take 1 tablet by mouth 3 (Three) Times a Day As Needed for Anxiety.       cetirizine (zyrTEC) 5 MG tablet Take 1 tablet by mouth Daily.       cyanocobalamin 1000 MCG/ML injection Inject 1 mL under the skin into the appropriate area as directed Every 30 (Thirty) Days.       metoprolol succinate XL (TOPROL-XL) 50 MG 24 hr tablet Take 1 tablet by  mouth Daily. 90 tablet 0     omeprazole (priLOSEC) 20 MG capsule Take 1 capsule by mouth Daily. 90 capsule 1     ondansetron ODT (ZOFRAN-ODT) 4 MG disintegrating tablet Place 1-2 tablets on the tongue Every 8 (Eight) Hours As Needed for Nausea or Vomiting. 90 tablet 0     QUEtiapine (SEROquel) 100 MG tablet Take 2 tablets by mouth Every Night. 180 tablet 0     rosuvastatin (CRESTOR) 10 MG tablet Take 1 tablet by mouth Every Night. 90 tablet 1        Allergies:  Allergies   Allergen Reactions    Fish Allergy Anaphylaxis     Has epi pen    Iodine Anaphylaxis    Penicillins Nausea And Vomiting and Rash     Tolerated rocephin 9/2023 admission    Prochlorperazine Dystonia, Anaphylaxis, Rash and Other (See Comments)     Paralysis    Muscle twisting    Shellfish Allergy Anaphylaxis, Rash and Swelling    Shellfish-Derived Products Anaphylaxis    Sulfate Rash    Glycerol, Iodinated Unknown - Low Severity     Anaphylaxis   (also to shellfish)    Octacosanol Dizziness     HEADACHE    Latex Rash    Levofloxacin Hives    Sulfa Antibiotics GI Intolerance     Immunizations:  Immunization History   Administered Date(s) Administered    Fluzone High-Dose 65+YRS 11/01/2023    Hep A / Hep B 01/10/1976    Hep B / HiB 01/01/2005    Hepatitis B 2 Dose Vaccine Heplisav-B 11/01/2023    INFLUENZA A MONOVALENT (H5N1), ADJUVANTED-2013 09/10/2023    INFLUENZA NASAL UF 10/10/2023    Influenza, Unspecified 10/02/2023    Pneumococcal Conjugate Unspecified 01/01/2022    Pneumococcal, Unspecified 01/01/2022    Td (TDVAX) 01/01/2015    Td, Not Adsorbed 01/01/2015     Social History:   Social History     Social History Narrative    Not on file     Social History     Socioeconomic History    Marital status:    Tobacco Use    Smoking status: Never     Passive exposure: Never    Smokeless tobacco: Never   Vaping Use    Vaping status: Never Used   Substance and Sexual Activity    Alcohol use: Yes     Alcohol/week: 2.0 standard drinks of alcohol     " Types: 2 Glasses of wine per week     Comment: OCCASIONALLY    Drug use: Not Currently     Types: Benzodiazepines, Hydrocodone, Oxycodone     Comment:     Sexual activity: Not Currently     Partners: Male     Birth control/protection: Post-menopausal     Comment: Post Menopausal with no immediate partner       Family History:  Family History   Problem Relation Age of Onset    Stroke Sister     Anxiety disorder Sister         +son & granddaughter    Breast cancer Daughter     Breast cancer Daughter     Stroke Maternal Grandmother     Arthritis Mother     Asthma Mother     Depression Mother     Arthritis Father     Heart disease Father     Hyperlipidemia Father     Developmental Disability Paternal Grandfather     Thyroid disease Paternal Grandmother     Drug abuse Brother     Malig Hyperthermia Neg Hx         Review of Systems  Not obtainable from the patient    Objective:  T Max 24 hrs: Temp (24hrs), Av.9 °F (36.6 °C), Min:97.7 °F (36.5 °C), Max:98.1 °F (36.7 °C)    Vitals Ranges:   Temp:  [97.7 °F (36.5 °C)-98.1 °F (36.7 °C)] 97.9 °F (36.6 °C)  Heart Rate:  [69-92] 79  Resp:  [16-17] 17  BP: (128-163)/(68-99) 139/73      Exam:  /73 (BP Location: Right arm, Patient Position: Lying)   Pulse 79   Temp 97.9 °F (36.6 °C) (Oral)   Resp 17   Ht 154.9 cm (61\")   Wt 75.5 kg (166 lb 7.2 oz)   SpO2 96%   BMI 31.45 kg/m²     General Appearance:    Alert, cooperative, no distress, appears stated age   Head:    Normocephalic, without obvious abnormality, atraumatic   Eyes:    PERRL, conjunctivae/corneas clear, EOM's intact, both eyes   Ears:    Normal external ear canals, both ears   Nose:   Nares normal, septum midline, mucosa normal, no drainage    or sinus tenderness   Throat:   Lips, mucosa, and tongue normal   Neck:   Supple, symmetrical, trachea midline, no adenopathy;     thyroid:  no enlargement/tenderness/nodules; no carotid    bruit or JVD   Back:     Symmetric, no curvature, ROM normal, no CVA " tenderness   Lungs:     Clear to auscultation bilaterally, respirations unlabored   Chest Wall:    No tenderness or deformity    Heart:    Regular rate and rhythm, S1 and S2 normal, no murmur, rub   or gallop   Abdomen:     Soft, nontender, bowel sounds active all four quadrants,     no masses, no hepatomegaly, no splenomegaly   Extremities:   Extremities normal, atraumatic, no cyanosis or edema                       .    Data Review:  Labs in chart were reviewed.  WBC   Date Value Ref Range Status   06/19/2025 5.24 3.40 - 10.80 10*3/mm3 Final     Hemoglobin   Date Value Ref Range Status   06/19/2025 13.2 12.0 - 15.9 g/dL Final     Hematocrit   Date Value Ref Range Status   06/19/2025 40.5 34.0 - 46.6 % Final     Platelets   Date Value Ref Range Status   06/19/2025 292 140 - 450 10*3/mm3 Final     Sodium   Date Value Ref Range Status   06/19/2025 141 136 - 145 mmol/L Final     Potassium   Date Value Ref Range Status   06/19/2025 2.6 (C) 3.5 - 5.2 mmol/L Final     Chloride   Date Value Ref Range Status   06/19/2025 106 98 - 107 mmol/L Final     CO2   Date Value Ref Range Status   06/19/2025 23.3 22.0 - 29.0 mmol/L Final     BUN   Date Value Ref Range Status   06/19/2025 10.8 8.0 - 23.0 mg/dL Final     Creatinine   Date Value Ref Range Status   06/19/2025 0.71 0.57 - 1.00 mg/dL Final     Glucose   Date Value Ref Range Status   06/19/2025 138 (H) 65 - 99 mg/dL Final     Calcium   Date Value Ref Range Status   06/19/2025 9.4 8.6 - 10.5 mg/dL Final     Magnesium   Date Value Ref Range Status   06/19/2025 1.6 1.6 - 2.4 mg/dL Final     AST (SGOT)   Date Value Ref Range Status   06/19/2025 9 1 - 32 U/L Final     ALT (SGPT)   Date Value Ref Range Status   06/19/2025 6 1 - 33 U/L Final     Alkaline Phosphatase   Date Value Ref Range Status   06/19/2025 76 39 - 117 U/L Final                Imaging Results (All)       Procedure Component Value Units Date/Time    CT Head Without Contrast - Preliminary [[id number]] Collected:  06/19/25 1209     Updated: 06/19/25 1209    This result has not been signed. Information might be incomplete.      Narrative:      HISTORY: Fell. Head injury. Facial bone trauma. Neck pain.     CT OF THE BRAIN WITHOUT CONTRAST 06/19/2025     Axial images were obtained through the brain without intravenous  contrast.     There is mild diffuse atrophy. There is no evidence of acute infarction,  hemorrhage, midline shift or mass effect. A small scalp hematoma is seen  over the left frontal region. No skull fractures are seen.       Impression:      No acute intracranial process identified.        CT OF THE FACIAL BONES WITHOUT CONTRAST     Axial images were obtained through the facial bones. Sagittal and  coronal reconstruction images were reviewed.     No facial bone fractures are seen. The sinuses appear clear.  Intraorbital contents appear symmetrical and within normal limits.     IMPRESSION:  No facial bone fractures are seen.     CT OF THE CERVICAL SPINE     Spiral images were obtained from the skull base to the upper thoracic  spine. Sagittal and coronal reconstruction images were reviewed.     There is degenerative disease of the C1-C2 articulation. There is  minimal spondylosis of C3-4. Very minimal anterolisthesis of C4 on C5 is  seen. There is moderate spondylosis of C5-6 and mild spondylosis at  C6-7. No other significant subluxation is seen. There is multilevel  facet joint disease. No cervical spine fractures are seen.     IMPRESSION:  1. Multilevel cervical degenerative disease.  2. No fractures are seen.     Radiation dose reduction techniques were utilized, including automated  exposure control and exposure modulation based on body size.          CT Facial Bones Without Contrast - Preliminary [[id number]] Collected: 06/19/25 1209     Updated: 06/19/25 1209    This result has not been signed. Information might be incomplete.      Narrative:      HISTORY: Fell. Head injury. Facial bone trauma. Neck pain.      CT OF THE BRAIN WITHOUT CONTRAST 06/19/2025     Axial images were obtained through the brain without intravenous  contrast.     There is mild diffuse atrophy. There is no evidence of acute infarction,  hemorrhage, midline shift or mass effect. A small scalp hematoma is seen  over the left frontal region. No skull fractures are seen.       Impression:      No acute intracranial process identified.        CT OF THE FACIAL BONES WITHOUT CONTRAST     Axial images were obtained through the facial bones. Sagittal and  coronal reconstruction images were reviewed.     No facial bone fractures are seen. The sinuses appear clear.  Intraorbital contents appear symmetrical and within normal limits.     IMPRESSION:  No facial bone fractures are seen.     CT OF THE CERVICAL SPINE     Spiral images were obtained from the skull base to the upper thoracic  spine. Sagittal and coronal reconstruction images were reviewed.     There is degenerative disease of the C1-C2 articulation. There is  minimal spondylosis of C3-4. Very minimal anterolisthesis of C4 on C5 is  seen. There is moderate spondylosis of C5-6 and mild spondylosis at  C6-7. No other significant subluxation is seen. There is multilevel  facet joint disease. No cervical spine fractures are seen.     IMPRESSION:  1. Multilevel cervical degenerative disease.  2. No fractures are seen.     Radiation dose reduction techniques were utilized, including automated  exposure control and exposure modulation based on body size.          CT Cervical Spine Without Contrast - Preliminary [[id number]] Collected: 06/19/25 1209     Updated: 06/19/25 1209    This result has not been signed. Information might be incomplete.      Narrative:      HISTORY: Fell. Head injury. Facial bone trauma. Neck pain.     CT OF THE BRAIN WITHOUT CONTRAST 06/19/2025     Axial images were obtained through the brain without intravenous  contrast.     There is mild diffuse atrophy. There is no evidence of  acute infarction,  hemorrhage, midline shift or mass effect. A small scalp hematoma is seen  over the left frontal region. No skull fractures are seen.       Impression:      No acute intracranial process identified.        CT OF THE FACIAL BONES WITHOUT CONTRAST     Axial images were obtained through the facial bones. Sagittal and  coronal reconstruction images were reviewed.     No facial bone fractures are seen. The sinuses appear clear.  Intraorbital contents appear symmetrical and within normal limits.     IMPRESSION:  No facial bone fractures are seen.     CT OF THE CERVICAL SPINE     Spiral images were obtained from the skull base to the upper thoracic  spine. Sagittal and coronal reconstruction images were reviewed.     There is degenerative disease of the C1-C2 articulation. There is  minimal spondylosis of C3-4. Very minimal anterolisthesis of C4 on C5 is  seen. There is moderate spondylosis of C5-6 and mild spondylosis at  C6-7. No other significant subluxation is seen. There is multilevel  facet joint disease. No cervical spine fractures are seen.     IMPRESSION:  1. Multilevel cervical degenerative disease.  2. No fractures are seen.     Radiation dose reduction techniques were utilized, including automated  exposure control and exposure modulation based on body size.                    Assessment:  Active Hospital Problems    Diagnosis  POA    **Hypokalemia [E87.6]  Yes      Resolved Hospital Problems   No resolved problems to display.   Uti  Altered mental status  Cad  Hypertension  Hyperlipidemia  Hypothyroidism  hyperglycemia    Plan:   Replace potassium  Continue antibiotics  She was diagnosed with a uti by urgent care at the end of may but did not take keflex prescribed to her  Pt.ot to see  Trend labs  Monitor on telemetry  Dw patient and family  Patient is full code and daughter is corey Kevin Nick Meng MD  6/19/2025  20:46 EDT

## 2025-06-20 NOTE — PLAN OF CARE
Goal Outcome Evaluation:  Plan of Care Reviewed With: patient           Outcome Evaluation: Pt is a 71 yo F who was admitted with confusion and fall at home. Pt found to have hypokalemia. Pt presents to PT with impaired functional mobility and gait secondary to genealized weakness, impaired balance, and decreased activity tolerance. Pt reports light headedness and dizziness with activity. Pt may benefit from skilled PT to address strength, mobility and gait. Per CCP, family is requesting acute rehab upon DC.    Anticipated Discharge Disposition (PT): inpatient rehabilitation facility

## 2025-06-20 NOTE — CASE MANAGEMENT/SOCIAL WORK
Discharge Planning Assessment  [facility]     Patient Name: Sonya Marcus  MRN: [id number]  Today's Date: 6/20/2025    Admit Date: 6/19/2025    Plan: IRB vs home with home health, will need transportation   Discharge Needs Assessment       Row Name 06/20/25 1022       Living Environment    People in Home alone    Current Living Arrangements home    Potentially Unsafe Housing Conditions none    In the past 12 months has the electric, gas, oil, or water company threatened to shut off services in your home? No    Primary Care Provided by self    Provides Primary Care For no one, unable/limited ability to care for self    Family Caregiver if Needed child(janae), adult    Family Caregiver Names Cece Riddle [phone] and Kalli Baker [phone]    Quality of Family Relationships helpful;involved    Able to Return to Prior Arrangements other (see comments)  Would like a referral to [facility]       Resource/Environmental Concerns    Resource/Environmental Concerns none    Transportation Concerns none       Transportation Needs    In the past 12 months, has lack of transportation kept you from medical appointments or from getting medications? no    In the past 12 months, has lack of transportation kept you from meetings, work, or from getting things needed for daily living? No       Food Insecurity    Within the past 12 months, you worried that your food would run out before you got the money to buy more. Never true    Within the past 12 months, the food you bought just didn't last and you didn't have money to get more. Never true       Transition Planning    Patient/Family Anticipates Transition to inpatient rehabilitation facility    Patient/Family Anticipated Services at Transition     Transportation Anticipated health plan transportation       Discharge Needs Assessment    Readmission Within the Last 30 Days no previous admission in last 30 days    Current Outpatient/Agency/Support Group  "inpatient rehabilitation facility    Equipment Currently Used at Home walker, standard    Concerns to be Addressed discharge planning    Do you want help finding or keeping work or a job? I do not need or want help    Do you want help with school or training? For example, starting or completing job training or getting a high school diploma, GED or equivalent No    Anticipated Changes Related to Illness none    Equipment Needed After Discharge none    Outpatient/Agency/Support Group Needs inpatient rehabilitation facility    Discharge Facility/Level of Care Needs rehabilitation facility                   Discharge Plan       Row Name 06/20/25 1024       Plan    Plan IRB vs home with home health, will need transportation    Patient/Family in Agreement with Plan yes    Plan Comments Met with pt at bedside, permission obtained to speak to pt in front of daughter, Kalli. Introduced self and explained role of , Face sheet verified, PCP is Regina Ferro. Pt denies any difficulty paying for medications and she obtains her medications  from [facility]. Pt lives alone, and stated that her daughters could assist with any care needs that may arise. Pt stated that she was independent in ADL's, however, she has recently had several falls, and her daughter stated that she will not \"hardly get out of bed or out of her chair\". She has had [facility] home health in the past and she has never been to SNF/Rehab. Pt is amenable to a referral to [facility]. Referral placed, contacted liaison and informed of referral. Upon discharge, pt may need transportation. Explained that CCP would follow to assess for discharge needs.                    Expected Discharge Date and Time       Expected Discharge Date Expected Discharge Time    Jun 23, 2025            Demographic Summary    No documentation.                  Functional Status    No documentation.                  Psychosocial    No documentation.                  " Abuse/Neglect    No documentation.                  Legal    No documentation.                  Substance Abuse    No documentation.                  Patient Forms    No documentation.                     Krista Freitas RN

## 2025-06-20 NOTE — PROGRESS NOTES
DAILY PROGRESS NOTE  [facility]    Patient Identification:  Name: Sonya Marcus  Age: 70 y.o.  Sex: female  :  1955  MRN: [id number]         Primary Care Physician: Regina Ferro APRN ([place])    Subjective:  Interval History: Complains of bilateral knee pain.  Daughter present at bedside to help with a lot of the history.  She was insightful in regards to past medical history as of recently which involved chronic opioid abuse in which she has been weaned off under the care of psychiatry and her Xanax has been adjusted over to Librium and they are currently using twice daily dosing.  She has been on this twice daily scheduled for the last couple weeks and per the daughter there is no concern for abuse of this as there was with Xanax.  She was found to have a possible UTI and was initiated on Rocephin.  Daughter feels she is still currently not baseline though she actually answered my orientation questions correct.  She does look a little bit flat and does admit to some dizziness with exertion.  Claims left eye bruised from fall    Objective: Flat, suspect, nontoxic, fluent speech    Scheduled Meds:aspirin, 81 mg, Oral, Daily  buPROPion XL, 300 mg, Oral, Nightly  cefTRIAXone, 2,000 mg, Intravenous, Q24H  Diclofenac Sodium, 2 g, Topical, 4x Daily  docusate sodium, 100 mg, Oral, Daily  DULoxetine, 60 mg, Oral, BID  levothyroxine, 88 mcg, Oral, Q AM  melatonin, 2.5 mg, Oral, Nightly  potassium chloride ER, 40 mEq, Oral, Q4H      Continuous Infusions:sodium chloride 0.9 % with KCl 20 mEq, 100 mL/hr, Last Rate: 100 mL/hr (25 0610)        Vital signs in last 24 hours:  Temp:  [97.9 °F (36.6 °C)-98.3 °F (36.8 °C)] 98.3 °F (36.8 °C)  Heart Rate:  [69-84] 79  Resp:  [15-17] 15  BP: (100-142)/(49-91) 142/91    Intake/Output:    Intake/Output Summary (Last 24 hours) at 2025 1128  Last data filed at 2025 0918  Gross per 24 hour   Intake 1810 ml   Output 0 ml   Net 1810 ml  "      Exam:  /91 (BP Location: Left arm, Patient Position: Sitting)   Pulse 79   Temp 98.3 °F (36.8 °C) (Oral)   Resp 15   Ht 154.9 cm (61\")   Wt 77 kg (169 lb 12.1 oz)   SpO2 91%   BMI 32.07 kg/m²     General Appearance:    Alert, cooperative,  AAOx3                          Head:    Normocephalic, without obvious abnormality, atraumatic                           Eyes:    PERRLA/EOM's intact-left eye shock                         Throat:   Oral mucosa pink and moist                           Neck:   Supple, no JVD                         Lungs:    Clear to auscultation bilaterally, respirations unlabored                 Chest Wall:    No tenderness or deformity                          Heart:    Regular rate and rhythm, S1 and S2 normal                  Abdomen:     Soft, nontender, bowel sounds active                 Extremities:   Moving all, nothing concerning on visual exam of the legs with chronic arthritic changes but certainly nothing that is concerning for any type of infection                       pulses:   Pulses palpable in all extremities                            Skin:   Skin is warm and dry                  Neurologic:   CNII-XII intact, no obvious FD     Data Review:  Labs in chart were reviewed.    Assessment:  Active Hospital Problems    Diagnosis  POA    **UTI (urinary tract infection), bacterial [N39.0, A49.9]  Unknown    Fall [W19.XXXA]  Unknown    Metabolic encephalopathy [G93.41]  Unknown    Hypokalemia [E87.6]  Yes    Opioid dependence [F11.20]  Yes    Fibromyalgia, primary [M79.7]  Yes    Anxiety disorder [F41.9]  Yes    Gastroesophageal reflux disease [K21.9]  Yes      Resolved Hospital Problems   No resolved problems to display.       Plan:    UTI versus contamination as was a clean-catch sample in a patient who presented with altered mentation   - Culture mixed further supports contamination but will treat with minimum duration of 3 days of IV antibiotics      Polypharmacy " with previous abuse of narcotics and benzos   - Already successfully weaned off pain medication   - Complains of chronic knee pain-Tylenol made scheduled and will also add Lidoderm.  No plans for narcotics   - Under the care of psychiatry and daughter helps with some of the history but she has been utilizing Librium as the choice was to utilize a long-acting and she had been using twice daily scheduled.  This has been ongoing for the last couple weeks and I think they are having a cumulative effect so at this time I will continue with nightly.  Per discussion with the daughter, the patient is following with psychiatry weekly and there is been no concerns about abuse of this benzo.  I anticipate the trend was likely to decrease frequency with upcoming appointment and we will go ahead and start that for now      CT of the head and neck and face all without acute disease with chronic change    Hypokalemia replaced.  Mag normal    SCDs for additional prophylaxis        Disposition: CCP to assist.  Lives independently      Addendum: Daughter requested diabetic check.  Blood sugars thus far fine.  Review of an old A1c's not consistent with diabetes ranging 5.5-6 with most recent check 2/325 will repeat A1c in a.m.    Dain Ko MD  6/20/2025  11:28 EDT

## 2025-06-20 NOTE — DISCHARGE PLACEMENT REQUEST
"Mike Marcus (70 y.o. Female)       Date of Birth   1955    Social Security Number       Address   [address] [place] [address]    Home Phone   [phone]    MRN   [id number]       Zoroastrianism   Lutheran    Marital Status                               Admission Date   6/19/2025    Admission Type   Urgent    Admitting Provider   Martinez Mak MD    Attending Provider   Dain Ko MD    Department, Room/Bed   [address], N432/1       Discharge Date       Discharge Disposition       Discharge Destination                                 Attending Provider: Dain Ko MD    Allergies: Fish Allergy, Iodine, Penicillins, Prochlorperazine, Shellfish Allergy, Shellfish-derived Products, Sulfate, Glycerol, Iodinated, Octacosanol, Latex, Levofloxacin, Sulfa Antibiotics    Isolation: None   Infection: None   Code Status: CPR    Ht: 154.9 cm (61\")   Wt: 77 kg (169 lb 12.1 oz)    Admission Cmt: None   Principal Problem: Hypokalemia [E87.6]                   Active Insurance as of 6/19/2025       Primary Coverage       Payor Plan Insurance Group Employer/Plan Group    ANTHEM MEDICARE REPLACEMENT ANTHEM MEDICARE ADVANTAGE PPO [id number]       Payor Plan Address Payor Plan Phone Number Payor Plan Fax Number Effective Dates    [address] [phone]  1/1/2024 - None Entered    [facility] [id number]         Subscriber Name Subscriber Birth Date Member ID       MIKE MARCUS 1955 [id number]                     Emergency Contacts        (Rel.) Home Phone Work Phone Mobile Phone    BARNEY KEY (Daughter) [phone] -- [phone]    SHARATH SANTOYO (Daughter) [phone] -- [phone]                "

## 2025-06-20 NOTE — PLAN OF CARE
Goal Outcome Evaluation:  Plan of Care Reviewed With: patient        Progress: improving  Outcome Evaluation: VSS; Qx4. Pt really drowsy after librium administration during shift last night. Pt remains SR w BBB on the monitor. Pt is room air during the night. Pt up to bedside commode this am; had episode where she started to get dizzy. Pt with tremors when up to the commode; 2 person assist. Voltaren ordered for bilateral knee pain. Pt only given PO Tylenol and Voltaren for pain during the night. Pt with falls at home prior to admission. Pt continues fluids per order. Pt safety maintained. Plan of care is ongoing.

## 2025-06-20 NOTE — PLAN OF CARE
Goal Outcome Evaluation:  Plan of Care Reviewed With: patient   Alert with flat orientation. VSS. Room air. Sinus rhythm. Receiving IV antibiotics for possible UTI. Patient c/o bilateral knee pain. Tylenol given and Voltaren gel applied . Safety maintained.

## 2025-06-20 NOTE — THERAPY EVALUATION
Patient Name: Sonya Marcus  : 1955    MRN: [id number]                              Today's Date: 2025       Admit Date: 2025    Visit Dx:     ICD-10-CM ICD-9-CM   1. Hypokalemia  E87.6 276.8   2. Weakness  R53.1 780.79   3. Acute cystitis without hematuria  N30.00 595.0   4. Failure to thrive in adult  R62.7 783.7   5. Periorbital ecchymosis of left eye, initial encounter  S00.12XA 921.1     Patient Active Problem List   Diagnosis    Altered mental status    Polypharmacy    Positive urine drug screen    MICHAEL (acute kidney injury)    Left ureteral stone    Functional tremor    Knee joint replacement status    Fibromyalgia, primary    Coronary arteriosclerosis    Hypothyroidism    Genitourinary syndrome of menopause    Hyperlipidemia    Osteoarthritis of right knee    Pernicious anemia    Urinary tract infection in female    Absolute anemia    Gastroesophageal reflux disease    Angina pectoris    Anxiety disorder    Arthritis or polyarthritis, rheumatoid    Avitaminosis D    B12 deficiency    Bilateral primary osteoarthritis of knee    BP (high blood pressure)    Cardiac murmur    Stage 2 chronic kidney disease    Aftercare for healing traumatic closed fracture of right lower extremity    Delayed union of tibial shaft fracture    Generalized weakness    Opioid dependence    Dehydration    Hypokalemia     Past Medical History:   Diagnosis Date    Allergic     Anemia     Angina pectoris     Anxiety and depression     Arthralgia of right knee 09/10/2024    Arthritis     Carotid artery occlusion     Chronic urinary tract infection     CKD (chronic kidney disease)     Coronary arteriosclerosis     Coronary artery disease     Disease of thyroid gland     Fibromyalgia, primary 2000    Fractures 2024    Fell at home    Genitourinary syndrome of menopause     GERD (gastroesophageal reflux disease)     History of kidney stones     History of pulmonary embolism     AFTER HYSTERECTOMY     HL (hearing loss) 2024    Hyperlipidemia 2020    Hypertension     Hypothyroidism     Kidney stone 1972    Left hip pain     Memory loss     MVP (mitral valve prolapse)     DR LUZ LAST OFFICE NOTE WITH CHART 7/30/2024    Obesity     Prediabetes     Right knee pain     Right shoulder pain     Sleep apnea     PT IS NOT USING CPAP    Substance abuse 2010    Hydrocodone and Oxycodone     Past Surgical History:   Procedure Laterality Date    APPENDECTOMY      BREAST BIOPSY      COLONOSCOPY  01/01/2022    CYSTOSCOPY W/ URETERAL STENT PLACEMENT Left 09/05/2023    Procedure: LEFT CYSTOSCOPY URETERAL CATHETER/STENT INSERTION;  Surgeon: Quinton Rosa MD;  Location: [facility];  Service: Urology;  Laterality: Left;    EYE SURGERY      HYSTERECTOMY      JOINT REPLACEMENT  2021 & 2024    REPLACEMENT TOTAL KNEE Left 2021    SHOULDER SURGERY Right     TONSILLECTOMY      TOTAL KNEE ARTHROPLASTY Right 08/22/2024    Procedure: TOTAL KNEE ARTHROPLASTY WITH CORI ROBOT;  Surgeon: Steven Sumner II, MD;  [facility] [place] [facility]  Service: Robotics - Ortho;  Laterality: Right;      General Information       Row Name 06/20/25 1019          Physical Therapy Time and Intention    Document Type evaluation  -     Mode of Treatment individual therapy;physical therapy  -       Row Name 06/20/25 1019          General Information    Prior Level of Function independent:;gait;transfer;bed mobility  no AD at baseline  -     Existing Precautions/Restrictions fall  -     Barriers to Rehab medically complex  -       Row Name 06/20/25 1019          Living Environment    Current Living Arrangements home  Simultaneous filing. User may be unaware of other data.  -     People in Home alone  -       Row Name 06/20/25 1019          Home Main Entrance    Number of Stairs, Main Entrance none  -       Row Name 06/20/25 1019          Cognition    Orientation Status (Cognition) oriented x 3  -       Row Name 06/20/25  1019          Safety Issues/Impairments Affecting Functional Mobility    Impairments Affecting Function (Mobility) balance;endurance/activity tolerance;strength  -               User Key  (r) = Recorded By, (t) = Taken By, (c) = Cosigned By      Initials Name Provider Type    Bhumi Samuel, PT Physical Therapist                   Mobility       Row Name 06/20/25 1020          Bed Mobility    Bed Mobility supine-sit;sit-supine  -     Supine-Sit [place] (Bed Mobility) verbal cues;nonverbal cues (demo/gesture);minimum assist (75% patient effort)  -     Sit-Supine [place] (Bed Mobility) verbal cues;nonverbal cues (demo/gesture);minimum assist (75% patient effort)  -     Assistive Device (Bed Mobility) bed rails;head of bed elevated  -       Row Name 06/20/25 1020          Sit-Stand Transfer    Sit-Stand [place] (Transfers) verbal cues;nonverbal cues (demo/gesture);minimum assist (75% patient effort)  -     Assistive Device (Sit-Stand Transfers) walker, front-wheeled  -     Comment, (Sit-Stand Transfer) Pt reports some dizziness and light headedness with standing  -       Row Name 06/20/25 1020          Gait/Stairs (Locomotion)    [place] Level (Gait) verbal cues;nonverbal cues (demo/gesture);minimum assist (75% patient effort)  -     Assistive Device (Gait) walker, front-wheeled  -     Distance in Feet (Gait) 3  sidesteps to HOB  -     Deviations/Abnormal Patterns (Gait) yuan decreased;gait speed decreased;stride length decreased  -     Comment, (Gait/Stairs) gait distance limited secondary to light headedness  -               User Key  (r) = Recorded By, (t) = Taken By, (c) = Cosigned By      Initials Name Provider Type    Bhumi Samuel, PT Physical Therapist                   Obj/Interventions       Row Name 06/20/25 1022          Range of Motion Comprehensive    General Range of Motion no range of motion deficits identified  -       Row Name 06/20/25  1022          Strength Comprehensive (MMT)    Comment, General Manual Muscle Testing (MMT) Assessment generalized weakness noted with functional mobility and gait  -       Row Name 06/20/25 1022          Motor Skills    Therapeutic Exercise --  5 reps B LE AP, LAQ, and seated marches  -[facility] Name 06/20/25 1022          Balance    Balance Assessment standing static balance;standing dynamic balance  -     Static Standing Balance verbal cues;non-verbal cues (demo/gesture);minimal assist  -CH     Dynamic Standing Balance verbal cues;non-verbal cues (demo/gesture);minimal assist  -CH     Position/Device Used, Standing Balance walker, rolling  -CH               User Key  (r) = Recorded By, (t) = Taken By, (c) = Cosigned By      Initials Name Provider Type     Bhumi Osborn, PT Physical Therapist                   Goals/Plan       [facility] Name 06/20/25 1027          Bed Mobility Goal 1 (PT)    Activity/Assistive Device (Bed Mobility Goal 1, PT) bed mobility activities, all  -CH     [place] Level/Cues Needed (Bed Mobility Goal 1, PT) supervision required  -CH     Time Frame (Bed Mobility Goal 1, PT) 1 week  -[facility] Name 06/20/25 1027          Transfer Goal 1 (PT)    Activity/Assistive Device (Transfer Goal 1, PT) transfers, all;walker, rolling  -CH     [place] Level/Cues Needed (Transfer Goal 1, PT) supervision required  -CH     Time Frame (Transfer Goal 1, PT) 1 week  -[facility] Name 06/20/25 1027          Gait Training Goal 1 (PT)    Activity/Assistive Device (Gait Training Goal 1, PT) gait (walking locomotion);walker, rolling  -CH     [place] Level (Gait Training Goal 1, PT) supervision required  -CH     Distance (Gait Training Goal 1, PT) 150  -CH     Time Frame (Gait Training Goal 1, PT) 1 week  -[facility] Name 06/20/25 1027          Therapy Assessment/Plan (PT)    Planned Therapy Interventions (PT) balance training;bed mobility training;gait training;home exercise  program;patient/family education;strengthening;transfer training  -               User Key  (r) = Recorded By, (t) = Taken By, (c) = Cosigned By      Initials Name Provider Type    Bhumi Samuel, PT Physical Therapist                   Clinical Impression       Row Name 06/20/25 1025          Pain    Pre/Posttreatment Pain Comment pt reports no pain during PT session but states her head is spinning.  -       Row Name 06/20/25 1025          Plan of Care Review    Plan of Care Reviewed With patient  -     Outcome Evaluation Pt is a 71 yo F who was admitted with confusion and fall at home. Pt found to have hypokalemia. Pt presents to PT with impaired functional mobility and gait secondary to genealized weakness, impaired balance, and decreased activity tolerance. Pt reports light headedness and dizziness with activity. Pt may benefit from skilled PT to address strength, mobility and gait. Per [facility], family is requesting acute rehab upon DC.  -       Row Name 06/20/25 1025          Therapy Assessment/Plan (PT)    Patient/Family Therapy Goals Statement (PT) to return to [facility]  -     Rehab Potential (PT) good  -     Criteria for Skilled Interventions Met (PT) skilled treatment is necessary  -     Therapy Frequency (PT) 5 times/wk  -       Row Name 06/20/25 1025          Positioning and Restraints    Pre-Treatment Position in bed  -     Post Treatment Position bed  -     In Bed supine;call light within reach;encouraged to call for assist;exit alarm on  -               User Key  (r) = Recorded By, (t) = Taken By, (c) = Cosigned By      Initials Name Provider Type    Bhumi Samuel, PT Physical Therapist                   Outcome Measures       Row Name 06/20/25 1028          How much help from another person do you currently need...    Turning from your back to your side while in flat bed without using bedrails? 3  -     Moving from lying on back to sitting on the side of a flat bed  without bedrails? 3  -CH     Moving to and from a bed to a chair (including a wheelchair)? 3  -CH     Standing up from a chair using your arms (e.g., wheelchair, bedside chair)? 3  -CH     Climbing 3-5 steps with a railing? 1  -CH     To walk in hospital room? 2  -CH     AM-PAC 6 Clicks Score (PT) 15  -CH     Highest Level of Mobility Goal Move to Chair/Commode-4  -CH       Row Name 06/20/25 1028          Functional Assessment    Outcome Measure Options AM-PAC 6 Clicks Basic Mobility (PT)  -               User Key  (r) = Recorded By, (t) = Taken By, (c) = Cosigned By      Initials Name Provider Type     Bhumi Osborn PT Physical Therapist                                 Physical Therapy Education       Title: PT OT SLP Therapies (Done)       Topic: Physical Therapy (Done)       Point: Mobility training (Done)       Learning Progress Summary            Patient Acceptance, E,TB,D, VU,NR by  at 6/20/2025 1028                      Point: Home exercise program (Done)       Learning Progress Summary            Patient Acceptance, E,TB,D, VU,NR by  at 6/20/2025 1028                      Point: Body mechanics (Done)       Learning Progress Summary            Patient Acceptance, E,TB,D, VU,NR by  at 6/20/2025 1028                      Point: Precautions (Done)       Learning Progress Summary            Patient Acceptance, E,TB,D, VU,NR by  at 6/20/2025 1028                                      User Key       Initials Effective Dates Name Provider Type [facility] 06/16/21 -  Bhumi Osborn PT Physical Therapist PT                  PT Recommendation and Plan  Planned Therapy Interventions (PT): balance training, bed mobility training, gait training, home exercise program, patient/family education, strengthening, transfer training  Outcome Evaluation: Pt is a 71 yo F who was admitted with confusion and fall at home. Pt found to have hypokalemia. Pt presents to PT with impaired functional mobility  and gait secondary to genealized weakness, impaired balance, and decreased activity tolerance. Pt reports light headedness and dizziness with activity. Pt may benefit from skilled PT to address strength, mobility and gait. Per [facility], family is requesting acute rehab upon DC.     Time Calculation:         PT Charges       Row Name 06/20/25 1029             Time Calculation    Start Time 0849  -      Stop Time 0902  -      Time Calculation (min) 13 min  -CH      PT Received On 06/20/25  -      PT - Next Appointment 06/23/25  -      PT Goal Re-Cert Due Date 06/27/25  -         Time Calculation- PT    Total Timed Code Minutes- PT 8 minute(s)  -CH         Timed Charges    20971 - PT Therapeutic Activity Minutes 8  -CH         Total Minutes    Timed Charges Total Minutes 8  -CH       Total Minutes 8  -CH                User Key  (r) = Recorded By, (t) = Taken By, (c) = Cosigned By      Initials Name Provider Type     Bhumi Osborn, PT Physical Therapist                  Therapy Charges for Today       Code Description Service Date Service Provider Modifiers Qty    [id number]  PT THERAPEUTIC ACT EA 15 MIN 6/20/2025 Bhumi Osborn, PT GP 1    [id number]  PT EVAL MOD COMPLEXITY 3 6/20/2025 Bhumi Osborn, PT GP 1            PT G-Codes  Outcome Measure Options: AM-PAC 6 Clicks Basic Mobility (PT)  AM-PAC 6 Clicks Score (PT): 15  PT Discharge Summary  Anticipated Discharge Disposition (PT): inpatient rehabilitation facility    Bhumi Osborn, BLAISE  6/20/2025

## 2025-06-20 NOTE — PLAN OF CARE
Goal Outcome Evaluation:  Plan of Care Reviewed With: patient           Outcome Evaluation: Pt admitted falls and confusion, found to have hypokalemia. Pt lives alone and is typically independent with ADLs and fxl mobility w/o AD however has had 2 recent falls. Today, pt presents with generalized weakness, poor safety awareness and activity tolerance below baseline. Requiring assist for ADLs and P[place]el for TF to chair with cues d/t poor safety awareness, attempting to sit before arriving at the chair. Pt quickly fatigued and declines further mobility attempts. OT will cont to follow, recommending rehab at d/c as she presents below baseline fxn and not safe to return home alone    Anticipated Discharge Disposition (OT): inpatient rehabilitation facility

## 2025-06-21 LAB — HBA1C MFR BLD: 5.5 % (ref 4.8–5.6)

## 2025-06-21 PROCEDURE — 25010000002 CEFTRIAXONE PER 250 MG: Performed by: HOSPITALIST

## 2025-06-21 PROCEDURE — 83036 HEMOGLOBIN GLYCOSYLATED A1C: CPT | Performed by: HOSPITALIST

## 2025-06-21 RX ADMIN — BUPROPION HYDROCHLORIDE 300 MG: 300 TABLET, EXTENDED RELEASE ORAL at 22:04

## 2025-06-21 RX ADMIN — DICLOFENAC SODIUM 2 G: 10 GEL TOPICAL at 18:10

## 2025-06-21 RX ADMIN — ACETAMINOPHEN 500 MG: 325 TABLET, FILM COATED ORAL at 11:04

## 2025-06-21 RX ADMIN — ACETAMINOPHEN 500 MG: 325 TABLET, FILM COATED ORAL at 22:03

## 2025-06-21 RX ADMIN — Medication 2.5 MG: at 22:04

## 2025-06-21 RX ADMIN — CHLORDIAZEPOXIDE HYDROCHLORIDE 25 MG: 25 CAPSULE ORAL at 22:03

## 2025-06-21 RX ADMIN — DICLOFENAC SODIUM 2 G: 10 GEL TOPICAL at 08:13

## 2025-06-21 RX ADMIN — LEVOTHYROXINE SODIUM 88 MCG: 88 TABLET ORAL at 05:17

## 2025-06-21 RX ADMIN — DULOXETINE 60 MG: 60 CAPSULE, DELAYED RELEASE ORAL at 22:04

## 2025-06-21 RX ADMIN — DICLOFENAC SODIUM 2 G: 10 GEL TOPICAL at 11:05

## 2025-06-21 RX ADMIN — LIDOCAINE 2 PATCH: 4 PATCH TOPICAL at 08:13

## 2025-06-21 RX ADMIN — DICLOFENAC SODIUM 2 G: 10 GEL TOPICAL at 22:05

## 2025-06-21 RX ADMIN — ACETAMINOPHEN 325MG 650 MG: 325 TABLET ORAL at 05:27

## 2025-06-21 RX ADMIN — ASPIRIN 81 MG: 81 TABLET, COATED ORAL at 08:12

## 2025-06-21 RX ADMIN — ACETAMINOPHEN 325MG 650 MG: 325 TABLET ORAL at 14:40

## 2025-06-21 RX ADMIN — CEFTRIAXONE 2000 MG: 2 INJECTION, POWDER, FOR SOLUTION INTRAMUSCULAR; INTRAVENOUS at 11:05

## 2025-06-21 RX ADMIN — DULOXETINE 60 MG: 60 CAPSULE, DELAYED RELEASE ORAL at 08:13

## 2025-06-21 NOTE — PLAN OF CARE
Goal Outcome Evaluation:   Patient rested well overnight. Alert and oriented. PRN tylenol given for bilateral knee pain.

## 2025-06-21 NOTE — PLAN OF CARE
Problem: Fall Injury Risk  Goal: Absence of Fall and Fall-Related Injury  Outcome: Progressing  Intervention: Identify and Manage Contributors  Recent Flowsheet Documentation  Taken 6/21/2025 1242 by Kait Whatley RN  Medication Review/Management: medications reviewed  Intervention: Promote Injury-Free Environment  Recent Flowsheet Documentation  Taken 6/21/2025 1242 by Kait Whatley RN  Safety Promotion/Fall Prevention:   activity supervised   clutter free environment maintained   assistive device/personal items within reach   safety round/check completed   Goal Outcome Evaluation:  Plan of Care Reviewed With: patient        Progress: improving     Pt Aox4. Assist x1 to bsc. Pt reports dizziness with position changes. Encouraged pt to increase activity but has refused. L eye remains bruised. Continues to c/o bilateral knee pain- medicated with Tylenol, voltaren gel, and lidocaine patches. Pt reports scd's relieve pain a bit. VSS. Safety maintained.

## 2025-06-21 NOTE — PROGRESS NOTES
"    DAILY PROGRESS NOTE  [facility]    Patient Identification:  Name: Sonya Marcus  Age: 70 y.o.  Sex: female  :  1955  MRN: [id number]         Primary Care Physician: Regina Ferro), CHANELLE    Subjective:  Interval History: Feeling better this morning.  She is not really voicing anything new to me.  Her affect remains guarded but I think that is her baseline but she seems more alert and actually more relaxed this morning.  No family present at bedside.    Objective:    Scheduled Meds:acetaminophen, 500 mg, Oral, 4x Daily  aspirin, 81 mg, Oral, Daily  buPROPion XL, 300 mg, Oral, Nightly  cefTRIAXone, 2,000 mg, Intravenous, Q24H  chlordiazePOXIDE, 25 mg, Oral, Nightly  Diclofenac Sodium, 2 g, Topical, 4x Daily  docusate sodium, 100 mg, Oral, Daily  DULoxetine, 60 mg, Oral, BID  levothyroxine, 88 mcg, Oral, Q AM  Lidocaine, 2 patch, Transdermal, Q24H  melatonin, 2.5 mg, Oral, Nightly      Continuous Infusions:     Vital signs in last 24 hours:  Temp:  [97.7 °F (36.5 °C)-98.2 °F (36.8 °C)] 97.9 °F (36.6 °C)  Heart Rate:  [71-80] 80  Resp:  [17-18] 17  BP: (112-168)/(51-73) 168/67    Intake/Output:    Intake/Output Summary (Last 24 hours) at 2025 1053  Last data filed at 2025 2100  Gross per 24 hour   Intake 680 ml   Output --   Net 680 ml       Exam:  /67 (BP Location: Right arm, Patient Position: Lying)   Pulse 80   Temp 97.9 °F (36.6 °C) (Oral)   Resp 17   Ht 154.9 cm (61\")   Wt 72.2 kg (159 lb 3.2 oz)   SpO2 98%   BMI 30.08 kg/m²     General Appearance:  More alert and animated today, alert and oriented x 3 for me                          Head:    Normocephalic, without obvious abnormality, atraumatic                           Eyes:    PERRLA, left eye shiner                         throat:   Oral mucosa pink and moist                           Neck:   No JVD                         Lungs:    Clear to auscultation bilaterally, respirations unlabored             "     Chest Wall:    No tenderness or deformity                          Heart:    Regular rate and rhythm, S1 and S2 normal                  Abdomen:     Soft, nontender, bowel sounds active                 Extremities:   E moving all, no cyanosis or edema                        Pulses:   Pulses palpable in all extremities                  Neurologic:   CNII-XII intact       Data Review:  Labs in chart were reviewed.    Assessment:  Active Hospital Problems    Diagnosis  POA    **UTI (urinary tract infection), bacterial [N39.0, A49.9]  Unknown    Fall [W19.XXXA]  Unknown    Metabolic encephalopathy [G93.41]  Unknown    Hypokalemia [E87.6]  Yes    Opioid dependence [F11.20]  Yes    Fibromyalgia, primary [M79.7]  Yes    Anxiety disorder [F41.9]  Yes    Gastroesophageal reflux disease [K21.9]  Yes      Resolved Hospital Problems   No resolved problems to display.       Plan:    UTI versus favored contamination in clean-catch sample who presented with altered mentation   - Keep duration minimal x 3 days      Polypharmacy is the main issue for this patient's altered mentation prior to admission   - I feel cumulative effect due to long-acting Librium prior to admission as psychiatry is already working on a taper and wean which is an excellent idea given previous issues of abuse with Xanax   - Was taking twice daily Librium currently decreased to nightly   - Positive effect thus far as patient started to look better yesterday afternoon and this morning seems to be even more improved   - CT of the head and neck and face without acute disease      Hypokalemia replaced    SCDs for prophylaxis        Disposition: Medically stable at any time.  PT/OT following -final disposition pending [facility] as family has made request for SNF      Addendum: Went back to floor to see another patient in I ran into patient's daughter and passing.  Her focus is on dizziness.  I think that is evolution of what she is feeling from Librium compounded by  polypharmacy.  CT of the head neck as well as face were all without acute disease and I anticipate her complaints of dizziness to be persistent for some time.    Dain Ko MD  6/21/2025  10:53 EDT

## 2025-06-22 PROCEDURE — 25010000002 CEFTRIAXONE PER 250 MG: Performed by: HOSPITALIST

## 2025-06-22 PROCEDURE — 63710000001 ONDANSETRON ODT 4 MG TABLET DISPERSIBLE: Performed by: INTERNAL MEDICINE

## 2025-06-22 RX ORDER — TRAMADOL HYDROCHLORIDE 50 MG/1
25 TABLET ORAL ONCE
Status: DISCONTINUED | OUTPATIENT
Start: 2025-06-22 | End: 2025-06-24

## 2025-06-22 RX ORDER — CHLORDIAZEPOXIDE HYDROCHLORIDE 25 MG/1
25 CAPSULE, GELATIN COATED ORAL 2 TIMES DAILY
Status: DISCONTINUED | OUTPATIENT
Start: 2025-06-22 | End: 2025-06-26

## 2025-06-22 RX ADMIN — LEVOTHYROXINE SODIUM 88 MCG: 88 TABLET ORAL at 04:46

## 2025-06-22 RX ADMIN — DICLOFENAC SODIUM 2 G: 10 GEL TOPICAL at 08:47

## 2025-06-22 RX ADMIN — ACETAMINOPHEN 325MG 650 MG: 325 TABLET ORAL at 01:59

## 2025-06-22 RX ADMIN — Medication 2.5 MG: at 20:14

## 2025-06-22 RX ADMIN — DICLOFENAC SODIUM 2 G: 10 GEL TOPICAL at 14:05

## 2025-06-22 RX ADMIN — ONDANSETRON 4 MG: 4 TABLET, ORALLY DISINTEGRATING ORAL at 06:17

## 2025-06-22 RX ADMIN — DICLOFENAC SODIUM 2 G: 10 GEL TOPICAL at 20:24

## 2025-06-22 RX ADMIN — DULOXETINE 60 MG: 60 CAPSULE, DELAYED RELEASE ORAL at 20:14

## 2025-06-22 RX ADMIN — BUPROPION HYDROCHLORIDE 300 MG: 300 TABLET, EXTENDED RELEASE ORAL at 20:14

## 2025-06-22 RX ADMIN — CHLORDIAZEPOXIDE HYDROCHLORIDE 25 MG: 25 CAPSULE ORAL at 11:10

## 2025-06-22 RX ADMIN — ACETAMINOPHEN 500 MG: 325 TABLET, FILM COATED ORAL at 20:13

## 2025-06-22 RX ADMIN — ACETAMINOPHEN 500 MG: 325 TABLET, FILM COATED ORAL at 08:47

## 2025-06-22 RX ADMIN — ASPIRIN 81 MG: 81 TABLET, COATED ORAL at 08:47

## 2025-06-22 RX ADMIN — LIDOCAINE 2 PATCH: 4 PATCH TOPICAL at 08:47

## 2025-06-22 RX ADMIN — DULOXETINE 60 MG: 60 CAPSULE, DELAYED RELEASE ORAL at 08:47

## 2025-06-22 RX ADMIN — CHLORDIAZEPOXIDE HYDROCHLORIDE 25 MG: 25 CAPSULE ORAL at 20:13

## 2025-06-22 RX ADMIN — CEFTRIAXONE 2000 MG: 2 INJECTION, POWDER, FOR SOLUTION INTRAMUSCULAR; INTRAVENOUS at 11:10

## 2025-06-22 RX ADMIN — ACETAMINOPHEN 500 MG: 325 TABLET, FILM COATED ORAL at 18:09

## 2025-06-22 RX ADMIN — ACETAMINOPHEN 500 MG: 325 TABLET, FILM COATED ORAL at 14:05

## 2025-06-22 NOTE — PLAN OF CARE
Problem: Fall Injury Risk  Goal: Absence of Fall and Fall-Related Injury  Outcome: Progressing   Goal Outcome Evaluation:  Plan of Care Reviewed With: patient        Progress: no change   Pt remains Aox4. Assist x1 to bsc. Pt encouraged to increase activity but does not want to go any further than bsc. Remains with flat affect. C/o intermittent bilateral knee pain- being medicated within parameters. Daughter expressed pt possibly going to inpatient rehab for substance abuse disorder, if [facility] does not accept pt. VSS. Safety maintained.

## 2025-06-22 NOTE — PROGRESS NOTES
"    DAILY PROGRESS NOTE  [facility]    Patient Identification:  Name: Sonya Marcus  Age: 70 y.o.  Sex: female  :  1955  MRN: [id number]         Primary Care Physician: Regina Ferro ([place]), CHANELLE    Subjective:  Interval History: This patient again looked better upon entering the room today.  Her affect is improved and her alertness is improved.  We discussed Librium dosing and given her overall improvement I will go ahead and increase back to twice daily dosing and I instructed the RN to give her a dose this morning.  We will watch to see how she reacts to the twice daily dosing but fully encourage her to work with psychiatry postdischarge and further taper to resolution    Objective:    Scheduled Meds:acetaminophen, 500 mg, Oral, 4x Daily  aspirin, 81 mg, Oral, Daily  buPROPion XL, 300 mg, Oral, Nightly  cefTRIAXone, 2,000 mg, Intravenous, Q24H  chlordiazePOXIDE, 25 mg, Oral, BID  Diclofenac Sodium, 2 g, Topical, 4x Daily  docusate sodium, 100 mg, Oral, Daily  DULoxetine, 60 mg, Oral, BID  levothyroxine, 88 mcg, Oral, Q AM  Lidocaine, 2 patch, Transdermal, Q24H  melatonin, 2.5 mg, Oral, Nightly  traMADol, 25 mg, Oral, Once      Continuous Infusions:     Vital signs in last 24 hours:  Temp:  [97.9 °F (36.6 °C)-98.4 °F (36.9 °C)] 98.1 °F (36.7 °C)  Heart Rate:  [67-92] 70  Resp:  [18] 18  BP: (126-148)/(70-80) 146/80    Intake/Output:  No intake or output data in the 24 hours ending 25 1110    Exam:  /80 (BP Location: Right arm, Patient Position: Lying)   Pulse 70   Temp 98.1 °F (36.7 °C) (Oral)   Resp 18   Ht 154.9 cm (61\")   Wt 77.9 kg (171 lb 11.2 oz)   SpO2 94%   BMI 32.44 kg/m²     General Appearance:    Alert, cooperative, alert and oriented x 3, fluent speech, improved affect                           Eyes:    PERRLA, left eye shiner                         Throat:   Oral mucosa pink and moist                         Lungs:    Clear to auscultation bilaterally, " respirations unlabored                          Heart:    Regular rate and rhythm, S1 and S2 normal                  Abdomen:     Soft, nontender, bowel sounds active                 Extremities: Moving all, no cyanosis or edema                  Neurologic:   CNII-XII intact     Data Review:  Labs in chart were reviewed.    Assessment:  Active Hospital Problems    Diagnosis  POA    **UTI (urinary tract infection), bacterial [N39.0, A49.9]  Unknown    Fall [W19.XXXA]  Unknown    Metabolic encephalopathy [G93.41]  Unknown    Hypokalemia [E87.6]  Yes    Opioid dependence [F11.20]  Yes    Fibromyalgia, primary [M79.7]  Yes    Anxiety disorder [F41.9]  Yes    Gastroesophageal reflux disease [K21.9]  Yes      Resolved Hospital Problems   No resolved problems to display.       Plan:    I completely doubt UTI but treated with a minimum of 3 days    Polypharmacy is the main concern   - I will increase Librium to scheduled 1 tab twice daily but this patient has made much more clinical improvement over the last couple days and remains alert and oriented x 3 with improving affect   - CT of the face head and neck all negative for acute disease    Hypokalemia replaced        Disposition: Patient is medically ready to leave the hospital at any time.  The only thing holding up disposition at this point is daughter request for rehab if patient qualifies/CCP to coordinate    Dain Ko MD  6/22/2025  11:10 EDT

## 2025-06-23 PROCEDURE — 97530 THERAPEUTIC ACTIVITIES: CPT

## 2025-06-23 PROCEDURE — 63710000001 ONDANSETRON ODT 4 MG TABLET DISPERSIBLE: Performed by: INTERNAL MEDICINE

## 2025-06-23 PROCEDURE — 90791 PSYCH DIAGNOSTIC EVALUATION: CPT

## 2025-06-23 PROCEDURE — 25010000002 ONDANSETRON PER 1 MG: Performed by: INTERNAL MEDICINE

## 2025-06-23 RX ORDER — LORAZEPAM 2 MG/ML
1 INJECTION INTRAMUSCULAR ONCE AS NEEDED
Status: DISCONTINUED | OUTPATIENT
Start: 2025-06-23 | End: 2025-06-27 | Stop reason: HOSPADM

## 2025-06-23 RX ADMIN — LEVOTHYROXINE SODIUM 88 MCG: 88 TABLET ORAL at 05:21

## 2025-06-23 RX ADMIN — DICLOFENAC SODIUM 2 G: 10 GEL TOPICAL at 21:06

## 2025-06-23 RX ADMIN — CHLORDIAZEPOXIDE HYDROCHLORIDE 25 MG: 25 CAPSULE ORAL at 21:03

## 2025-06-23 RX ADMIN — BUPROPION HYDROCHLORIDE 300 MG: 300 TABLET, EXTENDED RELEASE ORAL at 21:05

## 2025-06-23 RX ADMIN — ASPIRIN 81 MG: 81 TABLET, COATED ORAL at 08:16

## 2025-06-23 RX ADMIN — ACETAMINOPHEN 500 MG: 325 TABLET, FILM COATED ORAL at 08:16

## 2025-06-23 RX ADMIN — ACETAMINOPHEN 500 MG: 325 TABLET, FILM COATED ORAL at 12:46

## 2025-06-23 RX ADMIN — Medication 2.5 MG: at 21:04

## 2025-06-23 RX ADMIN — DULOXETINE 60 MG: 60 CAPSULE, DELAYED RELEASE ORAL at 21:03

## 2025-06-23 RX ADMIN — ACETAMINOPHEN 500 MG: 325 TABLET, FILM COATED ORAL at 21:04

## 2025-06-23 RX ADMIN — DICLOFENAC SODIUM 2 G: 10 GEL TOPICAL at 08:17

## 2025-06-23 RX ADMIN — DICLOFENAC SODIUM 2 G: 10 GEL TOPICAL at 12:48

## 2025-06-23 RX ADMIN — DICLOFENAC SODIUM 2 G: 10 GEL TOPICAL at 17:39

## 2025-06-23 RX ADMIN — DULOXETINE 60 MG: 60 CAPSULE, DELAYED RELEASE ORAL at 08:16

## 2025-06-23 RX ADMIN — DOCUSATE SODIUM 100 MG: 100 CAPSULE, LIQUID FILLED ORAL at 08:16

## 2025-06-23 RX ADMIN — ACETAMINOPHEN 500 MG: 325 TABLET, FILM COATED ORAL at 17:39

## 2025-06-23 RX ADMIN — CHLORDIAZEPOXIDE HYDROCHLORIDE 25 MG: 25 CAPSULE ORAL at 08:16

## 2025-06-23 RX ADMIN — ACETAMINOPHEN 325MG 650 MG: 325 TABLET ORAL at 05:51

## 2025-06-23 RX ADMIN — LIDOCAINE 2 PATCH: 4 PATCH TOPICAL at 08:16

## 2025-06-23 RX ADMIN — ONDANSETRON 4 MG: 2 INJECTION, SOLUTION INTRAMUSCULAR; INTRAVENOUS at 10:12

## 2025-06-23 RX ADMIN — ONDANSETRON 4 MG: 4 TABLET, ORALLY DISINTEGRATING ORAL at 19:05

## 2025-06-23 NOTE — THERAPY TREATMENT NOTE
Patient Name: Sonya Marcus  : 1955    MRN: [id number]                              Today's Date: 2025       Admit Date: 2025    Visit Dx:     ICD-10-CM ICD-9-CM   1. Hypokalemia  E87.6 276.8   2. Weakness  R53.1 780.79   3. Acute cystitis without hematuria  N30.00 595.0   4. Failure to thrive in adult  R62.7 783.7   5. Periorbital ecchymosis of left eye, initial encounter  S00.12XA 921.1     Patient Active Problem List   Diagnosis    Altered mental status    Polypharmacy    Positive urine drug screen    MICHAEL (acute kidney injury)    Left ureteral stone    Functional tremor    Knee joint replacement status    Fibromyalgia, primary    Coronary arteriosclerosis    Hypothyroidism    Genitourinary syndrome of menopause    Hyperlipidemia    Osteoarthritis of right knee    Pernicious anemia    Urinary tract infection in female    Absolute anemia    Gastroesophageal reflux disease    Angina pectoris    Anxiety disorder    Arthritis or polyarthritis, rheumatoid    Avitaminosis D    B12 deficiency    Bilateral primary osteoarthritis of knee    BP (high blood pressure)    Cardiac murmur    Stage 2 chronic kidney disease    Aftercare for healing traumatic closed fracture of right lower extremity    Delayed union of tibial shaft fracture    Generalized weakness    Opioid dependence    Dehydration    Hypokalemia    Fall    UTI (urinary tract infection), bacterial    Metabolic encephalopathy     Past Medical History:   Diagnosis Date    Allergic     Anemia 2000    Angina pectoris     Anxiety and depression     Arthralgia of right knee 09/10/2024    Arthritis     Carotid artery occlusion     Chronic urinary tract infection     CKD (chronic kidney disease)     Coronary arteriosclerosis     Coronary artery disease     Disease of thyroid gland     Fibromyalgia, primary 2000    Fractures 2024    Fell at home    Genitourinary syndrome of menopause     GERD (gastroesophageal reflux disease)      History of kidney stones     History of pulmonary embolism     AFTER HYSTERECTOMY    HL (hearing loss) 2024    Hyperlipidemia 2020    Hypertension     Hypothyroidism     Kidney stone 1972    Left hip pain     Memory loss     MVP (mitral valve prolapse)     DR LUZ LAST OFFICE NOTE WITH CHART 7/30/2024    Obesity     Prediabetes     Right knee pain     Right shoulder pain     Sleep apnea     PT IS NOT USING CPAP    Substance abuse 2010    Hydrocodone and Oxycodone     Past Surgical History:   Procedure Laterality Date    APPENDECTOMY      BREAST BIOPSY      COLONOSCOPY  01/01/2022    CYSTOSCOPY W/ URETERAL STENT PLACEMENT Left 09/05/2023    Procedure: LEFT CYSTOSCOPY URETERAL CATHETER/STENT INSERTION;  Surgeon: Quinton Rosa MD;  Location: [facility] OR;  Service: Urology;  Laterality: Left;    EYE SURGERY      HYSTERECTOMY      JOINT REPLACEMENT  2021 & 2024    REPLACEMENT TOTAL KNEE Left 2021    SHOULDER SURGERY Right     TONSILLECTOMY      TOTAL KNEE ARTHROPLASTY Right 08/22/2024    Procedure: TOTAL KNEE ARTHROPLASTY WITH CORI ROBOT;  Surgeon: Steven Sumner II, MD;  Location: [facility];  Service: Robotics - Ortho;  Laterality: Right;      General Information       Row Name 06/23/25 1453          Physical Therapy Time and Intention    Document Type therapy note (daily note)  -     Mode of Treatment individual therapy;physical therapy  -       Row Name 06/23/25 1453          General Information    Existing Precautions/Restrictions fall  -       Row Name 06/23/25 1453          Cognition    Orientation Status (Cognition) oriented x 3  -       Row Name 06/23/25 1453          Safety Issues/Impairments Affecting Functional Mobility    Impairments Affecting Function (Mobility) balance;endurance/activity tolerance;strength  -     Comment, Safety Issues/Impairments (Mobility) pt reports some dizziness with ambulation  -               User Key  (r) = Recorded By, (t) = Taken By, (c)  = Cosigned By      Initials Name Provider Type     Bhumi Osborn, PT Physical Therapist                   Mobility       Row Name 06/23/25 1453          Bed Mobility    Bed Mobility supine-sit;sit-supine  -     Supine-Sit Sumner (Bed Mobility) verbal cues;nonverbal cues (demo/gesture);contact guard  -     Sit-Supine Sumner (Bed Mobility) verbal cues;nonverbal cues (demo/gesture);minimum assist (75% patient effort)  -     Assistive Device (Bed Mobility) bed rails;head of bed elevated  -       Row Name 06/23/25 1459          Sit-Stand Transfer    Sit-Stand Sumner (Transfers) verbal cues;nonverbal cues (demo/gesture);minimum assist (75% patient effort)  -     Assistive Device (Sit-Stand Transfers) walker, front-wheeled  -       Row Name 06/23/25 1450          Gait/Stairs (Locomotion)    Sumner Level (Gait) verbal cues;nonverbal cues (demo/gesture);minimum assist (75% patient effort)  -     Assistive Device (Gait) walker, front-wheeled  -     Distance in Feet (Gait) 12  -     Deviations/Abnormal Patterns (Gait) yuan decreased;gait speed decreased;stride length decreased  -     Comment, (Gait/Stairs) pt reports dizziness with ambulation  -               User Key  (r) = Recorded By, (t) = Taken By, (c) = Cosigned By      Initials Name Provider Type    Bhumi Samuel, PT Physical Therapist                   Obj/Interventions    No documentation.                  Goals/Plan    No documentation.                  Clinical Impression       Row Name 06/23/25 1452          Pain    Pretreatment Pain Rating 7/10  -CH     Posttreatment Pain Rating 7/10  -     Pain Location knee  -     Pain Side/Orientation bilateral  -     Pain Management Interventions exercise or physical activity utilized  pt with pain patches on her knees  -     Response to Pain Interventions activity participation with tolerable pain  -       Row Name 06/23/25 7444          Plan of Care  Review    Plan of Care Reviewed With patient  -     Outcome Evaluation Pt demonstrates some increased activity tolerance and functional strength as she was able to increase her gait distance and required slighly less assistance. Pt continues to report dizziness and knee pain with ambulation but gait and balance is improve compared to last PT session. Pt reports she is still interested in acute rehab and feels as though she can tolterate 3 hours of therapy a day. PT will continue to follow to address strength, mobility, and gait.  -       Row Name 06/23/25 1451          Positioning and Restraints    Pre-Treatment Position in bed  -     Post Treatment Position bed  -CH     In Bed supine;call light within reach;encouraged to call for assist;exit alarm on  -               User Key  (r) = Recorded By, (t) = Taken By, (c) = Cosigned By      Initials Name Provider Type    Bhumi Samuel PT Physical Therapist                   Outcome Measures       Row Name 06/23/25 1451          How much help from another person do you currently need...    Turning from your back to your side while in flat bed without using bedrails? 3  -CH     Moving from lying on back to sitting on the side of a flat bed without bedrails? 3  -CH     Moving to and from a bed to a chair (including a wheelchair)? 3  -CH     Standing up from a chair using your arms (e.g., wheelchair, bedside chair)? 3  -CH     Climbing 3-5 steps with a railing? 1  -CH     To walk in hospital room? 3  -CH     AM-PAC 6 Clicks Score (PT) 16  -CH     Highest Level of Mobility Goal Stand (1 or More Minutes)-5  -       Row Name 06/23/25 1457          Functional Assessment    Outcome Measure Options AM-PAC 6 Clicks Basic Mobility (PT)  -               User Key  (r) = Recorded By, (t) = Taken By, (c) = Cosigned By      Initials Name Provider Type    Bhumi Samuel PT Physical Therapist                                 Physical Therapy Education        Title: PT OT SLP Therapies (Done)       Topic: Physical Therapy (Done)       Point: Mobility training (Done)       Learning Progress Summary            Patient Acceptance, E,TB,D, VU,NR by  at 6/23/2025 1458    Acceptance, E,TB,D, VU,NR by  at 6/20/2025 1028                      Point: Home exercise program (Done)       Learning Progress Summary            Patient Acceptance, E,TB,D, VU,NR by  at 6/20/2025 1028                      Point: Body mechanics (Done)       Learning Progress Summary            Patient Acceptance, E,TB,D, VU,NR by  at 6/23/2025 1458    Acceptance, E,TB,D, VU,NR by  at 6/20/2025 1028                      Point: Precautions (Done)       Learning Progress Summary            Patient Acceptance, E,TB,D, VU,NR by  at 6/23/2025 1458    Acceptance, E,TB,D, VU,NR by  at 6/20/2025 1028                                      User Key       Initials Effective Dates Name Provider Type Discipline     06/16/21 -  Bhumi Osborn, PT Physical Therapist PT                  PT Recommendation and Plan  Planned Therapy Interventions (PT): balance training, bed mobility training, gait training, home exercise program, patient/family education, strengthening, transfer training  Outcome Evaluation: Pt demonstrates some increased activity tolerance and functional strength as she was able to increase her gait distance and required slighly less assistance. Pt continues to report dizziness and knee pain with ambulation but gait and balance is improve compared to last PT session. Pt reports she is still interested in acute rehab and feels as though she can tolterate 3 hours of therapy a day. PT will continue to follow to address strength, mobility, and gait.     Time Calculation:         PT Charges       Row Name 06/23/25 5678             Time Calculation    Start Time 1429  -      Stop Time 1442  -      Time Calculation (min) 13 min  -      PT Received On 06/23/25  -      PT - Next Appointment  06/24/25  -         Time Calculation- PT    Total Timed Code Minutes- PT 13 minute(s)  -CH         Timed Charges    56674 - PT Therapeutic Activity Minutes 13  -CH         Total Minutes    Timed Charges Total Minutes 13  -CH       Total Minutes 13  -CH                User Key  (r) = Recorded By, (t) = Taken By, (c) = Cosigned By      Initials Name Provider Type     Bhumi Osborn, PT Physical Therapist                  Therapy Charges for Today       Code Description Service Date Service Provider Modifiers Qty    [id number]  PT THERAPEUTIC ACT EA 15 MIN 6/23/2025 Bhumi Osborn, PT GP 1            PT G-Codes  Outcome Measure Options: AM-PAC 6 Clicks Basic Mobility (PT)  AM-PAC 6 Clicks Score (PT): 16  AM-PAC 6 Clicks Score (OT): 17  PT Discharge Summary  Anticipated Discharge Disposition (PT): inpatient rehabilitation facility    Bhumi Osborn, PT  6/23/2025

## 2025-06-23 NOTE — PROGRESS NOTES
"    DAILY PROGRESS NOTE  [facility]    Patient Identification:  Name: Sonya Marcus  Age: 70 y.o.  Sex: female  :  1955  MRN: [id number]         Primary Care Physician: Regina Ferro ([place]), CHANELLE    Subjective:  Interval History: Feeling better overall.  She clinically continues to look better in regards to alertness and overall affect.  She is in agreement with plan when I discussed things with her today    Objective: Daughter not at bedside.    Scheduled Meds:acetaminophen, 500 mg, Oral, 4x Daily  aspirin, 81 mg, Oral, Daily  buPROPion XL, 300 mg, Oral, Nightly  chlordiazePOXIDE, 25 mg, Oral, BID  Diclofenac Sodium, 2 g, Topical, 4x Daily  docusate sodium, 100 mg, Oral, Daily  DULoxetine, 60 mg, Oral, BID  levothyroxine, 88 mcg, Oral, Q AM  Lidocaine, 2 patch, Transdermal, Q24H  melatonin, 2.5 mg, Oral, Nightly  traMADol, 25 mg, Oral, Once      Continuous Infusions:     Vital signs in last 24 hours:  Temp:  [97.2 °F (36.2 °C)-98.1 °F (36.7 °C)] 98.1 °F (36.7 °C)  Heart Rate:  [69-76] 75  Resp:  [18] 18  BP: ()/(43-93) 125/67    Intake/Output:  No intake or output data in the 24 hours ending 25 0937    Exam:  /67 (BP Location: Right arm, Patient Position: Lying)   Pulse 75   Temp 98.1 °F (36.7 °C) (Oral)   Resp 18   Ht 154.9 cm (61\")   Wt 77.9 kg (171 lb 11.2 oz)   SpO2 97%   BMI 32.44 kg/m²     General Appearance:    Alert, cooperative, no distress, AAOx3                         Throat:   Oral mucosa pink and moist                         Lungs:    Clear to auscultation bilaterally, respirations unlabored                          Heart:    Regular rate and rhythm, S1 and S2 normal                  Abdomen:     Soft, nontender, bowel sounds active                  Data Review:  Labs in chart were reviewed.    Assessment:  Active Hospital Problems    Diagnosis  POA    **UTI (urinary tract infection), bacterial [N39.0, A49.9]  Unknown    Fall [W19.XXXA]  Unknown    " Metabolic encephalopathy [G93.41]  Unknown    Hypokalemia [E87.6]  Yes    Opioid dependence [F11.20]  Yes    Fibromyalgia, primary [M79.7]  Yes    Anxiety disorder [F41.9]  Yes    Gastroesophageal reflux disease [K21.9]  Yes      Resolved Hospital Problems   No resolved problems to display.       Plan:    Medically stable for discharge at any time.    Awaiting coordination of CCP with the patient and daughter    Dain Ko MD  6/23/2025  09:37 EDT

## 2025-06-23 NOTE — CASE MANAGEMENT/SOCIAL WORK
"Continued Stay Note  [facility]     Patient Name: Sonya Marcus  MRN: [id number]  Today's Date: 6/23/2025    Admit Date: 6/19/2025    Plan: IRB vs home with home health   Discharge Plan       Row Name 06/23/25 1527       Plan    Plan IRB vs home with home health    Plan Comments Met with pt at bedside. Encouraged pt to participate more with therapy and with nursing staff in order to gain strength and endurance. Pt stated she would. Informed pt that [facility] is reviewing for possible admission, but if declined she should consider alternative plan. She stated that she would need to \"think about it\". Permission obtained to contact daughter. CCP will continue to follow to assess for discharge plans.                   Discharge Codes    No documentation.                 Expected Discharge Date and Time       Expected Discharge Date Expected Discharge Time    Jun 23, 2025               Krista Freitas RN    "

## 2025-06-23 NOTE — CASE MANAGEMENT/SOCIAL WORK
"Continued Stay Note  [facility]     Patient Name: Sonya Marcus  MRN: [id number]  Today's Date: 6/23/2025    Admit Date: 6/19/2025    Plan: IRB vs home with home health   Discharge Plan       Row Name 06/23/25 1542       Plan    Plan Comments Spoke with daughter, Cece, who requested a repeat CT scan of her head, stated that she would not be taking her home, or allowing her to be dc until that was done. Explained to daughter that [facility] would like to have an additional dc plan, in case [facility] does not accept, daughter again would not address anything but CT scan, or potential substance abuse program. Informed daughter that CCP could consult [facility] to see for advice. CCP will continue to follow      Row Name 06/23/25 1527       Plan    Plan IRB vs home with home health    Plan Comments Met with pt at bedside. Encouraged pt to participate more with therapy and with nursing staff in order to gain strength and endurance. Pt stated she would. Informed pt that [facility] is reviewing for possible admission, but if declined she should consider alternative plan. She stated that she would need to \"think about it\". Permission obtained to contact daughter. CCP will continue to follow to assess for discharge plans.                   Discharge Codes    No documentation.                 Expected Discharge Date and Time       Expected Discharge Date Expected Discharge Time    Jun 23, 2025               Krista Freitas RN    "

## 2025-06-23 NOTE — CONSULTS
"Access Center consult regarding polysubstance use.     Spoke with CCP Krista and reviewed chart. Noted daughters concerns and care planning for BAR versus CD/psych need. Pt is a 70 year old female admitted to [facility] via [facility] ED for confusion. Pt was treated for UTI here and has a history of such with similar presentation on past admissions. Per chart, pt has also had other altered mental status admissions with suspected polysubstance abuse of prescription medications.    Today when seem pt was awake in room resting in bed. Introduced self and role and she was agreeable to evaluation as long as she could eat. Pt has a history of chronic pain and bilateral knee replacements. She cites these issues as her reason for pain medication prescription. Pt denies SI today, and states she does not wish she was dead, but feels like she would be \"better\" if she could cut her legs off to get rid of the pain. Pt states he knees were worse after the replacement surgeries. In recent past pt was on Oxycodone, decreased down to Hydrocodone, which was stopped during a detox at [facility] under care of Dr. Go. She had two recent admissions there. Her first admission went poorly as she wasn't properly detoxed from Xanax; \"they didn't know I was on it.\" She discharged within a week but returned for a second admission in which she says she detoxed from Xanax and Hydrocodone. Pt has also received Rx Lyrica, which she no longer takes, and Gabapentin which caused severe side effects \"hallucinations and confusion.\" Pt states after the second admission to [facility] her medication was well regimented. She reports Cymbalta 60 BID, Wellbutrin  qhs, Trazadone 100 qhs, and Librium with a planned taper has been effective. Pt denies continuing controlled rx post discharge from [facility] and believes her issues this admission are related to UTI and poor appetite with minimal food in past week. Pt states the suspicion that she is " "abusing pain medication stems from her daughter having trust issues with her related to her previous addiction/behaviors.     Pt reports her anxiety today as \"5 or 6\" out of 10 and reports her depression as \"decreased\" citing that her meds are working. Pt denies SI and history of ideation/attempts. Pt denies HI and hallucinations. Pt reports poor appetite times one week. She cites not feeling like getting out of bed as why she hasn't eaten. Pt denies this was depression and states she has poor sleep in general and should use CPAP but often doesn't because it stresses her out and she can't achieve more than 3 or 4 hours with it on. Pt states her appetite is generally good and she usually takes a boost like supplement drink once daily.     Pt completed a 30 day program for opiate dependence 13 years ago in [place]. She cites this time period as problematic; states he 2nd  at that time and her had issues. She states he was assisting her in using/finding opiates in hopes that pt would die \"and he would get the house.\" Pt was at [facility] twice recently for detox. Pt does not describe herself as an addict, though she acknowledges using NA support groups with some level of success for the past 10 years. She has also had a sponsor for the past 5 years. Pt denies abuse of alcohol and other illicit substances. Per chart there was one positive amphetamine result from ~1 year ago. Pt denies knowingly using amphetamines or rx meds containing. Pt reports a hx of blackouts from use of sleep med Xyrem that would \"paralyze\" her night; used times one month.   Pt son has alcohol issues. Pt sister has anxiety issues. Pt suspects parents had alcohol issues.     Pt lives in apartment by self x 3 years. Pt is  for 13 years from 2nd . Pt has two daughters Kalli and Cece, and two boys Arsalan and Huey. 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 Pt reports adequate finances. Pt denies legal issues and hx. Pt denies having needed walker or wheelchair in home, but now has dizziness and continued knee pain on ambulation. Possible referrals will be made to [facility] for discharge. Pt states she was planning to move to [facility] facility on July 1st facilitated by daughter, but now needs to get stronger with increased mobility first.     Access will follow up with this pt and provide resources as necessary. Pt is currently opposed to a residential rehab program and denies very recent abuse of controlled substances. Pt intends to f/u with Dr. Go ([facility]) in his private practice.

## 2025-06-23 NOTE — PLAN OF CARE
Goal Outcome Evaluation:  Plan of Care Reviewed With: patient           Outcome Evaluation: Pt demonstrates some increased activity tolerance and functional strength as she was able to increase her gait distance and required slighly less assistance. Pt continues to report dizziness and knee pain with ambulation but gait and balance is improve compared to last PT session. Pt reports she is still interested in acute rehab and feels as though she can tolterate 3 hours of therapy a day. PT will continue to follow to address strength, mobility, and gait.    Anticipated Discharge Disposition (PT): inpatient rehabilitation facility

## 2025-06-23 NOTE — PLAN OF CARE
Goal Outcome Evaluation:   Patient rested well overnight. PRN tylenol given for knee pain. Up to BSC with assist. On 2L NC while asleep.

## 2025-06-23 NOTE — PLAN OF CARE
Goal Outcome Evaluation:  Plan of Care Reviewed With: patient        Progress: no change  Outcome Evaluation: VSS; pt did not want to get up today d/t some dizziness; pt has been getting up to the BSC at times;MRI of brain without contrast to be done;worked with PT; safety maintained

## 2025-06-24 PROCEDURE — 97535 SELF CARE MNGMENT TRAINING: CPT

## 2025-06-24 PROCEDURE — 63710000001 ONDANSETRON ODT 4 MG TABLET DISPERSIBLE: Performed by: INTERNAL MEDICINE

## 2025-06-24 PROCEDURE — 97530 THERAPEUTIC ACTIVITIES: CPT

## 2025-06-24 RX ADMIN — DULOXETINE 60 MG: 60 CAPSULE, DELAYED RELEASE ORAL at 09:34

## 2025-06-24 RX ADMIN — ONDANSETRON 4 MG: 4 TABLET, ORALLY DISINTEGRATING ORAL at 06:03

## 2025-06-24 RX ADMIN — DOCUSATE SODIUM 100 MG: 100 CAPSULE, LIQUID FILLED ORAL at 09:34

## 2025-06-24 RX ADMIN — ACETAMINOPHEN 500 MG: 325 TABLET, FILM COATED ORAL at 20:25

## 2025-06-24 RX ADMIN — DICLOFENAC SODIUM 2 G: 10 GEL TOPICAL at 11:49

## 2025-06-24 RX ADMIN — DICLOFENAC SODIUM 2 G: 10 GEL TOPICAL at 20:26

## 2025-06-24 RX ADMIN — DULOXETINE 60 MG: 60 CAPSULE, DELAYED RELEASE ORAL at 20:25

## 2025-06-24 RX ADMIN — Medication 2.5 MG: at 20:25

## 2025-06-24 RX ADMIN — CHLORDIAZEPOXIDE HYDROCHLORIDE 25 MG: 25 CAPSULE ORAL at 09:34

## 2025-06-24 RX ADMIN — ACETAMINOPHEN 500 MG: 325 TABLET, FILM COATED ORAL at 11:49

## 2025-06-24 RX ADMIN — ACETAMINOPHEN 325MG 650 MG: 325 TABLET ORAL at 03:33

## 2025-06-24 RX ADMIN — ACETAMINOPHEN 500 MG: 325 TABLET, FILM COATED ORAL at 09:34

## 2025-06-24 RX ADMIN — ACETAMINOPHEN 500 MG: 325 TABLET, FILM COATED ORAL at 17:04

## 2025-06-24 RX ADMIN — ASPIRIN 81 MG: 81 TABLET, COATED ORAL at 09:34

## 2025-06-24 RX ADMIN — LEVOTHYROXINE SODIUM 88 MCG: 88 TABLET ORAL at 06:03

## 2025-06-24 RX ADMIN — DICLOFENAC SODIUM 2 G: 10 GEL TOPICAL at 09:36

## 2025-06-24 RX ADMIN — CHLORDIAZEPOXIDE HYDROCHLORIDE 25 MG: 25 CAPSULE ORAL at 20:25

## 2025-06-24 RX ADMIN — DICLOFENAC SODIUM 2 G: 10 GEL TOPICAL at 17:04

## 2025-06-24 RX ADMIN — LIDOCAINE 2 PATCH: 4 PATCH TOPICAL at 09:34

## 2025-06-24 RX ADMIN — BUPROPION HYDROCHLORIDE 300 MG: 300 TABLET, EXTENDED RELEASE ORAL at 20:25

## 2025-06-24 NOTE — PLAN OF CARE
Goal Outcome Evaluation:  Plan of Care Reviewed With: patient        Progress: no change  Outcome Evaluation: Pt seen for OT tx focusing on UB ADLs seated unsupported at EOB. Pt requiring min encouragement to complete 2/2 c/o diffuse pain in UEs (with heating pad on chest/L shoulder prior to and at emma of session). Pt able to complete grooming tasks seated EOB but fatigues quickly without thorough completion and requires assist to finish task. Pt able to complete all bed mobility and scooting laterally with SBA. OT will con't to follow for stated goals.    Anticipated Discharge Disposition (OT): skilled nursing facility, inpatient rehabilitation facility

## 2025-06-24 NOTE — DISCHARGE SUMMARY
Patient Name: Sonya Marcus  : 1955  MRN: [id number]    Date of Admission: 2025  Date of Discharge:  2025  Primary Care Physician: Regina Ferro APRN ([place])      Chief Complaint:   UTI      Discharge Diagnoses     Active Hospital Problems    Diagnosis  POA    **Anxiety disorder [F41.9]  Yes    Fall [W19.XXXA]  Unknown    Metabolic encephalopathy [G93.41]  Unknown    Hypokalemia [E87.6]  Yes    Opioid dependence [F11.20]  Yes    Fibromyalgia, primary [M79.7]  Yes    Gastroesophageal reflux disease [K21.9]  Yes      Resolved Hospital Problems   No resolved problems to display.        Hospital Course   Patient is a 70-year-old female with a history of recurrent UTI on prophylactic Macrobid, hypothyroidism, hypertension, GERD, SEBASTIAN, anxiety, fibromyalgia, chronic pain on opiates follows by pain management, presented to the ED with with worsening confusion.  Diagnosed with UTI and hypokalemia.  Also reported fall 1 week to prior to admission with  left eye injury     Encephalopathy  -Secondary to UTI and polypharmacy  -CT scan of the head, cervical spine and facial bones with no acute findings  -Resolved.       UTI  - Urinalysis was positive for small leukocytes, 6-10 WBC, 4+ bacteria.  Urine culture grew mixed nara.  Completed 4-day course IV ceftriaxone  - Continue outpatient prophylactic Macrobid 50 mg nightly.       Polypharmacy  History of benzodiazepine abuse  Anxiety  -patient is on alprazolam 0.5 mg twice daily, Norco 5 mg twice daily, and Librium 50 mg BID   - Patient follows with psychiatry and pain management outpatient.  - Patient was recently started on Librium with plan to taper benzodiazepines.  - Continue Librium to 50 twice daily as previously prescribed.    -Continue to hold benzodiazepines, Norco, trazodone  - On nightly as needed Atarax for anxiety/insomnia.  Discussed with patient/daughter that Atarax can also be associated with confusion, increasing risk of falls which  can lead to significant injuries.  They  understand the risks and want to continue Atarax for now until seen by psychiatry.  -Follow-up with with psychiatry in couple weeks.  -Continue Librium, nightly as needed Atarax          Hypothyroidism  -TSH 06/25/2025 elevated at 24.9, free T4 was low at 0.69 on 06/25/2025.  Likely contributed to encephalopathy on admission.    - Initially there was concern with patient has not been taking, however daughter reported she has been taking levothyroxine mostly as prescribed.  - Increased levothyroxine dose to 100 mcg daily.  Discussed importance of taking medication as prescribed on empty stomach 30 minutes before.  Will need repeat labs in 4 weeks to monitor.       Hypokalemia  -Potassium was 2.6 on admission.  Status post replacement.  -Remained WNL    At the time of discharge patient was told to take all medications as prescribed, keep all follow-up appointments, and call their doctor or return to the hospital with any worsening or concerning symptoms.           Day of Discharge     Subjective:  Patient seen this morning.  No acute events overnight.  No new complaints, feels anxious.  Received Atarax overnight which helped with sleep.  No shortness of breath, chest pain.  No fevers or chills.    Physical Exam:  Temp:  [97.5 °F (36.4 °C)-97.9 °F (36.6 °C)] 97.5 °F (36.4 °C)  Heart Rate:  [63-98] 98  Resp:  [18] 18  BP: ()/(62-78) 97/68  Body mass index is 29.67 kg/m².  Physical Exam    ,General: Alert, lying in bed, anxious   HEENT: Normocephalic, left periorbital bruising.  CV: Regular rate and rhythm, no murmurs rubs or gallops  Lungs: Clear to auscultation bilaterally, no crackles or wheezes  Abdomen: Soft, nontender, nondistended  Extremities: No significant peripheral edema , no cyanosis     Consultants     Consult Orders (all) (From admission, onward)       Start     Ordered    06/23/25 4065  Inpatient Access Center Consult  Once        Comments: Polysubstance  abuse   Provider:  (Not yet assigned)    06/23/25 1547    06/19/25 1759  Inpatient Case Management  Consult  Once        Provider:  (Not yet assigned)    06/19/25 1758                  Procedures     * Surgery not found *      Imaging Results (All)       Procedure Component Value Units Date/Time    CT Head Without Contrast [[id number]] Collected: 06/19/25 1209     Updated: 06/20/25 1217    Narrative:      HISTORY: Fell. Head injury. Facial bone trauma. Neck pain.     CT OF THE BRAIN WITHOUT CONTRAST 06/19/2025     Axial images were obtained through the brain without intravenous  contrast.     There is mild diffuse atrophy. There is no evidence of acute infarction,  hemorrhage, midline shift or mass effect. A small scalp hematoma is seen  over the left frontal region. No skull fractures are seen.       Impression:      No acute intracranial process identified.        CT OF THE FACIAL BONES WITHOUT CONTRAST 6/19/2025     Axial images were obtained through the facial bones. Sagittal and  coronal reconstruction images were reviewed.     No facial bone fractures are seen. The sinuses appear clear.  Intraorbital contents appear symmetrical and within normal limits.     IMPRESSION:  No facial bone fractures are seen.     CT OF THE CERVICAL SPINE 6/19/2025     Spiral images were obtained from the skull base to the upper thoracic  spine. Sagittal and coronal reconstruction images were reviewed.     There is degenerative disease of the C1-C2 articulation. There is  minimal spondylosis of C3-4. Very minimal anterolisthesis of C4 on C5 is  seen. There is moderate spondylosis of C5-6 and mild spondylosis at  C6-7. No other significant subluxation is seen. There is multilevel  facet joint disease. No cervical spine fractures are seen.     IMPRESSION:  1. Multilevel cervical degenerative disease.  2. No fractures are seen.     Radiation dose reduction techniques were utilized, including automated  exposure control  and exposure modulation based on body size.        This report was finalized on 6/20/2025 12:14 PM by Dr. Federico Brewer M.D on Workstation: [id number]       CT Facial Bones Without Contrast [[id number]] Collected: 06/19/25 1209     Updated: 06/20/25 1217    Narrative:      HISTORY: Fell. Head injury. Facial bone trauma. Neck pain.     CT OF THE BRAIN WITHOUT CONTRAST 06/19/2025     Axial images were obtained through the brain without intravenous  contrast.     There is mild diffuse atrophy. There is no evidence of acute infarction,  hemorrhage, midline shift or mass effect. A small scalp hematoma is seen  over the left frontal region. No skull fractures are seen.       Impression:      No acute intracranial process identified.        CT OF THE FACIAL BONES WITHOUT CONTRAST 6/19/2025     Axial images were obtained through the facial bones. Sagittal and  coronal reconstruction images were reviewed.     No facial bone fractures are seen. The sinuses appear clear.  Intraorbital contents appear symmetrical and within normal limits.     IMPRESSION:  No facial bone fractures are seen.     CT OF THE CERVICAL SPINE 6/19/2025     Spiral images were obtained from the skull base to the upper thoracic  spine. Sagittal and coronal reconstruction images were reviewed.     There is degenerative disease of the C1-C2 articulation. There is  minimal spondylosis of C3-4. Very minimal anterolisthesis of C4 on C5 is  seen. There is moderate spondylosis of C5-6 and mild spondylosis at  C6-7. No other significant subluxation is seen. There is multilevel  facet joint disease. No cervical spine fractures are seen.     IMPRESSION:  1. Multilevel cervical degenerative disease.  2. No fractures are seen.     Radiation dose reduction techniques were utilized, including automated  exposure control and exposure modulation based on body size.        This report was finalized on 6/20/2025 12:14 PM by Dr. Federico Brewer M.D on Workstation:  [id number]       CT Cervical Spine Without Contrast [[id number]] Collected: 06/19/25 1209     Updated: 06/20/25 1217    Narrative:      HISTORY: Fell. Head injury. Facial bone trauma. Neck pain.     CT OF THE BRAIN WITHOUT CONTRAST 06/19/2025     Axial images were obtained through the brain without intravenous  contrast.     There is mild diffuse atrophy. There is no evidence of acute infarction,  hemorrhage, midline shift or mass effect. A small scalp hematoma is seen  over the left frontal region. No skull fractures are seen.       Impression:      No acute intracranial process identified.        CT OF THE FACIAL BONES WITHOUT CONTRAST 6/19/2025     Axial images were obtained through the facial bones. Sagittal and  coronal reconstruction images were reviewed.     No facial bone fractures are seen. The sinuses appear clear.  Intraorbital contents appear symmetrical and within normal limits.     IMPRESSION:  No facial bone fractures are seen.     CT OF THE CERVICAL SPINE 6/19/2025     Spiral images were obtained from the skull base to the upper thoracic  spine. Sagittal and coronal reconstruction images were reviewed.     There is degenerative disease of the C1-C2 articulation. There is  minimal spondylosis of C3-4. Very minimal anterolisthesis of C4 on C5 is  seen. There is moderate spondylosis of C5-6 and mild spondylosis at  C6-7. No other significant subluxation is seen. There is multilevel  facet joint disease. No cervical spine fractures are seen.     IMPRESSION:  1. Multilevel cervical degenerative disease.  2. No fractures are seen.     Radiation dose reduction techniques were utilized, including automated  exposure control and exposure modulation based on body size.        This report was finalized on 6/20/2025 12:14 PM by Dr. Federico Brewer M.D on Workstation: [id number]                 Pertinent Labs     Results from last 7 days   Lab Units 06/27/25  0532 06/26/25  0603 06/25/25  0329   WBC  "10*3/mm3 5.58 5.15 4.73   HEMOGLOBIN g/dL 12.6 12.2 12.0   PLATELETS 10*3/mm3 231 220 205     Results from last 7 days   Lab Units 06/27/25  0532 06/26/25  0603 06/25/25  0329   SODIUM mmol/L 139 136 141   POTASSIUM mmol/L 3.9 3.9 4.1   CHLORIDE mmol/L 108* 106 109*   CO2 mmol/L 21.1* 21.2* 24.1   BUN mg/dL 10.0 9.0 11.0   CREATININE mg/dL 0.71 0.66 0.75   GLUCOSE mg/dL 115* 103* 102*   Estimated Creatinine Clearance: 65.6 mL/min (by C-G formula based on SCr of 0.71 mg/dL).  Results from last 7 days   Lab Units 06/26/25  0603   ALBUMIN g/dL 3.4*   BILIRUBIN mg/dL 0.2   ALK PHOS U/L 65   AST (SGOT) U/L 15   ALT (SGPT) U/L 14     Results from last 7 days   Lab Units 06/27/25 0532 06/26/25  0603 06/25/25  0329   CALCIUM mg/dL 9.2 9.3 8.9   ALBUMIN g/dL  --  3.4*  --    MAGNESIUM mg/dL  --   --  2.0   PHOSPHORUS mg/dL  --   --  2.9               Invalid input(s): \"LDLCALC\"            Test Results Pending at Discharge       Discharge Details        Discharge Medications        New Medications        Instructions Start Date   acetaminophen 500 MG tablet  Commonly known as: TYLENOL   500 mg, Oral, Every 6 Hours PRN      hydrOXYzine 25 MG tablet  Commonly known as: ATARAX   25 mg, Oral, Nightly PRN      polyethylene glycol 17 g packet  Commonly known as: MIRALAX   17 g, Oral, Daily PRN      sennosides-docusate 8.6-50 MG per tablet  Commonly known as: PERICOLACE   2 tablets, Oral, 2 Times Daily PRN             Changes to Medications        Instructions Start Date   chlordiazePOXIDE 25 MG capsule  Commonly known as: LIBRIUM  What changed:   how much to take  when to take this   50 mg, Oral, 2 Times Daily      levothyroxine 100 MCG tablet  Commonly known as: SYNTHROID, LEVOTHROID  What changed:   medication strength  how much to take   100 mcg, Oral, Every Early Morning   Start Date: June 28, 2025            Continue These Medications        Instructions Start Date   aspirin 81 MG EC tablet   81 mg, Daily      buPROPion XL " 150 MG 24 hr tablet  Commonly known as: WELLBUTRIN XL   300 mg, Nightly      cetirizine 5 MG tablet  Commonly known as: zyrTEC   5 mg, Daily      cyanocobalamin 1000 MCG/ML injection   1,000 mcg, Every 30 Days      docusate sodium 100 MG capsule  Commonly known as: Colace   100 mg, Oral, Daily      DULoxetine 60 MG capsule  Commonly known as: CYMBALTA   60 mg, Oral, 2 Times Daily      nitrofurantoin 50 MG capsule  Commonly known as: MACRODANTIN   50 mg, Oral, Nightly      omeprazole 20 MG capsule  Commonly known as: priLOSEC   20 mg, Oral, Daily      ondansetron ODT 4 MG disintegrating tablet  Commonly known as: ZOFRAN-ODT   4-8 mg, Translingual, Every 8 Hours PRN      rosuvastatin 10 MG tablet  Commonly known as: CRESTOR   10 mg, Oral, Nightly             Stop These Medications      ALPRAZolam 0.5 MG tablet  Commonly known as: XANAX     metoprolol succinate XL 50 MG 24 hr tablet  Commonly known as: TOPROL-XL     QUEtiapine 100 MG tablet  Commonly known as: SEROquel     traZODone 100 MG tablet  Commonly known as: DESYREL              Allergies   Allergen Reactions    Fish Allergy Anaphylaxis     Has epi pen    Iodine Anaphylaxis    Penicillins Nausea And Vomiting and Rash     Tolerated rocephin 9/2023 admission    Prochlorperazine Dystonia, Anaphylaxis, Rash and Other (See Comments)     Paralysis    Muscle twisting    Shellfish Allergy Anaphylaxis, Rash and Swelling    Shellfish-Derived Products Anaphylaxis    Sulfate Rash    Glycerol, Iodinated Unknown - Low Severity     Anaphylaxis   (also to shellfish)    Octacosanol Dizziness     HEADACHE    Latex Rash    Levofloxacin Hives    Sulfa Antibiotics GI Intolerance       Discharge Disposition:  Skilled Nursing Facility (DC - External)      Discharge Diet:  Diet Order   Procedures    Diet: Cardiac; Healthy Heart (2-3 Na+); Fluid Consistency: Thin (IDDSI 0)       Discharge Activity:       CODE STATUS:    Code Status and Medical Interventions: CPR (Attempt to  Resuscitate); Full   Ordered at: 06/19/25 1653     Code Status (Patient has no pulse and is not breathing):    CPR (Attempt to Resuscitate)     Medical Interventions (Patient has pulse or is breathing):    Full       Future Appointments   Date Time Provider Department Center   9/8/2025 10:00 AM [facility]   9/8/2025  1:00 PM [facility]      Contact information for follow-up providers       Regina Ferro), APRN .    Specialty: Family Medicine  Contact information:  [address]  [facility] [address]  [phone]                       Contact information for after-discharge care       Destination       [facility] [place] .    Service: Skilled Nursing  Contact information:  [address] [facility] [address] [id number] [phone]                                   Time Spent on Discharge:  Greater than 30 minutes      Merly Hills MD  [facility]  06/27/25  16:31 EDT

## 2025-06-24 NOTE — PLAN OF CARE
Goal Outcome Evaluation:  Plan of Care Reviewed With: patient        Progress: improving  Outcome Evaluation: VSS; complaints of  bilateral knee pain and bilateral arm pain;pt has been requesting pain medication frequently for bilateral knees; pt takes scheduled tylenol and has voltaren for the knee pain; MRI cancelled; activity encouraged; pt to go to rehab when discharged

## 2025-06-24 NOTE — PROGRESS NOTES
"    DAILY PROGRESS NOTE  [facility]    Patient Identification:  Name: Sonya Marcus  Age: 70 y.o.  Sex: female  :  1955  MRN: [id number]         Primary Care Physician: Regina Ferro APRN ([place])    Subjective:  Interval History: Feeling better.  She does not complain of dizziness to me.  She is not complain of any headache either.  Some of her history can be skewed by past issues of fibromyalgia and drug abuse.  Denies any chest pain or troubles breathing    Objective: This patient has demonstrated clinical improvement from the over the last 2 to 3 days    Scheduled Meds:acetaminophen, 500 mg, Oral, 4x Daily  aspirin, 81 mg, Oral, Daily  buPROPion XL, 300 mg, Oral, Nightly  chlordiazePOXIDE, 25 mg, Oral, BID  Diclofenac Sodium, 2 g, Topical, 4x Daily  docusate sodium, 100 mg, Oral, Daily  DULoxetine, 60 mg, Oral, BID  levothyroxine, 88 mcg, Oral, Q AM  Lidocaine, 2 patch, Transdermal, Q24H  melatonin, 2.5 mg, Oral, Nightly  traMADol, 25 mg, Oral, Once      Continuous Infusions:     Vital signs in last 24 hours:  Temp:  [97.5 °F (36.4 °C)-98.1 °F (36.7 °C)] 97.5 °F (36.4 °C)  Heart Rate:  [59-76] 76  Resp:  [18] 18  BP: (116-137)/(59-99) 137/80    Intake/Output:  No intake or output data in the 24 hours ending 25 0935    Exam:  /80 (BP Location: Right arm, Patient Position: Lying)   Pulse 76   Temp 97.5 °F (36.4 °C) (Oral)   Resp 18   Ht 154.9 cm (61\")   Wt 72.3 kg (159 lb 6.4 oz)   SpO2 91%   BMI 30.12 kg/m²     General Appearance:    Alert, cooperative, fluent speech, more alert and animated.  Daughter at bedside definitely feels her mother is much improved                          Head:    Normocephalic, without obvious abnormality, atraumatic                           Eyes:    PERRLA/EOM's intact, both eyes.  Left eye shiner                         Throat:   Oral mucosa pink and moist                           Neck:   Supple, no JVD                         Lungs:    " Clear to auscultation bilaterally, respirations unlabored                 Chest Wall:    No tenderness or deformity                          Heart:    Regular rate and rhythm, S1 and S2 normal                  Abdomen:     Soft, nontender, bowel sounds active                 Extremities: Moving all with absolutely no focal loss of function or deficits when checking in bedside chair                        Pulses:   Pulses palpable in all extremities                            Skin:   Skin is warm and dry                  Neurologic:   CNII-XII intact     Data Review:  Labs in chart were reviewed.    Assessment:  Active Hospital Problems    Diagnosis  POA    **Anxiety disorder [F41.9]  Yes    Fall [W19.XXXA]  Unknown    Metabolic encephalopathy [G93.41]  Unknown    Hypokalemia [E87.6]  Yes    Opioid dependence [F11.20]  Yes    Fibromyalgia, primary [M79.7]  Yes    Gastroesophageal reflux disease [K21.9]  Yes      Resolved Hospital Problems   No resolved problems to display.       Plan:    Medically stable at any time for discharge.  Case discussed with CCP who is coordinating discharge needs.  Per family request we are waiting for formal Orthodoxy evaluation with secondary plans for [facility]      I doubt UTI was ever initiated.  Patient was treated with 3 days of Rocephin      Polypharmacy is the main issue here   - Patient has known benzodiazepine abuse and is under the care of psychiatry to transition to Librium and taper.  I think there was a cumulative effect with Librium given its long half-life and I initially reduced her to just nightly and currently we have been on twice daily and she is stable       This patient has been making clinical improvement every day.  CT of her face neck and head were all negative for acute disease.  Unfortunately there are family issues amongst children regarding mgnt. One is demanding repeat CT imaging and the other clearly sees the clinical improvement that I have noticed  daily over the last 2-3 days.I feel any additional head imaging is unnecessary to put patient through the probably low yield study. CCP trying her best to handle difficult social situation        Disposition: CCP helping and coordinating      Dain Ko MD  6/24/2025  09:35 EDT

## 2025-06-24 NOTE — THERAPY TREATMENT NOTE
Patient Name: Sonya Marcus  : 1955    MRN: [id number]                              Today's Date: 2025       Admit Date: 2025    Visit Dx:     ICD-10-CM ICD-9-CM   1. Hypokalemia  E87.6 276.8   2. Weakness  R53.1 780.79   3. Acute cystitis without hematuria  N30.00 595.0   4. Failure to thrive in adult  R62.7 783.7   5. Periorbital ecchymosis of left eye, initial encounter  S00.12XA 921.1     Patient Active Problem List   Diagnosis    Altered mental status    Polypharmacy    Positive urine drug screen    MICHAEL (acute kidney injury)    Left ureteral stone    Functional tremor    Knee joint replacement status    Fibromyalgia, primary    Coronary arteriosclerosis    Hypothyroidism    Genitourinary syndrome of menopause    Hyperlipidemia    Osteoarthritis of right knee    Pernicious anemia    Urinary tract infection in female    Absolute anemia    Gastroesophageal reflux disease    Angina pectoris    Anxiety disorder    Arthritis or polyarthritis, rheumatoid    Avitaminosis D    B12 deficiency    Bilateral primary osteoarthritis of knee    BP (high blood pressure)    Cardiac murmur    Stage 2 chronic kidney disease    Aftercare for healing traumatic closed fracture of right lower extremity    Delayed union of tibial shaft fracture    Generalized weakness    Opioid dependence    Dehydration    Hypokalemia    Fall    UTI (urinary tract infection), bacterial    Metabolic encephalopathy     Past Medical History:   Diagnosis Date    Allergic     Anemia 2000    Angina pectoris     Anxiety and depression     Arthralgia of right knee 09/10/2024    Arthritis     Carotid artery occlusion     Chronic urinary tract infection     CKD (chronic kidney disease)     Coronary arteriosclerosis     Coronary artery disease     Disease of thyroid gland     Fibromyalgia, primary 2000    Fractures 2024    Fell at home    Genitourinary syndrome of menopause     GERD (gastroesophageal reflux disease)      History of kidney stones     History of pulmonary embolism     AFTER HYSTERECTOMY    HL (hearing loss) 2024    Hyperlipidemia 2020    Hypertension     Hypothyroidism     Kidney stone 1972    Left hip pain     Memory loss     MVP (mitral valve prolapse)     DR LUZ LAST OFFICE NOTE WITH CHART 7/30/2024    Obesity     Prediabetes     Right knee pain     Right shoulder pain     Sleep apnea     PT IS NOT USING CPAP    Substance abuse 2010    Hydrocodone and Oxycodone     Past Surgical History:   Procedure Laterality Date    APPENDECTOMY      BREAST BIOPSY      COLONOSCOPY  01/01/2022    CYSTOSCOPY W/ URETERAL STENT PLACEMENT Left 09/05/2023    Procedure: LEFT CYSTOSCOPY URETERAL CATHETER/STENT INSERTION;  Surgeon: Quinton Rosa MD;  Location: [facility] OR;  Service: Urology;  Laterality: Left;    EYE SURGERY      HYSTERECTOMY      JOINT REPLACEMENT  2021 & 2024    REPLACEMENT TOTAL KNEE Left 2021    SHOULDER SURGERY Right     TONSILLECTOMY      TOTAL KNEE ARTHROPLASTY Right 08/22/2024    Procedure: TOTAL KNEE ARTHROPLASTY WITH CORI ROBOT;  Surgeon: Steven Sumner II, MD;  Location: [facility] OR [facility];  Service: Robotics - Ortho;  Laterality: Right;      General Information       Row Name 06/24/25 1122          OT Time and Intention    Subjective Information complains of;pain  -CE     Document Type therapy note (daily note)  -CE     Mode of Treatment occupational therapy  -CE     Patient Effort adequate  -CE     Symptoms Noted During/After Treatment none  -CE       Row Name 06/24/25 1122          General Information    Patient Profile Reviewed yes  -CE     Existing Precautions/Restrictions fall  -CE       Row Name 06/24/25 1122          Cognition    Orientation Status (Cognition) oriented x 3  -CE       Row Name 06/24/25 1122          Safety Issues/Impairments Affecting Functional Mobility    Safety Issues Affecting Function (Mobility) judgment;problem-solving;insight into deficits/self-awareness   "-CE     Impairments Affecting Function (Mobility) balance;endurance/activity tolerance;strength  -CE               User Key  (r) = Recorded By, (t) = Taken By, (c) = Cosigned By      Initials Name Provider Type    Nikkie Robles OT Occupational Therapist                     Mobility/ADL's       Row Name 06/24/25 1123          Bed Mobility    Bed Mobility supine-sit;sit-supine;scooting/bridging  -CE     Scooting/Bridging [place] (Bed Mobility) standby assist;1 person assist  pt able to put weight through BLEs and scoot self laterally towards HOB in prep for sit>sup  -CE     Supine-Sit [place] (Bed Mobility) standby assist  -CE     Sit-Supine [place] (Bed Mobility) standby assist  -CE     Assistive Device (Bed Mobility) bed rails;head of bed elevated  -CE     Comment, (Bed Mobility) declining getting OOBTC 2/2 pain  -CE       Row Name 06/24/25 1125          Activities of Daily Living    BADL Assessment/Intervention grooming  -CE       Row Name 06/24/25 1125          Grooming Assessment/Training    [place] Level (Grooming) grooming skills;hair care, combing/brushing;minimum assist (75% patient effort);other (see comments)  for thorough completion 2/2 UE fatige; completed seated unsupported at EOB  -CE               User Key  (r) = Recorded By, (t) = Taken By, (c) = Cosigned By      Initials Name Provider Type    Nikkie Robles OT Occupational Therapist                   Obj/Interventions    No documentation.                  Goals/Plan    No documentation.                  Clinical Impression       Row Name 06/24/25 1126          Pain Assessment    Pre/Posttreatment Pain Comment Did not formally rate but stating BUEs hurt \"all over\"; preoccupied with getting pain meds despite RN giving po meds prior to session.  -CE       Row Name 06/24/25 1126          Plan of Care Review    Plan of Care Reviewed With patient  -CE     Progress no change  -CE     Outcome Evaluation Pt seen for OT tx " focusing on UB ADLs seated unsupported at EOB. Pt requiring min encouragement to complete 2/2 c/o diffuse pain in UEs (with heating pad on chest/L shoulder prior to and at emma of session). Pt able to complete grooming tasks seated EOB but fatigues quickly without thorough completion and requires assist to finish task. Pt able to complete all bed mobility and scooting laterally with SBA. OT will con't to follow for stated goals.  -CE       Row Name 06/24/25 1126          Therapy Plan Review/Discharge Plan (OT)    Anticipated Discharge Disposition (OT) skilled nursing facility;inpatient rehabilitation facility  -CE       Row Name 06/24/25 1126          Vital Signs    O2 Delivery Pre Treatment room air  -CE     O2 Delivery Intra Treatment room air  -CE     O2 Delivery Post Treatment room air  -CE     Pre Patient Position Supine  -CE     Intra Patient Position Sitting  -CE     Post Patient Position Supine  -CE       Row Name 06/24/25 1126          Positioning and Restraints    Pre-Treatment Position in bed  -CE     Post Treatment Position bed  -CE     In Bed notified nsg;supine;fowlers;call light within reach;encouraged to call for assist  -CE               User Key  (r) = Recorded By, (t) = Taken By, (c) = Cosigned By      Initials Name Provider Type    CE Nikkie Weinstein, DANISHA Occupational Therapist                   Outcome Measures       Row Name 06/24/25 1130          How much help from another is currently needed...    Putting on and taking off regular lower body clothing? 3  -CE     Bathing (including washing, rinsing, and drying) 3  -CE     Toileting (which includes using toilet bed pan or urinal) 3  -CE     Putting on and taking off regular upper body clothing 3  -CE     Taking care of personal grooming (such as brushing teeth) 3  -CE     Eating meals 4  -CE     AM-PAC 6 Clicks Score (OT) 19  -CE       Row Name 06/24/25 1130          Functional Assessment    Outcome Measure Options AM-PAC 6 Clicks Daily  Activity (OT)  -CE               User Key  (r) = Recorded By, (t) = Taken By, (c) = Cosigned By      Initials Name Provider Type    Nikkie Robles OT Occupational Therapist                      OT Recommendation and Plan     Plan of Care Review  Plan of Care Reviewed With: patient  Progress: no change  Outcome Evaluation: Pt seen for OT tx focusing on UB ADLs seated unsupported at EOB. Pt requiring min encouragement to complete 2/2 c/o diffuse pain in UEs (with heating pad on chest/L shoulder prior to and at emma of session). Pt able to complete grooming tasks seated EOB but fatigues quickly without thorough completion and requires assist to finish task. Pt able to complete all bed mobility and scooting laterally with SBA. OT will con't to follow for stated goals.     Time Calculation:         Time Calculation- OT       Row Name 06/24/25 1130             Time Calculation- OT    OT Start Time 1050  -CE      OT Stop Time 1104  -CE      OT Time Calculation (min) 14 min  -CE      OT Received On 06/24/25  -CE         Timed Charges    44497 - OT Self Care/Mgmt Minutes 14  -CE         Total Minutes    Timed Charges Total Minutes 14  -CE       Total Minutes 14  -CE                User Key  (r) = Recorded By, (t) = Taken By, (c) = Cosigned By      Initials Name Provider Type    Nikkie Robles OT Occupational Therapist                  Therapy Charges for Today       Code Description Service Date Service Provider Modifiers Qty    [id number]  OT SELF CARE/MGMT/TRAIN EA 15 MIN 6/24/2025 Nikkie Weinstein OT GO 1                 Nikkie Weinstein OT  6/24/2025

## 2025-06-24 NOTE — THERAPY TREATMENT NOTE
Patient Name: Sonya Marcus  : 1955    MRN: [id number]                              Today's Date: 2025       Admit Date: 2025    Visit Dx:     ICD-10-CM ICD-9-CM   1. Hypokalemia  E87.6 276.8   2. Weakness  R53.1 780.79   3. Acute cystitis without hematuria  N30.00 595.0   4. Failure to thrive in adult  R62.7 783.7   5. Periorbital ecchymosis of left eye, initial encounter  S00.12XA 921.1     Patient Active Problem List   Diagnosis    Altered mental status    Polypharmacy    Positive urine drug screen    MICHAEL (acute kidney injury)    Left ureteral stone    Functional tremor    Knee joint replacement status    Fibromyalgia, primary    Coronary arteriosclerosis    Hypothyroidism    Genitourinary syndrome of menopause    Hyperlipidemia    Osteoarthritis of right knee    Pernicious anemia    Urinary tract infection in female    Absolute anemia    Gastroesophageal reflux disease    Angina pectoris    Anxiety disorder    Arthritis or polyarthritis, rheumatoid    Avitaminosis D    B12 deficiency    Bilateral primary osteoarthritis of knee    BP (high blood pressure)    Cardiac murmur    Stage 2 chronic kidney disease    Aftercare for healing traumatic closed fracture of right lower extremity    Delayed union of tibial shaft fracture    Generalized weakness    Opioid dependence    Dehydration    Hypokalemia    Fall    UTI (urinary tract infection), bacterial    Metabolic encephalopathy     Past Medical History:   Diagnosis Date    Allergic     Anemia 2000    Angina pectoris     Anxiety and depression     Arthralgia of right knee 09/10/2024    Arthritis     Carotid artery occlusion     Chronic urinary tract infection     CKD (chronic kidney disease)     Coronary arteriosclerosis     Coronary artery disease     Disease of thyroid gland     Fibromyalgia, primary 2000    Fractures 2024    Fell at home    Genitourinary syndrome of menopause     GERD (gastroesophageal reflux disease)      History of kidney stones     History of pulmonary embolism     AFTER HYSTERECTOMY    HL (hearing loss) 2024    Hyperlipidemia 2020    Hypertension     Hypothyroidism     Kidney stone 1972    Left hip pain     Memory loss     MVP (mitral valve prolapse)     DR LUZ LAST OFFICE NOTE WITH CHART 7/30/2024    Obesity     Prediabetes     Right knee pain     Right shoulder pain     Sleep apnea     PT IS NOT USING CPAP    Substance abuse 2010    Hydrocodone and Oxycodone     Past Surgical History:   Procedure Laterality Date    APPENDECTOMY      BREAST BIOPSY      COLONOSCOPY  01/01/2022    CYSTOSCOPY W/ URETERAL STENT PLACEMENT Left 09/05/2023    Procedure: LEFT CYSTOSCOPY URETERAL CATHETER/STENT INSERTION;  Surgeon: Quinton Rosa MD;  Location: [facility] OR;  Service: Urology;  Laterality: Left;    EYE SURGERY      HYSTERECTOMY      JOINT REPLACEMENT  2021 & 2024    REPLACEMENT TOTAL KNEE Left 2021    SHOULDER SURGERY Right     TONSILLECTOMY      TOTAL KNEE ARTHROPLASTY Right 08/22/2024    Procedure: TOTAL KNEE ARTHROPLASTY WITH CORI ROBOT;  Surgeon: Steven Sumner II, MD;  Location: [facility] OR [facility];  Service: Robotics - Ortho;  Laterality: Right;      General Information       Row Name 06/24/25 1556          Physical Therapy Time and Intention    Document Type therapy note (daily note)  -     Mode of Treatment individual therapy;physical therapy  -       Row Name 06/24/25 1556          General Information    Existing Precautions/Restrictions fall  -       Row Name 06/24/25 1556          Cognition    Orientation Status (Cognition) oriented x 3  -       Row Name 06/24/25 1556          Safety Issues/Impairments Affecting Functional Mobility    Impairments Affecting Function (Mobility) balance;endurance/activity tolerance;strength  -               User Key  (r) = Recorded By, (t) = Taken By, (c) = Cosigned By      Initials Name Provider Type    CH Bhumi Osborn PT Physical Therapist                    Mobility       Row Name 06/24/25 1556          Bed Mobility    Bed Mobility supine-sit;sit-supine  -     Supine-Sit Henry (Bed Mobility) verbal cues;nonverbal cues (demo/gesture);contact guard  -CH     Sit-Supine Henry (Bed Mobility) verbal cues;nonverbal cues (demo/gesture);contact guard  -     Assistive Device (Bed Mobility) bed rails;head of bed elevated  -       Row Name 06/24/25 1556          Sit-Stand Transfer    Sit-Stand Henry (Transfers) verbal cues;nonverbal cues (demo/gesture);minimum assist (75% patient effort)  -     Assistive Device (Sit-Stand Transfers) walker, front-wheeled  -       Row Name 06/24/25 1556          Gait/Stairs (Locomotion)    Henry Level (Gait) verbal cues;nonverbal cues (demo/gesture);minimum assist (75% patient effort);contact guard  -     Assistive Device (Gait) walker, front-wheeled  -     Distance in Feet (Gait) 30  -CH     Deviations/Abnormal Patterns (Gait) yuan decreased;gait speed decreased;stride length decreased  -     Comment, (Gait/Stairs) Pt with unsteady gait, LE shakiness noted for first 10 ft, improved with distance  -               User Key  (r) = Recorded By, (t) = Taken By, (c) = Cosigned By      Initials Name Provider Type    Bhumi Samuel, PT Physical Therapist                   Obj/Interventions       [facility] Name 06/24/25 3698          Motor Skills    Therapeutic Exercise --  10 reps B LE AP, LAQ, and seated marches  -               User Key  (r) = Recorded By, (t) = Taken By, (c) = Cosigned By      Initials Name Provider Type    Bhumi Samuel, PT Physical Therapist                   Goals/Plan    No documentation.                  Clinical Impression       [facility] Name 06/24/25 1488          Pain    Pre/Posttreatment Pain Comment pt did no complain of pain during PT session  -CH       Row Name 06/24/25 2600          Plan of Care Review    Outcome Evaluation Pt reports her dizziness is  better today. Pt voices desire to increase ambulation and function. Pt demonstrates increased activity tolerance as she was able to increase her gait distance. Pt continues to require some assistance secondary to generalized weakness and impaired balance. PT will continue to follow to address strength, mobility, and gait. Pt would benefit from acute rehab upon discharge from hospital.  -       Row Name 06/24/25 6671          Positioning and Restraints    Pre-Treatment Position in bed  -     Post Treatment Position bed  -     In Bed supine;call light within reach;encouraged to call for assist;exit alarm on  -               User Key  (r) = Recorded By, (t) = Taken By, (c) = Cosigned By      Initials Name Provider Type     Bhumi Osborn, PT Physical Therapist                   Outcome Measures       Row Name 06/24/25 1603          How much help from another person do you currently need...    Turning from your back to your side while in flat bed without using bedrails? 3  -CH     Moving from lying on back to sitting on the side of a flat bed without bedrails? 3  -CH     Moving to and from a bed to a chair (including a wheelchair)? 3  -CH     Standing up from a chair using your arms (e.g., wheelchair, bedside chair)? 3  -CH     Climbing 3-5 steps with a railing? 2  -CH     To walk in hospital room? 3  -     AM-PAC 6 Clicks Score (PT) 17  -CH     Highest Level of Mobility Goal Stand (1 or More Minutes)-5  -       Row Name 06/24/25 1603 06/24/25 1130       Functional Assessment    Outcome Measure Options AM-PAC 6 Clicks Basic Mobility (PT)  - AM-PAC 6 Clicks Daily Activity (OT)  -CE              User Key  (r) = Recorded By, (t) = Taken By, (c) = Cosigned By      Initials Name Provider Type    Bhumi Samuel, PT Physical Therapist    Nikkie Robles OT Occupational Therapist                                 Physical Therapy Education       Title: PT OT SLP Therapies (Done)       Topic:  Physical Therapy (Done)       Point: Mobility training (Done)       Learning Progress Summary            Patient Acceptance, E,TB,D, VU,NR by  at 6/24/2025 1603    Acceptance, E,TB,D, VU,NR by  at 6/23/2025 1458    Acceptance, E,TB,D, VU,NR by  at 6/20/2025 1028                      Point: Home exercise program (Done)       Learning Progress Summary            Patient Acceptance, E,TB,D, VU,NR by  at 6/24/2025 1603    Acceptance, E,TB,D, VU,NR by  at 6/20/2025 1028                      Point: Body mechanics (Done)       Learning Progress Summary            Patient Acceptance, E,TB,D, VU,NR by  at 6/24/2025 1603    Acceptance, E,TB,D, VU,NR by  at 6/23/2025 1458    Acceptance, E,TB,D, VU,NR by  at 6/20/2025 1028                      Point: Precautions (Done)       Learning Progress Summary            Patient Acceptance, E,TB,D, VU,NR by  at 6/24/2025 1603    Acceptance, E,TB,D, VU,NR by  at 6/23/2025 1458    Acceptance, E,TB,D, VU,NR by  at 6/20/2025 1028                                      User Key       Initials Effective Dates Name Provider Type Discipline     06/16/21 -  Bhumi Osborn, PT Physical Therapist PT                  PT Recommendation and Plan  Planned Therapy Interventions (PT): balance training, bed mobility training, gait training, home exercise program, patient/family education, strengthening, transfer training  Outcome Evaluation: Pt reports her dizziness is better today. Pt voices desire to increase ambulation and function. Pt demonstrates increased activity tolerance as she was able to increase her gait distance. Pt continues to require some assistance secondary to generalized weakness and impaired balance. PT will continue to follow to address strength, mobility, and gait. Pt would benefit from acute rehab upon discharge from hospital.     Time Calculation:         PT Charges       Row Name 06/24/25 1604             Time Calculation    Start Time 1540  -      Stop  Time 1554  -      Time Calculation (min) 14 min  -CH      PT Received On 06/24/25  -      PT - Next Appointment 06/25/25  -         Time Calculation- PT    Total Timed Code Minutes- PT 14 minute(s)  -CH         Timed Charges    06052 - PT Therapeutic Activity Minutes 14  -CH         Total Minutes    Timed Charges Total Minutes 14  -CH       Total Minutes 14  -CH                User Key  (r) = Recorded By, (t) = Taken By, (c) = Cosigned By      Initials Name Provider Type     Bhumi Osborn PT Physical Therapist                  Therapy Charges for Today       Code Description Service Date Service Provider Modifiers Qty    01189342724  PT THERAPEUTIC ACT EA 15 MIN 6/23/2025 Bhumi Osborn, PT GP 1    46638279622 HC PT THERAPEUTIC ACT EA 15 MIN 6/24/2025 Bhumi Osborn, PT GP 1            PT G-Codes  Outcome Measure Options: AM-PAC 6 Clicks Basic Mobility (PT)  AM-PAC 6 Clicks Score (PT): 17  AM-PAC 6 Clicks Score (OT): 19  PT Discharge Summary  Anticipated Discharge Disposition (PT): inpatient rehabilitation facility    Bhumi Osborn, PT  6/24/2025

## 2025-06-24 NOTE — CASE MANAGEMENT/SOCIAL WORK
Continued Stay Note  [facility]     Patient Name: Sonya Marcus  MRN: [id number]  Today's Date: 6/24/2025    Admit Date: 6/19/2025    Plan: [facility] accepted, pending pre-cert   Discharge Plan       Row Name 06/24/25 1358       Plan    Plan [facility] accepted, pending pre-cert    Plan Comments [facility] has accpeted pt, pre-cert has been started. Informed pt and daughter Kalli. Pt will need transportation arranged. [facility] will continue to follow.                   Discharge Codes    No documentation.                 Expected Discharge Date and Time       Expected Discharge Date Expected Discharge Time    Jun 25, 2025               Krista Freitas RN

## 2025-06-24 NOTE — PLAN OF CARE
Goal Outcome Evaluation:           Progress: no change  Outcome Evaluation: Maintained on R/A, Incontinent of urine x1, BSC and standing weight with assist of two, gait unsteady and weak. PRN tyelnol x1 for knee pain with fair effect. Safety maintained.

## 2025-06-24 NOTE — NURSING NOTE
"[facility] follow up regarding polysubstance use. Reviewed chart and spoke with patients nurse and CCP earlier in day. Met with pt. She was awake and resting in bed. She appeared with more expressive affect and mood. She reported feeling much better today and stated she had ambulated well with PT. Pt reported good sleep and decreased pain in knees despite ambulation today. Pt reports poor appetite, though somewhat better eating a little less than 50% of today's meals. Pt stated she had read my previous note and questioned whether it made her appear as a current opiate addict. She reiterated that her opiate addiction was 13 years ago, and that recent issues have been to polysubstance combination and probably her poor appetite; \"I didn't eat for a week.\" Pt does report continued anxiety and feels Xanax before and now Librium have been helpful. Pt was pleasant and showed increased motivation for more ambulation. Voices some excitement about getting back to herself and attending physical rehab. Pt also eager to move into new senior housing and looks forward to new environment and social setting.   "

## 2025-06-24 NOTE — PLAN OF CARE
Goal Outcome Evaluation:  Plan of Care Reviewed With: patient           Outcome Evaluation: Pt reports her dizziness is better today. Pt voices desire to increase ambulation and function. Pt demonstrates increased activity tolerance as she was able to increase her gait distance. Pt continues to require some assistance secondary to generalized weakness and impaired balance. PT will continue to follow to address strength, mobility, and gait. Pt would benefit from acute rehab upon discharge from hospital.    Anticipated Discharge Disposition (PT): inpatient rehabilitation facility

## 2025-06-25 LAB
ANION GAP SERPL CALCULATED.3IONS-SCNC: 7.9 MMOL/L (ref 5–15)
BUN SERPL-MCNC: 11 MG/DL (ref 8–23)
BUN/CREAT SERPL: 14.7 (ref 7–25)
CALCIUM SPEC-SCNC: 8.9 MG/DL (ref 8.6–10.5)
CHLORIDE SERPL-SCNC: 109 MMOL/L (ref 98–107)
CO2 SERPL-SCNC: 24.1 MMOL/L (ref 22–29)
CREAT SERPL-MCNC: 0.75 MG/DL (ref 0.57–1)
DEPRECATED RDW RBC AUTO: 59.5 FL (ref 37–54)
EGFRCR SERPLBLD CKD-EPI 2021: 85.2 ML/MIN/1.73
ERYTHROCYTE [DISTWIDTH] IN BLOOD BY AUTOMATED COUNT: 16.5 % (ref 12.3–15.4)
GLUCOSE SERPL-MCNC: 102 MG/DL (ref 65–99)
HCT VFR BLD AUTO: 38.4 % (ref 34–46.6)
HGB BLD-MCNC: 12 G/DL (ref 12–15.9)
MAGNESIUM SERPL-MCNC: 2 MG/DL (ref 1.6–2.4)
MCH RBC QN AUTO: 30.5 PG (ref 26.6–33)
MCHC RBC AUTO-ENTMCNC: 31.3 G/DL (ref 31.5–35.7)
MCV RBC AUTO: 97.7 FL (ref 79–97)
PHOSPHATE SERPL-MCNC: 2.9 MG/DL (ref 2.5–4.5)
PLATELET # BLD AUTO: 205 10*3/MM3 (ref 140–450)
PMV BLD AUTO: 10.6 FL (ref 6–12)
POTASSIUM SERPL-SCNC: 4.1 MMOL/L (ref 3.5–5.2)
RBC # BLD AUTO: 3.93 10*6/MM3 (ref 3.77–5.28)
SODIUM SERPL-SCNC: 141 MMOL/L (ref 136–145)
T4 FREE SERPL-MCNC: 0.69 NG/DL (ref 0.92–1.68)
TSH SERPL DL<=0.05 MIU/L-ACNC: 24.9 UIU/ML (ref 0.27–4.2)
WBC NRBC COR # BLD AUTO: 4.73 10*3/MM3 (ref 3.4–10.8)

## 2025-06-25 PROCEDURE — 85027 COMPLETE CBC AUTOMATED: CPT | Performed by: STUDENT IN AN ORGANIZED HEALTH CARE EDUCATION/TRAINING PROGRAM

## 2025-06-25 PROCEDURE — 84100 ASSAY OF PHOSPHORUS: CPT | Performed by: STUDENT IN AN ORGANIZED HEALTH CARE EDUCATION/TRAINING PROGRAM

## 2025-06-25 PROCEDURE — 25010000002 ONDANSETRON PER 1 MG: Performed by: INTERNAL MEDICINE

## 2025-06-25 PROCEDURE — 84443 ASSAY THYROID STIM HORMONE: CPT | Performed by: STUDENT IN AN ORGANIZED HEALTH CARE EDUCATION/TRAINING PROGRAM

## 2025-06-25 PROCEDURE — 84439 ASSAY OF FREE THYROXINE: CPT | Performed by: STUDENT IN AN ORGANIZED HEALTH CARE EDUCATION/TRAINING PROGRAM

## 2025-06-25 PROCEDURE — 83735 ASSAY OF MAGNESIUM: CPT | Performed by: STUDENT IN AN ORGANIZED HEALTH CARE EDUCATION/TRAINING PROGRAM

## 2025-06-25 PROCEDURE — 63710000001 ONDANSETRON ODT 4 MG TABLET DISPERSIBLE: Performed by: INTERNAL MEDICINE

## 2025-06-25 PROCEDURE — 80048 BASIC METABOLIC PNL TOTAL CA: CPT | Performed by: STUDENT IN AN ORGANIZED HEALTH CARE EDUCATION/TRAINING PROGRAM

## 2025-06-25 RX ORDER — HYDROXYZINE HYDROCHLORIDE 25 MG/1
25 TABLET, FILM COATED ORAL NIGHTLY PRN
Status: DISCONTINUED | OUTPATIENT
Start: 2025-06-25 | End: 2025-06-27 | Stop reason: HOSPADM

## 2025-06-25 RX ORDER — HYDROXYZINE HYDROCHLORIDE 25 MG/1
25 TABLET, FILM COATED ORAL ONCE
Status: COMPLETED | OUTPATIENT
Start: 2025-06-25 | End: 2025-06-25

## 2025-06-25 RX ADMIN — HYDROXYZINE HYDROCHLORIDE 25 MG: 25 TABLET, FILM COATED ORAL at 08:42

## 2025-06-25 RX ADMIN — ACETAMINOPHEN 500 MG: 325 TABLET, FILM COATED ORAL at 18:09

## 2025-06-25 RX ADMIN — DICLOFENAC SODIUM 2 G: 10 GEL TOPICAL at 08:21

## 2025-06-25 RX ADMIN — ONDANSETRON 4 MG: 2 INJECTION, SOLUTION INTRAMUSCULAR; INTRAVENOUS at 06:26

## 2025-06-25 RX ADMIN — ACETAMINOPHEN 325MG 650 MG: 325 TABLET ORAL at 00:21

## 2025-06-25 RX ADMIN — DICLOFENAC SODIUM 2 G: 10 GEL TOPICAL at 20:36

## 2025-06-25 RX ADMIN — ASPIRIN 81 MG: 81 TABLET, COATED ORAL at 08:17

## 2025-06-25 RX ADMIN — LIDOCAINE 2 PATCH: 4 PATCH TOPICAL at 08:21

## 2025-06-25 RX ADMIN — DULOXETINE 60 MG: 60 CAPSULE, DELAYED RELEASE ORAL at 20:26

## 2025-06-25 RX ADMIN — DULOXETINE 60 MG: 60 CAPSULE, DELAYED RELEASE ORAL at 08:18

## 2025-06-25 RX ADMIN — Medication 2.5 MG: at 20:26

## 2025-06-25 RX ADMIN — CHLORDIAZEPOXIDE HYDROCHLORIDE 25 MG: 25 CAPSULE ORAL at 08:18

## 2025-06-25 RX ADMIN — ONDANSETRON 4 MG: 4 TABLET, ORALLY DISINTEGRATING ORAL at 16:35

## 2025-06-25 RX ADMIN — BUPROPION HYDROCHLORIDE 300 MG: 300 TABLET, EXTENDED RELEASE ORAL at 20:26

## 2025-06-25 RX ADMIN — ACETAMINOPHEN 500 MG: 325 TABLET, FILM COATED ORAL at 12:48

## 2025-06-25 RX ADMIN — CHLORDIAZEPOXIDE HYDROCHLORIDE 25 MG: 25 CAPSULE ORAL at 20:26

## 2025-06-25 RX ADMIN — ACETAMINOPHEN 325MG 650 MG: 325 TABLET ORAL at 04:36

## 2025-06-25 RX ADMIN — LEVOTHYROXINE SODIUM 88 MCG: 88 TABLET ORAL at 04:36

## 2025-06-25 NOTE — PROGRESS NOTES
Nutrition Services    Patient Name: Sonya Marcus  YOB: 1955  MRN: [id number]  Admission date: 6/19/2025    PROGRESS NOTE      Encounter Information: Follow up        PO Diet: Diet: Cardiac; Healthy Heart (2-3 Na+); Fluid Consistency: Thin (IDDSI 0)   PO Supplements: BID, Other: boost VHC   PO Intake:  0-100%       Current nutrition support: Oral diet, ONS   Nutrition support review: Pt states she ate all of her lunch today. She does not like the boost VHC. Willing to try boost original.        Weight: Weight: 71.1 kg (156 lb 12 oz) (06/25/25 0517)       Medications: reviewed   Labs: reviewed        GI Function:  last bowel movement: 6/24       Nutrition Intervention Updates: CTM oral intakes and encourage PO >75%  Boost original BID        Results from last 7 days   Lab Units 06/25/25  0329 06/20/25  0539 06/19/25  1121   SODIUM mmol/L 141 144 141   POTASSIUM mmol/L 4.1 3.5 2.6*   CHLORIDE mmol/L 109* 114* 106   CO2 mmol/L 24.1 22.0 23.3   BUN mg/dL 11.0 8.0 10.8   CREATININE mg/dL 0.75 0.64 0.71   CALCIUM mg/dL 8.9 8.1* 9.4   BILIRUBIN mg/dL  --   --  0.3   ALK PHOS U/L  --   --  76   ALT (SGPT) U/L  --   --  6   AST (SGOT) U/L  --   --  9   GLUCOSE mg/dL 102* 107* 138*     Results from last 7 days   Lab Units 06/25/25  0329 06/20/25  0539 06/19/25  1121   MAGNESIUM mg/dL 2.0  --  1.6   PHOSPHORUS mg/dL 2.9  --   --    HEMOGLOBIN g/dL 12.0   < > 13.2   HEMATOCRIT % 38.4   < > 40.5    < > = values in this interval not displayed.     COVID19   Date Value Ref Range Status   02/24/2025 Not Detected Not Detected - Ref. Range Final     Lab Results   Component Value Date    HGBA1C 5.50 06/21/2025       RD to follow up per protocol.    Electronically signed by:  Nargis Restrepo RD  06/25/25 15:09 EDT

## 2025-06-25 NOTE — PROGRESS NOTES
Access did follow up with pt. Pt was sleepy but stated she was feeling better. Pt plans to go to nursing rehab and continue with Dr Go,psychiatrist on an outpt basis.

## 2025-06-25 NOTE — PLAN OF CARE
Goal Outcome Evaluation:  Plan of Care Reviewed With: patient   C/o generalized pain. Tylenol given. VSS. 2L NC. Sinus rhythm on monitor. Alert and oriented. Safety maintained

## 2025-06-25 NOTE — PROGRESS NOTES
Name: Sonya Marcus ADMIT: 2025   : 1955  PCP: Regina Ferro APRN ([place])    MRN: [id number] LOS: 6 days   AGE/SEX: 70 y.o. female  ROOM: [facility]     Subjective   Subjective   Patient was seen this morning.  Daughter present at bedside.  Patient's restless.  Oriented to name, place and year.    Review of Systems     Objective   Objective   Vital Signs  Temp:  [97.5 °F (36.4 °C)-98.1 °F (36.7 °C)] 98.1 °F (36.7 °C)  Heart Rate:  [57-66] 64  Resp:  [18] 18  BP: (125-152)/(77-92) 152/77  SpO2:  [87 %-100 %] 100 %  on  Flow (L/min) (Oxygen Therapy):  [1-2] 1;   Device (Oxygen Therapy): nasal cannula  Body mass index is 29.62 kg/m².  Physical Exam    General: Alert, lying in bed, restless, anxious appearing  HEENT: Normocephalic, atraumatic  CV: Regular rate and rhythm, no murmurs rubs or gallops  Lungs: Clear to auscultation bilaterally, no crackles or wheezes  Abdomen: Soft, nontender, nondistended  Extremities: No significant peripheral edema , no cyanosis     Results Review     I reviewed the patient's new clinical results.  Results from last 7 days   Lab Units 25  0329 25  0539 25  1121   WBC 10*3/mm3 4.73 5.55 5.24   HEMOGLOBIN g/dL 12.0 11.3* 13.2   PLATELETS 10*3/mm3 205 229 292     Results from last 7 days   Lab Units 25  0329 25  0539 25  1121   SODIUM mmol/L 141 144 141   POTASSIUM mmol/L 4.1 3.5 2.6*   CHLORIDE mmol/L 109* 114* 106   CO2 mmol/L 24.1 22.0 23.3   BUN mg/dL 11.0 8.0 10.8   CREATININE mg/dL 0.75 0.64 0.71   GLUCOSE mg/dL 102* 107* 138*   Estimated Creatinine Clearance: 62 mL/min (by C-G formula based on SCr of 0.75 mg/dL).  Results from last 7 days   Lab Units 25  1121   ALBUMIN g/dL 3.9   BILIRUBIN mg/dL 0.3   ALK PHOS U/L 76   AST (SGOT) U/L 9   ALT (SGPT) U/L 6     Results from last 7 days   Lab Units 25  0329 25  0539 25  1121   CALCIUM mg/dL 8.9 8.1* 9.4   ALBUMIN g/dL  --   --  3.9   MAGNESIUM mg/dL 2.0  --   "1.6   PHOSPHORUS mg/dL 2.9  --   --        COVID19   Date Value Ref Range Status   02/24/2025 Not Detected Not Detected - Ref. Range Final   09/03/2024 Not Detected Not Detected - Ref. Range Final     No results found for: \"HGBA1C\", \"POCGLU\"        CT Head Without Contrast, CT Facial Bones Without Contrast, CT Cervical Spine Without Contrast  Narrative: HISTORY: Fell. Head injury. Facial bone trauma. Neck pain.     CT OF THE BRAIN WITHOUT CONTRAST 06/19/2025     Axial images were obtained through the brain without intravenous  contrast.     There is mild diffuse atrophy. There is no evidence of acute infarction,  hemorrhage, midline shift or mass effect. A small scalp hematoma is seen  over the left frontal region. No skull fractures are seen.     Impression: No acute intracranial process identified.        CT OF THE FACIAL BONES WITHOUT CONTRAST 6/19/2025     Axial images were obtained through the facial bones. Sagittal and  coronal reconstruction images were reviewed.     No facial bone fractures are seen. The sinuses appear clear.  Intraorbital contents appear symmetrical and within normal limits.     IMPRESSION:  No facial bone fractures are seen.     CT OF THE CERVICAL SPINE 6/19/2025     Spiral images were obtained from the skull base to the upper thoracic  spine. Sagittal and coronal reconstruction images were reviewed.     There is degenerative disease of the C1-C2 articulation. There is  minimal spondylosis of C3-4. Very minimal anterolisthesis of C4 on C5 is  seen. There is moderate spondylosis of C5-6 and mild spondylosis at  C6-7. No other significant subluxation is seen. There is multilevel  facet joint disease. No cervical spine fractures are seen.     IMPRESSION:  1. Multilevel cervical degenerative disease.  2. No fractures are seen.     Radiation dose reduction techniques were utilized, including automated  exposure control and exposure modulation based on body size.        This report was finalized " on 6/20/2025 12:14 PM by Dr. Federico Brewer M.D on Workstation: [id number]       Scheduled Medications  acetaminophen, 500 mg, Oral, 4x Daily  aspirin, 81 mg, Oral, Daily  buPROPion XL, 300 mg, Oral, Nightly  chlordiazePOXIDE, 25 mg, Oral, BID  Diclofenac Sodium, 2 g, Topical, 4x Daily  docusate sodium, 100 mg, Oral, Daily  DULoxetine, 60 mg, Oral, BID  levothyroxine, 88 mcg, Oral, Q AM  Lidocaine, 2 patch, Transdermal, Q24H  melatonin, 2.5 mg, Oral, Nightly    Infusions   Diet  Diet: Cardiac; Healthy Heart (2-3 Na+); Fluid Consistency: Thin (IDDSI 0)    I have personally reviewed     [x]  Laboratory   [x]  Microbiology   [x]  Radiology   [x]  EKG/Telemetry  [x]  Cardiology/Vascular   []  Pathology    []  Records       Assessment/Plan     Active Hospital Problems    Diagnosis  POA    **Anxiety disorder [F41.9]  Yes    Fall [W19.XXXA]  Unknown    Metabolic encephalopathy [G93.41]  Unknown    Hypokalemia [E87.6]  Yes    Opioid dependence [F11.20]  Yes    Fibromyalgia, primary [M79.7]  Yes    Gastroesophageal reflux disease [K21.9]  Yes      Resolved Hospital Problems   No resolved problems to display.       Patient is a 70-year-old female with a history of recurrent UTI on prophylactic Macrobid, hypothyroidism, hypertension, GERD, SEBASTIAN, anxiety, fibromyalgia, chronic pain on opiates follows by pain management, presented to the ED with worsening with worsening confusion.  Diagnosed with UTI and hypokalemia.  Also reported fall 1 week to prior to admission to left eye injury     Encephalopathy  -Secondary to UTI and polypharmacy  -CT scan of the head, cervical spine and facial bones with no acute findings  -Resolved.       UTI  - Urinalysis was positive for small leukocytes, 6-10 WBC, 4+ bacteria.  Urine culture grew mixed nara.  Completed 4-day course IV ceftriaxone  - Continue outpatient prophylactic Macrobid 50 mg nightly.     Hypokalemia  - Potassium was 2.6 on admission.  Status post replacement.  WNL         Polypharmacy   history of benzodiazepine abuse  Anxiety  -patient is on alprazolam 0.5 mg twice daily, Norco 5 mg twice daily, and Librium 25 3 times daily  - Patient follows with psychiatry and pain management outpatient.  - Patient was recently started on Librium with plan to taper benzodiazepines.  - Was initiated on Librium at lower dose 25 mg twice daily, outpatient as needed Norco, and alprazolam  was held.  Continued to hold at discharge  -Patient rested this morning, anxious appearing.  Likely partly due to withdrawals.  Status post Atarax one-time.  Will initiate on as needed Atarax.     Hypothyroidism  -TSH 06/25/2025 elevated at 24.9, free T4 low at 0.69.  Likely contributed to encephalopathy on admission.  Discussed with patient/daughter, patient reported not taking levothyroxine as prescribed.  -Since abnormal labs due to noncompliance, would not increase home dose.  Discussed with patient and daughter importance of taking levothyroxine as prescribed, at least 30 minutes in the morning prior to meal.  - Continue levothyroxine 88 mcg daily.  Will need to recheck labs in 4 to 6 weeks.      SCDs for DVT prophylaxis.  Full code.  Discussed with patient.    Discussed in multidisciplinary rounds  Disposition: [facility] rehab once pre-CERT obtained.  Stable to be discharged from medical standpoint.  Expected Discharge Date: 6/26/2025; Expected Discharge Time:        Copied text in this note has been reviewed and is accurate as of 06/25/25.         Dictated utilizing Dragon dictation        Merly Hills MD  [facility]  06/25/25  14:28 EDT

## 2025-06-25 NOTE — PLAN OF CARE
Goal Outcome Evaluation:  Plan of Care Reviewed With: patient           Outcome Evaluation: VSS, pt c/o pain and anxiety most of the night. PRN Tylenol given with little help per pt. Zofran given for nausea this morning. Pt did not sleep much at all overnight. Possible D/C to rehab soon--will need to get pt home pill box in pharmacy. 2LNC placed for sats <90%. Alarms in place. Will CTM, safety maintained.

## 2025-06-26 LAB
ALBUMIN SERPL-MCNC: 3.4 G/DL (ref 3.5–5.2)
ALP SERPL-CCNC: 65 U/L (ref 39–117)
ALT SERPL W P-5'-P-CCNC: 14 U/L (ref 1–33)
ANION GAP SERPL CALCULATED.3IONS-SCNC: 8.8 MMOL/L (ref 5–15)
AST SERPL-CCNC: 15 U/L (ref 1–32)
BILIRUB CONJ SERPL-MCNC: <0.1 MG/DL (ref 0–0.3)
BILIRUB INDIRECT SERPL-MCNC: ABNORMAL MG/DL
BILIRUB SERPL-MCNC: 0.2 MG/DL (ref 0–1.2)
BUN SERPL-MCNC: 9 MG/DL (ref 8–23)
BUN/CREAT SERPL: 13.6 (ref 7–25)
CALCIUM SPEC-SCNC: 9.3 MG/DL (ref 8.6–10.5)
CHLORIDE SERPL-SCNC: 106 MMOL/L (ref 98–107)
CO2 SERPL-SCNC: 21.2 MMOL/L (ref 22–29)
CREAT SERPL-MCNC: 0.66 MG/DL (ref 0.57–1)
DEPRECATED RDW RBC AUTO: 58 FL (ref 37–54)
EGFRCR SERPLBLD CKD-EPI 2021: 93.9 ML/MIN/1.73
ERYTHROCYTE [DISTWIDTH] IN BLOOD BY AUTOMATED COUNT: 16.8 % (ref 12.3–15.4)
GLUCOSE SERPL-MCNC: 103 MG/DL (ref 65–99)
HCT VFR BLD AUTO: 38 % (ref 34–46.6)
HGB BLD-MCNC: 12.2 G/DL (ref 12–15.9)
MCH RBC QN AUTO: 30.3 PG (ref 26.6–33)
MCHC RBC AUTO-ENTMCNC: 32.1 G/DL (ref 31.5–35.7)
MCV RBC AUTO: 94.5 FL (ref 79–97)
PLATELET # BLD AUTO: 220 10*3/MM3 (ref 140–450)
PMV BLD AUTO: 10.9 FL (ref 6–12)
POTASSIUM SERPL-SCNC: 3.9 MMOL/L (ref 3.5–5.2)
PROT SERPL-MCNC: 5.4 G/DL (ref 6–8.5)
RBC # BLD AUTO: 4.02 10*6/MM3 (ref 3.77–5.28)
SODIUM SERPL-SCNC: 136 MMOL/L (ref 136–145)
WBC NRBC COR # BLD AUTO: 5.15 10*3/MM3 (ref 3.4–10.8)

## 2025-06-26 PROCEDURE — 80048 BASIC METABOLIC PNL TOTAL CA: CPT | Performed by: STUDENT IN AN ORGANIZED HEALTH CARE EDUCATION/TRAINING PROGRAM

## 2025-06-26 PROCEDURE — 85027 COMPLETE CBC AUTOMATED: CPT | Performed by: STUDENT IN AN ORGANIZED HEALTH CARE EDUCATION/TRAINING PROGRAM

## 2025-06-26 PROCEDURE — 25010000002 ONDANSETRON PER 1 MG: Performed by: STUDENT IN AN ORGANIZED HEALTH CARE EDUCATION/TRAINING PROGRAM

## 2025-06-26 PROCEDURE — 63710000001 ONDANSETRON ODT 4 MG TABLET DISPERSIBLE: Performed by: INTERNAL MEDICINE

## 2025-06-26 PROCEDURE — 93010 ELECTROCARDIOGRAM REPORT: CPT | Performed by: INTERNAL MEDICINE

## 2025-06-26 PROCEDURE — 93005 ELECTROCARDIOGRAM TRACING: CPT | Performed by: STUDENT IN AN ORGANIZED HEALTH CARE EDUCATION/TRAINING PROGRAM

## 2025-06-26 PROCEDURE — 80076 HEPATIC FUNCTION PANEL: CPT | Performed by: STUDENT IN AN ORGANIZED HEALTH CARE EDUCATION/TRAINING PROGRAM

## 2025-06-26 RX ORDER — LEVOTHYROXINE SODIUM 25 UG/1
25 TABLET ORAL ONCE
Status: COMPLETED | OUTPATIENT
Start: 2025-06-26 | End: 2025-06-26

## 2025-06-26 RX ORDER — CHLORDIAZEPOXIDE HYDROCHLORIDE 25 MG/1
50 CAPSULE, GELATIN COATED ORAL 2 TIMES DAILY
Status: DISCONTINUED | OUTPATIENT
Start: 2025-06-26 | End: 2025-06-27 | Stop reason: HOSPADM

## 2025-06-26 RX ORDER — ONDANSETRON 2 MG/ML
4 INJECTION INTRAMUSCULAR; INTRAVENOUS ONCE
Status: COMPLETED | OUTPATIENT
Start: 2025-06-26 | End: 2025-06-26

## 2025-06-26 RX ORDER — LEVOTHYROXINE SODIUM 100 UG/1
100 TABLET ORAL
Status: DISCONTINUED | OUTPATIENT
Start: 2025-06-27 | End: 2025-06-27 | Stop reason: HOSPADM

## 2025-06-26 RX ORDER — CHLORDIAZEPOXIDE HYDROCHLORIDE 25 MG/1
25 CAPSULE, GELATIN COATED ORAL ONCE
Status: COMPLETED | OUTPATIENT
Start: 2025-06-26 | End: 2025-06-26

## 2025-06-26 RX ADMIN — DICLOFENAC SODIUM 2 G: 10 GEL TOPICAL at 21:20

## 2025-06-26 RX ADMIN — ONDANSETRON 4 MG: 2 INJECTION, SOLUTION INTRAMUSCULAR; INTRAVENOUS at 17:56

## 2025-06-26 RX ADMIN — DICLOFENAC SODIUM 2 G: 10 GEL TOPICAL at 08:49

## 2025-06-26 RX ADMIN — LIDOCAINE 2 PATCH: 4 PATCH TOPICAL at 08:44

## 2025-06-26 RX ADMIN — DULOXETINE 60 MG: 60 CAPSULE, DELAYED RELEASE ORAL at 08:39

## 2025-06-26 RX ADMIN — Medication 2.5 MG: at 21:20

## 2025-06-26 RX ADMIN — CHLORDIAZEPOXIDE HYDROCHLORIDE 25 MG: 25 CAPSULE ORAL at 10:41

## 2025-06-26 RX ADMIN — DICLOFENAC SODIUM 2 G: 10 GEL TOPICAL at 12:11

## 2025-06-26 RX ADMIN — HYDROXYZINE HYDROCHLORIDE 25 MG: 25 TABLET, FILM COATED ORAL at 02:19

## 2025-06-26 RX ADMIN — LEVOTHYROXINE SODIUM 25 MCG: 0.03 TABLET ORAL at 10:42

## 2025-06-26 RX ADMIN — LEVOTHYROXINE SODIUM 88 MCG: 88 TABLET ORAL at 06:02

## 2025-06-26 RX ADMIN — HYDROXYZINE HYDROCHLORIDE 25 MG: 25 TABLET, FILM COATED ORAL at 23:08

## 2025-06-26 RX ADMIN — DICLOFENAC SODIUM 2 G: 10 GEL TOPICAL at 17:58

## 2025-06-26 RX ADMIN — DOCUSATE SODIUM 100 MG: 100 CAPSULE, LIQUID FILLED ORAL at 08:39

## 2025-06-26 RX ADMIN — ACETAMINOPHEN 325MG 650 MG: 325 TABLET ORAL at 02:16

## 2025-06-26 RX ADMIN — ONDANSETRON 4 MG: 4 TABLET, ORALLY DISINTEGRATING ORAL at 07:08

## 2025-06-26 RX ADMIN — DULOXETINE 60 MG: 60 CAPSULE, DELAYED RELEASE ORAL at 21:20

## 2025-06-26 RX ADMIN — CHLORDIAZEPOXIDE HYDROCHLORIDE 25 MG: 25 CAPSULE ORAL at 08:39

## 2025-06-26 RX ADMIN — ASPIRIN 81 MG: 81 TABLET, COATED ORAL at 08:38

## 2025-06-26 RX ADMIN — ACETAMINOPHEN 500 MG: 325 TABLET, FILM COATED ORAL at 21:19

## 2025-06-26 RX ADMIN — BUPROPION HYDROCHLORIDE 300 MG: 300 TABLET, EXTENDED RELEASE ORAL at 21:19

## 2025-06-26 RX ADMIN — ACETAMINOPHEN 500 MG: 325 TABLET, FILM COATED ORAL at 08:39

## 2025-06-26 RX ADMIN — CHLORDIAZEPOXIDE HYDROCHLORIDE 50 MG: 25 CAPSULE ORAL at 21:20

## 2025-06-26 RX ADMIN — ONDANSETRON 4 MG: 4 TABLET, ORALLY DISINTEGRATING ORAL at 15:29

## 2025-06-26 RX ADMIN — ACETAMINOPHEN 500 MG: 325 TABLET, FILM COATED ORAL at 12:10

## 2025-06-26 NOTE — PLAN OF CARE
Goal Outcome Evaluation:  Plan of Care Reviewed With: patient      Outcome Evaluation: Patient A/O x4. Cooperative. On room air. Up  to C with stand by assistance. Lidocaine patches, and Voltaren gel applied to bilateral knees for chronic pain. Patient refused scheduled Tylenol at 1800 due to nausea/vomiting.PRN Zofran given x2 for nausea/ vomiting. VSS. Safety maintained.

## 2025-06-26 NOTE — CASE MANAGEMENT/SOCIAL WORK
Continued Stay Note  [facility]     Patient Name: Sonya Marcus  MRN: [id number]  Today's Date: 6/26/2025    Admit Date: 6/19/2025    Plan: Home with home health, family to transport   Discharge Plan       Row Name 06/26/25 1556       Plan    Plan Home with home health, family to transport    Plan Comments Met with pt at bedside, informed insurance declined IRB stay. Pt refusing SNF, wants to go home with home health. CCP will make blanket referrals for accepting home health agencies.                   Discharge Codes    No documentation.                 Expected Discharge Date and Time       Expected Discharge Date Expected Discharge Time    Jun 27, 2025               Krista Freitas RN

## 2025-06-26 NOTE — CASE MANAGEMENT/SOCIAL WORK
Post-Acute Authorization Submission      Post Acute Pre-Cert Documentation  Request Submitted by Facility - Type:: Post Acute  Post-Acute Authorization Type Submitted:: IRF  Date Post Acute Pre-Cert Inititated per Facility: 06/24/25  Accepting Facility: [facility]  Hospital Discharge Date Requested: 06/25/25  All Clinicals Submitted?: Yes  Had Accepting Facility at Time of Submission: Yes  Response Received from Insurance?: P2P Requested  Response Communicated to::   Authorization Number:: PENDING [id number]              RADHA Ackerman

## 2025-06-26 NOTE — CASE MANAGEMENT/SOCIAL WORK
Continued Stay Note  [facility]     Patient Name: Sonya Marcus  MRN: [id number]  Today's Date: 6/26/2025    Admit Date: 6/19/2025    Plan: [facility] accepted, pending pre-cert   Discharge Plan       Row Name 06/26/25 1003       Plan    Plan Comments [facility] is offering a P2P by 2pm today for IRB placement. Informed Mk FUNEZ/PA regarding insurance request.                   Discharge Codes    No documentation.                 Expected Discharge Date and Time       Expected Discharge Date Expected Discharge Time    Jun 26, 2025               Krista Freitas, RN

## 2025-06-26 NOTE — CASE MANAGEMENT/SOCIAL WORK
Post-Acute Authorization Submission      Post Acute Pre-Cert Documentation  Request Submitted by Facility - Type:: Post Acute  Post-Acute Authorization Type Submitted:: IRF  Date Post Acute Pre-Cert Inititated per Facility: 06/24/25  Date Post Acute Pre-Cert Completed: 06/26/25  Accepting Facility: [facility]  Hospital Discharge Date Requested: 06/25/25  All Clinicals Submitted?: Yes  Had Accepting Facility at Time of Submission: Yes  Response Received from Insurance?: P2P Requested  Action Taken by Requesting Facility:: P2P Completed  Response Communicated to::   Authorization Number:: DENIED [id number]  Post Acute Pre-Cert Initiated Comment: [place] RECOMMENDING SNF; [facility] FAST APPEALS  # 1- [phone]/ FAX# [phone]              RADHA Ackerman

## 2025-06-26 NOTE — CASE MANAGEMENT/SOCIAL WORK
Continued Stay Note  [facility]     Patient Name: Sonya Marcus  MRN: [id number]  Today's Date: 6/26/2025    Admit Date: 6/19/2025    Plan: IRB vs SNF, pending pre-cert   Discharge Plan       Row Name 06/26/25 1144       Plan    Plan IRB vs SNF, pending pre-cert    Plan Comments Informed pt and daughter, Mihcel, that a P2P was offered and to be done by 1400 this day. Informed that if that was also denied, CCP has been looking into SNF, and [facility] was agreeable to review and potentially accept pt. CCP will continue to follow.      Row Name 06/26/25 1003       Plan    Plan Comments [facility] is offering a P2P by 2pm today for IRB placement. Informed Mk FUNEZ/PA regarding insurance request.                   Discharge Codes    No documentation.                 Expected Discharge Date and Time       Expected Discharge Date Expected Discharge Time    Jun 26, 2025               Krista Freitas RN

## 2025-06-26 NOTE — NURSING NOTE
Access center follow up regarding anxiety: plan to d/c to rehab today. Pt reports better anxiety management with Atarax.

## 2025-06-26 NOTE — PROGRESS NOTES
Name: Sonya Marcus ADMIT: 2025   : 1955  PCP: Regina Ferro APRN ([place])    MRN: [id number] LOS: 7 days   AGE/SEX: 70 y.o. female  ROOM: [facility]     Subjective   Subjective   Patient was seen this morning.  Daughter present at bedside.  Patient reports she is feeling anxious, symptoms not well-controlled.  Patient and daughter want to increase Librium to be increased to 50 twice daily as well as prescriber of psychiatry.  Does feel like Atarax helps with anxiety and sleep at night.  We do not want trazodone.    Review of Systems  As above     Objective   Objective   Vital Signs  Temp:  [97.7 °F (36.5 °C)-98.2 °F (36.8 °C)] 98.2 °F (36.8 °C)  Heart Rate:  [59-74] 72  Resp:  [18] 18  BP: (106-135)/(58-92) 109/58  SpO2:  [94 %-98 %] 94 %  on  Flow (L/min) (Oxygen Therapy):  [1] 1;   Device (Oxygen Therapy): (P) room air  Body mass index is 29.62 kg/m².  Physical Exam    General: Alert, lying in bed,  anxious appearing  HEENT: Normocephalic, atraumatic  CV: Regular rate and rhythm, no murmurs rubs or gallops  Lungs: Clear to auscultation bilaterally, no crackles or wheezes  Abdomen: Soft, nontender, nondistended  Extremities: No significant peripheral edema , no cyanosis     Results Review     I reviewed the patient's new clinical results.  Results from last 7 days   Lab Units 25  0603 25  0329 25  0539   WBC 10*3/mm3 5.15 4.73 5.55   HEMOGLOBIN g/dL 12.2 12.0 11.3*   PLATELETS 10*3/mm3 220 205 229     Results from last 7 days   Lab Units 25  0603 25  0329 25  0539   SODIUM mmol/L 136 141 144   POTASSIUM mmol/L 3.9 4.1 3.5   CHLORIDE mmol/L 106 109* 114*   CO2 mmol/L 21.2* 24.1 22.0   BUN mg/dL 9.0 11.0 8.0   CREATININE mg/dL 0.66 0.75 0.64   GLUCOSE mg/dL 103* 102* 107*   Estimated Creatinine Clearance: 70.5 mL/min (by C-G formula based on SCr of 0.66 mg/dL).  Results from last 7 days   Lab Units 25  0603   ALBUMIN g/dL 3.4*   BILIRUBIN mg/dL 0.2   ALK  "PHOS U/L 65   AST (SGOT) U/L 15   ALT (SGPT) U/L 14     Results from last 7 days   Lab Units 06/26/25  0603 06/25/25  0329 06/20/25  0539   CALCIUM mg/dL 9.3 8.9 8.1*   ALBUMIN g/dL 3.4*  --   --    MAGNESIUM mg/dL  --  2.0  --    PHOSPHORUS mg/dL  --  2.9  --        COVID19   Date Value Ref Range Status   02/24/2025 Not Detected Not Detected - Ref. Range Final   09/03/2024 Not Detected Not Detected - Ref. Range Final     No results found for: \"HGBA1C\", \"POCGLU\"        CT Head Without Contrast, CT Facial Bones Without Contrast, CT Cervical Spine Without Contrast  Narrative: HISTORY: Fell. Head injury. Facial bone trauma. Neck pain.     CT OF THE BRAIN WITHOUT CONTRAST 06/19/2025     Axial images were obtained through the brain without intravenous  contrast.     There is mild diffuse atrophy. There is no evidence of acute infarction,  hemorrhage, midline shift or mass effect. A small scalp hematoma is seen  over the left frontal region. No skull fractures are seen.     Impression: No acute intracranial process identified.        CT OF THE FACIAL BONES WITHOUT CONTRAST 6/19/2025     Axial images were obtained through the facial bones. Sagittal and  coronal reconstruction images were reviewed.     No facial bone fractures are seen. The sinuses appear clear.  Intraorbital contents appear symmetrical and within normal limits.     IMPRESSION:  No facial bone fractures are seen.     CT OF THE CERVICAL SPINE 6/19/2025     Spiral images were obtained from the skull base to the upper thoracic  spine. Sagittal and coronal reconstruction images were reviewed.     There is degenerative disease of the C1-C2 articulation. There is  minimal spondylosis of C3-4. Very minimal anterolisthesis of C4 on C5 is  seen. There is moderate spondylosis of C5-6 and mild spondylosis at  C6-7. No other significant subluxation is seen. There is multilevel  facet joint disease. No cervical spine fractures are seen.     IMPRESSION:  1. Multilevel " cervical degenerative disease.  2. No fractures are seen.     Radiation dose reduction techniques were utilized, including automated  exposure control and exposure modulation based on body size.        This report was finalized on 6/20/2025 12:14 PM by Dr. Federico Brewer M.D on Workstation: [id number]       Scheduled Medications  acetaminophen, 500 mg, Oral, 4x Daily  aspirin, 81 mg, Oral, Daily  buPROPion XL, 300 mg, Oral, Nightly  chlordiazePOXIDE, 50 mg, Oral, BID  Diclofenac Sodium, 2 g, Topical, 4x Daily  docusate sodium, 100 mg, Oral, Daily  DULoxetine, 60 mg, Oral, BID  [START ON 6/27/2025] levothyroxine, 100 mcg, Oral, Q AM  Lidocaine, 2 patch, Transdermal, Q24H  melatonin, 2.5 mg, Oral, Nightly    Infusions   Diet  Diet: Cardiac; Healthy Heart (2-3 Na+); Fluid Consistency: Thin (IDDSI 0)    I have personally reviewed     [x]  Laboratory   [x]  Microbiology   [x]  Radiology   [x]  EKG/Telemetry  [x]  Cardiology/Vascular   []  Pathology    []  Records       Assessment/Plan     Active Hospital Problems    Diagnosis  POA   • **Anxiety disorder [F41.9]  Yes   • Fall [W19.XXXA]  Unknown   • Metabolic encephalopathy [G93.41]  Unknown   • Hypokalemia [E87.6]  Yes   • Opioid dependence [F11.20]  Yes   • Fibromyalgia, primary [M79.7]  Yes   • Gastroesophageal reflux disease [K21.9]  Yes      Resolved Hospital Problems   No resolved problems to display.       Patient is a 70-year-old female with a history of recurrent UTI on prophylactic Macrobid, hypothyroidism, hypertension, GERD, SEBASTIAN, anxiety, fibromyalgia, chronic pain on opiates follows by pain management, presented to the ED with worsening with worsening confusion.  Diagnosed with UTI and hypokalemia.  Also reported fall 1 week to prior to admission to left eye injury     Encephalopathy  -Secondary to UTI and polypharmacy  -CT scan of the head, cervical spine and facial bones with no acute findings  -Resolved.       UTI  - Urinalysis was positive for  small leukocytes, 6-10 WBC, 4+ bacteria.  Urine culture grew mixed nara.  Completed 4-day course IV ceftriaxone  - Continue outpatient prophylactic Macrobid 50 mg nightly.     Hypokalemia  - Potassium was 2.6 on admission.  Status post replacement.  -WNL        Polypharmacy   history of benzodiazepine abuse  Anxiety  -patient is on alprazolam 0.5 mg twice daily, Norco 5 mg twice daily, and Librium 25 3 times daily  - Patient follows with psychiatry and pain management outpatient.  - Patient was recently started on Librium with plan to taper benzodiazepines.  - Increase Librium to 50 twice daily as previously prescribed.    -Continue to hold benzodiazepines, Norco, trazodone  - On nightly as needed Atarax for anxiety/insomnia.  Discussed with patient for total right Atarax can also be associated with confusion, increasing risk of falls which can lead to significant injuries.  They  understand the risks and want to continue Atarax for now until seen by psychiatry.       Hypothyroidism  -TSH 06/25/2025 elevated at 24.9, free T4 low at 0.69.  Likely contributed to encephalopathy on admission.    - Discussed with different daughter today Cece reported that she feels patient's pillbox and she mostly takes her levothyroxine.  - Increased levothyroxine dose to 100 mcg daily.  Discussed importance of taking medication as prescribed on empty stomach 30 minutes before.  Will need repeat labs in 4 weeks to monitor.      SCDs for DVT prophylaxis.  Full code.  Discussed with patient.    Discussed with daughter  Discussed in multidisciplinary rounds  Disposition: [facility] rehab once pre-CERT obtained.  Stable to be discharged from medical standpoint.  Expected Discharge Date: 6/26/2025; Expected Discharge Time:        Copied text in this note has been reviewed and is accurate as of 06/26/25.         Dictated utilizing Dragon dictation        Merly Hills MD  [facility]  06/26/25  15:18  EDT

## 2025-06-26 NOTE — PLAN OF CARE
Goal Outcome Evaluation:  Plan of Care Reviewed With: patient        Progress: improving  Outcome Evaluation: Pt was started on Atarax with good effect per patient. Pt slept most of the night. C/o Bhavesh knee pain Tylenol given once. Voltaren on knees. Pt felt like she could not sleep anymore aroun 3:30. AOX4. D/c to [facility] rehab today. CTM, safety maintained.

## 2025-06-27 VITALS
RESPIRATION RATE: 18 BRPM | TEMPERATURE: 97.3 F | OXYGEN SATURATION: 100 % | BODY MASS INDEX: 29.65 KG/M2 | HEART RATE: 82 BPM | WEIGHT: 157.04 LBS | DIASTOLIC BLOOD PRESSURE: 90 MMHG | HEIGHT: 61 IN | SYSTOLIC BLOOD PRESSURE: 110 MMHG

## 2025-06-27 LAB
ANION GAP SERPL CALCULATED.3IONS-SCNC: 9.9 MMOL/L (ref 5–15)
BUN SERPL-MCNC: 10 MG/DL (ref 8–23)
BUN/CREAT SERPL: 14.1 (ref 7–25)
CALCIUM SPEC-SCNC: 9.2 MG/DL (ref 8.6–10.5)
CHLORIDE SERPL-SCNC: 108 MMOL/L (ref 98–107)
CO2 SERPL-SCNC: 21.1 MMOL/L (ref 22–29)
CREAT SERPL-MCNC: 0.71 MG/DL (ref 0.57–1)
DEPRECATED RDW RBC AUTO: 58 FL (ref 37–54)
EGFRCR SERPLBLD CKD-EPI 2021: 91 ML/MIN/1.73
ERYTHROCYTE [DISTWIDTH] IN BLOOD BY AUTOMATED COUNT: 16.9 % (ref 12.3–15.4)
GLUCOSE SERPL-MCNC: 115 MG/DL (ref 65–99)
HCT VFR BLD AUTO: 39 % (ref 34–46.6)
HGB BLD-MCNC: 12.6 G/DL (ref 12–15.9)
MCH RBC QN AUTO: 30.4 PG (ref 26.6–33)
MCHC RBC AUTO-ENTMCNC: 32.3 G/DL (ref 31.5–35.7)
MCV RBC AUTO: 94.2 FL (ref 79–97)
PLATELET # BLD AUTO: 231 10*3/MM3 (ref 140–450)
PMV BLD AUTO: 10.7 FL (ref 6–12)
POTASSIUM SERPL-SCNC: 3.9 MMOL/L (ref 3.5–5.2)
QT INTERVAL: 417 MS
QTC INTERVAL: 428 MS
RBC # BLD AUTO: 4.14 10*6/MM3 (ref 3.77–5.28)
SODIUM SERPL-SCNC: 139 MMOL/L (ref 136–145)
WBC NRBC COR # BLD AUTO: 5.58 10*3/MM3 (ref 3.4–10.8)

## 2025-06-27 PROCEDURE — 63710000001 ONDANSETRON ODT 4 MG TABLET DISPERSIBLE: Performed by: INTERNAL MEDICINE

## 2025-06-27 PROCEDURE — 85027 COMPLETE CBC AUTOMATED: CPT | Performed by: STUDENT IN AN ORGANIZED HEALTH CARE EDUCATION/TRAINING PROGRAM

## 2025-06-27 PROCEDURE — 97530 THERAPEUTIC ACTIVITIES: CPT

## 2025-06-27 PROCEDURE — 80048 BASIC METABOLIC PNL TOTAL CA: CPT | Performed by: STUDENT IN AN ORGANIZED HEALTH CARE EDUCATION/TRAINING PROGRAM

## 2025-06-27 RX ORDER — AMOXICILLIN 250 MG
2 CAPSULE ORAL 2 TIMES DAILY PRN
Start: 2025-06-27

## 2025-06-27 RX ORDER — HYDROXYZINE HYDROCHLORIDE 25 MG/1
25 TABLET, FILM COATED ORAL ONCE
Status: COMPLETED | OUTPATIENT
Start: 2025-06-27 | End: 2025-06-27

## 2025-06-27 RX ORDER — ACETAMINOPHEN 500 MG
500 TABLET ORAL EVERY 6 HOURS PRN
Start: 2025-06-27

## 2025-06-27 RX ORDER — HYDROXYZINE HYDROCHLORIDE 25 MG/1
25 TABLET, FILM COATED ORAL NIGHTLY PRN
Start: 2025-06-27

## 2025-06-27 RX ORDER — LEVOTHYROXINE SODIUM 100 UG/1
100 TABLET ORAL
Start: 2025-06-28

## 2025-06-27 RX ORDER — POLYETHYLENE GLYCOL 3350 17 G/17G
17 POWDER, FOR SOLUTION ORAL DAILY PRN
Start: 2025-06-27

## 2025-06-27 RX ORDER — CHLORDIAZEPOXIDE HYDROCHLORIDE 25 MG/1
50 CAPSULE, GELATIN COATED ORAL 2 TIMES DAILY
Qty: 12 CAPSULE | Refills: 0 | Status: SHIPPED | OUTPATIENT
Start: 2025-06-27 | End: 2025-06-30

## 2025-06-27 RX ADMIN — CHLORDIAZEPOXIDE HYDROCHLORIDE 50 MG: 25 CAPSULE ORAL at 08:44

## 2025-06-27 RX ADMIN — DULOXETINE 60 MG: 60 CAPSULE, DELAYED RELEASE ORAL at 08:45

## 2025-06-27 RX ADMIN — LEVOTHYROXINE SODIUM 100 MCG: 100 TABLET ORAL at 05:15

## 2025-06-27 RX ADMIN — DICLOFENAC SODIUM 2 G: 10 GEL TOPICAL at 08:45

## 2025-06-27 RX ADMIN — ASPIRIN 81 MG: 81 TABLET, COATED ORAL at 08:44

## 2025-06-27 RX ADMIN — HYDROXYZINE HYDROCHLORIDE 25 MG: 25 TABLET, FILM COATED ORAL at 14:22

## 2025-06-27 RX ADMIN — DICLOFENAC SODIUM 2 G: 10 GEL TOPICAL at 12:00

## 2025-06-27 RX ADMIN — ACETAMINOPHEN 325MG 650 MG: 325 TABLET ORAL at 05:17

## 2025-06-27 RX ADMIN — ONDANSETRON 4 MG: 4 TABLET, ORALLY DISINTEGRATING ORAL at 12:00

## 2025-06-27 RX ADMIN — ACETAMINOPHEN 500 MG: 325 TABLET, FILM COATED ORAL at 08:44

## 2025-06-27 RX ADMIN — ACETAMINOPHEN 500 MG: 325 TABLET, FILM COATED ORAL at 12:00

## 2025-06-27 RX ADMIN — LIDOCAINE 2 PATCH: 4 PATCH TOPICAL at 08:45

## 2025-06-27 RX ADMIN — ONDANSETRON 4 MG: 4 TABLET, ORALLY DISINTEGRATING ORAL at 05:24

## 2025-06-27 NOTE — CASE MANAGEMENT/SOCIAL WORK
Continued Stay Note  [facility]     Patient Name: Sonya Marcus  MRN: [id number]  Today's Date: 6/27/2025    Admit Date: 6/19/2025    Plan: DC to [facility], [facility] to transport   Discharge Plan       Row Name 06/27/25 1342       Plan    Plan DC to [facility], [facility] to transport    Plan Comments Pre-cert obtained for [facility], and the facility can accept today. Informed pt and daughter, Klali Baker.  [facility] to transport at 1530. Informed MD of disposition. Gladys V/[employer] assisted with transportation arrangements. Packet to nurse.      Row Name 06/27/25 1312       Plan    Plan SNF vs home, family to transport    Plan Comments Met with pt at bedside. Pt discussed with her daughters the idea of going to SNF, she was agreeable to go to [facility], if insurance approved. [facility] accepted, and pre-cert has been started. Waiting determination.                   Discharge Codes    No documentation.                 Expected Discharge Date and Time       Expected Discharge Date Expected Discharge Time    Jun 27, 2025               Krista Freitas, RN

## 2025-06-27 NOTE — CASE MANAGEMENT/SOCIAL WORK
Post-Acute Authorization Submission      Post Acute Pre-Cert #2 Documentation  Request Submitted by Facility - Type:: Hospital  Post-Acute Authorization Type Submitted:: SNF  Date Post Acute Pre-Cert Inititated per Facility: 06/27/25  Date Post Acute Pre-Cert Completed: 06/27/25  Accepting Facility: [facility] Discharge Date Requested: 06/27/25  All Clinicals Submitted?: Yes  Had Accepting Facility at Time of Submission: Yes  Response Received from Insurance?: Approval  Response Communicated to:: , Accepting Facility Liaison, Accepting Facility Auth Department  Authorization Number:: APPROVED [id number]  Post Acute Pre-Cert Initiated Comment: VALID TO ADMIT UPTO 7/3/25           Mk Coronado PCT

## 2025-06-27 NOTE — CASE MANAGEMENT/SOCIAL WORK
Continued Stay Note  [facility]     Patient Name: Sonya Marcus  MRN: [id number]  Today's Date: 6/27/2025    Admit Date: 6/19/2025    Plan: SNF vs home, family to transport   Discharge Plan       Row Name 06/27/25 1312       Plan    Plan SNF vs home, family to transport    Plan Comments Met with pt at bedside. Pt discussed with her daughters the idea of going to SNF, she was agreeable to go to [facility], if insurance approved. [facility] accepted, and pre-cert has been started. Waiting determination.                   Discharge Codes    No documentation.                 Expected Discharge Date and Time       Expected Discharge Date Expected Discharge Time    Jun 27, 2025               Krista Freitas RN

## 2025-06-27 NOTE — PLAN OF CARE
Goal Outcome Evaluation:  Plan of Care Reviewed With: patient, daughter (x2 daughters)        Progress: improving  Outcome Evaluation: Pt seen for OT session this AM reporting frustration with her current discharge situation. Pt requesting to be present with dtrs when discussing her care. Pt recently denied IRF at dc and now agreeable to dc home w/ HH, however, her dtr's want dc to SNF to increase her strength and safety. Pt demo's improvement today with all over function. SBA for bed mob and to stand. S/u for LBD. SBA for fxnl xfers/ mob extended household distance using FWW. Pt does move slow but has no overt LOB. Pt rec dc home w/ HH and 24/7 assist/ SPV from family until at safe baseline level of function. However, if assist is unavailable then rec SNF.    Anticipated Discharge Disposition (OT): skilled nursing facility, home with 24/7 care, home with home health

## 2025-06-27 NOTE — PLAN OF CARE
Goal Outcome Evaluation:  Plan of Care Reviewed With: patient           Outcome Evaluation: Pt continues to make steady progress with PT as she was able to increase her gait distance and required just CGA with a walker. Pt's gait was a bit shakey initially but this improved with distance. Pt reports she is going to try to get some caregivers for home and this PT is recommending HHPT to address strength, mobility, gait, and safety. However, CCP reports pt does not have caregivers and patient/family are interested in SNF.    Anticipated Discharge Disposition (PT): home with assist, home with home health, skilled nursing facility (Pending home resources. CCP reports pt without support at home.)

## 2025-06-27 NOTE — CASE MANAGEMENT/SOCIAL WORK
Case Management Discharge Note      Final Note: [facility] van to transport         Selected Continued Care - Discharged on 6/27/2025 Admission date: 6/19/2025 - Discharge disposition: Skilled Nursing Facility (DC - External)      Destination Coordination complete.      Service Provider Services Address Phone Fax Patient Preferred    [facility] Skilled Nursing [address] [place] [phone] --              Durable Medical Equipment    No services have been selected for the patient.                Dialysis/Infusion    No services have been selected for the patient.                Home Medical Care    No services have been selected for the patient.                Therapy    No services have been selected for the patient.                Community Resources    No services have been selected for the patient.                Community & DME    No services have been selected for the patient.                    Transportation Services  Transportation: W/C Van  W/C Van: Skilled Nursing Facility Van    Final Discharge Disposition Code: 03 - skilled nursing facility (SNF)

## 2025-06-27 NOTE — THERAPY TREATMENT NOTE
Patient Name: Sonya Marcus  : 1955    MRN: [id number]                              Today's Date: 2025       Admit Date: 2025    Visit Dx:     ICD-10-CM ICD-9-CM   1. Hypokalemia  E87.6 276.8   2. Weakness  R53.1 780.79   3. Acute cystitis without hematuria  N30.00 595.0   4. Failure to thrive in adult  R62.7 783.7   5. Periorbital ecchymosis of left eye, initial encounter  S00.12XA 921.1     Patient Active Problem List   Diagnosis    Altered mental status    Polypharmacy    Positive urine drug screen    MICHAEL (acute kidney injury)    Left ureteral stone    Functional tremor    Knee joint replacement status    Fibromyalgia, primary    Coronary arteriosclerosis    Hypothyroidism    Genitourinary syndrome of menopause    Hyperlipidemia    Osteoarthritis of right knee    Pernicious anemia    Urinary tract infection in female    Absolute anemia    Gastroesophageal reflux disease    Angina pectoris    Anxiety disorder    Arthritis or polyarthritis, rheumatoid    Avitaminosis D    B12 deficiency    Bilateral primary osteoarthritis of knee    BP (high blood pressure)    Cardiac murmur    Stage 2 chronic kidney disease    Aftercare for healing traumatic closed fracture of right lower extremity    Delayed union of tibial shaft fracture    Generalized weakness    Opioid dependence    Dehydration    Hypokalemia    Fall    UTI (urinary tract infection), bacterial    Metabolic encephalopathy     Past Medical History:   Diagnosis Date    Allergic     Anemia 2000    Angina pectoris     Anxiety and depression     Arthralgia of right knee 09/10/2024    Arthritis     Carotid artery occlusion     Chronic urinary tract infection     CKD (chronic kidney disease)     Coronary arteriosclerosis     Coronary artery disease     Disease of thyroid gland     Fibromyalgia, primary 2000    Fractures 2024    Fell at home    Genitourinary syndrome of menopause     GERD (gastroesophageal reflux disease)      History of kidney stones     History of pulmonary embolism     AFTER HYSTERECTOMY    HL (hearing loss) 2024    Hyperlipidemia 2020    Hypertension     Hypothyroidism     Kidney stone 1972    Left hip pain     Memory loss     MVP (mitral valve prolapse)     DR LUZ LAST OFFICE NOTE WITH CHART 7/30/2024    Obesity     Prediabetes     Right knee pain     Right shoulder pain     Sleep apnea     PT IS NOT USING CPAP    Substance abuse 2010    Hydrocodone and Oxycodone     Past Surgical History:   Procedure Laterality Date    APPENDECTOMY      BREAST BIOPSY      COLONOSCOPY  01/01/2022    CYSTOSCOPY W/ URETERAL STENT PLACEMENT Left 09/05/2023    Procedure: LEFT CYSTOSCOPY URETERAL CATHETER/STENT INSERTION;  Surgeon: Quinton Rosa MD;  Location: [facility] OR;  Service: Urology;  Laterality: Left;    EYE SURGERY      HYSTERECTOMY      JOINT REPLACEMENT  2021 & 2024    REPLACEMENT TOTAL KNEE Left 2021    SHOULDER SURGERY Right     TONSILLECTOMY      TOTAL KNEE ARTHROPLASTY Right 08/22/2024    Procedure: TOTAL KNEE ARTHROPLASTY WITH CORI ROBOT;  Surgeon: Steven Sumner II, MD;  [facility]: [facility] OR [facility];  Service: Robotics - Ortho;  Laterality: Right;      General Information       Row Name 06/27/25 1002          Physical Therapy Time and Intention    Document Type therapy note (daily note)  -     Mode of Treatment individual therapy;physical therapy  -       Row Name 06/27/25 1002          General Information    Existing Precautions/Restrictions fall  -       Row Name 06/27/25 1002          Cognition    Orientation Status (Cognition) oriented x 3  -       Row Name 06/27/25 1002          Safety Issues/Impairments Affecting Functional Mobility    Impairments Affecting Function (Mobility) balance;endurance/activity tolerance;strength  -               User Key  (r) = Recorded By, (t) = Taken By, (c) = Cosigned By      Initials Name Provider Type    [facility] Bhumi Osborn PT Physical Therapist                    Mobility       [facility] Name 06/27/25 1006          Bed Mobility    Bed Mobility supine-sit;sit-supine  -     Supine-Sit [place] (Bed Mobility) standby assist  -     Sit-Supine [place] (Bed Mobility) standby assist  -     Assistive Device (Bed Mobility) bed rails;head of bed elevated  -[facility] Name 06/27/25 1006          Sit-Stand Transfer    Sit-Stand [place] (Transfers) verbal cues;nonverbal cues (demo/gesture);contact guard  -     Assistive Device (Sit-Stand Transfers) walker, front-wheeled  -[facility] Name 06/27/25 1006          Gait/Stairs (Locomotion)    [place] Level (Gait) verbal cues;nonverbal cues (demo/gesture);contact guard  -     Assistive Device (Gait) walker, front-wheeled  -     Distance in Feet (Gait) 100  -CH     Deviations/Abnormal Patterns (Gait) yuan decreased;gait speed decreased;stride length decreased  -     Bilateral Gait Deviations forward flexed posture  -     Comment, (Gait/Stairs) pt with shakey/tremorous gait initially but this improved with distance  -               User Key  (r) = Recorded By, (t) = Taken By, (c) = Cosigned By      Initials Name Provider Type     Bhumi Osborn, PT Physical Therapist                   Obj/Interventions       [facility] Name 06/27/25 1007          Range of Motion Comprehensive    General Range of Motion no range of motion deficits identified  -CH       Row Name 06/27/25 1007          Strength Comprehensive (MMT)    Comment, General Manual Muscle Testing (MMT) Assessment generalized weakness noted with functional mobility  -               User Key  (r) = Recorded By, (t) = Taken By, (c) = Cosigned By      Initials Name Provider Type     Bhumi Osborn, PT Physical Therapist                   Goals/Plan       Row Name 06/27/25 1012          Bed Mobility Goal 1 (PT)    Activity/Assistive Device (Bed Mobility Goal 1, PT) bed mobility activities, all  -     [place] Level/Cues Needed  (Bed Mobility Goal 1, PT) supervision required  -CH     Time Frame (Bed Mobility Goal 1, PT) 1 week  -CH     Progress/Outcomes (Bed Mobility Goal 1, PT) goal ongoing  -       Row Name 06/27/25 1012          Transfer Goal 1 (PT)    Activity/Assistive Device (Transfer Goal 1, PT) transfers, all;walker, rolling  -CH     [place] Level/Cues Needed (Transfer Goal 1, PT) supervision required  -CH     Time Frame (Transfer Goal 1, PT) 1 week  -CH     Progress/Outcome (Transfer Goal 1, PT) goal ongoing  -       Row Name 06/27/25 1012          Gait Training Goal 1 (PT)    Activity/Assistive Device (Gait Training Goal 1, PT) gait (walking locomotion);walker, rolling  -CH     [place] Level (Gait Training Goal 1, PT) supervision required  -CH     Distance (Gait Training Goal 1, PT) 150  -CH     Time Frame (Gait Training Goal 1, PT) 1 week  -CH     Progress/Outcome (Gait Training Goal 1, PT) goal ongoing  -       Row Name 06/27/25 1012          Therapy Assessment/Plan (PT)    Planned Therapy Interventions (PT) balance training;bed mobility training;gait training;home exercise program;patient/family education;strengthening;transfer training  -               User Key  (r) = Recorded By, (t) = Taken By, (c) = Cosigned By      Initials Name Provider Type     Bhumi Osborn, PT Physical Therapist                   Clinical Impression       Row Name 06/27/25 1010          Pain    Pre/Posttreatment Pain Comment pt reports she has B knee pain but did not provide a numerical value  -       Row Name 06/27/25 1010          Plan of Care Review    Plan of Care Reviewed With patient  -     Outcome Evaluation Pt continues to make steady progress with PT as she was able to increase her gait distance and required just CGA with a walker. Pt's gait was a bit shakey initially but this improved with distance. Pt reports she is going to try to get some caregivers for home and this PT is recommending HHPT to address strength,  mobility, gait, and safety. However, [facility] reports pt does not have caregivers and patient/family are interested in SNF.  -       Row Name 06/27/25 1010          Positioning and Restraints    Pre-Treatment Position in bed  -     Post Treatment Position bed  -CH     In Bed call light within reach;encouraged to call for assist;exit alarm on;sitting EOB  -               User Key  (r) = Recorded By, (t) = Taken By, (c) = Cosigned By      Initials Name Provider Type     Bhumi Osborn PT Physical Therapist                   Outcome Measures       Row Name 06/27/25 1013          How much help from another person do you currently need...    Turning from your back to your side while in flat bed without using bedrails? 3  -CH     Moving from lying on back to sitting on the side of a flat bed without bedrails? 3  -CH     Moving to and from a bed to a chair (including a wheelchair)? 3  -CH     Standing up from a chair using your arms (e.g., wheelchair, bedside chair)? 3  -CH     Climbing 3-5 steps with a railing? 3  -CH     To walk in hospital room? 3  -CH     AM-PAC 6 Clicks Score (PT) 18  -CH     Highest Level of Mobility Goal Walk 10 Steps or More-6  -       Row Name 06/27/25 1013          Functional Assessment    Outcome Measure Options AM-PAC 6 Clicks Basic Mobility (PT)  -               User Key  (r) = Recorded By, (t) = Taken By, (c) = Cosigned By      Initials Name Provider Type     Bhumi Osborn, PT Physical Therapist                                 Physical Therapy Education       Title: PT OT SLP Therapies (Done)       Topic: Physical Therapy (Done)       Point: Mobility training (Done)       Learning Progress Summary            Patient Acceptance, E,TB,D, VU,NR by  at 6/27/2025 1013    Acceptance, E,TB,D, VU,NR by  at 6/24/2025 1603    Acceptance, E,TB,D, VU,NR by  at 6/23/2025 1458    Acceptance, E,TB,D, VU,NR by  at 6/20/2025 1028                      Point: Home exercise program  (Done)       Learning Progress Summary            Patient Acceptance, E,TB,D, VU,NR by  at 6/24/2025 1603    Acceptance, E,TB,D, VU,NR by  at 6/20/2025 1028                      Point: Body mechanics (Done)       Learning Progress Summary            Patient Acceptance, E,TB,D, VU,NR by  at 6/27/2025 1013    Acceptance, E,TB,D, VU,NR by  at 6/24/2025 1603    Acceptance, E,TB,D, VU,NR by  at 6/23/2025 1458    Acceptance, E,TB,D, VU,NR by  at 6/20/2025 1028                      Point: Precautions (Done)       Learning Progress Summary            Patient Acceptance, E,TB,D, VU,NR by  at 6/27/2025 1013    Acceptance, E,TB,D, VU,NR by  at 6/24/2025 1603    Acceptance, E,TB,D, VU,NR by  at 6/23/2025 1458    Acceptance, E,TB,D, VU,NR by  at 6/20/2025 1028                                      User Key       Initials Effective Dates Name Provider Type Discipline     06/16/21 -  Bhumi Osborn, PT Physical Therapist PT                  PT Recommendation and Plan  Planned Therapy Interventions (PT): balance training, bed mobility training, gait training, home exercise program, patient/family education, strengthening, transfer training  Outcome Evaluation: Pt continues to make steady progress with PT as she was able to increase her gait distance and required just CGA with a walker. Pt's gait was a bit shakey initially but this improved with distance. Pt reports she is going to try to get some caregivers for home and this PT is recommending HHPT to address strength, mobility, gait, and safety. However, [facility] reports pt does not have caregivers and patient/family are interested in SNF.     Time Calculation:         PT Charges       Row Name 06/27/25 1014             Time Calculation    Start Time 0904  -      Stop Time 0919  -      Time Calculation (min) 15 min  -      PT Received On 06/27/25  -      PT - Next Appointment 06/30/25  -      PT Goal Re-Cert Due Date 07/04/25  -         Time  Calculation- PT    Total Timed Code Minutes- PT 15 minute(s)  -CH         Timed Charges    35424 - PT Therapeutic Activity Minutes 15  -CH         Total Minutes    Timed Charges Total Minutes 15  -CH       Total Minutes 15  -CH                User Key  (r) = Recorded By, (t) = Taken By, (c) = Cosigned By      Initials Name Provider Type     Bhumi Osborn, PT Physical Therapist                  Therapy Charges for Today       Code Description Service Date Service Provider Modifiers Qty    [id number] HC PT THERAPEUTIC ACT EA 15 MIN 6/27/2025 Bhumi Osborn PT GP 1            PT G-Codes  Outcome Measure Options: AM-PAC 6 Clicks Basic Mobility (PT)  AM-PAC 6 Clicks Score (PT): 18  AM-PAC 6 Clicks Score (OT): 19  PT Discharge Summary  Anticipated Discharge Disposition (PT): home with assist, home with home health, skilled nursing facility (Pending home resources. [facility] reports pt without support at home.)    Bhumi Osborn, PT  6/27/2025

## 2025-06-27 NOTE — THERAPY TREATMENT NOTE
Patient Name: Sonya Marcus  : 1955    MRN: [id number]                              Today's Date: 2025       Admit Date: 2025    Visit Dx:     ICD-10-CM ICD-9-CM   1. Hypokalemia  E87.6 276.8   2. Weakness  R53.1 780.79   3. Acute cystitis without hematuria  N30.00 595.0   4. Failure to thrive in adult  R62.7 783.7   5. Periorbital ecchymosis of left eye, initial encounter  S00.12XA 921.1     Patient Active Problem List   Diagnosis    Altered mental status    Polypharmacy    Positive urine drug screen    MICHAEL (acute kidney injury)    Left ureteral stone    Functional tremor    Knee joint replacement status    Fibromyalgia, primary    Coronary arteriosclerosis    Hypothyroidism    Genitourinary syndrome of menopause    Hyperlipidemia    Osteoarthritis of right knee    Pernicious anemia    Urinary tract infection in female    Absolute anemia    Gastroesophageal reflux disease    Angina pectoris    Anxiety disorder    Arthritis or polyarthritis, rheumatoid    Avitaminosis D    B12 deficiency    Bilateral primary osteoarthritis of knee    BP (high blood pressure)    Cardiac murmur    Stage 2 chronic kidney disease    Aftercare for healing traumatic closed fracture of right lower extremity    Delayed union of tibial shaft fracture    Generalized weakness    Opioid dependence    Dehydration    Hypokalemia    Fall    UTI (urinary tract infection), bacterial    Metabolic encephalopathy     Past Medical History:   Diagnosis Date    Allergic     Anemia 2000    Angina pectoris     Anxiety and depression     Arthralgia of right knee 09/10/2024    Arthritis     Carotid artery occlusion     Chronic urinary tract infection     CKD (chronic kidney disease)     Coronary arteriosclerosis     Coronary artery disease     Disease of thyroid gland     Fibromyalgia, primary 2000    Fractures 2024    Fell at home    Genitourinary syndrome of menopause     GERD (gastroesophageal reflux disease)      History of kidney stones     History of pulmonary embolism     AFTER HYSTERECTOMY    HL (hearing loss) 2024    Hyperlipidemia 2020    Hypertension     Hypothyroidism     Kidney stone 1972    Left hip pain     Memory loss     MVP (mitral valve prolapse)     DR LUZ LAST OFFICE NOTE WITH CHART 7/30/2024    Obesity     Prediabetes     Right knee pain     Right shoulder pain     Sleep apnea     PT IS NOT USING CPAP    Substance abuse 2010    Hydrocodone and Oxycodone     Past Surgical History:   Procedure Laterality Date    APPENDECTOMY      BREAST BIOPSY      COLONOSCOPY  01/01/2022    CYSTOSCOPY W/ URETERAL STENT PLACEMENT Left 09/05/2023    Procedure: LEFT CYSTOSCOPY URETERAL CATHETER/STENT INSERTION;  Surgeon: Quinton Rosa MD;  Location: [facility] OR;  Service: Urology;  Laterality: Left;    EYE SURGERY      HYSTERECTOMY      JOINT REPLACEMENT  2021 & 2024    REPLACEMENT TOTAL KNEE Left 2021    SHOULDER SURGERY Right     TONSILLECTOMY      TOTAL KNEE ARTHROPLASTY Right 08/22/2024    Procedure: TOTAL KNEE ARTHROPLASTY WITH CORI ROBOT;  Surgeon: Steven Sumner II, MD;  Location: [facility] [place] [facility] [place];  Service: Robotics - Ortho;  Laterality: Right;      General Information       Row Name 06/27/25 1252          OT Time and Intention    Subjective Information no complaints  -PP     Document Type therapy note (daily note)  -PP     Mode of Treatment individual therapy;occupational therapy  -PP     Symptoms Noted During/After Treatment none  -PP       Row Name 06/27/25 1252          General Information    Patient Profile Reviewed yes  -PP     Existing Precautions/Restrictions fall  -PP       Row Name 06/27/25 1252          Cognition    Orientation Status (Cognition) oriented x 3  -PP       Row Name 06/27/25 1252          Safety Issues/Impairments Affecting Functional Mobility    Impairments Affecting Function (Mobility) balance;endurance/activity tolerance;strength  -PP     Comment, Safety  Issues/Impairments (Mobility) gait belt and non skid socks worn for safety  -PP               User Key  (r) = Recorded By, (t) = Taken By, (c) = Cosigned By      Initials Name Provider Type    Latrice Cain OT Occupational Therapist                     Mobility/ADL's       Row Name 06/27/25 125          Bed Mobility    Bed Mobility supine-sit;sit-supine  -PP     Supine-Sit [place] (Bed Mobility) standby assist  -PP     Sit-Supine [place] (Bed Mobility) standby assist  -PP     Assistive Device (Bed Mobility) bed rails;head of bed elevated  -PP       Row Name 06/27/25 1252          Transfers    Transfers sit-stand transfer;toilet transfer  -PP       Row Name 06/27/25 1252          Sit-Stand Transfer    Sit-Stand [place] (Transfers) verbal cues;nonverbal cues (demo/gesture);standby assist  -PP     Assistive Device (Sit-Stand Transfers) walker, front-wheeled  -PP     Comment, (Sit-Stand Transfer) from EOB and commode  -PP       Row Name 06/27/25 125          Toilet Transfer    Type (Toilet Transfer) sit-stand;stand-sit  -PP     [place] Level (Toilet Transfer) standby assist  -PP     Assistive Device (Toilet Transfer) walker, front-wheeled;grab bars/safety frame;commode  -PP       Row Name 06/27/25 1252          Functional Mobility    Functional Mobility- Ind. Level standby assist  -PP     Functional Mobility- Device walker, front-wheeled  -PP     Functional Mobility- Comment Pt tolerates mob to BR then mob further distance in hallway w/ SBA/ FWW. Pt moves slowly but no overt LOB noted  -PP       Row Name 06/27/25 1252          Activities of Daily Living    BADL Assessment/Intervention lower body dressing  -PP       Row Name 06/27/25 1252          Toileting Assessment/Training    Comment, (Toileting) Pt demo's ability to xfer on/off commode w/ SBA and denies needing to actually perform but endorses being able to complete w/o A.  -PP       Row Name 06/27/25 [facility]          Lower Body Dressing  Assessment/Training    [place]ig Level (Lower Body Dressing) lower body dressing skills;doff;don;socks;set up;supervision  -PP     Position (Lower Body Dressing) edge of bed sitting  -PP     Comment, (Lower Body Dressing) Using figure four tech  -PP               User Key  (r) = Recorded By, (t) = Taken By, (c) = Cosigned By      Initials Name Provider Type    PP Latrice Osborne OT Occupational Therapist                   Obj/Interventions       Row Name 06/27/25 1255          Motor Skills    Therapeutic Exercise --  light BUE AROM performed EOB at session start in prep for activity  -PP       Row Name 06/27/25 1255          Balance    Balance Assessment sitting static balance;sitting dynamic balance  -PP     Static Sitting Balance independent  -PP     Dynamic Sitting Balance supervision  -PP     Position, Sitting Balance sitting edge of bed  -PP     Static Standing Balance standby assist  -PP     Dynamic Standing Balance standby assist  -PP     Position/Device Used, Standing Balance supported;walker, front-wheeled  -PP     Balance Interventions sitting;standing;occupation based/functional task  -PP               User Key  (r) = Recorded By, (t) = Taken By, (c) = Cosigned By      Initials Name Provider Type    PP Latrice Osborne OT Occupational Therapist                   Goals/Plan    No documentation.                  Clinical Impression       Row Name 06/27/25 1258          Pain Assessment    Pretreatment Pain Rating 5/10  -PP     Posttreatment Pain Rating 5/10  -PP     Pain Location knee  -PP     Pain Side/Orientation bilateral  -PP     Pain Management Interventions exercise or physical activity utilized  -PP     Response to Pain Interventions activity participation with tolerable pain  -PP       Row Name 06/27/25 125          Plan of Care Review    Plan of Care Reviewed With patient;daughter  x2 daughters  -PP     Progress improving  -PP     Outcome Evaluation Pt seen for OT session this AM  reporting frustration with her current discharge situation. Pt requesting to be present with dtrs when discussing her care. Pt recently denied IRF at dc and now agreeable to dc home w/ HH, however, her dtr's want dc to SNF to increase her strength and safety. Pt demo's improvement today with all over function. SBA for bed mob and to stand. S/u for LBD. SBA for fxnl xfers/ mob extended household distance using FWW. Pt does move slow but has no overt LOB. Pt rec dc home w/ HH and 24/7 assist/ SPV from family until at safe baseline level of function. However, if assist is unavailable then rec SNF.  -PP       Row Name 06/27/25 1258          Therapy Plan Review/Discharge Plan (OT)    Anticipated Discharge Disposition (OT) skilled nursing facility;home with 24/7 care;home with home health  -PP       Row Name 06/27/25 1253          Positioning and Restraints    Pre-Treatment Position in bed  -PP     Post Treatment Position bed  -PP     In Bed notified nsg;supine;fowlers;call light within reach;encouraged to call for assist;exit alarm on;with family/caregiver  -PP               User Key  (r) = Recorded By, (t) = Taken By, (c) = Cosigned By      Initials Name Provider Type    PP Latrice Osborne, DANISHA Occupational Therapist                   Outcome Measures       Row Name 06/27/25 1252          How much help from another is currently needed...    Putting on and taking off regular lower body clothing? 3  -PP     Bathing (including washing, rinsing, and drying) 3  -PP     Toileting (which includes using toilet bed pan or urinal) 3  -PP     Putting on and taking off regular upper body clothing 3  -PP     Taking care of personal grooming (such as brushing teeth) 3  -PP     Eating meals 4  -PP     AM-PAC 6 Clicks Score (OT) 19  -PP       Row Name 06/27/25 1013          How much help from another person do you currently need...    Turning from your back to your side while in flat bed without using bedrails? 3  -CH     Moving from  lying on back to sitting on the side of a flat bed without bedrails? 3  -CH     Moving to and from a bed to a chair (including a wheelchair)? 3  -CH     Standing up from a chair using your arms (e.g., wheelchair, bedside chair)? 3  -CH     Climbing 3-5 steps with a railing? 3  -CH     To walk in hospital room? 3  -CH     AM-PAC 6 Clicks Score (PT) 18  -CH     Highest Level of Mobility Goal Walk 10 Steps or More-6  -CH       Row Name 06/27/25 1252 06/27/25 1013       Functional Assessment    Outcome Measure Options AM-PAC 6 Clicks Daily Activity (OT)  -PP AM-PAC 6 Clicks Basic Mobility (PT)  -CH              User Key  (r) = Recorded By, (t) = Taken By, (c) = Cosigned By      Initials Name Provider Type    CH Bhumi Osborn, PT Physical Therapist    PP Latrice Osborne, OT Occupational Therapist                      OT Recommendation and Plan     Plan of Care Review  Plan of Care Reviewed With: patient, daughter (x2 daughters)  Progress: improving  Outcome Evaluation: Pt seen for OT session this AM reporting frustration with her current discharge situation. Pt requesting to be present with dtrs when discussing her care. Pt recently denied IRF at dc and now agreeable to dc home w/ HH, however, her dtr's want dc to SNF to increase her strength and safety. Pt demo's improvement today with all over function. SBA for bed mob and to stand. S/u for LBD. SBA for fxnl xfers/ mob extended household distance using FWW. Pt does move slow but has no overt LOB. Pt rec dc home w/ HH and 24/7 assist/ SPV from family until at safe baseline level of function. However, if assist is unavailable then rec SNF.     Time Calculation:         Time Calculation- OT       Row Name 06/27/25 1251             Time Calculation- OT    OT Start Time 1055  -PP      OT Stop Time 1112  -PP      OT Time Calculation (min) 17 min  -PP      Total Timed Code Minutes- OT 17 minute(s)  -PP      OT Received On 06/27/25  -PP      OT - Next Appointment  07/01/25  -PP         Timed Charges    67503 - OT Therapeutic Activity Minutes 17  -PP         Total Minutes    Timed Charges Total Minutes 17  -PP       Total Minutes 17  -PP                User Key  (r) = Recorded By, (t) = Taken By, (c) = Cosigned By      Initials Name Provider Type    Latrice Cain OT Occupational Therapist                           Latrice Osborne OT  6/27/2025

## 2025-06-27 NOTE — PLAN OF CARE
Goal Outcome Evaluation:  Plan of Care Reviewed With: patient        Progress: improving  Outcome Evaluation: Maintained on R/A, c/o's bilateral knee pain, scheduled voltaren gel applied with good effect. PRN Atarax at hs for sleep with good effect. Safety maintained.

## 2025-07-06 ENCOUNTER — APPOINTMENT (OUTPATIENT)
Dept: GENERAL RADIOLOGY | Facility: HOSPITAL | Age: 70
End: 2025-07-06
Payer: MEDICARE

## 2025-07-06 ENCOUNTER — APPOINTMENT (OUTPATIENT)
Dept: CT IMAGING | Facility: HOSPITAL | Age: 70
End: 2025-07-06
Payer: MEDICARE

## 2025-07-06 ENCOUNTER — HOSPITAL ENCOUNTER (OUTPATIENT)
Facility: HOSPITAL | Age: 70
Setting detail: OBSERVATION
LOS: 1 days | Discharge: HOME | End: 2025-07-09
Attending: EMERGENCY MEDICINE | Admitting: EMERGENCY MEDICINE
Payer: MEDICARE

## 2025-07-06 DIAGNOSIS — R93.3 ABNORMAL ESOPHAGRAM: ICD-10-CM

## 2025-07-06 DIAGNOSIS — Z79.899 POLYPHARMACY: ICD-10-CM

## 2025-07-06 DIAGNOSIS — K20.90 ESOPHAGITIS: ICD-10-CM

## 2025-07-06 DIAGNOSIS — R13.19 ESOPHAGEAL DYSPHAGIA: ICD-10-CM

## 2025-07-06 DIAGNOSIS — R41.82 ALTERED MENTAL STATUS, UNSPECIFIED ALTERED MENTAL STATUS TYPE: Primary | ICD-10-CM

## 2025-07-06 LAB
ALBUMIN SERPL-MCNC: 4.3 G/DL (ref 3.5–5.2)
ALBUMIN/GLOB SERPL: 1.7 G/DL
ALP SERPL-CCNC: 66 U/L (ref 39–117)
ALT SERPL W P-5'-P-CCNC: 11 U/L (ref 1–33)
AMMONIA BLD-SCNC: 25 UMOL/L (ref 11–51)
AMPHET+METHAMPHET UR QL: NEGATIVE
AMPHETAMINES UR QL: NEGATIVE
ANION GAP SERPL CALCULATED.3IONS-SCNC: 10.6 MMOL/L (ref 5–15)
AST SERPL-CCNC: 12 U/L (ref 1–32)
BARBITURATES UR QL SCN: NEGATIVE
BASOPHILS # BLD AUTO: 0.06 10*3/MM3 (ref 0–0.2)
BASOPHILS NFR BLD AUTO: 1 % (ref 0–1.5)
BENZODIAZ UR QL SCN: POSITIVE
BILIRUB SERPL-MCNC: 0.3 MG/DL (ref 0–1.2)
BILIRUB UR QL STRIP: NEGATIVE
BUN SERPL-MCNC: 12 MG/DL (ref 8–23)
BUN/CREAT SERPL: 16.2 (ref 7–25)
BUPRENORPHINE SERPL-MCNC: NEGATIVE NG/ML
CALCIUM SPEC-SCNC: 10.2 MG/DL (ref 8.6–10.5)
CANNABINOIDS SERPL QL: NEGATIVE
CHLORIDE SERPL-SCNC: 107 MMOL/L (ref 98–107)
CLARITY UR: ABNORMAL
CO2 SERPL-SCNC: 25.4 MMOL/L (ref 22–29)
COCAINE UR QL: NEGATIVE
COLOR UR: YELLOW
CREAT SERPL-MCNC: 0.74 MG/DL (ref 0.57–1)
DEPRECATED RDW RBC AUTO: 59.9 FL (ref 37–54)
EGFRCR SERPLBLD CKD-EPI 2021: 86.6 ML/MIN/1.73
EOSINOPHIL # BLD AUTO: 0.13 10*3/MM3 (ref 0–0.4)
EOSINOPHIL NFR BLD AUTO: 2.2 % (ref 0.3–6.2)
ERYTHROCYTE [DISTWIDTH] IN BLOOD BY AUTOMATED COUNT: 16.7 % (ref 12.3–15.4)
ETHANOL BLD-MCNC: <10 MG/DL (ref 0–10)
ETHANOL UR QL: <0.01 %
FENTANYL UR-MCNC: NEGATIVE NG/ML
GLOBULIN UR ELPH-MCNC: 2.5 GM/DL
GLUCOSE SERPL-MCNC: 83 MG/DL (ref 65–99)
GLUCOSE UR STRIP-MCNC: NEGATIVE MG/DL
HCT VFR BLD AUTO: 42.2 % (ref 34–46.6)
HGB BLD-MCNC: 13.6 G/DL (ref 12–15.9)
HGB UR QL STRIP.AUTO: NEGATIVE
IMM GRANULOCYTES # BLD AUTO: 0.03 10*3/MM3 (ref 0–0.05)
IMM GRANULOCYTES NFR BLD AUTO: 0.5 % (ref 0–0.5)
KETONES UR QL STRIP: NEGATIVE
LEUKOCYTE ESTERASE UR QL STRIP.AUTO: NEGATIVE
LYMPHOCYTES # BLD AUTO: 2.17 10*3/MM3 (ref 0.7–3.1)
LYMPHOCYTES NFR BLD AUTO: 36.2 % (ref 19.6–45.3)
MAGNESIUM SERPL-MCNC: 2 MG/DL (ref 1.6–2.4)
MCH RBC QN AUTO: 31.3 PG (ref 26.6–33)
MCHC RBC AUTO-ENTMCNC: 32.2 G/DL (ref 31.5–35.7)
MCV RBC AUTO: 97 FL (ref 79–97)
METHADONE UR QL SCN: NEGATIVE
MONOCYTES # BLD AUTO: 0.45 10*3/MM3 (ref 0.1–0.9)
MONOCYTES NFR BLD AUTO: 7.5 % (ref 5–12)
NEUTROPHILS NFR BLD AUTO: 3.15 10*3/MM3 (ref 1.7–7)
NEUTROPHILS NFR BLD AUTO: 52.6 % (ref 42.7–76)
NITRITE UR QL STRIP: NEGATIVE
NRBC BLD AUTO-RTO: 0 /100 WBC (ref 0–0.2)
OPIATES UR QL: NEGATIVE
OXYCODONE UR QL SCN: NEGATIVE
PCP UR QL SCN: NEGATIVE
PH UR STRIP.AUTO: 7 [PH] (ref 5–8)
PLATELET # BLD AUTO: 334 10*3/MM3 (ref 140–450)
PMV BLD AUTO: 10 FL (ref 6–12)
POTASSIUM SERPL-SCNC: 4 MMOL/L (ref 3.5–5.2)
PROT SERPL-MCNC: 6.8 G/DL (ref 6–8.5)
PROT UR QL STRIP: NEGATIVE
QT INTERVAL: 434 MS
QTC INTERVAL: 487 MS
RBC # BLD AUTO: 4.35 10*6/MM3 (ref 3.77–5.28)
SODIUM SERPL-SCNC: 143 MMOL/L (ref 136–145)
SP GR UR STRIP: 1.01 (ref 1–1.03)
TRICYCLICS UR QL SCN: NEGATIVE
TSH SERPL DL<=0.05 MIU/L-ACNC: 3.94 UIU/ML (ref 0.27–4.2)
UROBILINOGEN UR QL STRIP: ABNORMAL
WBC NRBC COR # BLD AUTO: 5.99 10*3/MM3 (ref 3.4–10.8)

## 2025-07-06 PROCEDURE — 70450 CT HEAD/BRAIN W/O DYE: CPT

## 2025-07-06 PROCEDURE — 99285 EMERGENCY DEPT VISIT HI MDM: CPT

## 2025-07-06 PROCEDURE — 93010 ELECTROCARDIOGRAM REPORT: CPT | Performed by: INTERNAL MEDICINE

## 2025-07-06 PROCEDURE — 71045 X-RAY EXAM CHEST 1 VIEW: CPT

## 2025-07-06 PROCEDURE — 83735 ASSAY OF MAGNESIUM: CPT | Performed by: EMERGENCY MEDICINE

## 2025-07-06 PROCEDURE — 82077 ASSAY SPEC XCP UR&BREATH IA: CPT | Performed by: EMERGENCY MEDICINE

## 2025-07-06 PROCEDURE — 85025 COMPLETE CBC W/AUTO DIFF WBC: CPT | Performed by: EMERGENCY MEDICINE

## 2025-07-06 PROCEDURE — 25810000003 SODIUM CHLORIDE 0.9 % SOLUTION: Performed by: INTERNAL MEDICINE

## 2025-07-06 PROCEDURE — 81003 URINALYSIS AUTO W/O SCOPE: CPT | Performed by: EMERGENCY MEDICINE

## 2025-07-06 PROCEDURE — 25010000002 CEFTRIAXONE PER 250 MG: Performed by: INTERNAL MEDICINE

## 2025-07-06 PROCEDURE — 84443 ASSAY THYROID STIM HORMONE: CPT | Performed by: EMERGENCY MEDICINE

## 2025-07-06 PROCEDURE — 82140 ASSAY OF AMMONIA: CPT | Performed by: EMERGENCY MEDICINE

## 2025-07-06 PROCEDURE — 93005 ELECTROCARDIOGRAM TRACING: CPT | Performed by: EMERGENCY MEDICINE

## 2025-07-06 PROCEDURE — P9612 CATHETERIZE FOR URINE SPEC: HCPCS

## 2025-07-06 PROCEDURE — 80307 DRUG TEST PRSMV CHEM ANLYZR: CPT | Performed by: EMERGENCY MEDICINE

## 2025-07-06 PROCEDURE — 80053 COMPREHEN METABOLIC PANEL: CPT | Performed by: EMERGENCY MEDICINE

## 2025-07-06 RX ORDER — AMOXICILLIN 250 MG
2 CAPSULE ORAL 2 TIMES DAILY PRN
Status: DISCONTINUED | OUTPATIENT
Start: 2025-07-06 | End: 2025-07-09 | Stop reason: HOSPADM

## 2025-07-06 RX ORDER — ACETAMINOPHEN 160 MG/5ML
650 SOLUTION ORAL EVERY 4 HOURS PRN
Status: DISCONTINUED | OUTPATIENT
Start: 2025-07-06 | End: 2025-07-09 | Stop reason: HOSPADM

## 2025-07-06 RX ORDER — ROSUVASTATIN CALCIUM 10 MG/1
10 TABLET, COATED ORAL NIGHTLY
Status: DISCONTINUED | OUTPATIENT
Start: 2025-07-06 | End: 2025-07-09 | Stop reason: HOSPADM

## 2025-07-06 RX ORDER — POLYETHYLENE GLYCOL 3350 17 G/17G
17 POWDER, FOR SOLUTION ORAL DAILY PRN
Status: DISCONTINUED | OUTPATIENT
Start: 2025-07-06 | End: 2025-07-09 | Stop reason: HOSPADM

## 2025-07-06 RX ORDER — BISACODYL 5 MG/1
5 TABLET, DELAYED RELEASE ORAL DAILY PRN
Status: DISCONTINUED | OUTPATIENT
Start: 2025-07-06 | End: 2025-07-09 | Stop reason: HOSPADM

## 2025-07-06 RX ORDER — ACETAMINOPHEN 325 MG/1
650 TABLET ORAL EVERY 4 HOURS PRN
Status: DISCONTINUED | OUTPATIENT
Start: 2025-07-06 | End: 2025-07-09 | Stop reason: HOSPADM

## 2025-07-06 RX ORDER — LEVOTHYROXINE SODIUM 100 UG/1
100 TABLET ORAL
Status: DISCONTINUED | OUTPATIENT
Start: 2025-07-07 | End: 2025-07-09 | Stop reason: HOSPADM

## 2025-07-06 RX ORDER — DULOXETIN HYDROCHLORIDE 30 MG/1
60 CAPSULE, DELAYED RELEASE ORAL 2 TIMES DAILY
Status: DISCONTINUED | OUTPATIENT
Start: 2025-07-06 | End: 2025-07-09 | Stop reason: HOSPADM

## 2025-07-06 RX ORDER — ASPIRIN 81 MG/1
81 TABLET ORAL DAILY
Status: DISCONTINUED | OUTPATIENT
Start: 2025-07-07 | End: 2025-07-09 | Stop reason: HOSPADM

## 2025-07-06 RX ORDER — BISACODYL 10 MG
10 SUPPOSITORY, RECTAL RECTAL DAILY PRN
Status: DISCONTINUED | OUTPATIENT
Start: 2025-07-06 | End: 2025-07-09 | Stop reason: HOSPADM

## 2025-07-06 RX ORDER — BUPROPION HYDROCHLORIDE 150 MG/1
300 TABLET ORAL NIGHTLY
Status: DISCONTINUED | OUTPATIENT
Start: 2025-07-06 | End: 2025-07-09 | Stop reason: HOSPADM

## 2025-07-06 RX ORDER — LIDOCAINE 4 G/G
1 PATCH TOPICAL
Status: DISCONTINUED | OUTPATIENT
Start: 2025-07-07 | End: 2025-07-09 | Stop reason: HOSPADM

## 2025-07-06 RX ORDER — ACETAMINOPHEN 650 MG/1
650 SUPPOSITORY RECTAL EVERY 4 HOURS PRN
Status: DISCONTINUED | OUTPATIENT
Start: 2025-07-06 | End: 2025-07-09 | Stop reason: HOSPADM

## 2025-07-06 RX ORDER — SODIUM CHLORIDE 9 MG/ML
75 INJECTION, SOLUTION INTRAVENOUS CONTINUOUS
Status: DISCONTINUED | OUTPATIENT
Start: 2025-07-06 | End: 2025-07-07

## 2025-07-06 RX ORDER — PANTOPRAZOLE SODIUM 40 MG/1
40 TABLET, DELAYED RELEASE ORAL
Status: DISCONTINUED | OUTPATIENT
Start: 2025-07-07 | End: 2025-07-09 | Stop reason: HOSPADM

## 2025-07-06 RX ORDER — CHLORDIAZEPOXIDE HYDROCHLORIDE 25 MG/1
25 CAPSULE, GELATIN COATED ORAL 2 TIMES DAILY PRN
Status: DISCONTINUED | OUTPATIENT
Start: 2025-07-06 | End: 2025-07-08

## 2025-07-06 RX ORDER — ONDANSETRON 2 MG/ML
4 INJECTION INTRAMUSCULAR; INTRAVENOUS EVERY 6 HOURS PRN
Status: DISCONTINUED | OUTPATIENT
Start: 2025-07-06 | End: 2025-07-09 | Stop reason: HOSPADM

## 2025-07-06 RX ORDER — ONDANSETRON 4 MG/1
4 TABLET, ORALLY DISINTEGRATING ORAL EVERY 6 HOURS PRN
Status: DISCONTINUED | OUTPATIENT
Start: 2025-07-06 | End: 2025-07-09 | Stop reason: HOSPADM

## 2025-07-06 RX ADMIN — ROSUVASTATIN CALCIUM 10 MG: 10 TABLET, FILM COATED ORAL at 21:45

## 2025-07-06 RX ADMIN — LIDOCAINE 1 PATCH: 4 PATCH TOPICAL at 23:21

## 2025-07-06 RX ADMIN — SODIUM CHLORIDE 75 ML/HR: 9 INJECTION, SOLUTION INTRAVENOUS at 21:54

## 2025-07-06 RX ADMIN — BUPROPION HYDROCHLORIDE 300 MG: 150 TABLET, EXTENDED RELEASE ORAL at 21:45

## 2025-07-06 RX ADMIN — Medication 2.5 MG: at 20:38

## 2025-07-06 RX ADMIN — CEFTRIAXONE SODIUM 1000 MG: 1 INJECTION, POWDER, FOR SOLUTION INTRAMUSCULAR; INTRAVENOUS at 21:45

## 2025-07-06 RX ADMIN — DULOXETINE 60 MG: 30 CAPSULE, DELAYED RELEASE ORAL at 21:45

## 2025-07-06 RX ADMIN — ACETAMINOPHEN 325MG 650 MG: 325 TABLET ORAL at 20:38

## 2025-07-06 NOTE — ED NOTES
Nursing report ED to floor  Sonya Marcus  70 y.o.  female    HPI :  HPI  Stated Reason for Visit: from [facility] for AMS. facility reports to EMS that sometime overnight pt went from aox4 to aox1. unable to give specific time. pt opens eyes to repeated stimuli in triage. facility reports pt currently being treated for uti  History Obtained From: EMS    Chief Complaint  Chief Complaint   Patient presents with    Altered Mental Status       Admitting doctor:   No admitting provider for patient encounter.    Admitting diagnosis:   The encounter diagnosis was Altered mental status, unspecified altered mental status type.    Code status:   Current Code Status       Date Active Code Status Order ID Comments User Context       Prior            Allergies:   Fish allergy; Iodine; Penicillins; Prochlorperazine; Shellfish allergy; Shellfish-derived products; Sulfate; Glycerol, iodinated; Octacosanol; Latex; Levofloxacin; and Sulfa antibiotics    Isolation:   No active isolations    Intake and Output  No intake or output data in the 24 hours ending 07/06/25 1547    Weight:   There were no vitals filed for this visit.    Most recent vitals:   Vitals:    07/06/25 1215 07/06/25 1216 07/06/25 1400 07/06/25 1500   BP: 169/83  163/74 139/59   Pulse: 66  66 67   Resp: 16  18 18   Temp:  97.9 °F (36.6 °C)     TempSrc:  Oral     SpO2: 95%  96% 97%       Active LDAs/IV Access:   Lines, Drains & Airways       Active LDAs       Name Placement date Placement time Site Days    Peripheral IV 07/06/25 1245 22 G Left Antecubital 07/06/25  1245  Antecubital  less than 1                    Labs (abnormal labs have a star):   Labs Reviewed   URINE DRUG SCREEN - Abnormal; Notable for the following components:       Result Value    Benzodiazepine Screen, Urine Positive (*)     All other components within normal limits    Narrative:     Cutoff For Drugs Screened:    Amphetamines               500 ng/ml  Barbiturates               200  ng/ml  Benzodiazepines            150 ng/ml  Cocaine                    150 ng/ml  Methadone                  200 ng/ml  Opiates                    100 ng/ml  Phencyclidine               25 ng/ml  THC                         50 ng/ml  Methamphetamine            500 ng/ml  Tricyclic Antidepressants  300 ng/ml  Oxycodone                  100 ng/ml  Buprenorphine               10 ng/ml    The normal value for all drugs tested is negative. This report includes unconfirmed screening results, with the cutoff values listed, to be used for medical treatment purposes only.  Unconfirmed results must not be used for non-medical purposes such as employment or legal testing.  Clinical consideration should be applied to any drug of abuse test, particularly when unconfirmed results are used.     URINALYSIS W/ CULTURE IF INDICATED - Abnormal; Notable for the following components:    Appearance, UA Cloudy (*)     All other components within normal limits    Narrative:     In absence of clinical symptoms, the presence of pyuria, bacteria, and/or nitrites on the urinalysis result does not correlate with infection.  Urine microscopic not indicated.   CBC WITH AUTO DIFFERENTIAL - Abnormal; Notable for the following components:    RDW 16.7 (*)     RDW-SD 59.9 (*)     All other components within normal limits   MAGNESIUM - Normal   TSH RFX ON ABNORMAL TO FREE T4 - Normal   AMMONIA - Normal   FENTANYL, URINE - Normal    Narrative:     Negative Threshold:      Fentanyl 5 ng/mL     The normal value for the drug tested is negative. This report includes final unconfirmed screening results to be used for medical treatment purposes only. Unconfirmed results must not be used for non-medical purposes such as employment or legal testing. Clinical consideration should be applied to any drug of abuse test, particularly when unconfirmed results are used.          COMPREHENSIVE METABOLIC PANEL    Narrative:     GFR Categories in Chronic Kidney Disease  (CKD)              GFR Category          GFR (mL/min/1.73)    Interpretation  G1                    90 or greater        Normal or high (1)  G2                    60-89                Mild decrease (1)  G3a                   45-59                Mild to moderate decrease  G3b                   30-44                Moderate to severe decrease  G4                    15-29                Severe decrease  G5                    14 or less           Kidney failure    (1)In the absence of evidence of kidney disease, neither GFR category G1 or G2 fulfill the criteria for CKD.    eGFR calculation 2021 CKD-EPI creatinine equation, which does not include race as a factor   ETHANOL    Narrative:     Not for legal purposes.   CBC AND DIFFERENTIAL    Narrative:     The following orders were created for panel order CBC & Differential.  Procedure                               Abnormality         Status                     ---------                               -----------         ------                     CBC Auto Differential[[id number]]        Abnormal            Final result                 Please view results for these tests on the individual orders.       EKG:   ECG 12 Lead Altered Mental Status   Preliminary Result   HEART RATE=76  bpm   RR Djirkhhk=549  ms   TX Nbdxyrxa=886  ms   P Horizontal Axis=15  deg   P Front Axis=41  deg   QRSD Interval=98  ms   QT Pqzxkvdy=243  ms   PTlZ=149  ms   QRS Axis=50  deg   T Wave Axis=3  deg   - ABNORMAL ECG -   Sinus rhythm   Low voltage, extremity leads   Nonspecific T abnrm, anterolateral leads   Borderline prolonged QT interval   When compared with ECG of 26-Jun-2025 17:36:19,   Nonspecific significant change   Date and Time of Study:2025-07-06 12:39:04          Meds given in ED:   Medications - No data to display    Imaging results:  CT Head Without Contrast  Result Date: 7/6/2025  1.  No noncontrast CT findings of acute intracranial abnormality.         XR Chest 1 View  Result Date:  7/6/2025  Low lung volumes with some suspected subsegmental atelectasis at the bases  This report was finalized on 7/6/2025 1:14 PM by Dr. Guido Smith M.D on Workstation: [id number]        Ambulatory status:   - bedrest    Social issues:   Social History     Socioeconomic History    Marital status:    Tobacco Use    Smoking status: Never     Passive exposure: Never    Smokeless tobacco: Never   Vaping Use    Vaping status: Never Used   Substance and Sexual Activity    Alcohol use: Yes     Alcohol/week: 2.0 standard drinks of alcohol     Types: 2 Glasses of wine per week     Comment: OCCASIONALLY    Drug use: Not Currently     Types: Benzodiazepines, Hydrocodone, Oxycodone     Comment: 2010    Sexual activity: Not Currently     Partners: Male     Birth control/protection: Post-menopausal     Comment: Post Menopausal with no immediate partner       Peripheral Neurovascular  Peripheral Neurovascular (Adult)  Peripheral Neurovascular WDL: WDL    Neuro Cognitive  Neuro Cognitive (Adult)  Cognitive/Neuro/Behavioral WDL: .WDL except, orientation  Orientation: disoriented to, place, time, situation    Learning  Learning Assessment  Learning Readiness and Ability: cognitive limitation noted    Respiratory  Respiratory  Airway WDL: WDL  Respiratory WDL  Respiratory WDL: WDL    Abdominal Pain       Pain Assessments  Pain (Adult)  (0-10) Pain Rating: Rest: 0    NIH Stroke Scale       Cayla Cabezas RN  07/06/25 15:47 EDT

## 2025-07-06 NOTE — PLAN OF CARE
Goal Outcome Evaluation:  Plan of Care Reviewed With: patient        Progress: improving  Outcome Evaluation: Pt alert and orient x4,but she no remember what happen this morning.Daugther at bedside.Plan of care on going.

## 2025-07-06 NOTE — ED PROVIDER NOTES
EMERGENCY DEPARTMENT ENCOUNTER  Room Number:  N532/1  PCP: Regina Ferro)CHANELLE  Independent Historians: Historian: Patient and Family  Date of encounter:  7/7/2025  Patient Care Team:  Regina Ferro)CHANELLE as PCP - General (Family Medicine)     HPI:  Chief Complaint: had concerns including Altered Mental Status.     A complete HPI/ROS/PMH/PSH/SH/FH are unobtainable due to: EM_Unobtainable History : Altered Mental Status    Context: Sonya Marcus is a 70 y.o. female with a medical history of polypharmacy, fibromyalgia, hypothyroidism, hyperlipidemia, anxiety who presents to the ED c/o acute altered mental status.  Majority of history is provided by the patient's daughter at bedside.  Patient is currently staying at [facility] for rehabilitation.  She is in the process of detoxing from chronic Norco and Xanax prescriptions.  She is also currently being treated for urinary tract infection, but the daughter is unsure of which antibiotic she is taking.  She presents today with altered mental status.  Last known well was sometime last night.  Patient became altered and confused overnight.  Patient was not given her Librium this morning because staff is concerned about the dosage despite her being on the same dosage since her recent discharge.  No recent falls reported.  Patient did not eat breakfast this morning but unsure of other p.o. intake.  No nausea or vomiting reported no fevers or chills.    Review of prior external notes (non-ED) -and- Review of prior external test results outside of this encounter: Discharge summary from 6/27/2025 reviewed.  Patient admitted for altered mental status.  Encephalopathy felt to be secondary to UTI and polypharmacy.  CT imaging was normal at that time.    PAST MEDICAL HISTORY  Active Ambulatory Problems     Diagnosis Date Noted    Altered mental status 09/04/2023    Polypharmacy 09/04/2023    Positive urine drug screen 09/04/2023    MICHAEL (acute kidney  injury) 09/04/2023    Left ureteral stone 09/06/2023    Functional tremor 05/07/2024    Knee joint replacement status 08/22/2024    Fibromyalgia, primary 10/16/2023    Coronary arteriosclerosis 02/01/2024    Hypothyroidism 10/23/2017    Genitourinary syndrome of menopause 10/23/2023    Hyperlipidemia 10/23/2017    Osteoarthritis of right knee 06/25/2024    Pernicious anemia 10/23/2017    Urinary tract infection in female 09/03/2024    Absolute anemia 09/16/2024    Gastroesophageal reflux disease 10/23/2017    Angina pectoris 02/01/2024    Anxiety disorder 10/23/2017    Arthritis or polyarthritis, rheumatoid 09/16/2024    Avitaminosis D 09/16/2024    B12 deficiency 09/16/2024    Bilateral primary osteoarthritis of knee 02/01/2024    BP (high blood pressure) 09/16/2024    Cardiac murmur 09/16/2024    Stage 2 chronic kidney disease 02/01/2024    Aftercare for healing traumatic closed fracture of right lower extremity 11/17/2024    Delayed union of tibial shaft fracture 12/07/2024    Generalized weakness 02/24/2025    Opioid dependence 02/24/2025    Dehydration 02/24/2025    Hypokalemia 06/19/2025    Fall 06/20/2025    UTI (urinary tract infection), bacterial 06/20/2025    Metabolic encephalopathy 06/20/2025     Resolved Ambulatory Problems     Diagnosis Date Noted    UTI (urinary tract infection) 09/04/2023    Bacteremia due to Escherichia coli 09/05/2023    Arthralgia of right knee 09/10/2024    Encephalopathy, toxic 11/15/2024     Past Medical History:   Diagnosis Date    Allergic 2000    Anemia 2000    Anxiety and depression     Arthritis     Carotid artery occlusion 2021    Chronic urinary tract infection     CKD (chronic kidney disease)     Coronary artery disease 2020    Disease of thyroid gland     Fractures 09/20/2024    GERD (gastroesophageal reflux disease)     History of kidney stones     History of pulmonary embolism     HL (hearing loss) 2024    Hypertension     Kidney stone 1972    Left hip pain      Memory loss     MVP (mitral valve prolapse)     Obesity     Prediabetes     Right knee pain     Right shoulder pain     Sleep apnea     Substance abuse 2010       PAST SURGICAL HISTORY  Past Surgical History:   Procedure Laterality Date    APPENDECTOMY      BREAST BIOPSY      COLONOSCOPY  01/01/2022    CYSTOSCOPY W/ URETERAL STENT PLACEMENT Left 09/05/2023    Procedure: LEFT CYSTOSCOPY URETERAL CATHETER/STENT INSERTION;  Surgeon: Quinton Rosa MD;  Location: [facility] MAIN OR;  Service: Urology;  Laterality: Left;    EYE SURGERY      HYSTERECTOMY      JOINT REPLACEMENT  2021 & 2024    REPLACEMENT TOTAL KNEE Left 2021    SHOULDER SURGERY Right     TONSILLECTOMY      TOTAL KNEE ARTHROPLASTY Right 08/22/2024    Procedure: TOTAL KNEE ARTHROPLASTY WITH CORI ROBOT;  Surgeon: Steven Sumner II, MD;  Location: [facility] OR [facility];  Service: Robotics - Ortho;  Laterality: Right;       FAMILY HISTORY  Family History   Problem Relation Age of Onset    Stroke Sister     Anxiety disorder Sister         +son & granddaughter    Breast cancer Daughter     Breast cancer Daughter     Stroke Maternal Grandmother     Arthritis Mother     Asthma Mother     Depression Mother     Arthritis Father     Heart disease Father     Hyperlipidemia Father     Developmental Disability Paternal Grandfather     Thyroid disease Paternal Grandmother     Drug abuse Brother     Malig Hyperthermia Neg Hx        SOCIAL HISTORY  Social History     Socioeconomic History    Marital status:    Tobacco Use    Smoking status: Never     Passive exposure: Never    Smokeless tobacco: Never   Vaping Use    Vaping status: Never Used   Substance and Sexual Activity    Alcohol use: Yes     Alcohol/week: 2.0 standard drinks of alcohol     Types: 2 Glasses of wine per week     Comment: OCCASIONALLY    Drug use: Not Currently     Types: Benzodiazepines, Hydrocodone, Oxycodone     Comment: 2010    Sexual activity: Not Currently     Partners: Male      Birth control/protection: Post-menopausal     Comment: Post Menopausal with no immediate partner       Chronic or social conditions impacting patient care (Social Determinants of Health):  Social Drivers of Health     Tobacco Use: Low Risk  (7/7/2025)    Patient History     Smoking Tobacco Use: Never     Smokeless Tobacco Use: Never     Passive Exposure: Never   Alcohol Use: Alcohol Misuse (7/6/2025)    AUDIT-C     Frequency of Alcohol Consumption: Monthly or less     Average Number of Drinks: 1 or 2     Frequency of Binge Drinking: Monthly   Financial Resource Strain: Low Risk  (7/7/2025)    Overall Financial Resource Strain (CARDIA)     Difficulty of Paying Living Expenses: Not hard at all   Food Insecurity: No Food Insecurity (7/7/2025)    Hunger Vital Sign     Worried About Running Out of Food in the Last Year: Never true     Ran Out of Food in the Last Year: Never true   Transportation Needs: No Transportation Needs (7/7/2025)    PRAPARE - Transportation     Lack of Transportation (Medical): No     Lack of Transportation (Non-Medical): No   Physical Activity: Sufficiently Active (7/7/2025)    Exercise Vital Sign     Days of Exercise per Week: 7 days     Minutes of Exercise per Session: 30 min   Recent Concern: Physical Activity - Insufficiently Active (6/20/2025)    Exercise Vital Sign     Days of Exercise per Week: 3 days     Minutes of Exercise per Session: 20 min   Stress: No Stress Concern Present (7/7/2025)    [employer] [place] [employer] - Occupational Stress Questionnaire     Feeling of Stress : Only a little   Social Connections: Not At Risk (7/7/2025)    Family and Community Support     Help with Day-to-Day Activities: I get all the help I need     Lonely or Isolated: Rarely   Interpersonal Safety: Not At Risk (7/6/2025)    Abuse Screen     Unsafe at Home or Work/School: no     Feels Threatened by Someone?: no     Does Anyone Keep You from Contacting Others or Doint Things Outside the  Home?: no     Physical Sign of Abuse Present: no   Depression: Not at risk (7/7/2025)    PHQ-2     PHQ-2 Score: 2   Housing Stability: Unknown (7/7/2025)    Housing Stability     Current Living Arrangements: Not on file     Potentially Unsafe Housing Conditions: none   Utilities: Not At Risk (7/7/2025)    [facility] Utilities     Threatened with loss of utilities: No   Health Literacy: Not At Risk (7/7/2025)    Education     Help with school or training?: No     Preferred Language: English   Employment: Not At Risk (7/7/2025)    Employment     Do you want help finding or keeping work or a job?: I do not need or want help   Disabilities: At Risk (7/6/2025)    Disabilities     Concentrating, Remembering, or Making Decisions Difficulty: yes     Doing Errands Independently Difficulty: no       ALLERGIES  Fish allergy; Iodine; Penicillins; Prochlorperazine; Shellfish allergy; Shellfish-derived products; Sulfate; Glycerol, iodinated; Octacosanol; Latex; Levofloxacin; and Sulfa antibiotics    REVIEW OF SYSTEMS  Review of Systems  Included in HPI  All systems reviewed and negative except for those discussed in HPI.    PHYSICAL EXAM    I have reviewed the triage vital signs and nursing notes.    ED Triage Vitals   Temp Heart Rate Resp BP SpO2   07/06/25 1216 07/06/25 1215 07/06/25 1215 07/06/25 1215 07/06/25 1215   97.9 °F (36.6 °C) 66 16 169/83 95 %      Temp src Heart Rate Source Patient Position BP Location FiO2 (%)   07/06/25 1216 -- -- -- --   Oral           Physical Exam  Constitutional:       General: She is not in acute distress.     Appearance: Normal appearance. She is not ill-appearing or toxic-appearing.   HENT:      Head: Normocephalic.      Comments: Small bruise beneath the left eye     Nose: Nose normal.      Mouth/Throat:      Mouth: Mucous membranes are moist.      Pharynx: Oropharynx is clear.   Eyes:      Extraocular Movements: Extraocular movements intact.      Conjunctiva/sclera: Conjunctivae normal.       Pupils: Pupils are equal, round, and reactive to light.   Cardiovascular:      Rate and Rhythm: Normal rate and regular rhythm.      Pulses: Normal pulses.      Heart sounds: Normal heart sounds. No murmur heard.     No friction rub. No gallop.   Pulmonary:      Effort: Pulmonary effort is normal. No respiratory distress.      Breath sounds: Normal breath sounds. No stridor. No wheezing, rhonchi or rales.   Abdominal:      General: Abdomen is flat. There is no distension.      Palpations: Abdomen is soft.      Tenderness: There is no abdominal tenderness. There is no guarding or rebound.   Musculoskeletal:      Cervical back: Normal range of motion and neck supple.   Skin:     General: Skin is warm and dry.   Neurological:      General: No focal deficit present.      Mental Status: She is alert. Mental status is at baseline.      Comments: Resting left upper extremity tremor   Psychiatric:         Mood and Affect: Mood normal.         Behavior: Behavior normal.         Thought Content: Thought content normal.         Judgment: Judgment normal.         LAB RESULTS  Recent Results (from the past 24 hours)   Basic Metabolic Panel    Collection Time: 07/07/25  6:26 AM    Specimen: Blood   Result Value Ref Range    Glucose 106 (H) 65 - 99 mg/dL    BUN 13.0 8.0 - 23.0 mg/dL    Creatinine 0.65 0.57 - 1.00 mg/dL    Sodium 140 136 - 145 mmol/L    Potassium 3.9 3.5 - 5.2 mmol/L    Chloride 107 98 - 107 mmol/L    CO2 23.6 22.0 - 29.0 mmol/L    Calcium 9.0 8.6 - 10.5 mg/dL    BUN/Creatinine Ratio 20.0 7.0 - 25.0    Anion Gap 9.4 5.0 - 15.0 mmol/L    eGFR 94.3 >60.0 mL/min/1.73   CBC (No Diff)    Collection Time: 07/07/25  6:26 AM    Specimen: Blood   Result Value Ref Range    WBC 4.49 3.40 - 10.80 10*3/mm3    RBC 3.87 3.77 - 5.28 10*6/mm3    Hemoglobin 12.1 12.0 - 15.9 g/dL    Hematocrit 37.0 34.0 - 46.6 %    MCV 95.6 79.0 - 97.0 fL    MCH 31.3 26.6 - 33.0 pg    MCHC 32.7 31.5 - 35.7 g/dL    RDW 16.6 (H) 12.3 - 15.4 %    RDW-SD  "57.7 (H) 37.0 - 54.0 fl    MPV 9.9 6.0 - 12.0 fL    Platelets 276 140 - 450 10*3/mm3       RADIOLOGY  FL ESOPHAGRAM DOUBLE CONTRAST  Result Date: 7/7/2025  EXAMINATION: Esophagram Complete Double-Contrast Fluoroscopy.  DATE: 7/7/2025  COMPARISON: Chest 1 view 7/6/2025  CLINICAL HISTORY: 70-year-old solid food esophageal dysphagia.  DETAILS: Administration of thin barium, thick barium, and air crystals were provided to the patient. Rapid sequence video clips and spot films of the esophagus were obtained. Images were obtained by CHANELLE Donovan.  *  TOTAL FLUOROSCOPY DOSE: Reference air kerma: 40.8 mGy *  TOTAL NUMBER OF FLUOROSCOPIC IMAGES: 150  FINDINGS:  The preliminary  views of the chest shows improved lung expansion when compared to the portable chest x-ray performed yesterday. There is no evidence of localized pneumonia. The heart size is normal.  Esophagus: The vallecula and pyriform sinuses are unremarkable, with no evidence of mucosal abnormality or extrinsic mass compression. Serial images of the cervical esophagus show no laryngeal penetration or aspiration. No evidence of cervical esophageal diverticula. No prominent cricopharyngeal bar.  Normal esophageal distensibility and caliber. Prominent tertiary contractions observed in the lower two thirds of the esophagus.  The esophageal mucosa has increased mucosal granularity. In addition, there are intermittent transverse folds, \"feline esophagus,\", which is seen in the mid thoracic esophagus. Sliding hiatal hernia is identified and measures approximately 5.4 cm. Within the hernia, there is a round barium rimmed filling defects with centralized barium pooling suggesting superficial erosion. Moderate volume of gastroesophageal reflux is seen refluxing to the upper thoracic esophagus with the patient in the recumbent position.  A 12.5mm barium tablet was administered and briefly hesitated at the gastroesophageal junction requiring additional liquid " "boluses prior to passing into the gastric body.  Stomach: Barium flows readily through the esophagus and into the stomach.       1.  Esophageal dysmotility demonstrated by intermittent tertiary contractions which are observed in the lower two thirds of the esophagus. 2.  Esophagitis demonstrated by increased mucosal granularity. There are also intermittent transverse folds, \"feline esophagus,\" which is often seen with chronic reflux. 3.  This sliding hiatal hernia measuring approximately 5.4 cm. Within the hernia there is a round barium rimmed filling defect with centralized barium pooling suggesting a superficial erosion. 4.  Moderate volume of recumbent gastroesophageal reflux observed refluxing to the upper esophagus to the level of the clavicles. 5.  Recommend direct visualization with endoscopy.      This report was finalized on 7/7/2025 3:39 PM by Dr. Josh Cooper M.D on Workstation: [id number]        MEDICATIONS GIVEN IN ER  Medications   acetaminophen (TYLENOL) tablet 650 mg (650 mg Oral Given 7/7/25 1733)     Or   acetaminophen (TYLENOL) 160 MG/5ML oral solution 650 mg ( Oral Not Given:  See Alt 7/7/25 1733)     Or   acetaminophen (TYLENOL) suppository 650 mg ( Rectal Not Given:  See Alt 7/7/25 1733)   ondansetron ODT (ZOFRAN-ODT) disintegrating tablet 4 mg (has no administration in time range)     Or   ondansetron (ZOFRAN) injection 4 mg (has no administration in time range)   melatonin tablet 2.5 mg (2.5 mg Oral Given 7/6/25 2038)   sennosides-docusate (PERICOLACE) 8.6-50 MG per tablet 2 tablet (has no administration in time range)     And   polyethylene glycol (MIRALAX) packet 17 g (has no administration in time range)     And   bisacodyl (DULCOLAX) EC tablet 5 mg (has no administration in time range)     And   bisacodyl (DULCOLAX) suppository 10 mg (has no administration in time range)   aspirin EC tablet 81 mg (81 mg Oral Given 7/7/25 0900)   buPROPion XL (WELLBUTRIN XL) 24 hr tablet 300 mg (300 mg " Oral Given 7/6/25 2145)   DULoxetine (CYMBALTA) DR capsule 60 mg (60 mg Oral Given 7/7/25 0900)   levothyroxine (SYNTHROID, LEVOTHROID) tablet 100 mcg (100 mcg Oral Given 7/7/25 0542)   pantoprazole (PROTONIX) EC tablet 40 mg (40 mg Oral Given 7/7/25 0542)   rosuvastatin (CRESTOR) tablet 10 mg (10 mg Oral Given 7/6/25 2145)   chlordiazePOXIDE (LIBRIUM) capsule 25 mg (25 mg Oral Given 7/7/25 1236)   Lidocaine 4 % 1 patch (1 patch Transdermal Medication Removed 7/7/25 1246)   sod bicarb-citric acid-simethicone (GAS-X II) 2.21-1.53-0.04 g packet 1 packet (has no administration in time range)   barium sulfate (E-Z-DISK) tablet 700 mg (has no administration in time range)   barium sulfate oral suspension 135 mL (has no administration in time range)   barium sulfate (E-Z-PAQUE) 96 % oral suspension reconstituted suspension 183 mL (has no administration in time range)       ORDERS PLACED DURING THIS VISIT:  Orders Placed This Encounter   Procedures    XR Chest 1 View    CT Head Without Contrast    FL ESOPHAGRAM DOUBLE CONTRAST    Comprehensive Metabolic Panel    Ethanol    Urine Drug Screen - Urine, Clean Catch    Magnesium    TSH Rfx On Abnormal To Free T4    Ammonia    Urinalysis With Culture If Indicated - Urine, Catheter    CBC Auto Differential    Fentanyl, Urine - Urine, Clean Catch    Basic Metabolic Panel    CBC (No Diff)    CBC (No Diff)    Renal Function Panel    Magnesium    Diet: Cardiac; Healthy Heart (2-3 Na+); Texture: Soft to Chew (NDD 3); Soft to Chew: Chopped Meat; Fluid Consistency: Thin (IDDSI 0)    Vital Signs    Up with assistance    Daily Weights    Strict Intake & Output    Oral Care    Place Sequential Compression Device    Maintain Sequential Compression Device    Code Status and Medical Interventions: CPR (Attempt to Resuscitate); Full    LHA (on-call MD unless specified) Details    Inpatient Consult to Advance Care Planning    Inpatient Case Management  Consult    Inpatient  Spiritual Care Consult    Inpatient Nutrition Consult    OT Consult: Eval & Treat ADL Performance Below Baseline    PT Consult: Eval & Treat Functional Mobility Below Baseline    SLP Consult: Eval & Treat Other; solid foods    SLP Plan of Care Cert / Re-Cert    ECG 12 Lead Altered Mental Status    Telemetry Scan    Telemetry Scan    Inpatient Admission    Initiate Observation Status    CBC & Differential       OUTPATIENT MEDICATION MANAGEMENT:  Current Facility-Administered Medications Ordered in Epic   Medication Dose Route Frequency Provider Last Rate Last Admin    acetaminophen (TYLENOL) tablet 650 mg  650 mg Oral Q4H PRN Dorita eMng MD   650 mg at 07/07/25 1733    Or    acetaminophen (TYLENOL) 160 MG/5ML oral solution 650 mg  650 mg Oral Q4H PRN Dorita Meng MD        Or    acetaminophen (TYLENOL) suppository 650 mg  650 mg Rectal Q4H PRN Dorita Meng MD        aspirin EC tablet 81 mg  81 mg Oral Daily Dorita Meng MD   81 mg at 07/07/25 0900    barium sulfate (E-Z-DISK) tablet 700 mg  700 mg Oral Once Jeremy Navas MD        barium sulfate (E-Z-PAQUE) 96 % oral suspension reconstituted suspension 183 mL  183 mL Oral Once in imaging Jeremy Navas MD        barium sulfate oral suspension 135 mL  135 mL Oral Once in imaging Jeremy Navas MD        sennosides-docusate (PERICOLACE) 8.6-50 MG per tablet 2 tablet  2 tablet Oral BID PRN Dorita Meng MD        And    polyethylene glycol (MIRALAX) packet 17 g  17 g Oral Daily PRN Dorita Meng MD        And    bisacodyl (DULCOLAX) EC tablet 5 mg  5 mg Oral Daily PRN Dorita Meng MD        And    bisacodyl (DULCOLAX) suppository 10 mg  10 mg Rectal Daily PRN Dorita Meng MD        buPROPion XL (WELLBUTRIN XL) 24 hr tablet 300 mg  300 mg Oral Nightly Dorita Meng MD   300 mg at 07/06/25 2145    chlordiazePOXIDE (LIBRIUM) capsule 25 mg  25 mg Oral BID PRN [employer]  Dorita Romero MD   25 mg at 07/07/25 1236    DULoxetine (CYMBALTA) DR capsule 60 mg  60 mg Oral BID Stingl, Dorita Romero MD   60 mg at 07/07/25 0900    levothyroxine (SYNTHROID, LEVOTHROID) tablet 100 mcg  100 mcg Oral Q AM Stingl, Dorita Romero MD   100 mcg at 07/07/25 0542    Lidocaine 4 % 1 patch  1 patch Transdermal Q24H Radha Mcelroy APRN   1 patch at 07/06/25 2321    melatonin tablet 2.5 mg  2.5 mg Oral Nightly Stingl, Dorita Romero MD   2.5 mg at 07/06/25 2038    ondansetron ODT (ZOFRAN-ODT) disintegrating tablet 4 mg  4 mg Oral Q6H PRN [employer], Dorita Romero MD        Or    ondansetron (ZOFRAN) injection 4 mg  4 mg Intravenous Q6H PRN Stinggal, Dorita Romero MD        pantoprazole (PROTONIX) EC tablet 40 mg  40 mg Oral Q AM Stingl, Dorita Romero MD   40 mg at 07/07/25 0542    rosuvastatin (CRESTOR) tablet 10 mg  10 mg Oral Nightly Stingl, Dorita Romero MD   10 mg at 07/06/25 2145    sod bicarb-citric acid-simethicone (GAS-X II) 2.21-1.53-0.04 g packet 1 packet  1 packet Oral Once in imaging Jeremy Navas MD         No current [facility]-ordered outpatient medications on file.       PROCEDURES  Procedures    PROGRESS, DATA ANALYSIS, CONSULTS, AND MEDICAL DECISION MAKING  All labs have been independently interpreted by me.  All radiology studies have been reviewed by me. All EKG's have been independently viewed and interpreted by me.  Discussion below represents my analysis of pertinent findings related to patient's condition, differential diagnosis, treatment plan and final disposition.    Differential diagnosis includes but is not limited to intracranial hemorrhage, UTI, electrolyte derangement, polypharmacy, intoxication.    Clinical Scores:                   ED Course as of 07/07/25 1741   Sun Jul 06, 2025   1241 EKG interpreted by myself  Time: 1239  Rate: 76  Rhythm: NSR  No ST elevation or depression  Normal intervals  Normal morphology [AB]   1346 Ethanol: <10 [AB]   1346 TSH Baseline:  3.940 [AB]   1346 Ammonia: 25 [AB]   1346 WBC: 5.99 [AB]   1346 Hemoglobin: 13.6 [AB]   1346 Urinalysis With Culture If Indicated - Urine, Catheter(!) [AB]   1346 XR Chest 1 View  My independent interpretation of the imaging study is low lung volumes, no pneumothorax [AB]   1420 CT Head Without Contrast  My independent interpretation of the imaging study is no ICH [AB]   1443 Benzodiazepine Screen, Urine(!): Positive [AB]   1448 Updated patient on results.  Her mentation has improved, and she is now AO x 3.  Still a bit lethargic.  When asked her about possible administration of her previously prescribed Norco or Xanax, she states that the facility did give her some.  This seems inconsistent so not sure how about it is.  Patient has been on Librium so that may be what she is reporting.  Will plan to admit for observation. [AB]   1457 I spoke with Karen with the ED observation unit.  She would like to see the patient at bedside to determine if that she is appropriate for the ED observation unit. [AB]   1617 I assumed care from Dr. Vega pending CT results as well as admission.  The observation unit provider was uncomfortable admitting the patient due to the potential benzodiazepine use is an issue and recommended that we admit her to the medicine service.  Did discuss the patient's presentation and findings with Dr. Meng of [facility].  She agrees to admit to the hospital for further management and treatment. [BM]      ED Course User Index  [AB] Nasim Vega MD  [BM] Lowell Hart MD             AS OF 17:41 EDT VITALS:    BP - 133/81  HR - 84  TEMP - 97.7 °F (36.5 °C) (Oral)  O2 SATS - 97%    COMPLEXITY OF CARE  The patient requires admission.      DIAGNOSIS  Final diagnoses:   Altered mental status, unspecified altered mental status type         DISPOSITION  ED Disposition       ED Disposition   Decision to Admit    Condition   --    Comment   Level of Care: Telemetry [5]   Diagnosis: AMS (altered mental  status) [2306924]   Admitting Physician: AFIA AMADOR [[id number]]   Attending Physician: AFIA AMADOR [[id number]]   Certification: I Certify That Inpatient Hospital Services Are Medically Necessary For Greater Than 2 Midnights                  Please note that portions of this document were completed with a voice recognition program.    Note Disclaimer: At [facility], we believe that sharing information builds trust and better relationships. You are receiving this note because you recently visited [facility]. It is possible you will see health information before a provider has talked with you about it. This kind of information can be easy to misunderstand. To help you fully understand what it means for your health, we urge you to discuss this note with your provider.         Nasim Vega MD  07/07/25 4220

## 2025-07-07 ENCOUNTER — APPOINTMENT (OUTPATIENT)
Dept: GENERAL RADIOLOGY | Facility: HOSPITAL | Age: 70
End: 2025-07-07
Payer: MEDICARE

## 2025-07-07 ENCOUNTER — TELEPHONE (OUTPATIENT)
Dept: FAMILY MEDICINE CLINIC | Facility: CLINIC | Age: 70
End: 2025-07-07
Payer: MEDICARE

## 2025-07-07 LAB
ANION GAP SERPL CALCULATED.3IONS-SCNC: 9.4 MMOL/L (ref 5–15)
BUN SERPL-MCNC: 13 MG/DL (ref 8–23)
BUN/CREAT SERPL: 20 (ref 7–25)
CALCIUM SPEC-SCNC: 9 MG/DL (ref 8.6–10.5)
CHLORIDE SERPL-SCNC: 107 MMOL/L (ref 98–107)
CO2 SERPL-SCNC: 23.6 MMOL/L (ref 22–29)
CREAT SERPL-MCNC: 0.65 MG/DL (ref 0.57–1)
DEPRECATED RDW RBC AUTO: 57.7 FL (ref 37–54)
EGFRCR SERPLBLD CKD-EPI 2021: 94.3 ML/MIN/1.73
ERYTHROCYTE [DISTWIDTH] IN BLOOD BY AUTOMATED COUNT: 16.6 % (ref 12.3–15.4)
GLUCOSE SERPL-MCNC: 106 MG/DL (ref 65–99)
HCT VFR BLD AUTO: 37 % (ref 34–46.6)
HGB BLD-MCNC: 12.1 G/DL (ref 12–15.9)
MCH RBC QN AUTO: 31.3 PG (ref 26.6–33)
MCHC RBC AUTO-ENTMCNC: 32.7 G/DL (ref 31.5–35.7)
MCV RBC AUTO: 95.6 FL (ref 79–97)
PLATELET # BLD AUTO: 276 10*3/MM3 (ref 140–450)
PMV BLD AUTO: 9.9 FL (ref 6–12)
POTASSIUM SERPL-SCNC: 3.9 MMOL/L (ref 3.5–5.2)
RBC # BLD AUTO: 3.87 10*6/MM3 (ref 3.77–5.28)
SODIUM SERPL-SCNC: 140 MMOL/L (ref 136–145)
WBC NRBC COR # BLD AUTO: 4.49 10*3/MM3 (ref 3.4–10.8)

## 2025-07-07 PROCEDURE — 85027 COMPLETE CBC AUTOMATED: CPT | Performed by: INTERNAL MEDICINE

## 2025-07-07 PROCEDURE — G0378 HOSPITAL OBSERVATION PER HR: HCPCS

## 2025-07-07 PROCEDURE — 74221 X-RAY XM ESOPHAGUS 2CNTRST: CPT

## 2025-07-07 PROCEDURE — 80048 BASIC METABOLIC PNL TOTAL CA: CPT | Performed by: INTERNAL MEDICINE

## 2025-07-07 PROCEDURE — 97166 OT EVAL MOD COMPLEX 45 MIN: CPT

## 2025-07-07 PROCEDURE — 36415 COLL VENOUS BLD VENIPUNCTURE: CPT | Performed by: INTERNAL MEDICINE

## 2025-07-07 PROCEDURE — 92610 EVALUATE SWALLOWING FUNCTION: CPT

## 2025-07-07 PROCEDURE — 97161 PT EVAL LOW COMPLEX 20 MIN: CPT

## 2025-07-07 PROCEDURE — 97535 SELF CARE MNGMENT TRAINING: CPT

## 2025-07-07 PROCEDURE — 97530 THERAPEUTIC ACTIVITIES: CPT

## 2025-07-07 RX ADMIN — DULOXETINE 60 MG: 30 CAPSULE, DELAYED RELEASE ORAL at 21:35

## 2025-07-07 RX ADMIN — ACETAMINOPHEN 325MG 650 MG: 325 TABLET ORAL at 17:33

## 2025-07-07 RX ADMIN — LEVOTHYROXINE SODIUM 100 MCG: 0.1 TABLET ORAL at 05:42

## 2025-07-07 RX ADMIN — Medication 2.5 MG: at 21:35

## 2025-07-07 RX ADMIN — CHLORDIAZEPOXIDE HYDROCHLORIDE 25 MG: 25 CAPSULE ORAL at 12:36

## 2025-07-07 RX ADMIN — ACETAMINOPHEN 325MG 650 MG: 325 TABLET ORAL at 13:34

## 2025-07-07 RX ADMIN — ROSUVASTATIN CALCIUM 10 MG: 10 TABLET, FILM COATED ORAL at 21:34

## 2025-07-07 RX ADMIN — PANTOPRAZOLE SODIUM 40 MG: 40 TABLET, DELAYED RELEASE ORAL at 05:42

## 2025-07-07 RX ADMIN — ASPIRIN 81 MG: 81 TABLET, COATED ORAL at 09:00

## 2025-07-07 RX ADMIN — BUPROPION HYDROCHLORIDE 300 MG: 150 TABLET, EXTENDED RELEASE ORAL at 21:34

## 2025-07-07 RX ADMIN — LIDOCAINE 1 PATCH: 4 PATCH TOPICAL at 21:30

## 2025-07-07 RX ADMIN — DULOXETINE 60 MG: 30 CAPSULE, DELAYED RELEASE ORAL at 09:00

## 2025-07-07 RX ADMIN — ACETAMINOPHEN 325MG 650 MG: 325 TABLET ORAL at 05:41

## 2025-07-07 RX ADMIN — ACETAMINOPHEN 325MG 650 MG: 325 TABLET ORAL at 09:14

## 2025-07-07 RX ADMIN — ACETAMINOPHEN 325MG 650 MG: 325 TABLET ORAL at 21:34

## 2025-07-07 NOTE — PLAN OF CARE
"Goal Outcome Evaluation:              Outcome Evaluation: Bedside swallow evaluation conducted. Pt c/o solid foods feeling \"stuck\" below sternal notch when swallowing. Pt is endentulous and does not have dentures anymore after losing previous set, states she is able to eat softer foods without difficulty chewing. Thins via cup/straw, puree, mixed, and regular solids were trialed. No overt s/s of aspiration with any consistency tested. No voice change noted throughout evaluation. Pt spoke with a soft/quiet voice throughout and exhibited slight SOB when eating, both of which pt states is baseline. SLP recs soft chopped and thins. Meds as tolerated. Upright when eating/drinking. Small bites and sips. Alternate food and drinks. Refer to GI if difficulty with swallowing presists. ST to s/o               SLP Swallowing Diagnosis: functional oral phase, functional pharyngeal phase, suspected esophageal dysphagia (07/07/25 1500)             "

## 2025-07-07 NOTE — TELEPHONE ENCOUNTER
Matt from [facility] called for verbal orders, PT/OT following discharge from skilled nursing facility. Requesting home health services.      # [phone]

## 2025-07-07 NOTE — THERAPY EVALUATION
Acute Care - Speech Language Pathology   Swallow Initial Evaluation [facility]     Patient Name: Sonya Marcus  : 1955  MRN: [id number]  Today's Date: 2025               Admit Date: 2025    Visit Dx:     ICD-10-CM ICD-9-CM   1. Altered mental status, unspecified altered mental status type  R41.82 780.97     Patient Active Problem List   Diagnosis    Altered mental status    Polypharmacy    Positive urine drug screen    MICHAEL (acute kidney injury)    Left ureteral stone    Functional tremor    Knee joint replacement status    Fibromyalgia, primary    Coronary arteriosclerosis    Hypothyroidism    Genitourinary syndrome of menopause    Hyperlipidemia    Osteoarthritis of right knee    Pernicious anemia    Urinary tract infection in female    Absolute anemia    Gastroesophageal reflux disease    Angina pectoris    Anxiety disorder    Arthritis or polyarthritis, rheumatoid    Avitaminosis D    B12 deficiency    Bilateral primary osteoarthritis of knee    BP (high blood pressure)    Cardiac murmur    Stage 2 chronic kidney disease    Aftercare for healing traumatic closed fracture of right lower extremity    Delayed union of tibial shaft fracture    Generalized weakness    Opioid dependence    Dehydration    Hypokalemia    Fall    UTI (urinary tract infection), bacterial    Metabolic encephalopathy    AMS (altered mental status)     Past Medical History:   Diagnosis Date    Allergic     Anemia     Angina pectoris     Anxiety and depression     Arthralgia of right knee 09/10/2024    Arthritis     Carotid artery occlusion     Chronic urinary tract infection     CKD (chronic kidney disease)     Coronary arteriosclerosis     Coronary artery disease     Disease of thyroid gland     Fibromyalgia, primary 2000    Fractures 2024    Fell at home    Genitourinary syndrome of menopause     GERD (gastroesophageal reflux disease)     History of kidney stones     History of pulmonary embolism      AFTER HYSTERECTOMY    HL (hearing loss) 2024    Hyperlipidemia 2020    Hypertension     Hypothyroidism     Kidney stone 1972    Left hip pain     Memory loss     MVP (mitral valve prolapse)     DR LUZ LAST OFFICE NOTE WITH CHART 7/30/2024    Obesity     Prediabetes     Right knee pain     Right shoulder pain     Sleep apnea     PT IS NOT USING CPAP    Substance abuse 2010    Hydrocodone and Oxycodone     Past Surgical History:   Procedure Laterality Date    APPENDECTOMY      BREAST BIOPSY      COLONOSCOPY  01/01/2022    CYSTOSCOPY W/ URETERAL STENT PLACEMENT Left 09/05/2023    Procedure: LEFT CYSTOSCOPY URETERAL CATHETER/STENT INSERTION;  Surgeon: Quinton Rosa MD;  Location: [facility] OR;  Service: Urology;  Laterality: Left;    EYE SURGERY      HYSTERECTOMY      JOINT REPLACEMENT  2021 & 2024    REPLACEMENT TOTAL KNEE Left 2021    SHOULDER SURGERY Right     TONSILLECTOMY      TOTAL KNEE ARTHROPLASTY Right 08/22/2024    Procedure: TOTAL KNEE ARTHROPLASTY WITH CORI ROBOT;  Surgeon: Steven Sumner II, MD;  Location: [facility] OR [facility];  Service: Robotics - Ortho;  Laterality: Right;       SLP Recommendation and Plan  SLP Swallowing Diagnosis: functional oral phase, functional pharyngeal phase, suspected esophageal dysphagia (07/07/25 1500)  SLP Diet Recommendation: soft to chew textures, chopped, thin liquids (07/07/25 1500)  Recommended Precautions and Strategies: upright posture during/after eating, small bites of food and sips of liquid (07/07/25 1500)  SLP Rec. for Method of Medication Administration: meds whole, as tolerated (07/07/25 1500)     Monitor for Signs of Aspiration: yes (07/07/25 1500)  Recommended Diagnostics: No further SLP services recommended (07/07/25 1500)  Swallow Criteria for Skilled Therapeutic Interventions Met: no problems identified which require skilled intervention (07/07/25 1500)        Therapy Frequency (Swallow): evaluation only (07/07/25 1500)     Oral Care  "Recommendations: Oral Care BID/PRN (07/07/25 1500)  Demonstrates Need for Referral to Another Service: gastroenterology, other (see comments) (consider GI if symptoms persist) (07/07/25 1500)                                     Outcome Evaluation: Bedside swallow evaluation conducted. Pt c/o solid foods feeling \"stuck\" below sternal notch when swallowing. Pt is endentulous and does not have dentures anymore after losing previous set, states she is able to eat softer foods without difficulty chewing. Thins via cup/straw, puree, mixed, and regular solids were trialed. No overt s/s of aspiration with any consistency tested. No voice change noted throughout evaluation. Pt spoke with a soft/quiet voice throughout and exhibited slight SOB when eating, both of which pt states is baseline. SLP recs soft chopped and thins. Meds as tolerated. Upright when eating/drinking. Small bites and sips. Alternate food and drinks. Refer to GI if difficulty with swallowing presists. ST to s/o      SWALLOW EVALUATION (Last 72 Hours)       SLP Adult Swallow Evaluation       Row Name 07/07/25 1500 07/07/25 1030                Rehab Evaluation    Document Type evaluation  -AW evaluation  -AW (r) KS (t) AW (c)       Subjective Information no complaints  -AW complains of;pain  -AW (r) KS (t) AW (c)       Patient Observations alert;cooperative;agree to therapy  -AW alert;cooperative  -AW (r) KS (t) AW (c)       Patient Effort good  -AW good  -AW (r) KS (t) AW (c)       Symptoms Noted During/After Treatment none  -AW none  -AW (r) KS (t) AW (c)          General Information    Patient Profile Reviewed yes  -AW yes  -AW (r) KS (t) AW (c)       Pertinent History Of Current Problem Admitted for altered mental status. CT head clear. Previously being treated for UTI. Recent admit for polypharmacy.  -AW Admitted for altered mental status. CT head clear. Previously being treated for UTI. Recent admit for polypharmacy.  -AW (r) KS (t) AW (c)       " "Current Method of Nutrition regular textures;thin liquids  -AW regular textures;thin liquids  -AW (r) KS (t) AW (c)       Precautions/Limitations, Vision WFL;for purposes of eval  -AW WFL;for purposes of eval  -AW (r) KS (t) AW (c)       Precautions/Limitations, Hearing WFL;for purposes of eval  -AW WFL;for purposes of eval  -AW (r) KS (t) AW (c)       Prior Level of Function-Communication unknown  -AW unknown  -AW (r) KS (t) AW (c)       Prior Level of Function-Swallowing no diet consistency restrictions;other (see comments)  complains of difficulty with solids feeling \"stuck\"  -AW no diet consistency restrictions;other (see comments)  complains of difficulty with solids feeling \"stuck\"  -AW (r) KS (t) AW (c)       Plans/Goals Discussed with patient;agreed upon  -AW patient  -AW (r) KS (t) AW (c)       Barriers to Rehab none identified  -AW --       Patient's Goals for Discharge return home  -AW --          Pain    Pretreatment Pain Rating 7/10  -AW 7/10  -AW (r) KS (t) AW (c)       Posttreatment Pain Rating 7/10  -AW 7/10  -AW (r) KS (t) AW (c)       Pain Location knee  -AW knee  -AW (r) KS (t) AW (c)       Pain Side/Orientation bilateral  -AW bilateral;generalized  -AW (r) KS (t) AW (c)       Pain Management Interventions positioning techniques utilized  -AW positioning techniques utilized  -AW          Oral Motor Structure and Function    Dentition Assessment edentulous  -AW edentulous, does not have dentures;other (see comments)  previously had dentures but lost them and have not gotten new ones  -AW (r) KS (t) AW (c)       Secretion Management WNL/WFL  -AW WNL/WFL  -AW       Mucosal Quality moist, healthy  -AW moist, healthy  -AW          Oral Musculature and Cranial Nerve Assessment    Oral Motor General Assessment WFL  -AW WFL  -AW (r) KS (t) AW (c)          General Eating/Swallowing Observations    Respiratory Support Currently in Use room air  -AW room air  -AW (r) KS (t) AW (c)       Eating/Swallowing " "Skills self-fed;fed by SLP  -AW fed by SLP;self-fed  -AW (r) KS (t) AW (c)       Positioning During Eating upright in chair  -AW upright in chair  -AW (r) KS (t) AW (c)       Utensils Used spoon;cup;straw  -AW spoon;cup;straw  -AW (r) KS (t) AW (c)       Consistencies Trialed regular textures;soft to chew textures;mixed consistency;pureed;thin liquids  -AW thin liquids;pureed;mixed consistency;soft to chew textures;regular textures  -AW (r) KS (t) AW (c)          Clinical Swallow Eval    Clinical Swallow Evaluation Summary Bedside swallow evaluation conducted. Pt c/o solid foods feeling \"stuck\" below sternal notch when swallowing. Pt is endentulous and does not have dentures anymore after losing previous set, states she is able to eat softer foods without difficulty chewing. Thins via cup/straw, puree, mixed, and regular solids were trialed. No overt s/s of aspiration with any consistency tested. No voice change noted throughout evaluation. Pt spoke with a soft/quiet voice throughout and exhibited slight SOB when eating, both of which pt states is baseline. SLP recs soft chopped and thins. Meds as tolerated. Upright when eating/drinking. Small bites and sips. Alternate food and drinks. Refer to GI if difficulty with swallowing presists. ST to s/o  -AW Bedside swallow evaluation conducted. Pt c/o solid foods feeling \"stuck\" below sternal notch when swallowing. Pt is endentulous and does not have dentures anymore after losing previous set, states she is able to eat softer foods without difficulty chewing. Thins via cup/straw, puree, mixed, and regular solids were trialed. No overt s/s of aspiration with any consistency tested. No voice change noted throughout evaluation. Pt spoke with a soft/quiet voice throughout and exhibited slight SOB when eating, both of which pt states is baseline. SLP recs soft chopped and thins. Meds as tolerated. Upright when eating/drinking. Small bites and sips. Alternate food and drinks. " Refer to GI if difficulty with swallowing presists. ST to s/o.  -AW (r) KS (t) AW (c)          SLP Evaluation Clinical Impression    SLP Swallowing Diagnosis functional oral phase;functional pharyngeal phase;suspected esophageal dysphagia  -AW suspected esophageal dysphagia;functional oral phase;functional pharyngeal phase  -AW       Functional Impact risk of aspiration/pneumonia  -AW risk of aspiration/pneumonia  -AW (r) KS (t) AW (c)       Swallow Criteria for Skilled Therapeutic Interventions Met no problems identified which require skilled intervention  -AW no problems identified which require skilled intervention  -AW          Recommendations    Therapy Frequency (Swallow) evaluation only  -AW evaluation only  -AW (r) KS (t) AW (c)       SLP Diet Recommendation soft to chew textures;chopped;thin liquids  -AW soft to chew textures;chopped;thin liquids  -AW (r) KS (t) AW (c)       Recommended Diagnostics No further SLP services recommended  -AW No further SLP services recommended  -AW (r) KS (t) AW (c)       Recommended Precautions and Strategies upright posture during/after eating;small bites of food and sips of liquid  -AW upright posture during/after eating;small bites of food and sips of liquid;alternate between small bites of food and sips of liquid  -AW (r) KS (t) AW (c)       Oral Care Recommendations Oral Care BID/PRN  -AW Oral Care BID/PRN  -AW (r) KS (t) AW (c)       SLP Rec. for Method of Medication Administration meds whole;as tolerated  -AW meds whole;as tolerated  -AW (r) KS (t) AW (c)       Monitor for Signs of Aspiration yes  -AW notify SLP if any concerns  -AW (r) KS (t) AW (c)       Demonstrates Need for Referral to Another Service gastroenterology;other (see comments)  consider GI if symptoms persist  -AW gastroenterology  -AW (r) KS (t) AW (c)                 User Key  (r) = Recorded By, (t) = Taken By, (c) = Cosigned By      Initials Name Effective Dates    Vanesa William, SLP 08/28/23 -      Kyle Chan, Speech Therapy Student 05/13/25 -                     EDUCATION  The patient has been educated in the following areas:   Dysphagia (Swallowing Impairment) Oral Care/Hydration.                Time Calculation:    Time Calculation- SLP       Row Name 07/07/25 1136             Time Calculation- SLP    SLP Start Time --  -AW      SLP Received On --  -CHER                User Key  (r) = Recorded By, (t) = Taken By, (c) = Cosigned By      Initials Name Provider Type    Vanesa William SLP Speech and Language Pathologist                    Therapy Charges for Today       Code Description Service Date Service Provider Modifiers Qty    [id number]  ST EVAL ORAL PHARYNG SWALLOW 4 7/7/2025 Vanesa Judd SLP GN 1                 SARAH Santo  7/7/2025

## 2025-07-07 NOTE — THERAPY EVALUATION
Patient Name: Sonya Marcus  : 1955    MRN: [id number]                              Today's Date: 2025       Admit Date: 2025    Visit Dx:     ICD-10-CM ICD-9-CM   1. Altered mental status, unspecified altered mental status type  R41.82 780.97     Patient Active Problem List   Diagnosis    Altered mental status    Polypharmacy    Positive urine drug screen    MICHAEL (acute kidney injury)    Left ureteral stone    Functional tremor    Knee joint replacement status    Fibromyalgia, primary    Coronary arteriosclerosis    Hypothyroidism    Genitourinary syndrome of menopause    Hyperlipidemia    Osteoarthritis of right knee    Pernicious anemia    Urinary tract infection in female    Absolute anemia    Gastroesophageal reflux disease    Angina pectoris    Anxiety disorder    Arthritis or polyarthritis, rheumatoid    Avitaminosis D    B12 deficiency    Bilateral primary osteoarthritis of knee    BP (high blood pressure)    Cardiac murmur    Stage 2 chronic kidney disease    Aftercare for healing traumatic closed fracture of right lower extremity    Delayed union of tibial shaft fracture    Generalized weakness    Opioid dependence    Dehydration    Hypokalemia    Fall    UTI (urinary tract infection), bacterial    Metabolic encephalopathy    AMS (altered mental status)     Past Medical History:   Diagnosis Date    Allergic     Anemia 2000    Angina pectoris     Anxiety and depression     Arthralgia of right knee 09/10/2024    Arthritis     Carotid artery occlusion     Chronic urinary tract infection     CKD (chronic kidney disease)     Coronary arteriosclerosis     Coronary artery disease     Disease of thyroid gland     Fibromyalgia, primary 2000    Fractures 2024    Fell at home    Genitourinary syndrome of menopause     GERD (gastroesophageal reflux disease)     History of kidney stones     History of pulmonary embolism     AFTER HYSTERECTOMY    HL (hearing loss)      Hyperlipidemia 2020    Hypertension     Hypothyroidism     Kidney stone 1972    Left hip pain     Memory loss     MVP (mitral valve prolapse)     DR LUZ LAST OFFICE NOTE WITH CHART 7/30/2024    Obesity     Prediabetes     Right knee pain     Right shoulder pain     Sleep apnea     PT IS NOT USING CPAP    Substance abuse 2010    Hydrocodone and Oxycodone     Past Surgical History:   Procedure Laterality Date    APPENDECTOMY      BREAST BIOPSY      COLONOSCOPY  01/01/2022    CYSTOSCOPY W/ URETERAL STENT PLACEMENT Left 09/05/2023    Procedure: LEFT CYSTOSCOPY URETERAL CATHETER/STENT INSERTION;  Surgeon: Quinton Rosa MD;  Location: [facility] OR;  Service: Urology;  Laterality: Left;    EYE SURGERY      HYSTERECTOMY      JOINT REPLACEMENT  2021 & 2024    REPLACEMENT TOTAL KNEE Left 2021    SHOULDER SURGERY Right     TONSILLECTOMY      TOTAL KNEE ARTHROPLASTY Right 08/22/2024    Procedure: TOTAL KNEE ARTHROPLASTY WITH CORI ROBOT;  Surgeon: Steven Sumner II, MD;  Location: [facility] OR [facility] [place];  Service: Robotics - Ortho;  Laterality: Right;      General Information       Row Name 07/07/25 1215          OT Time and Intention    Subjective Information no complaints  -LE     Document Type evaluation;therapy note (daily note)  -LE     Mode of Treatment individual therapy;occupational therapy  -LE     Patient Effort adequate  -LE     Symptoms Noted During/After Treatment none  -LE       Row Name 07/07/25 1211          General Information    Patient Profile Reviewed yes  -LE     Prior Level of Function transfer;ADL's  pt reports at SNU only 2 days, does not recall working with therapy, pt reports she wants to go home at d/c but her family wants her to go to rehab.  -LE     Existing Precautions/Restrictions fall  -LE     Barriers to Rehab cognitive status  -LE       Row Name 07/07/25 1015          Living Environment    Current Living Arrangements --  from SNU, home alone prior per chart notes.  -LE      People in Home alone  -LE       Row Name 07/07/25 1215          Cognition    Orientation Status (Cognition) oriented x 3  -       Row Name 07/07/25 1215          Safety Issues/Impairments Affecting Functional Mobility    Safety Issues Affecting Function (Mobility) insight into deficits/self-awareness;judgment;problem-solving  -     Impairments Affecting Function (Mobility) endurance/activity tolerance;strength;cognition;balance  -LE     Comment, Safety Issues/Impairments (Mobility) non skid socks and gait belt worn.  -LE               User Key  (r) = Recorded By, (t) = Taken By, (c) = Cosigned By      Initials Name Provider Type    Ivanna Vivas, OTR/L, CSRS Occupational Therapist                     Mobility/ADL's       Row Name 07/07/25 1218          Bed Mobility    Bed Mobility supine-sit;sit-supine;scooting/bridging  -LE     Comment, (Bed Mobility) UIC when enter.  -       Row Name 07/07/25 1218          Transfers    Transfers sit-stand transfer;stand-sit transfer;bed-chair transfer;toilet transfer  -       Row Name 07/07/25 1218          Sit-Stand Transfer    Sit-Stand [place] (Transfers) set up;verbal cues;contact guard  -LE     Assistive Device (Sit-Stand Transfers) walker, front-wheeled  -       Row Name 07/07/25 1218          Stand-Sit Transfer    Stand-Sit [place] (Transfers) set up;verbal cues;contact guard  -LE     Assistive Device (Stand-Sit Transfers) walker, front-wheeled  -       Row Name 07/07/25 1218          Toilet Transfer    [place] Level (Toilet Transfer) set up;verbal cues;contact guard  -LE     Assistive Device (Toilet Transfer) walker, front-wheeled  -LE       Row Name 07/07/25 1218          Functional Mobility    Functional Mobility- Ind. Level set up required;contact guard assist;verbal cues required  -LE     Functional Mobility- Device walker, front-wheeled  -LE     Functional Mobility- Comment chair to bathroom to sink to chair.  -       Row Name 07/07/25  1218          Activities of Daily Living    BADL Assessment/Intervention toileting;feeding;grooming;lower body dressing;upper body dressing;bathing  -       Row Name 07/07/25 1218          Self-Feeding Assessment/Training    Comment, (Feeding) denies difficulty feeding but difficulty opening milk container  -[facility] Name 07/07/25 1218          Lower Body Dressing Assessment/Training    [place] Level (Lower Body Dressing) doff;don;socks;set up;standby assist  -LE     Position (Lower Body Dressing) supported sitting  -       Row Name 07/07/25 1218          Grooming Assessment/Training    [place] Level (Grooming) wash face, hands;set up;standby assist  -LE     Position (Grooming) supported standing;sink side  -       Row Name 07/07/25 1218          Toileting Assessment/Training    [place] Level (Toileting) moderate assist (50% patient effort);adjust/manage clothing;change pad/brief;perform perineal hygiene  -     Position (Toileting) supported standing;unsupported sitting  -LE     Comment, (Toileting) pt reports h/o incontinence, takes a pill at night to keep from urinating. during the day pt sits on toilet until she urinates.   with OT pt noted wet brief despite purwick. pt walks to BR, OT changes brief with full assist, removes purwick, pt has BM and is set up/cueing for hygiene with wipes.  -               User Key  (r) = Recorded By, (t) = Taken By, (c) = Cosigned By      Initials Name Provider Type    Ivanna Vivas, OTR/L, CSRS Occupational Therapist                   Obj/Interventions       Row Name 07/07/25 1221          Range of Motion Comprehensive    General Range of Motion bilateral upper extremity ROM WFL  -[facility] Name 07/07/25 1221          Strength Comprehensive (MMT)    Comment, General Manual Muscle Testing (MMT) Assessment B UE 4-/5.  -       Row Name 07/07/25 1221          Balance    Balance Assessment sitting static balance;standing static balance;standing  dynamic balance  -LE     Static Sitting Balance independent  -LE     Dynamic Sitting Balance standby assist  -LE     Static Standing Balance standby assist  -LE     Dynamic Standing Balance contact guard  -LE     Position/Device Used, Standing Balance walker, front-wheeled  -LE               User Key  (r) = Recorded By, (t) = Taken By, (c) = Cosigned By      Initials Name Provider Type    Ivanna Vivas, OTR/L, CSRS Occupational Therapist                   Goals/Plan       Row Name 07/07/25 1224          Transfer Goal 1 (OT)    Activity/Assistive Device (Transfer Goal 1, OT) sit-to-stand/stand-to-sit;bed-to-chair/chair-to-bed;toilet;walker, rolling  -LE     [place] Level/Cues Needed (Transfer Goal 1, OT) set-up required;standby assist;modified independence  -LE     Time Frame (Transfer Goal 1, OT) 2 weeks  -LE     Progress/Outcome (Transfer Goal 1, OT) goal ongoing  -LE       Row Name 07/07/25 1224          Dressing Goal 1 (OT)    Activity/Device (Dressing Goal 1, OT) upper body dressing;lower body dressing  -LE     [place]/Cues Needed (Dressing Goal 1, OT) standby assist;set-up required;verbal cues required  -LE     Time Frame (Dressing Goal 1, OT) 2 weeks  -LE     Progress/Outcome (Dressing Goal 1, OT) goal ongoing  -LE       Row Name 07/07/25 1224          Toileting Goal 1 (OT)    Activity/Device (Toileting Goal 1, OT) toileting skills, all;grab bar/safety frame  -LE     [place] Level/Cues Needed (Toileting Goal 1, OT) set-up required;standby assist;verbal cues required  -LE     Time Frame (Toileting Goal 1, OT) 2 weeks  -LE     Progress/Outcome (Toileting Goal 1, OT) goal ongoing  -LE       Row Name 07/07/25 1224          Strength Goal 1 (OT)    Strength Goal 1 (OT) Supervision for  B UE exercises to increase sustained reach and hold during ADL and mobility efforts.  -LE     Time Frame (Strength Goal 1, OT) 2 weeks  -LE     Progress/Outcome (Strength Goal 1, OT) goal ongoing  -LE       Row Name  07/07/25 1224          Problem Specific Goal 1 (OT)    Problem Specific Goal 1 (OT) SBA ambulation to/from bathroom with walker.  -LE     Time Frame (Problem Specific Goal 1, OT) 2 weeks  -LE     Progress/Outcome (Problem Specific Goal 1, OT) goal ongoing  -LE       Row Name 07/07/25 1224          Therapy Assessment/Plan (OT)    Planned Therapy Interventions (OT) activity tolerance training;adaptive equipment training;BADL retraining;functional balance retraining;patient/caregiver education/training;strengthening exercise;transfer/mobility retraining  -LE               User Key  (r) = Recorded By, (t) = Taken By, (c) = Cosigned By      Initials Name Provider Type    Ivanna Vivas, OTR/L, CSRS Occupational Therapist                   Clinical Impression       Row Name 07/07/25 1221          Pain Assessment    Pain Location knee  -LE     Pain Side/Orientation bilateral  -LE     Pain Management Interventions activity modification encouraged;positioning techniques utilized;exercise or physical activity utilized  -LE     Response to Pain Interventions activity participation with tolerable pain  -LE     Pre/Posttreatment Pain Comment reports chronic knee pain.  -ESTEPHANIE       Row Name 07/07/25 1221          Plan of Care Review    Plan of Care Reviewed With patient  -LE     Outcome Evaluation Pt admit from facility with AMS and confusion. Per chart pt usually A&OX4 but admit with A&OX1. Pt was living alone prior to 6/19 admit with d/c to SNU.  Today, pt presents in chair and is agreeable to walking to bathroom  with CGA at walker.  OT provides mod A for toileting and set up/SBA for grooming at sink.  Pt is able to reach feet to galilea/doff socks and has functional B UE.  Pt is alert and oriented but confusion noted in conversation and impaired problem solving noted during ADL tasks.  Will cont to follow for skilled OT.   CCP working with pt/family on d/c plan.   At this time recommend supervision/assist at d/c.  -ESTEPHANIE Edwards  Name 07/07/25 1221          Therapy Assessment/Plan (OT)    Patient/Family Therapy Goal Statement (OT) Return to prior level of function.  -LE     Rehab Potential (OT) good  -LE     Criteria for Skilled Therapeutic Interventions Met (OT) meets criteria;yes  -LE     Therapy Frequency (OT) 3 times/wk  -LE       Row Name 07/07/25 1221          Therapy Plan Review/Discharge Plan (OT)    Equipment Needs Upon Discharge (OT) --  has walker at home.  if not already owned could benefit from shower chair, grab bars.  -LE     Anticipated Discharge Disposition (OT) skilled nursing facility  -LE       Row Name 07/07/25 1221          Vital Signs    O2 Delivery Pre Treatment room air  -LE     O2 Delivery Intra Treatment room air  -LE     O2 Delivery Post Treatment room air  -LE     Pre Patient Position Sitting  -LE     Intra Patient Position Standing  -LE     Post Patient Position Sitting  -LE       Row Name 07/07/25 1221          Positioning and Restraints    Pre-Treatment Position sitting in chair/recliner  -LE     Post Treatment Position chair  -LE     In Chair notified nsg;reclined;call light within reach;encouraged to call for assist;exit alarm on  time spent as pt confused about which device is call light vs TV remote and  the mediscus pad that pt is sitting on.  -LE               User Key  (r) = Recorded By, (t) = Taken By, (c) = Cosigned By      Initials Name Provider Type    Ivanna Vivas, DANISHAR/L, CSRS Occupational Therapist                   Outcome Measures       Row Name 07/07/25 1225          How much help from another is currently needed...    Putting on and taking off regular lower body clothing? 3  -LE     Bathing (including washing, rinsing, and drying) 2  -LE     Toileting (which includes using toilet bed pan or urinal) 2  -LE     Putting on and taking off regular upper body clothing 3  -LE     Taking care of personal grooming (such as brushing teeth) 3  -LE     Eating meals 3  -LE     AM-PAC 6 Clicks Score (OT)  16  -ESTEPHANIE       Row Name 07/07/25 1010          How much help from another person do you currently need...    Turning from your back to your side while in flat bed without using bedrails? 4  -SM     Moving from lying on back to sitting on the side of a flat bed without bedrails? 3  -SM     Moving to and from a bed to a chair (including a wheelchair)? 3  -SM     Standing up from a chair using your arms (e.g., wheelchair, bedside chair)? 3  -SM     Climbing 3-5 steps with a railing? 2  -SM     To walk in hospital room? 3  -SM     AM-PAC 6 Clicks Score (PT) 18  -SM     Highest Level of Mobility Goal Walk 10 Steps or More-6  -SM       Row Name 07/07/25 1225          Modified [place] Scale    Modified [place] Scale 4 - Moderately severe disability.  Unable to walk without assistance, and unable to attend to own bodily needs without assistance.  -       Row Name 07/07/25 1225 07/07/25 1010       Functional Assessment    Outcome Measure Options AM-PAC 6 Clicks Daily Activity (OT);Modified [place]  -LE AM-PAC 6 Clicks Basic Mobility (PT)  -              User Key  (r) = Recorded By, (t) = Taken By, (c) = Cosigned By      Initials Name Provider Type    Ivanna Vivas, OTR/L, CSRS Occupational Therapist     Elsi Segundo, PT Physical Therapist                    Occupational Therapy Education       Title: PT OT SLP Therapies (In Progress)       Topic: Occupational Therapy (In Progress)       Point: ADL training (Done)       Learning Progress Summary            Patient Acceptance, E, Bed IU by ESTEPHANIE at 7/7/2025 1226    Comment: role of OT, plan of care, how to use call light and difference in remote and nursing call light, purpose of mediscus pad and why in place.                      Point: Precautions (Done)       Learning Progress Summary            Patient Acceptance, E, Bed IU by ESTEPHANIE at 7/7/2025 1226    Comment: role of OT, plan of care, how to use call light and difference in remote and nursing call light, purpose of  mediscus pad and why in place.                      Point: Body mechanics (Done)       Learning Progress Summary            Patient Acceptance, E, Bed IU by ESTEPHANIE at 7/7/2025 1226    Comment: role of OT, plan of care, how to use call light and difference in remote and nursing call light, purpose of mediscus pad and why in place.                                      User Key       Initials Effective Dates Name Provider Type Discipline    ESTEPHANIE 04/25/25 -  Elder, Ivanna, OTR/L, CSRS Occupational Therapist OT                  OT Recommendation and Plan  Planned Therapy Interventions (OT): activity tolerance training, adaptive equipment training, BADL retraining, functional balance retraining, patient/caregiver education/training, strengthening exercise, transfer/mobility retraining  Therapy Frequency (OT): 3 times/wk  Plan of Care Review  Plan of Care Reviewed With: patient  Outcome Evaluation: Pt admit from facility with AMS and confusion. Per chart pt usually A&OX4 but admit with A&OX1. Pt was living alone prior to 6/19 admit with d/c to SNU.  Today, pt presents in chair and is agreeable to walking to bathroom  with CGA at walker.  OT provides mod A for toileting and set up/SBA for grooming at sink.  Pt is able to reach feet to galilea/doff socks and has functional B UE.  Pt is alert and oriented but confusion noted in conversation and impaired problem solving noted during ADL tasks.  Will cont to follow for skilled OT.   CCP working with pt/family on d/c plan.   At this time recommend supervision/assist at d/c.     Time Calculation:   Evaluation Complexity (OT)  Review Occupational Profile/Medical/Therapy History Complexity: expanded/moderate complexity  Assessment, Occupational Performance/Identification of Deficit Complexity: 3-5 performance deficits  Clinical Decision Making Complexity (OT): detailed assessment/moderate complexity  Overall Complexity of Evaluation (OT): moderate complexity     Time Calculation- OT        Row Name 07/07/25 1227             Time Calculation- OT    OT Start Time 0936  -LE      OT Stop Time 1002  -LE      OT Time Calculation (min) 26 min  -LE      Total Timed Code Minutes- OT 10 minute(s)  -LE      OT Received On 07/07/25  -LE      OT - Next Appointment 07/09/25  -LE      OT Goal Re-Cert Due Date 07/21/25  -LE         Timed Charges    70286 - OT Self Care/Mgmt Minutes 10  -LE         Total Minutes    Timed Charges Total Minutes 10  -LE       Total Minutes 10  -LE                User Key  (r) = Recorded By, (t) = Taken By, (c) = Cosigned By      Initials Name Provider Type    LE Ivanna Adler, OTR/L, CSRS Occupational Therapist                  Therapy Charges for Today       Code Description Service Date Service Provider Modifiers Qty    60822399201 HC OT SELF CARE/MGMT/TRAIN EA 15 MIN 7/7/2025 Ivanna Adler OTR/L, CSRS GO 1    95803308469 HC OT EVAL MOD COMPLEXITY 3 7/7/2025 Ivanna Adler, OTR/L, CSRS GO 1                 Ivanna Elder, OTR/L, CSRS  7/7/2025

## 2025-07-07 NOTE — PLAN OF CARE
Goal Outcome Evaluation:  Plan of Care Reviewed With: patient           Outcome Evaluation: Pt admit from facility with AMS and confusion. Per chart pt usually A&OX4 but admit with A&OX1. Pt was living alone prior to 6/19 admit with d/c to SNU.  Today, pt presents in chair and is agreeable to walking to bathroom  with CGA at walker.  OT provides mod A for toileting and set up/SBA for grooming at sink.  Pt is able to reach feet to galilea/doff socks and has functional B UE.  Pt is alert and oriented but confusion noted in conversation and impaired problem solving noted during ADL tasks.  Will cont to follow for skilled OT.   CCP working with pt/family on d/c plan.   At this time recommend supervision/assist at d/c.    Anticipated Discharge Disposition (OT): skilled nursing facility

## 2025-07-07 NOTE — PLAN OF CARE
Goal Outcome Evaluation:  Pt is alert and oriented. Pt complaints of knee pain. Pain medication given per order. VSS with no acute changes. Safety measure maintained. Plan of care complete

## 2025-07-07 NOTE — THERAPY EVALUATION
Patient Name: Sonya Marcus  : 1955    MRN: [id number]                              Today's Date: 2025       Admit Date: 2025    Visit Dx:     ICD-10-CM ICD-9-CM   1. Altered mental status, unspecified altered mental status type  R41.82 780.97     Patient Active Problem List   Diagnosis    Altered mental status    Polypharmacy    Positive urine drug screen    MICHAEL (acute kidney injury)    Left ureteral stone    Functional tremor    Knee joint replacement status    Fibromyalgia, primary    Coronary arteriosclerosis    Hypothyroidism    Genitourinary syndrome of menopause    Hyperlipidemia    Osteoarthritis of right knee    Pernicious anemia    Urinary tract infection in female    Absolute anemia    Gastroesophageal reflux disease    Angina pectoris    Anxiety disorder    Arthritis or polyarthritis, rheumatoid    Avitaminosis D    B12 deficiency    Bilateral primary osteoarthritis of knee    BP (high blood pressure)    Cardiac murmur    Stage 2 chronic kidney disease    Aftercare for healing traumatic closed fracture of right lower extremity    Delayed union of tibial shaft fracture    Generalized weakness    Opioid dependence    Dehydration    Hypokalemia    Fall    UTI (urinary tract infection), bacterial    Metabolic encephalopathy    AMS (altered mental status)     Past Medical History:   Diagnosis Date    Allergic     Anemia 2000    Angina pectoris     Anxiety and depression     Arthralgia of right knee 09/10/2024    Arthritis     Carotid artery occlusion     Chronic urinary tract infection     CKD (chronic kidney disease)     Coronary arteriosclerosis     Coronary artery disease     Disease of thyroid gland     Fibromyalgia, primary 2000    Fractures 2024    Fell at home    Genitourinary syndrome of menopause     GERD (gastroesophageal reflux disease)     History of kidney stones     History of pulmonary embolism     AFTER HYSTERECTOMY    HL (hearing loss)      Hyperlipidemia 2020    Hypertension     Hypothyroidism     Kidney stone 1972    Left hip pain     Memory loss     MVP (mitral valve prolapse)     DR LUZ LAST OFFICE NOTE WITH CHART 7/30/2024    Obesity     Prediabetes     Right knee pain     Right shoulder pain     Sleep apnea     PT IS NOT USING CPAP    Substance abuse 2010    Hydrocodone and Oxycodone     Past Surgical History:   Procedure Laterality Date    APPENDECTOMY      BREAST BIOPSY      COLONOSCOPY  01/01/2022    CYSTOSCOPY W/ URETERAL STENT PLACEMENT Left 09/05/2023    Procedure: LEFT CYSTOSCOPY URETERAL CATHETER/STENT INSERTION;  Surgeon: Quinton Rosa MD;  Location: [facility] OR;  Service: Urology;  Laterality: Left;    EYE SURGERY      HYSTERECTOMY      JOINT REPLACEMENT  2021 & 2024    REPLACEMENT TOTAL KNEE Left 2021    SHOULDER SURGERY Right     TONSILLECTOMY      TOTAL KNEE ARTHROPLASTY Right 08/22/2024    Procedure: TOTAL KNEE ARTHROPLASTY WITH CORI ROBOT;  Surgeon: Steven Sumner II, MD;  Location: [facility];  Service: Robotics - Ortho;  Laterality: Right;      General Information       Row Name 07/07/25 1003          Physical Therapy Time and Intention    Document Type evaluation  -     Mode of Treatment individual therapy;physical therapy  -       Row Name 07/07/25 1003          General Information    Patient Profile Reviewed yes  -     Prior Level of Function independent:  -     Existing Precautions/Restrictions fall  -       Row Name 07/07/25 1003          Living Environment    Current Living Arrangements apartment  -     People in Home alone  -       Row Name 07/07/25 1003          Cognition    Orientation Status (Cognition) oriented x 3  confusion noted  -       Row Name 07/07/25 1003          Safety Issues/Impairments Affecting Functional Mobility    Impairments Affecting Function (Mobility) balance;endurance/activity tolerance;strength;cognition  -               User Key  (r) = Recorded By,  (t) = Taken By, (c) = Cosigned By      Initials Name Provider Type     Elsi Segundo PT Physical Therapist                   Mobility       Row Name 07/07/25 1004          Bed Mobility    Bed Mobility supine-sit  -     Supine-Sit [place] (Bed Mobility) standby assist  -     Assistive Device (Bed Mobility) head of bed elevated;bed rails  -       Row Name 07/07/25 1004          Sit-Stand Transfer    Sit-Stand [place] (Transfers) contact guard;verbal cues  -     Assistive Device (Sit-Stand Transfers) walker, front-wheeled  -       Row Name 07/07/25 1004          Gait/Stairs (Locomotion)    [place] Level (Gait) contact guard  -     Assistive Device (Gait) walker, front-wheeled  -     Patient was able to Ambulate yes  -     Distance in Feet (Gait) 150  -     Deviations/Abnormal Patterns (Gait) yuan decreased;gait speed decreased  -     Bilateral Gait Deviations forward flexed posture  -     Comment, (Gait/Stairs) Gait slow and gaurded with patient reporting fear of falling. No overt LOB noted .  -               User Key  (r) = Recorded By, (t) = Taken By, (c) = Cosigned By      Initials Name Provider Type     Elsi Segundo PT Physical Therapist                   Obj/Interventions       Row Name 07/07/25 1005          Range of Motion Comprehensive    General Range of Motion bilateral lower extremity ROM WFL  -       Row Name 07/07/25 1005          Strength Comprehensive (MMT)    General Manual Muscle Testing (MMT) Assessment lower extremity strength deficits identified  -     Comment, General Manual Muscle Testing (MMT) Assessment Generalized weakness  -       Row Name 07/07/25 1005          Balance    Balance Assessment sitting dynamic balance;sitting static balance;standing static balance;standing dynamic balance  -     Static Sitting Balance supervision  -     Dynamic Sitting Balance standby assist  -     Position, Sitting Balance sitting edge of bed   -SM     Static Standing Balance contact guard;standby assist  -SM     Dynamic Standing Balance contact guard  -SM     Position/Device Used, Standing Balance supported;walker, front-wheeled  -SM     Balance Interventions sitting;standing;sit to stand;supported;static;dynamic  -SM               User Key  (r) = Recorded By, (t) = Taken By, (c) = Cosigned By      Initials Name Provider Type     Elsi Segundo PT Physical Therapist                   Goals/Plan       Row Name 07/07/25 1009          Bed Mobility Goal 1 (PT)    Activity/Assistive Device (Bed Mobility Goal 1, PT) bed mobility activities, all  -SM     [place] Level/Cues Needed (Bed Mobility Goal 1, PT) independent  -SM     Time Frame (Bed Mobility Goal 1, PT) 1 week  -SM       Row Name 07/07/25 1009          Transfer Goal 1 (PT)    Activity/Assistive Device (Transfer Goal 1, PT) sit-to-stand/stand-to-sit;bed-to-chair/chair-to-bed  -SM     [place] Level/Cues Needed (Transfer Goal 1, PT) modified independence  -SM     Time Frame (Transfer Goal 1, PT) 1 week  -SM       Row Name 07/07/25 1009          Gait Training Goal 1 (PT)    Activity/Assistive Device (Gait Training Goal 1, PT) gait (walking locomotion);walker, rolling  -SM     [place] Level (Gait Training Goal 1, PT) modified independence  -SM     Distance (Gait Training Goal 1, PT) 300ft  -SM     Time Frame (Gait Training Goal 1, PT) 1 week  -SM               User Key  (r) = Recorded By, (t) = Taken By, (c) = Cosigned By      Initials Name Provider Type     Elsi Segundo PT Physical Therapist                   Clinical Impression       Row Name 07/07/25 1006          Pain    Pre/Posttreatment Pain Comment Patient reports chronic knee pain  -       Row Name 07/07/25 1006          Plan of Care Review    Plan of Care Reviewed With patient  -     Outcome Evaluation Patient is a 70 y.o female who presented to [facility] with AMS. Patient AOx3 though moments of confusion noted with  difficulty obtaining consistent PLOF. Per chart patient lives at home alone but has been at [facility] for rehab. Patient reports she recently has started using a rwx. Patient sat up to EOB with SBA and increased time. Patient stood from EOB with CGA and ambulated 150ft around unit with support of rwx. Gait slow and gaurded with patient reporting fear of falling. No overt LOB noted. Patient reclined in chair at end of session. Patient would continue to benefit from skilled PT intervention to address deficits in functional mobility and maximize safety and independence. Acute PT will continue to monitor.  -       Row Name 07/07/25 1006          Therapy Assessment/Plan (PT)    Rehab Potential (PT) good  -     Criteria for Skilled Interventions Met (PT) yes;skilled treatment is necessary  -     Therapy Frequency (PT) 3 times/wk  -       Row Name 07/07/25 1006          Vital Signs    O2 Delivery Pre Treatment room air  -SM     O2 Delivery Intra Treatment room air  -SM     O2 Delivery Post Treatment room air  -SM     Pre Patient Position Supine  -SM     Intra Patient Position Standing  -     Post Patient Position Sitting  -       Row Name 07/07/25 1006          Positioning and Restraints    Pre-Treatment Position in bed  -SM     Post Treatment Position chair  -SM     In Chair notified nsg;reclined;call light within reach;encouraged to call for assist;exit alarm on  -               User Key  (r) = Recorded By, (t) = Taken By, (c) = Cosigned By      Initials Name Provider Type     Elsi Segundo, PT Physical Therapist                   Outcome Measures       Row Name 07/07/25 1010          How much help from another person do you currently need...    Turning from your back to your side while in flat bed without using bedrails? 4  -SM     Moving from lying on back to sitting on the side of a flat bed without bedrails? 3  -SM     Moving to and from a bed to a chair (including a wheelchair)? 3  -SM      Standing up from a chair using your arms (e.g., wheelchair, bedside chair)? 3  -SM     Climbing 3-5 steps with a railing? 2  -SM     To walk in hospital room? 3  -SM     AM-PAC 6 Clicks Score (PT) 18  -     Highest Level of Mobility Goal Walk 10 Steps or More-6  -SM       Row Name 07/07/25 1010          Functional Assessment    Outcome Measure Options AM-PAC 6 Clicks Basic Mobility (PT)  -               User Key  (r) = Recorded By, (t) = Taken By, (c) = Cosigned By      Initials Name Provider Type    SM Elsi Segundo PT Physical Therapist                                 Physical Therapy Education       Title: PT OT SLP Therapies (In Progress)       Topic: Physical Therapy (Done)       Point: Mobility training (Done)       Learning Progress Summary            Patient Acceptance, E, VU,NR by  at 7/7/2025 1010                      Point: Home exercise program (Done)       Learning Progress Summary            Patient Acceptance, E, VU,NR by  at 7/7/2025 1010                      Point: Body mechanics (Done)       Learning Progress Summary            Patient Acceptance, E, VU,NR by  at 7/7/2025 1010                      Point: Precautions (Done)       Learning Progress Summary            Patient Acceptance, E, VU,NR by  at 7/7/2025 1010                                      User Key       Initials Effective Dates Name Provider Type Discipline     05/02/22 -  Elsi Segundo PT Physical Therapist PT                  PT Recommendation and Plan     Outcome Evaluation: Patient is a 70 y.o female who presented to [facility] with AMS. Patient AOx3 though moments of confusion noted with difficulty obtaining consistent PLOF. Per chart patient lives at home alone but has been at [facility] for rehab. Patient reports she recently has started using a rwx. Patient sat up to EOB with SBA and increased time. Patient stood from EOB with CGA and ambulated 150ft around unit with support of rwx. Gait slow and gaurded  with patient reporting fear of falling. No overt LOB noted. Patient reclined in chair at end of session. Patient would continue to benefit from skilled PT intervention to address deficits in functional mobility and maximize safety and independence. Acute PT will continue to monitor.     Time Calculation:         PT Charges       Row Name 07/07/25 1011             Time Calculation    Start Time 0833  -SM      Stop Time 0853  -SM      Time Calculation (min) 20 min  -SM      PT Received On 07/07/25  -      PT - Next Appointment 07/09/25  -      PT Goal Re-Cert Due Date 07/14/25  -         Time Calculation- PT    Total Timed Code Minutes- PT 10 minute(s)  -SM         Timed Charges    97059 - PT Therapeutic Activity Minutes 10  -SM         Total Minutes    Timed Charges Total Minutes 10  -SM       Total Minutes 10  -SM                User Key  (r) = Recorded By, (t) = Taken By, (c) = Cosigned By      Initials Name Provider Type     Elsi Segundo PT Physical Therapist                  Therapy Charges for Today       Code Description Service Date Service Provider Modifiers Qty    [id number]  PT THERAPEUTIC ACT EA 15 MIN 7/7/2025 Elsi Segundo, PT GP 1    [id number]  PT EVAL LOW COMPLEXITY 3 7/7/2025 Elsi Segundo, PT GP 1            PT G-Codes  Outcome Measure Options: AM-PAC 6 Clicks Basic Mobility (PT)  AM-PAC 6 Clicks Score (PT): 18  PT Discharge Summary  Anticipated Discharge Disposition (PT): home with assist, home with home health    Elsi Segundo PT  7/7/2025

## 2025-07-07 NOTE — PLAN OF CARE
Problem: Adult Inpatient Plan of Care  Goal: Plan of Care Review  Outcome: Progressing  Flowsheets  Taken 7/7/2025 1700 by Luz Queen RN Extern  Progress: improving (Pended)  Outcome Evaluation: Pt is alert and oriented with confusion. Pt complained of pain in her head and knees. Pt was given tylenol per order and given meds scheduled in MAR. No acute changes. Plan of care on going. (Pended)  Taken 7/7/2025 1100 by Kyle Polanco, Speech Therapy Student  Plan of Care Reviewed With: patient  Goal: Patient-Specific Goal (Individualized)  Outcome: Progressing  Goal: Absence of Hospital-Acquired Illness or Injury  Outcome: Progressing  Goal: Optimal Comfort and Wellbeing  Outcome: Progressing  Goal: Readiness for Transition of Care  Outcome: Progressing     Problem: Violence Risk or Actual  Goal: Anger and Impulse Control  Outcome: Progressing     Problem: Skin Injury Risk Increased  Goal: Skin Health and Integrity  Outcome: Progressing   Goal Outcome Evaluation:           Progress: (P) improving  Outcome Evaluation: (P) Pt is alert and oriented x4. Pt complained of pain in her head and knees. Pt was given tylenol per order and given meds scheduled in MAR. No acute changes. Plan of care on going.

## 2025-07-07 NOTE — PROGRESS NOTES
Dedicated to Hospital Care    [phone]   LOS: 1 day     Name: Sonya Marcus  Age/Sex: 70 y.o. female  :  1955        PCP: Regina Ferro APRN ([place])  Chief Complaint   Patient presents with    Altered Mental Status      Subjective   She is doing okay this morning.  Patient seen and evaluated while eating breakfast.  Denies new issues or complaints.  Getting ready to work with therapy.  She is oriented to self and place but not completely to situation after some coaching she did understand while she was in the hospital and describe the events that brought her in.  She was walking and got confused where she was and what was going on and the nurses took her back to her room and she was brought to the emergency room for ongoing confusion.  She has chronic urinary tract infections and is on MacrobidChronically at baseline.  Urinalysis in the emergency room was not consistent with active infection  General: No Fever or Chills, Cardiac: No Chest Pain or Palpitations, Resp: No Cough or SOA, GI: No Nausea, Vomiting, or Diarrhea, and Other: No bleeding    aspirin, 81 mg, Oral, Daily  buPROPion XL, 300 mg, Oral, Nightly  cefTRIAXone, 1,000 mg, Intravenous, Q24H  DULoxetine, 60 mg, Oral, BID  levothyroxine, 100 mcg, Oral, Q AM  Lidocaine, 1 patch, Transdermal, Q24H  melatonin, 2.5 mg, Oral, Nightly  pantoprazole, 40 mg, Oral, Q AM  rosuvastatin, 10 mg, Oral, Nightly      sodium chloride, 75 mL/hr, Last Rate: 75 mL/hr (25 5174)        Objective   Vital Signs  Temp:  [97.7 °F (36.5 °C)-98.7 °F (37.1 °C)] 97.7 °F (36.5 °C)  Heart Rate:  [66-73] 69  Resp:  [18] 18  BP: (130-163)/(59-90) 148/70  Body mass index is 30.33 kg/m².  No intake or output data in the 24 hours ending 25 1239    Physical Exam  On exam she is resting in the bed in no distress alert and oriented x 2  Respirations even nonlabored clear to auscultation anteriorly  Regular rate and rhythm  Abdomen soft nontender  Trace  edema    Results Review:       I reviewed the patient's new clinical results.  Results from last 7 days   Lab Units 07/07/25  0626 07/06/25  1241   WBC 10*3/mm3 4.49 5.99   HEMOGLOBIN g/dL 12.1 13.6   PLATELETS 10*3/mm3 276 334     Results from last 7 days   Lab Units 07/07/25  0626 07/06/25  1241   SODIUM mmol/L 140 143   POTASSIUM mmol/L 3.9 4.0   CHLORIDE mmol/L 107 107   CO2 mmol/L 23.6 25.4   BUN mg/dL 13.0 12.0   CREATININE mg/dL 0.65 0.74   CALCIUM mg/dL 9.0 10.2   MAGNESIUM mg/dL  --  2.0   Estimated Creatinine Clearance: 72.4 mL/min (by C-G formula based on SCr of 0.65 mg/dL).      Assessment & Plan   Active Hospital Problems    Diagnosis  POA    **AMS (altered mental status) [R41.82]  Yes      Resolved Hospital Problems   No resolved problems to display.       ASSESSMENT  This is a 70-year-old lady with a history of frequent urinary tract infections, hypertension, hypothyroidism, hyperlipidemia, polypharmacy, probable underlying dementia who presents to the hospital with confusion of unclear etiology and was admitted for concern for urinary tract infection  PLAN  This is not a urinary tract infection there is neither pyuria bacteriuria or any symptoms of a urinary tract infection  She is on chronic suppressive therapy I would recommend if this is planned to be continued that she follow-up with the urogynecologist in the outpatient setting.  Will also start her on some Estrace cream to help with this frequent urinary tract issues.  Labs and vital signs are stable today.  No other metabolic abnormalities to explain her confusion  My suspicion is that she probably had a little bit of delirium related to some mild underlying dementia.  She is near her baseline today but still seems a little sluggish and slow to respond.  Pneumonia and TSH levels were within normal limits will check a B12 in the morning.  Speech therapy evaluated the patient and feels there is probably more of an esophageal component to her  dysphagia we will check an esophagram in the morning.  The hope is that she continues to improve and can discharge back to skilled nursing facility tomorrow.  Given this is a readmission for altered mentation I like to observe her for 24 hours.          VTE Prophylaxis:  Mechanical VTE prophylaxis orders are present.      Code Status and Medical Interventions: CPR (Attempt to Resuscitate); Full   Ordered at: 07/06/25 1659     Code Status (Patient has no pulse and is not breathing):    CPR (Attempt to Resuscitate)     Medical Interventions (Patient has pulse or is breathing):    Full          Disposition  Expected Discharge Date: 7/8/2025; Expected Discharge Time:        Jeremy Navas MD  [facility]  07/07/25  12:39 EDT

## 2025-07-07 NOTE — H&P
HISTORY AND PHYSICAL   [facility]        Date of Admission: 2025  Patient Identification:  Name: Sonya Marcus  Age: 70 y.o.  Sex: female  :  1955  MRN: [id number]                     Primary Care Physician: Regina Ferro APRN ([place])    Chief Complaint:  70 year old female was sent in from rehab due to altered mental status; she was recentlyk admitted for polypharmacy and was taken off norco and xanax; she remains confused and is not able to give any history; no family is present with her at this time; history was obtained from the ED physician and chart; she was being treated for a uti    History of Present Illness:   As above    Past Medical History:  Past Medical History:   Diagnosis Date    Allergic     Anemia     Angina pectoris     Anxiety and depression     Arthralgia of right knee 09/10/2024    Arthritis     Carotid artery occlusion     Chronic urinary tract infection     CKD (chronic kidney disease)     Coronary arteriosclerosis     Coronary artery disease     Disease of thyroid gland     Fibromyalgia, primary 2000    Fractures 2024    Fell at home    Genitourinary syndrome of menopause     GERD (gastroesophageal reflux disease)     History of kidney stones     History of pulmonary embolism     AFTER HYSTERECTOMY    HL (hearing loss)     Hyperlipidemia     Hypertension     Hypothyroidism     Kidney stone     Left hip pain     Memory loss     MVP (mitral valve prolapse)     DR LUZ LAST OFFICE NOTE WITH CHART 2024    Obesity     Prediabetes     Right knee pain     Right shoulder pain     Sleep apnea     PT IS NOT USING CPAP    Substance abuse 2010    Hydrocodone and Oxycodone     Past Surgical History:  Past Surgical History:   Procedure Laterality Date    APPENDECTOMY      BREAST BIOPSY      COLONOSCOPY  2022    CYSTOSCOPY W/ URETERAL STENT PLACEMENT Left 2023    Procedure: LEFT CYSTOSCOPY URETERAL CATHETER/STENT  INSERTION;  Surgeon: Quinton Rosa MD;  Location: [facility] MAIN OR;  Service: Urology;  Laterality: Left;    EYE SURGERY      HYSTERECTOMY      JOINT REPLACEMENT  2021 & 2024    REPLACEMENT TOTAL KNEE Left 2021    SHOULDER SURGERY Right     TONSILLECTOMY      TOTAL KNEE ARTHROPLASTY Right 08/22/2024    Procedure: TOTAL KNEE ARTHROPLASTY WITH CORI ROBOT;  Surgeon: Steven Sumner II, MD;  Location:  [facility];  Service: Robotics - Ortho;  Laterality: Right;      Home Meds:  Medications Prior to Admission   Medication Sig Dispense Refill Last Dose/Taking    acetaminophen (TYLENOL) 500 MG tablet Take 1 tablet by mouth Every 6 (Six) Hours As Needed for Mild Pain.   7/6/2025    aspirin 81 MG EC tablet Take 1 tablet by mouth Daily.   7/6/2025 Morning    buPROPion XL (WELLBUTRIN XL) 150 MG 24 hr tablet Take 2 tablets by mouth Every Night.   7/5/2025 Bedtime    cyanocobalamin 1000 MCG/ML injection Inject 1 mL under the skin into the appropriate area as directed Every 30 (Thirty) Days.   Past Week    docusate sodium (Colace) 100 MG capsule Take 1 capsule by mouth Daily. 90 capsule 3 7/5/2025 Morning    DULoxetine (CYMBALTA) 60 MG capsule Take 1 capsule by mouth 2 (Two) Times a Day. 180 capsule 1 7/6/2025 Morning    hydrOXYzine (ATARAX) 25 MG tablet Take 1 tablet by mouth At Night As Needed for Anxiety.   7/5/2025 Bedtime    levothyroxine (SYNTHROID, LEVOTHROID) 100 MCG tablet Take 1 tablet by mouth Every Morning.   7/6/2025 Morning    nitrofurantoin (MACRODANTIN) 50 MG capsule Take 1 capsule by mouth Every Night. 90 capsule 1 7/5/2025 Bedtime    omeprazole (priLOSEC) 20 MG capsule Take 1 capsule by mouth Daily. 90 capsule 1 7/6/2025 Morning    rosuvastatin (CRESTOR) 10 MG tablet Take 1 tablet by mouth Every Night. 90 tablet 1 7/5/2025 Bedtime    cetirizine (zyrTEC) 5 MG tablet Take 1 tablet by mouth Daily.   Unknown    ondansetron ODT (ZOFRAN-ODT) 4 MG disintegrating tablet Place 1-2 tablets on the tongue  Every 8 (Eight) Hours As Needed for Nausea or Vomiting. 90 tablet 0 Unknown    polyethylene glycol (MIRALAX) 17 g packet Take 17 g by mouth Daily As Needed (Use if senna-docusate is ineffective).   Unknown    sennosides-docusate (PERICOLACE) 8.6-50 MG per tablet Take 2 tablets by mouth 2 (Two) Times a Day As Needed for Constipation.   Unknown       Allergies:  Allergies   Allergen Reactions    Fish Allergy Anaphylaxis     Has epi pen    Iodine Anaphylaxis    Penicillins Nausea And Vomiting and Rash     Tolerated rocephin 9/2023 admission    Prochlorperazine Dystonia, Anaphylaxis, Rash and Other (See Comments)     Paralysis    Muscle twisting    Shellfish Allergy Anaphylaxis, Rash and Swelling    Shellfish-Derived Products Anaphylaxis    Sulfate Rash    Glycerol, Iodinated Unknown - Low Severity     Anaphylaxis   (also to shellfish)    Octacosanol Dizziness     HEADACHE    Latex Rash    Levofloxacin Hives    Sulfa Antibiotics GI Intolerance     Immunizations:  Immunization History   Administered Date(s) Administered    Fluzone High-Dose 65+YRS 11/01/2023    Hep A / Hep B 01/10/1976    Hep B / HiB 01/01/2005    Hepatitis B 2 Dose Vaccine Heplisav-B 11/01/2023    INFLUENZA A MONOVALENT (H5N1), ADJUVANTED-2013 09/10/2023    INFLUENZA NASAL UF 10/10/2023    Influenza, Unspecified 10/02/2023    Pneumococcal Conjugate Unspecified 01/01/2022    Pneumococcal, Unspecified 01/01/2022    Td (TDVAX) 01/01/2015    Td, Not Adsorbed 01/01/2015     Social History:   Social History     Social History Narrative    Not on file     Social History     Socioeconomic History    Marital status:    Tobacco Use    Smoking status: Never     Passive exposure: Never    Smokeless tobacco: Never   Vaping Use    Vaping status: Never Used   Substance and Sexual Activity    Alcohol use: Yes     Alcohol/week: 2.0 standard drinks of alcohol     Types: 2 Glasses of wine per week     Comment: OCCASIONALLY    Drug use: Not Currently     Types:  Benzodiazepines, Hydrocodone, Oxycodone     Comment:     Sexual activity: Not Currently     Partners: Male     Birth control/protection: Post-menopausal     Comment: Post Menopausal with no immediate partner       Family History:  Family History   Problem Relation Age of Onset    Stroke Sister     Anxiety disorder Sister         +son & granddaughter    Breast cancer Daughter     Breast cancer Daughter     Stroke Maternal Grandmother     Arthritis Mother     Asthma Mother     Depression Mother     Arthritis Father     Heart disease Father     Hyperlipidemia Father     Developmental Disability Paternal Grandfather     Thyroid disease Paternal Grandmother     Drug abuse Brother     Malig Hyperthermia Neg Hx         Review of Systems  Not obtainable from the patient    Objective:  T Max 24 hrs: Temp (24hrs), Av.9 °F (36.6 °C), Min:97.7 °F (36.5 °C), Max:98.1 °F (36.7 °C)    Vitals Ranges:   Temp:  [97.7 °F (36.5 °C)-98.1 °F (36.7 °C)] 98.1 °F (36.7 °C)  Heart Rate:  [66-73] 70  Resp:  [16-18] 18  BP: (130-169)/(59-90) 130/90      Exam:  /90 (BP Location: Right arm, Patient Position: Lying)   Pulse 70   Temp 98.1 °F (36.7 °C) (Oral)   Resp 18   SpO2 98%     General Appearance:    Alert, cooperative, no distress, appears stated age   Head:    Normocephalic, without obvious abnormality, atraumatic   Eyes:    PERRL, conjunctivae/corneas clear, EOM's intact, both eyes   Ears:    Normal external ear canals, both ears   Nose:   Nares normal, septum midline, mucosa normal, no drainage    or sinus tenderness   Throat:   Lips, mucosa, and tongue normal   Neck:   Supple, symmetrical, trachea midline, no adenopathy;     thyroid:  no enlargement/tenderness/nodules; no carotid    bruit or JVD   Back:     Symmetric, no curvature, ROM normal, no CVA tenderness   Lungs:     Clear to auscultation bilaterally, respirations unlabored   Chest Wall:    No tenderness or deformity    Heart:    Regular rate and rhythm, S1 and  S2 normal, no murmur, rub   or gallop   Abdomen:     Soft, nontender, bowel sounds active all four quadrants,     no masses, no hepatomegaly, no splenomegaly   Extremities:   Extremities normal, atraumatic, no cyanosis or edema                       .    Data Review:  Labs in chart were reviewed.  WBC   Date Value Ref Range Status   07/06/2025 5.99 3.40 - 10.80 10*3/mm3 Final     Hemoglobin   Date Value Ref Range Status   07/06/2025 13.6 12.0 - 15.9 g/dL Final     Hematocrit   Date Value Ref Range Status   07/06/2025 42.2 34.0 - 46.6 % Final     Platelets   Date Value Ref Range Status   07/06/2025 334 140 - 450 10*3/mm3 Final     Sodium   Date Value Ref Range Status   07/06/2025 143 136 - 145 mmol/L Final     Potassium   Date Value Ref Range Status   07/06/2025 4.0 3.5 - 5.2 mmol/L Final     Chloride   Date Value Ref Range Status   07/06/2025 107 98 - 107 mmol/L Final     CO2   Date Value Ref Range Status   07/06/2025 25.4 22.0 - 29.0 mmol/L Final     BUN   Date Value Ref Range Status   07/06/2025 12.0 8.0 - 23.0 mg/dL Final     Creatinine   Date Value Ref Range Status   07/06/2025 0.74 0.57 - 1.00 mg/dL Final     Glucose   Date Value Ref Range Status   07/06/2025 83 65 - 99 mg/dL Final     Calcium   Date Value Ref Range Status   07/06/2025 10.2 8.6 - 10.5 mg/dL Final     Magnesium   Date Value Ref Range Status   07/06/2025 2.0 1.6 - 2.4 mg/dL Final     AST (SGOT)   Date Value Ref Range Status   07/06/2025 12 1 - 32 U/L Final     ALT (SGPT)   Date Value Ref Range Status   07/06/2025 11 1 - 33 U/L Final     Alkaline Phosphatase   Date Value Ref Range Status   07/06/2025 66 39 - 117 U/L Final       Results from last 7 days   Lab Units 07/06/25  1241   TSH uIU/mL 3.940          Imaging Results (All)       Procedure Component Value Units Date/Time    CT Head Without Contrast - Preliminary [[id number]] Collected: 07/06/25 1529     Updated: 07/06/25 1529    This result has not been signed. Information might be incomplete.       Narrative:      CT HEAD     HISTORY: Altered status     TECHNIQUE: CT scan of the head was obtained with 3 mm axial images  without intravenous contrast.  Radiation dose reduction techniques were  utilized, including automated exposure control and exposure modulation  based on body size.     COMPARISON: CT head dated back to 1/15/2024     FINDINGS:  There is no finding of acute infarct, hemorrhage, contusion or abnormal  extra-axial collection. No hydrocephalus is present.       Impression:      1.  No noncontrast CT findings of acute intracranial abnormality.                XR Chest 1 View [[id number]] Collected: 07/06/25 1312     Updated: 07/06/25 1317    Narrative:      XR CHEST 1 VW-        INDICATION: Altered mental status     COMPARISON: Chest radiograph February 24, 2025     TECHNIQUE: 1 view chest     FINDINGS:      Low lung volumes. Vascular crowding. Small linear opacities at the  bases. No effusions. Stable mediastinum. Heart is normal in size for AP  technique. Right humeral head anchors.       Impression:      Low lung volumes with some suspected subsegmental atelectasis at the  bases     This report was finalized on 7/6/2025 1:14 PM by Dr. Guido Smith M.D  on Workstation: [id number]                 Assessment:  Active Hospital Problems    Diagnosis  POA    **AMS (altered mental status) [R41.82]  Yes      Resolved Hospital Problems   No resolved problems to display.   Cad  Hypertension  Polypharmacy  Hypothyroidism  Prediabetes  Uti      Plan:  Will continue with rocephin for now  Hold hydroxyzine and change librium to prn  Fluids  Trend labs  Monitor on telemetry  Dw ed provider  Patient is full code and daughter is corey Kevin Nick Meng MD  7/6/2025  22:00 EDT

## 2025-07-07 NOTE — THERAPY EVALUATION
Acute Care - Speech Language Pathology   Swallow Initial Evaluation [facility]     Patient Name: Sonya Marcus  : 1955  MRN: [id number]  Today's Date: 2025               Admit Date: 2025    Visit Dx:     ICD-10-CM ICD-9-CM   1. Altered mental status, unspecified altered mental status type  R41.82 780.97     Patient Active Problem List   Diagnosis    Altered mental status    Polypharmacy    Positive urine drug screen    MICHAEL (acute kidney injury)    Left ureteral stone    Functional tremor    Knee joint replacement status    Fibromyalgia, primary    Coronary arteriosclerosis    Hypothyroidism    Genitourinary syndrome of menopause    Hyperlipidemia    Osteoarthritis of right knee    Pernicious anemia    Urinary tract infection in female    Absolute anemia    Gastroesophageal reflux disease    Angina pectoris    Anxiety disorder    Arthritis or polyarthritis, rheumatoid    Avitaminosis D    B12 deficiency    Bilateral primary osteoarthritis of knee    BP (high blood pressure)    Cardiac murmur    Stage 2 chronic kidney disease    Aftercare for healing traumatic closed fracture of right lower extremity    Delayed union of tibial shaft fracture    Generalized weakness    Opioid dependence    Dehydration    Hypokalemia    Fall    UTI (urinary tract infection), bacterial    Metabolic encephalopathy    AMS (altered mental status)     Past Medical History:   Diagnosis Date    Allergic     Anemia     Angina pectoris     Anxiety and depression     Arthralgia of right knee 09/10/2024    Arthritis     Carotid artery occlusion     Chronic urinary tract infection     CKD (chronic kidney disease)     Coronary arteriosclerosis     Coronary artery disease     Disease of thyroid gland     Fibromyalgia, primary 2000    Fractures 2024    Fell at home    Genitourinary syndrome of menopause     GERD (gastroesophageal reflux disease)     History of kidney stones     History of pulmonary embolism      AFTER HYSTERECTOMY    HL (hearing loss) 2024    Hyperlipidemia 2020    Hypertension     Hypothyroidism     Kidney stone 1972    Left hip pain     Memory loss     MVP (mitral valve prolapse)     DR LUZ LAST OFFICE NOTE WITH CHART 7/30/2024    Obesity     Prediabetes     Right knee pain     Right shoulder pain     Sleep apnea     PT IS NOT USING CPAP    Substance abuse 2010    Hydrocodone and Oxycodone     Past Surgical History:   Procedure Laterality Date    APPENDECTOMY      BREAST BIOPSY      COLONOSCOPY  01/01/2022    CYSTOSCOPY W/ URETERAL STENT PLACEMENT Left 09/05/2023    Procedure: LEFT CYSTOSCOPY URETERAL CATHETER/STENT INSERTION;  Surgeon: Quinton Rosa MD;  Location: [facility] OR;  Service: Urology;  Laterality: Left;    EYE SURGERY      HYSTERECTOMY      JOINT REPLACEMENT  2021 & 2024    REPLACEMENT TOTAL KNEE Left 2021    SHOULDER SURGERY Right     TONSILLECTOMY      TOTAL KNEE ARTHROPLASTY Right 08/22/2024    Procedure: TOTAL KNEE ARTHROPLASTY WITH CORI ROBOT;  Surgeon: Steven Sumner II, MD;  Location: [facility] [place];  Service: Robotics - Ortho;  Laterality: Right;       SLP Recommendation and Plan     SLP Diet Recommendation: soft to chew textures, chopped, thin liquids (07/07/25 1030)  Recommended Precautions and Strategies: upright posture during/after eating, small bites of food and sips of liquid, alternate between small bites of food and sips of liquid (07/07/25 1030)  SLP Rec. for Method of Medication Administration: meds whole, as tolerated (07/07/25 1030)     Monitor for Signs of Aspiration: notify SLP if any concerns (07/07/25 1030)  Recommended Diagnostics: No further SLP services recommended (07/07/25 1030)           Therapy Frequency (Swallow): evaluation only (07/07/25 1030)     Oral Care Recommendations: Oral Care BID/PRN (07/07/25 1030)  Demonstrates Need for Referral to Another Service: gastroenterology (07/07/25 1030)                                  "    Outcome Evaluation: Bedside swallow evaluation conducted. Pt c/o solid foods feeling \"stuck\" below sternal notch when swallowing. Pt is endentulous and does not have dentures anymore after losing previous set, states she is able to eat softer foods without difficulty chewing. Thins via cup/straw, puree, mixed, and regular solids were trialed. No overt s/s of aspiration with any consistency tested. No voice change noted throughout evaluation. Pt spoke with a soft/quiet voice throughout and exhibited slight SOB when eating, both of which pt states is baseline. SLP recs soft chopped and thins. Meds as tolerated. Upright when eating/drinking. Small bites and sips. Alternate food and drinks. Refer to GI if difficulty with swallowing presists. ST to s/o.      SWALLOW EVALUATION (Last 72 Hours)       SLP Adult Swallow Evaluation       Row Name 07/07/25 9450                   Rehab Evaluation    Document Type evaluation  -AW (r) KS (t) AW (c)        Subjective Information complains of;pain  -AW (r) KS (t) AW (c)        Patient Observations alert;cooperative  -AW (r) KS (t) AW (c)        Patient Effort good  -AW (r) KS (t) AW (c)        Symptoms Noted During/After Treatment none  -AW (r) KS (t) AW (c)           General Information    Patient Profile Reviewed yes  -AW (r) KS (t) AW (c)        Pertinent History Of Current Problem Admitted for altered mental status. CT head clear. Previously being treated for UTI. Recent admit for polypharmacy.  -AW (r) KS (t) AW (c)        Current Method of Nutrition regular textures;thin liquids  -AW (r) KS (t) AW (c)        Precautions/Limitations, Vision WFL;for purposes of eval  -AW (r) KS (t) AW (c)        Precautions/Limitations, Hearing WFL;for purposes of eval  -AW (r) KS (t) AW (c)        Prior Level of Function-Communication unknown  -AW (r) KS (t) AW (c)        Prior Level of Function-Swallowing no diet consistency restrictions;other (see comments)  complains of difficulty with " "solids feeling \"stuck\"  -AW (r) KS (t) AW (c)        Plans/Goals Discussed with patient  -AW (r) KS (t) AW (c)           Pain    Pretreatment Pain Rating 7/10  -AW (r) KS (t) AW (c)        Posttreatment Pain Rating 7/10  -AW (r) KS (t) AW (c)        Pain Location knee  -AW (r) KS (t) AW (c)        Pain Side/Orientation bilateral;generalized  -AW (r) KS (t) AW (c)        Pain Management Interventions positioning techniques utilized  -AW           Oral Motor Structure and Function    Dentition Assessment edentulous, does not have dentures;other (see comments)  previously had dentures but lost them and have not gotten new ones  -AW (r) KS (t) AW (c)        Secretion Management WNL/WFL  -AW        Mucosal Quality moist, healthy  -AW           Oral Musculature and Cranial Nerve Assessment    Oral Motor General Assessment WFL  -AW (r) KS (t) AW (c)           General Eating/Swallowing Observations    Respiratory Support Currently in Use room air  -AW (r) KS (t) AW (c)        Eating/Swallowing Skills fed by SLP;self-fed  -AW (r) KS (t) AW (c)        Positioning During Eating upright in chair  -AW (r) KS (t) AW (c)        Utensils Used spoon;cup;straw  -AW (r) KS (t) AW (c)        Consistencies Trialed thin liquids;pureed;mixed consistency;soft to chew textures;regular textures  -AW (r) KS (t) AW (c)           Clinical Swallow Eval    Clinical Swallow Evaluation Summary Bedside swallow evaluation conducted. Pt c/o solid foods feeling \"stuck\" below sternal notch when swallowing. Pt is endentulous and does not have dentures anymore after losing previous set, states she is able to eat softer foods without difficulty chewing. Thins via cup/straw, puree, mixed, and regular solids were trialed. No overt s/s of aspiration with any consistency tested. No voice change noted throughout evaluation. Pt spoke with a soft/quiet voice throughout and exhibited slight SOB when eating, both of which pt states is baseline. SLP recs soft " chopped and thins. Meds as tolerated. Upright when eating/drinking. Small bites and sips. Alternate food and drinks. Refer to GI if difficulty with swallowing presists. ST to s/o.  -AW (r) KS (t) AW (c)           SLP Evaluation Clinical Impression    SLP Swallowing Diagnosis suspected esophageal dysphagia;functional oral phase;functional pharyngeal phase  -AW        Functional Impact risk of aspiration/pneumonia  -AW (r) KS (t) AW (c)        Swallow Criteria for Skilled Therapeutic Interventions Met no problems identified which require skilled intervention  -AW           Recommendations    Therapy Frequency (Swallow) evaluation only  -AW (r) KS (t) AW (c)        SLP Diet Recommendation soft to chew textures;chopped;thin liquids  -AW (r) KS (t) AW (c)        Recommended Diagnostics No further SLP services recommended  -AW (r) KS (t) AW (c)        Recommended Precautions and Strategies upright posture during/after eating;small bites of food and sips of liquid;alternate between small bites of food and sips of liquid  -AW (r) KS (t) AW (c)        Oral Care Recommendations Oral Care BID/PRN  -AW (r) KS (t) AW (c)        SLP Rec. for Method of Medication Administration meds whole;as tolerated  -AW (r) KS (t) AW (c)        Monitor for Signs of Aspiration notify SLP if any concerns  -AW (r) KS (t) AW (c)        Demonstrates Need for Referral to Another Service gastroenterology  -AW (r) KS (t) AW (c)                  User Key  (r) = Recorded By, (t) = Taken By, (c) = Cosigned By      Initials Name Effective Dates    Vanesa William, SLP 08/28/23 -     Kyle Chan, Speech Therapy Student 05/13/25 -                     EDUCATION  The patient has been educated in the following areas:   Dysphagia (Swallowing Impairment).                Time Calculation:    Time Calculation- SLP       Row Name 07/07/25 5428             Time Calculation- SLP    SLP Start Time 1030  -CHER (r) KS (t) CHER (c)      SLP Received On 07/07/25  -CHER (r)  KS (t) CHER (c)                User Key  (r) = Recorded By, (t) = Taken By, (c) = Cosigned By      Initials Name Provider Type    Vanesa William, SLP Speech and Language Pathologist    Kyle Chan, Speech Therapy Student SLP Student                    Therapy Charges for Today       Code Description Service Date Service Provider Modifiers Qty    [id number] HC ST EVAL ORAL PHARYNG SWALLOW 4 7/7/2025 Kyle Polanco, Speech Therapy Student GN 1                 Kyle Polanco Speech Therapy Student  7/7/2025

## 2025-07-07 NOTE — PLAN OF CARE
Goal Outcome Evaluation:  Pt is alert alert and oriented. Pr report a generalized knee pain. Pain medication given per order. Pain relief for

## 2025-07-07 NOTE — PLAN OF CARE
"Goal Outcome Evaluation:  Plan of Care Reviewed With: (P) patient           Outcome Evaluation: (P) Bedside swallow evaluation conducted. Pt c/o solid foods feeling \"stuck\" below sternal notch when swallowing. Pt is endentulous and does not have dentures anymore after losing previous set, states she is able to eat softer foods without difficulty chewing. Thins via cup/straw, puree, mixed, and regular solids were trialed. No overt s/s of aspiration with any consistency tested. No voice change noted throughout evaluation. Pt spoke with a soft/quiet voice throughout and exhibited slight SOB when eating, both of which pt states is baseline. SLP recs soft chopped and thins. Meds as tolerated. Upright when eating/drinking. Small bites and sips. Alternate food and drinks. Refer to GI if difficulty with swallowing presists. ST to s/o.               SLP Swallowing Diagnosis: (P) swallow WFL/no suspected pharyngeal impairment, suspected esophageal dysphagia (07/07/25 1030)             "

## 2025-07-07 NOTE — PLAN OF CARE
Goal Outcome Evaluation:  Plan of Care Reviewed With: patient           Outcome Evaluation: Patient is a 70 y.o female who presented to [facility] with AMS. Patient AOx3 though moments of confusion noted with difficulty obtaining consistent PLOF. Per chart patient lives at home alone but has been at [facility] for rehab. Patient reports she recently has started using a rwx. Patient sat up to EOB with SBA and increased time. Patient stood from EOB with CGA and ambulated 150ft around unit with support of rwx. Gait slow and gaurded with patient reporting fear of falling. No overt LOB noted. Patient reclined in chair at end of session. Patient would continue to benefit from skilled PT intervention to address deficits in functional mobility and maximize safety and independence. Acute PT will continue to monitor.    Anticipated Discharge Disposition (PT): home with assist, home with home health

## 2025-07-08 PROBLEM — K20.90 ESOPHAGITIS: Status: ACTIVE | Noted: 2025-07-08

## 2025-07-08 PROBLEM — R13.19 ESOPHAGEAL DYSPHAGIA: Status: ACTIVE | Noted: 2025-07-08

## 2025-07-08 LAB
ALBUMIN SERPL-MCNC: 3.5 G/DL (ref 3.5–5.2)
ANION GAP SERPL CALCULATED.3IONS-SCNC: 11 MMOL/L (ref 5–15)
BUN SERPL-MCNC: 9 MG/DL (ref 8–23)
BUN/CREAT SERPL: 13.6 (ref 7–25)
CALCIUM SPEC-SCNC: 8.9 MG/DL (ref 8.6–10.5)
CHLORIDE SERPL-SCNC: 108 MMOL/L (ref 98–107)
CO2 SERPL-SCNC: 22 MMOL/L (ref 22–29)
CREAT SERPL-MCNC: 0.66 MG/DL (ref 0.57–1)
DEPRECATED RDW RBC AUTO: 58.9 FL (ref 37–54)
EGFRCR SERPLBLD CKD-EPI 2021: 93.9 ML/MIN/1.73
ERYTHROCYTE [DISTWIDTH] IN BLOOD BY AUTOMATED COUNT: 16.3 % (ref 12.3–15.4)
GLUCOSE SERPL-MCNC: 107 MG/DL (ref 65–99)
HCT VFR BLD AUTO: 37.2 % (ref 34–46.6)
HGB BLD-MCNC: 11.8 G/DL (ref 12–15.9)
MAGNESIUM SERPL-MCNC: 1.9 MG/DL (ref 1.6–2.4)
MCH RBC QN AUTO: 30.9 PG (ref 26.6–33)
MCHC RBC AUTO-ENTMCNC: 31.7 G/DL (ref 31.5–35.7)
MCV RBC AUTO: 97.4 FL (ref 79–97)
PHOSPHATE SERPL-MCNC: 3.7 MG/DL (ref 2.5–4.5)
PLATELET # BLD AUTO: 271 10*3/MM3 (ref 140–450)
PMV BLD AUTO: 10.2 FL (ref 6–12)
POTASSIUM SERPL-SCNC: 3.6 MMOL/L (ref 3.5–5.2)
RBC # BLD AUTO: 3.82 10*6/MM3 (ref 3.77–5.28)
SODIUM SERPL-SCNC: 141 MMOL/L (ref 136–145)
WBC NRBC COR # BLD AUTO: 4.21 10*3/MM3 (ref 3.4–10.8)

## 2025-07-08 PROCEDURE — 85027 COMPLETE CBC AUTOMATED: CPT | Performed by: HOSPITALIST

## 2025-07-08 PROCEDURE — 63710000001 ONDANSETRON ODT 4 MG TABLET DISPERSIBLE: Performed by: INTERNAL MEDICINE

## 2025-07-08 PROCEDURE — G0378 HOSPITAL OBSERVATION PER HR: HCPCS

## 2025-07-08 PROCEDURE — 83735 ASSAY OF MAGNESIUM: CPT | Performed by: HOSPITALIST

## 2025-07-08 PROCEDURE — 80069 RENAL FUNCTION PANEL: CPT | Performed by: HOSPITALIST

## 2025-07-08 PROCEDURE — 99214 OFFICE O/P EST MOD 30 MIN: CPT

## 2025-07-08 RX ORDER — BUSPIRONE HYDROCHLORIDE 5 MG/1
5 TABLET ORAL ONCE
Status: COMPLETED | OUTPATIENT
Start: 2025-07-08 | End: 2025-07-08

## 2025-07-08 RX ORDER — CHLORDIAZEPOXIDE HYDROCHLORIDE 25 MG/1
25 CAPSULE, GELATIN COATED ORAL 2 TIMES DAILY
Status: DISCONTINUED | OUTPATIENT
Start: 2025-07-08 | End: 2025-07-09 | Stop reason: HOSPADM

## 2025-07-08 RX ORDER — CHLORDIAZEPOXIDE HYDROCHLORIDE 5 MG/1
10 CAPSULE, GELATIN COATED ORAL 2 TIMES DAILY
Status: DISCONTINUED | OUTPATIENT
Start: 2025-07-08 | End: 2025-07-08

## 2025-07-08 RX ADMIN — ACETAMINOPHEN 325MG 650 MG: 325 TABLET ORAL at 14:21

## 2025-07-08 RX ADMIN — CHLORDIAZEPOXIDE HYDROCHLORIDE 25 MG: 25 CAPSULE ORAL at 21:18

## 2025-07-08 RX ADMIN — ONDANSETRON 4 MG: 4 TABLET, ORALLY DISINTEGRATING ORAL at 05:11

## 2025-07-08 RX ADMIN — ASPIRIN 81 MG: 81 TABLET, COATED ORAL at 09:36

## 2025-07-08 RX ADMIN — ROSUVASTATIN CALCIUM 10 MG: 10 TABLET, FILM COATED ORAL at 21:14

## 2025-07-08 RX ADMIN — ACETAMINOPHEN 325MG 650 MG: 325 TABLET ORAL at 18:02

## 2025-07-08 RX ADMIN — BUPROPION HYDROCHLORIDE 300 MG: 150 TABLET, EXTENDED RELEASE ORAL at 21:13

## 2025-07-08 RX ADMIN — ACETAMINOPHEN 325MG 650 MG: 325 TABLET ORAL at 09:36

## 2025-07-08 RX ADMIN — Medication 2.5 MG: at 21:18

## 2025-07-08 RX ADMIN — PANTOPRAZOLE SODIUM 40 MG: 40 TABLET, DELAYED RELEASE ORAL at 05:28

## 2025-07-08 RX ADMIN — ACETAMINOPHEN 325MG 650 MG: 325 TABLET ORAL at 21:39

## 2025-07-08 RX ADMIN — ACETAMINOPHEN 325MG 650 MG: 325 TABLET ORAL at 01:11

## 2025-07-08 RX ADMIN — BUSPIRONE HYDROCHLORIDE 5 MG: 5 TABLET ORAL at 17:58

## 2025-07-08 RX ADMIN — CHLORDIAZEPOXIDE HYDROCHLORIDE 25 MG: 25 CAPSULE ORAL at 06:27

## 2025-07-08 RX ADMIN — CHLORDIAZEPOXIDE HYDROCHLORIDE 25 MG: 25 CAPSULE ORAL at 13:04

## 2025-07-08 RX ADMIN — DULOXETINE 60 MG: 30 CAPSULE, DELAYED RELEASE ORAL at 09:36

## 2025-07-08 RX ADMIN — ACETAMINOPHEN 325MG 650 MG: 325 TABLET ORAL at 05:33

## 2025-07-08 RX ADMIN — LEVOTHYROXINE SODIUM 100 MCG: 0.1 TABLET ORAL at 05:28

## 2025-07-08 RX ADMIN — DULOXETINE 60 MG: 30 CAPSULE, DELAYED RELEASE ORAL at 21:13

## 2025-07-08 NOTE — CONSULTS
Chp visited Pt regarding request for Advance Directive. RN confirmed Pt is decisional. Pt stated she was not feeling well and asked Chp to visit at a later time. Chp remains available.

## 2025-07-08 NOTE — PROGRESS NOTES
Dedicated to Hospital Care    [phone]   LOS: 1 day     Name: Sonya Marcus  Age/Sex: 70 y.o. female  :  1955        PCP: Regina Ferro APRN ([place])  Chief Complaint   Patient presents with    Altered Mental Status      Subjective   Called by the nurse this morning for the patient crying in pain.  She be given Tylenol without any relief.  On my arrival to the room the patient was sleeping soundly and awakened easily.  She complains of pain in her knees and her arms has chronic pain that she has had for a long time she cannot tell me what she takes at the nursing home or at home for this pain.  She has a history of polypharmacy and was titrated off of hydrocodone following her last admission.  She has remained on her as needed Librium.    General: No Fever or Chills, Cardiac: No Chest Pain or Palpitations, Resp: No Cough or SOA, GI: No Nausea, Vomiting, or Diarrhea, and Other: No bleeding    aspirin, 81 mg, Oral, Daily  barium sulfate, 700 mg, Oral, Once  barium sulfate, 183 mL, Oral, Once in imaging  barium sulfate, 135 mL, Oral, Once in imaging  buPROPion XL, 300 mg, Oral, Nightly  DULoxetine, 60 mg, Oral, BID  levothyroxine, 100 mcg, Oral, Q AM  Lidocaine, 1 patch, Transdermal, Q24H  melatonin, 2.5 mg, Oral, Nightly  pantoprazole, 40 mg, Oral, Q AM  rosuvastatin, 10 mg, Oral, Nightly  sod bicarb-citric acid-simethicone, 1 packet, Oral, Once in imaging         Objective   Vital Signs  Temp:  [97.7 °F (36.5 °C)-98.4 °F (36.9 °C)] 97.9 °F (36.6 °C)  Heart Rate:  [67-84] 67  Resp:  [20-22] 22  BP: (117-138)/(70-81) 138/74  Body mass index is 29.99 kg/m².    Intake/Output Summary (Last 24 hours) at 2025 0977  Last data filed at 2025 2134  Gross per 24 hour   Intake 100 ml   Output --   Net 100 ml       Physical Exam  Sleeping on my arrival but after awakening was awake alert and oriented x 4  Clear to auscultation anteriorly  Respirations even nonlabored  Clear to auscultation  anteriorly  Abdomen is soft nontender nondistended she deferred additional physical exam and just kept telling me to call her daughter with additional questioning her request to examine further      Results Review:       I reviewed the patient's new clinical results.  Results from last 7 days   Lab Units 07/08/25  0534 07/07/25  0626 07/06/25  1241   WBC 10*3/mm3 4.21 4.49 5.99   HEMOGLOBIN g/dL 11.8* 12.1 13.6   PLATELETS 10*3/mm3 271 276 334     Results from last 7 days   Lab Units 07/08/25  0534 07/07/25  0626 07/06/25  1241   SODIUM mmol/L 141 140 143   POTASSIUM mmol/L 3.6 3.9 4.0   CHLORIDE mmol/L 108* 107 107   CO2 mmol/L 22.0 23.6 25.4   BUN mg/dL 9.0 13.0 12.0   CREATININE mg/dL 0.66 0.65 0.74   CALCIUM mg/dL 8.9 9.0 10.2   MAGNESIUM mg/dL 1.9  --  2.0   PHOSPHORUS mg/dL 3.7  --   --    Estimated Creatinine Clearance: 71 mL/min (by C-G formula based on SCr of 0.66 mg/dL).      Assessment & Plan   Active Hospital Problems    Diagnosis  POA    **AMS (altered mental status) [R41.82]  Yes    Esophageal dysphagia [R13.19]  Unknown    Esophagitis [K20.90]  Unknown    BP (high blood pressure) [I10]  Yes    Cardiac murmur [R01.1]  Yes    Stage 2 chronic kidney disease [N18.2]  Yes    Coronary arteriosclerosis [I25.10]  Yes    Bilateral primary osteoarthritis of knee [M17.0]  Yes    Polypharmacy [Z79.899]  Not Applicable    Hypothyroidism [E03.9]  Yes    Hyperlipidemia [E78.5]  Yes    Anxiety disorder [F41.9]  Yes    Gastroesophageal reflux disease [K21.9]  Yes      Resolved Hospital Problems   No resolved problems to display.       ASSESSMENT  This is a 70-year-old lady with previous history of polypharmacy, probable underlying mild dementia, previous urinary tract infections, hypertension, hypothyroidism and hyperlipidemia who presented to the hospital with confusion and concern for development of a urinary tract infection.  She had no symptoms of UTI on admission other than the confusion.  Her urinalysis was  geovanna and she was admitted to the hospital for further workup of her encephalopathy.  Edited by: Jeremy Navas MD at 7/8/2025 0858   PLAN  I still feel like she probably has some mild underlying dementia and this probably represented delirium.  The speech therapy evaluation was reviewed yesterday and esophagram completed.  She has a possible erosion or ulcer within the hiatal hernia seen on the esophagram and also has changes of esophagitis.  She has a history Schatzki's ring and has had previous EGDs in the past I think it warrants additional evaluation and we will get gastroenterology's input and opinion.  Continue on antireflux medications and reflux precautions  Will call and touch base with her daughter later today    Addendum spoke to her daughter on the phone today.  Her Librium has been scheduled at the nursing home 50 mg daily split 25 twice daily.  Will schedule that here she did receive a dose yesterday and 1 dose this morning.  The daughter thinks she should probably have the procedure done while she is here in the hospital will let GI know about the possible EGD.  Her daughters are coming in to speak with her about it this afternoon.  They are planning testing for dementia but they are waiting for her to be titrated off of all the medications before evaluation    VTE Prophylaxis:  Mechanical VTE prophylaxis orders are present.      Code Status and Medical Interventions: CPR (Attempt to Resuscitate); Full   Ordered at: 07/06/25 165     Code Status (Patient has no pulse and is not breathing):    CPR (Attempt to Resuscitate)     Medical Interventions (Patient has pulse or is breathing):    Full          Disposition  Expected Discharge Date: 7/8/2025; Expected Discharge Time:        Jeremy Navas MD  [facility]  07/08/25  09:21 EDT

## 2025-07-08 NOTE — CONSULTS
"Patient Name: Sonya Marcus  YOB: 1955  MRN: [id number]  Admission date: 7/6/2025  Reason for Encounter: MST 2-3 or Nursing Admission Screen and MST 4-5    [facility] Clinical Nutrition Assessment     Subjective    Subjective Information     69 yo female with h/o frequent UTI's, HTN, HLD, polypharmacy here from rehab d/t AMS.     7/8: Pt seen for MST 5 for weight loss and poor appetite. Pt stated that she was taken off of Lyrica about a year ago which contributed to her change in appetite and resulting weight loss. Pt with 31 lb (16%) wt loss x 1 year which is not significant for Malnutrition Diagnosis. Pt without any signs of fat/muscle wasting noted on NFPE. BMI 29.9 (overweight). SLP evaluated pt 7/7 d/t pt feeling like \"solids getting stuck\". SLP recommended Soft to Chew (chopped meat) diet, sit upright for meals, small bites/sips and to alternate bites and sips of liquids. Pt stated she is tolerating HH diet with ~50% po intake of meals. 1 BM today. Noted plans for esophagram today per MD notes. Will follow for tolerance of diet.     Assessment    H&P and Current Problems      H&P  Past Medical History:   Diagnosis Date    Allergic 2000    Anemia 2000    Angina pectoris     Anxiety and depression     Arthralgia of right knee 09/10/2024    Arthritis     Carotid artery occlusion 2021    Chronic urinary tract infection     CKD (chronic kidney disease)     Coronary arteriosclerosis     Coronary artery disease 2020    Disease of thyroid gland     Fibromyalgia, primary 2000    Fractures 09/20/2024    Fell at home    Genitourinary syndrome of menopause     GERD (gastroesophageal reflux disease)     History of kidney stones     History of pulmonary embolism     AFTER HYSTERECTOMY    HL (hearing loss) 2024    Hyperlipidemia 2020    Hypertension     Hypothyroidism     Kidney stone 1972    Left hip pain     Memory loss     MVP (mitral valve prolapse)     DR LUZ LAST OFFICE NOTE WITH CHART " "7/30/2024    Obesity     Prediabetes     Right knee pain     Right shoulder pain     Sleep apnea     PT IS NOT USING CPAP    Substance abuse 2010    Hydrocodone and Oxycodone      Past Surgical History:   Procedure Laterality Date    APPENDECTOMY      BREAST BIOPSY      COLONOSCOPY  01/01/2022    CYSTOSCOPY W/ URETERAL STENT PLACEMENT Left 09/05/2023    Procedure: LEFT CYSTOSCOPY URETERAL CATHETER/STENT INSERTION;  Surgeon: Quinton Rosa MD;  Location: [facility];  Service: Urology;  Laterality: Left;    EYE SURGERY      HYSTERECTOMY      JOINT REPLACEMENT  2021 & 2024    REPLACEMENT TOTAL KNEE Left 2021    SHOULDER SURGERY Right     TONSILLECTOMY      TOTAL KNEE ARTHROPLASTY Right 08/22/2024    Procedure: TOTAL KNEE ARTHROPLASTY WITH CORI ROBOT;  Surgeon: Steven Sumner II, MD;  [place] [facility] [place] [facility] [place]  Service: Robotics - Ortho;  Laterality: Right;      Current Problems   Admission Diagnosis:  Altered mental status, unspecified altered mental status type [R41.82]  AMS (altered mental status) [R41.82]    Problem List:    AMS (altered mental status)      Other Applicable Nutrition Information:   Stopped Lyrica 1 year ago which lead to change in appetite and resulting weight loss. Feels like solids \"getting stuck\" sometimes.      Anthropometrics      BMI, Height, Weight Estimated body mass index is 29.99 kg/m² as calculated from the following:    Height as of 6/19/25: 154.9 cm (61\").    Weight as of this encounter: 72 kg (158 lb 11.7 oz).    Weight Method: Bed scale       Trending Weight Changes Loss   weight loss of 31 lbs (16%) over 1 year(s)    Significant?  No       Weight History  Wt Readings from Last 10 Encounters:   07/08/25 72 kg (158 lb 11.7 oz)   06/27/25 71.2 kg (157 lb 0.7 oz)   04/03/25 79 kg (174 lb 3.2 oz)   03/28/25 79.4 kg (175 lb)   03/04/25 79.1 kg (174 lb 6.4 oz)   02/25/25 78.7 kg (173 lb 8 oz)   02/06/25 79.5 kg (175 lb 3.2 oz)   02/03/25 78.9 kg (174 lb)   12/20/24 83 " "kg (183 lb)   12/06/24 81.6 kg (180 lb)        Labs      Comment: Reviewed     Results from last 7 days   Lab Units 07/08/25  0534 07/07/25  0626 07/06/25  1241   SODIUM mmol/L 141 140 143   POTASSIUM mmol/L 3.6 3.9 4.0   GLUCOSE mg/dL 107* 106* 83   BUN mg/dL 9.0 13.0 12.0   CREATININE mg/dL 0.66 0.65 0.74   CALCIUM mg/dL 8.9 9.0 10.2   PHOSPHORUS mg/dL 3.7  --   --    MAGNESIUM mg/dL 1.9  --  2.0   ALBUMIN g/dL 3.5  --  4.3   BILIRUBIN mg/dL  --   --  0.3   ALK PHOS U/L  --   --  66   AST (SGOT) U/L  --   --  12   ALT (SGPT) U/L  --   --  11     Results from last 7 days   Lab Units 07/08/25  0534 07/07/25  0626 07/06/25  1241   PLATELETS 10*3/mm3 271 276 334   HEMOGLOBIN g/dL 11.8* 12.1 13.6   HEMATOCRIT % 37.2 37.0 42.2     Lab Results   Component Value Date    HGBA1C 5.50 06/21/2025          Medications       Scheduled Medications aspirin, 81 mg, Oral, Daily  barium sulfate, 700 mg, Oral, Once  barium sulfate, 183 mL, Oral, Once in imaging  barium sulfate, 135 mL, Oral, Once in imaging  buPROPion XL, 300 mg, Oral, Nightly  DULoxetine, 60 mg, Oral, BID  levothyroxine, 100 mcg, Oral, Q AM  Lidocaine, 1 patch, Transdermal, Q24H  melatonin, 2.5 mg, Oral, Nightly  pantoprazole, 40 mg, Oral, Q AM  rosuvastatin, 10 mg, Oral, Nightly  sod bicarb-citric acid-simethicone, 1 packet, Oral, Once in imaging        Infusions      PRN Medications   acetaminophen **OR** acetaminophen **OR** acetaminophen    senna-docusate sodium **AND** polyethylene glycol **AND** bisacodyl **AND** bisacodyl    chlordiazePOXIDE    ondansetron ODT **OR** ondansetron     Physical Findings      Chewing/Swallowing    SLP following, Dysphagia, and feels like solids \"getting stuck\" sometimes, see SLP naheed. Plans for esophagram today   Dentition Mouth/Teeth WDL: .WDL except, teeth   Teeth Symptoms: no dental appliances present   Edema   no edema    Gastrointestinal last bowel movement: 7/8   Skin skin intact    Lines/Drains none   I/O reviewed  "     Nutrition Focused Physical Exam     NFPE No clinical signs of muscle wasting or fat loss  07/08/25 Pt does not meet criteria for malnutrition diagnosis, at this time.       Malnutrition Severity Assessment            (1)   Current Nutrition Orders & Evaluation of Intake      Oral Nutrition     Food Allergies  and Intolerances Fish, Shellfish   Current PO Diet Diet: Cardiac; Healthy Heart (2-3 Na+); Texture: Soft to Chew (NDD 3); Soft to Chew: Chopped Meat; Fluid Consistency: Thin (IDDSI 0)   Oral Nutrition Supplement None     Trending % PO Intake 50%   (2)  Assessment & Plan   Nutrition Diagnosis and Goals       Nutrition Diagnosis Problem: Swallowing Difficulty  Etiology: Functional Diagnosis - dysphagia   Signs/Symptoms: Report/Observation        Goal(s) Average Meal Intake at Least 75%, Meets Estimated Needs , and Tolerates PO Diet      Nutrition Intervention and Prescription       Intervention  Continue with current interventions and Completed NFPE      Diet Prescription Diet per SLP    Supplement Prescription     Education Provided     (3)  Monitoring/Evaluation       Monitor/Evaluation Per Protocol, PO Intake, Symptoms, POC/GOC, and Swallow Function      Electronically signed by:  Camille Conrad RD  07/08/25 11:33 EDT

## 2025-07-08 NOTE — PLAN OF CARE
Goal Outcome Evaluation:  Plan of Care Reviewed With: patient        Progress: improving  Pt alert and orient x 4 but so forguetful,she has been calling her daugther all day complaining about her meds,Dr Harris was notified i dont know how many times today  ,librium was PRN dr already fix her order and now is schedule,after that they want to know why she no is going home today, spoke with pt and family,next they request atarax dr no want order because is anticholinergic drug and he order buspar one time dose and psyquiatric consult,when i went in her room with her meds after she took said, no want another new meds,finally we waiting for Psyquiatric consult but pt and family never according.Pt in NPO after midnight for EGD tomorrow.Plan of care on going.

## 2025-07-08 NOTE — DISCHARGE PLACEMENT REQUEST
"Mike Marcus (70 y.o. Female)       Date of Birth   1955    Social Security Number       Address   [address] [place] [address]    Home Phone   [phone]    MRN   [id number]       Hindu   Orthodoxy    Marital Status                               Admission Date   7/6/2025    Admission Type   Emergency    Admitting Provider   Dorita Meng MD    Attending Provider   Jeremy Navas MD    Department, Room/Bed   [address], [address]       Discharge Date       Discharge Disposition       Discharge Destination                                 Attending Provider: Jeremy Navas MD    Allergies: Fish Allergy, Iodine, Penicillins, Prochlorperazine, Shellfish Allergy, Shellfish-derived Products, Sulfate, Glycerol, Iodinated, Octacosanol, Latex, Levofloxacin, Sulfa Antibiotics    Isolation: None   Infection: None   Code Status: CPR    Ht: 154.9 cm (61\")   Wt: 72 kg (158 lb 11.7 oz)    Admission Cmt: None   Principal Problem: AMS (altered mental status) [R41.82]                   Active Insurance as of 7/6/2025       Primary Coverage       Payor Plan Insurance Group Employer/Plan Group    ANTHEM MEDICARE REPLACEMENT ANTHEM MEDICARE ADVANTAGE PPO [id number]       Payor Plan Address Payor Plan Phone Number Payor Plan Fax Number Effective Dates    [address] [phone]  1/1/2024 - None Entered    [facility] [id number]         Subscriber Name Subscriber Birth Date Member ID       MIKE MARCUS 1955 [id number]                     Emergency Contacts        (Rel.) Home Phone Work Phone Mobile Phone    BARNEY KEY (Daughter) [phone] -- [phone]    SHARATH SANTOYO (Daughter) [phone] -- [phone]                "

## 2025-07-08 NOTE — CONSULTS
Gastroenterology   Initial Inpatient Consult Note    Referring Provider: Dr brennan     Reason for Consultation: Abnormal esophagram, dysphagia    Subjective     History of present illness:    70 y.o. female presents to the hospital on 7/6 from rehab facility due to altered mental status.  He has a history of CKD, CAD, GERD, sleep apnea, chronic UTIs   Recently admitted with AMS 2/2 polypharmacy. [place] and Xanax were discontinued.      Evaluated by speech therapy due to reports of dysphagia.  She is edentulous and does not have dentures.  She was recommended soft chopped foods and thin liquids.  She does have a history of esophageal stenosis and a Schatzki's ring dilated in 2022.  Esophagram this admission showed dysmotility and suggests esophagitis and erosion in her hiatal hernia.  20 mg Prilosec daily is on her home medication list.    Patient is oriented x4 during interview.  She reports a long history of dysphagia and knows about her hiatal hernia.  She states that she has not had much difficulty with this since she underwent EGD in 2022.  She manages this with eating slowly and eating soft foods.  Dysphagia is worse with meat.  She states that it does not get stuck, but feels it travels very slowly.  She takes Prilosec 20 mg on a daily basis and denies heartburn or reflux.  We discussed proceeding to EGD while inpatient and she does not wish to proceed. She states she would rather have this done as an outpatient.       Results Reviewed:  FL ESOPHAGRAM DOUBLE CONTRAST (07/07/2025 14:26) esophageal dysmotility in the lower two thirds of the esophagus, esophagitis, sliding hiatal hernia measuring 5.4 cm, within the hernia there was a round barium rimmed filling defect with centralized barium pooling suggesting superficial erosion, moderate volume of GERD, recommend direct visualization    2022 EGD (@ [facility]) Food impaction at GE junction, benign-appearing esophageal stenosis dilated, moderate Schatzki's ring,  dilated - -pathology with increased intraepithelial eosinophils up to 4 per high-powered field, negative for intestinal metaplasia or dysplasia    Past Medical History:  Past Medical History:   Diagnosis Date    Allergic 2000    Anemia 2000    Angina pectoris     Anxiety and depression     Arthralgia of right knee 09/10/2024    Arthritis     Carotid artery occlusion 2021    Chronic urinary tract infection     CKD (chronic kidney disease)     Coronary arteriosclerosis     Coronary artery disease 2020    Disease of thyroid gland     Fibromyalgia, primary 2000    Fractures 09/20/2024    Fell at home    Genitourinary syndrome of menopause     GERD (gastroesophageal reflux disease)     History of kidney stones     History of pulmonary embolism     AFTER HYSTERECTOMY    HL (hearing loss) 2024    Hyperlipidemia 2020    Hypertension     Hypothyroidism     Kidney stone 1972    Left hip pain     Memory loss     MVP (mitral valve prolapse)     DR LUZ LAST OFFICE NOTE WITH CHART 7/30/2024    Obesity     Prediabetes     Right knee pain     Right shoulder pain     Sleep apnea     PT IS NOT USING CPAP    Substance abuse 2010    Hydrocodone and Oxycodone     Past Surgical History:  Past Surgical History:   Procedure Laterality Date    APPENDECTOMY      BREAST BIOPSY      COLONOSCOPY  01/01/2022    CYSTOSCOPY W/ URETERAL STENT PLACEMENT Left 09/05/2023    Procedure: LEFT CYSTOSCOPY URETERAL CATHETER/STENT INSERTION;  Surgeon: Quinton Rosa MD;  Location: [facility];  Service: Urology;  Laterality: Left;    EYE SURGERY      HYSTERECTOMY      JOINT REPLACEMENT  2021 & 2024    REPLACEMENT TOTAL KNEE Left 2021    SHOULDER SURGERY Right     TONSILLECTOMY      TOTAL KNEE ARTHROPLASTY Right 08/22/2024    Procedure: TOTAL KNEE ARTHROPLASTY WITH CORI ROBOT;  Surgeon: Steven Sumner II, MD;  Location: [facility];  Service: Robotics - Ortho;  Laterality: Right;      Social History:   Social History     Tobacco  Use    Smoking status: Never     Passive exposure: Never    Smokeless tobacco: Never   Substance Use Topics    Alcohol use: Yes     Alcohol/week: 2.0 standard drinks of alcohol     Types: 2 Glasses of wine per week     Comment: OCCASIONALLY      Family History:  Family History   Problem Relation Age of Onset    Stroke Sister     Anxiety disorder Sister         +son & granddaughter    Breast cancer Daughter     Breast cancer Daughter     Stroke Maternal Grandmother     Arthritis Mother     Asthma Mother     Depression Mother     Arthritis Father     Heart disease Father     Hyperlipidemia Father     Developmental Disability Paternal Grandfather     Thyroid disease Paternal Grandmother     Drug abuse Brother     Lauryn Hyperthermia Neg Hx        Home Meds:  Medications Prior to Admission   Medication Sig Dispense Refill Last Dose/Taking    acetaminophen (TYLENOL) 500 MG tablet Take 1 tablet by mouth Every 6 (Six) Hours As Needed for Mild Pain.   7/6/2025    aspirin 81 MG EC tablet Take 1 tablet by mouth Daily.   7/6/2025 Morning    buPROPion XL (WELLBUTRIN XL) 150 MG 24 hr tablet Take 2 tablets by mouth Every Night.   7/5/2025 Bedtime    cyanocobalamin 1000 MCG/ML injection Inject 1 mL under the skin into the appropriate area as directed Every 30 (Thirty) Days.   Past Week    docusate sodium (Colace) 100 MG capsule Take 1 capsule by mouth Daily. 90 capsule 3 7/5/2025 Morning    DULoxetine (CYMBALTA) 60 MG capsule Take 1 capsule by mouth 2 (Two) Times a Day. 180 capsule 1 7/6/2025 Morning    hydrOXYzine (ATARAX) 25 MG tablet Take 1 tablet by mouth At Night As Needed for Anxiety.   7/5/2025 Bedtime    levothyroxine (SYNTHROID, LEVOTHROID) 100 MCG tablet Take 1 tablet by mouth Every Morning.   7/6/2025 Morning    nitrofurantoin (MACRODANTIN) 50 MG capsule Take 1 capsule by mouth Every Night. 90 capsule 1 7/5/2025 Bedtime    omeprazole (priLOSEC) 20 MG capsule Take 1 capsule by mouth Daily. 90 capsule 1 7/6/2025 Morning     rosuvastatin (CRESTOR) 10 MG tablet Take 1 tablet by mouth Every Night. 90 tablet 1 7/5/2025 Bedtime    cetirizine (zyrTEC) 5 MG tablet Take 1 tablet by mouth Daily.   Unknown    ondansetron ODT (ZOFRAN-ODT) 4 MG disintegrating tablet Place 1-2 tablets on the tongue Every 8 (Eight) Hours As Needed for Nausea or Vomiting. 90 tablet 0 Unknown    polyethylene glycol (MIRALAX) 17 g packet Take 17 g by mouth Daily As Needed (Use if senna-docusate is ineffective).   Unknown    sennosides-docusate (PERICOLACE) 8.6-50 MG per tablet Take 2 tablets by mouth 2 (Two) Times a Day As Needed for Constipation.   Unknown     Current Meds:   aspirin, 81 mg, Oral, Daily  barium sulfate, 700 mg, Oral, Once  barium sulfate, 183 mL, Oral, Once in imaging  barium sulfate, 135 mL, Oral, Once in imaging  buPROPion XL, 300 mg, Oral, Nightly  DULoxetine, 60 mg, Oral, BID  levothyroxine, 100 mcg, Oral, Q AM  Lidocaine, 1 patch, Transdermal, Q24H  melatonin, 2.5 mg, Oral, Nightly  pantoprazole, 40 mg, Oral, Q AM  rosuvastatin, 10 mg, Oral, Nightly  sod bicarb-citric acid-simethicone, 1 packet, Oral, Once in imaging      Allergies:  Allergies   Allergen Reactions    Fish Allergy Anaphylaxis     Has epi pen    Iodine Anaphylaxis    Penicillins Nausea And Vomiting and Rash     Tolerated rocephin 9/2023 admission    Prochlorperazine Dystonia, Anaphylaxis, Rash and Other (See Comments)     Paralysis    Muscle twisting    Shellfish Allergy Anaphylaxis, Rash and Swelling    Shellfish-Derived Products Anaphylaxis    Sulfate Rash    Glycerol, Iodinated Unknown - Low Severity     Anaphylaxis   (also to shellfish)    Octacosanol Dizziness     HEADACHE    Latex Rash    Levofloxacin Hives    Sulfa Antibiotics GI Intolerance         Review of Systems  Review of Systems  See HPI    Objective     Vital Signs  Temp:  [97.7 °F (36.5 °C)-98.4 °F (36.9 °C)] 97.9 °F (36.6 °C)  Heart Rate:  [67-84] 67  Resp:  [20-22] 22  BP: (117-138)/(70-81) 138/74      Physical  "Exam  Vitals reviewed.   Constitutional:       General: She is not in acute distress.     Appearance: She is well-developed.   HENT:      Head: Normocephalic and atraumatic.   Pulmonary:      Effort: Pulmonary effort is normal. No respiratory distress.   Abdominal:      General: Abdomen is flat. There is no distension.      Palpations: Abdomen is soft.      Tenderness: There is no abdominal tenderness. There is no guarding or rebound.   Skin:     General: Skin is dry.      Coloration: Skin is not pale.   Neurological:      Mental Status: She is alert and oriented to person, place, and time.   Psychiatric:         Thought Content: Thought content normal.           Results Review:     Results from last 7 days   Lab Units 07/08/25  0534 07/07/25  0626 07/06/25  1241   WBC 10*3/mm3 4.21 4.49 5.99   HEMOGLOBIN g/dL 11.8* 12.1 13.6   HEMATOCRIT % 37.2 37.0 42.2   PLATELETS 10*3/mm3 271 276 334     Results from last 7 days   Lab Units 07/08/25  0534 07/07/25  0626 07/06/25  1241   SODIUM mmol/L 141 140 143   POTASSIUM mmol/L 3.6 3.9 4.0   CHLORIDE mmol/L 108* 107 107   CO2 mmol/L 22.0 23.6 25.4   BUN mg/dL 9.0 13.0 12.0   CREATININE mg/dL 0.66 0.65 0.74   CALCIUM mg/dL 8.9 9.0 10.2   BILIRUBIN mg/dL  --   --  0.3   ALK PHOS U/L  --   --  66   ALT (SGPT) U/L  --   --  11   AST (SGOT) U/L  --   --  12   GLUCOSE mg/dL 107* 106* 83         Lab Results   Lab Value Date/Time    LIPASE 11 (L) 03/10/2025 1149    LIPASE 14 03/10/2024 0930    LIPASE 18 10/10/2023 1619       Radiology:  FL ESOPHAGRAM DOUBLE CONTRAST   Final Result   1.  Esophageal dysmotility demonstrated by intermittent tertiary   contractions which are observed in the lower two thirds of the   esophagus.   2.  Esophagitis demonstrated by increased mucosal granularity. There are   also intermittent transverse folds, \"feline esophagus,\" which is often   seen with chronic reflux.   3.  This sliding hiatal hernia measuring approximately 5.4 cm. Within   the hernia " there is a round barium rimmed filling defect with   centralized barium pooling suggesting a superficial erosion.   4.  Moderate volume of recumbent gastroesophageal reflux observed   refluxing to the upper esophagus to the level of the clavicles.   5.  Recommend direct visualization with endoscopy.                       This report was finalized on 7/7/2025 3:39 PM by Dr. Josh Cooper M.D   on Workstation: [id number]          CT Head Without Contrast   Final Result   1.  No noncontrast CT findings of acute intracranial abnormality.                   This report was finalized on 7/6/2025 10:54 PM by Dr. Oleg Martinez M.D   on Workstation: [id number]          XR Chest 1 View   Final Result   Low lung volumes with some suspected subsegmental atelectasis at the   bases       This report was finalized on 7/6/2025 1:14 PM by Dr. Guido Smith M.D   on Workstation: [id number]                Assessment & Plan   Active Hospital Problems    Diagnosis     **AMS (altered mental status)        Assessment:  Dysphagia  Abnormal esophagram  History of esophageal stenosis, Schatzki's ring  Altered mental status  Chronic UTIs  Polypharmacy      Plan:  70-year-old female admitted to the hospital with altered mental status.  Initially suspected due to a UTI, however, suspicion of underlying dementia and delirium.  GI is consulted for dysphagia and abnormal esophagram.  Last EGD in 2022 with dilation of esophageal stricture and Schatzki's ring.  Food impaction at this time.    Patient reports minimal issues with dysphagia since EGD with dilation.  She manages this with her diet and with low-dose PPI.  Pathology on EGD 2022 did contain intraepithelial eosinophils, however, did not meet diagnostic criteria for eosinophilic esophagitis.  Her esophagram suggests esophagitis as well as erosion in her hiatal hernia.    Offered EGD as an inpatient, but she declines.  She is alert and oriented during exam, but will determine if she is  able to consent and make decisions for herself.  If EGD is not planned as inpatient, could treat empirically with Voquezna and schedule EGD outpatient.     I discussed the patients findings and my recommendations with patient.             CHANELLE Garcia  [facility] [address] [address]  Office: [phone]

## 2025-07-08 NOTE — PLAN OF CARE
Goal Outcome Evaluation:      Pt A/Ox4, forgetful, RA, VSS. No acute changes overnight. C/o pain in bilateral knees- PRN tylenol given as ordered. C/o nausea- PRN Zofran given this AM. Brief and purewick on. Bed alarm on. Call light within reach. Plan of care ongoing.

## 2025-07-08 NOTE — PROGRESS NOTES
Called by the nursing staff this evening complaints for the family requesting Atarax.  I have purposely not utilize this drug for her anxiety in the hospital given her altered mental status present on admission.  I do suspect that he has she has some mild underlying dementia and would like to avoid anticholinergic drugs given their possibility for exacerbating altered mentation and delirium.  She already has Librium ordered twice a day and they have been working to titrate off of this.  Will reaching a point where I do not really feel comfortable doing a lot of things for her.  I think if she is this anxious and this agitated may be utilizing an activating medication like Wellbutrin is probably not the best medication for depression.  She is already on an SSRI.  We could utilize BuSpar in this situation.  But I will admit I do not have a lot of experience in her age group or presentation profile to continue this long-term.  I will order one-time doses BuSpar for this evening but I would like psychiatry to evaluate and weigh in tomorrow on additional medications or treatments for her anxiety moving forward.  SR 1752 7/8/25

## 2025-07-09 ENCOUNTER — ANESTHESIA (OUTPATIENT)
Dept: GASTROENTEROLOGY | Facility: HOSPITAL | Age: 70
End: 2025-07-09
Payer: MEDICARE

## 2025-07-09 ENCOUNTER — ANESTHESIA EVENT (OUTPATIENT)
Dept: GASTROENTEROLOGY | Facility: HOSPITAL | Age: 70
End: 2025-07-09
Payer: MEDICARE

## 2025-07-09 ENCOUNTER — READMISSION MANAGEMENT (OUTPATIENT)
Dept: CALL CENTER | Facility: HOSPITAL | Age: 70
End: 2025-07-09
Payer: MEDICARE

## 2025-07-09 VITALS
OXYGEN SATURATION: 95 % | RESPIRATION RATE: 16 BRPM | TEMPERATURE: 97.9 F | HEART RATE: 74 BPM | WEIGHT: 158.73 LBS | SYSTOLIC BLOOD PRESSURE: 144 MMHG | BODY MASS INDEX: 29.99 KG/M2 | DIASTOLIC BLOOD PRESSURE: 74 MMHG

## 2025-07-09 PROBLEM — R93.3 ABNORMAL ESOPHAGRAM: Status: ACTIVE | Noted: 2025-07-06

## 2025-07-09 LAB
ALBUMIN SERPL-MCNC: 3.5 G/DL (ref 3.5–5.2)
ALBUMIN SERPL-MCNC: 3.6 G/DL (ref 3.5–5.2)
ALP SERPL-CCNC: 49 U/L (ref 39–117)
ALT SERPL W P-5'-P-CCNC: 6 U/L (ref 1–33)
ANION GAP SERPL CALCULATED.3IONS-SCNC: 8.2 MMOL/L (ref 5–15)
AST SERPL-CCNC: 11 U/L (ref 1–32)
BILIRUB CONJ SERPL-MCNC: 0.1 MG/DL (ref 0–0.3)
BILIRUB INDIRECT SERPL-MCNC: 0.1 MG/DL
BILIRUB SERPL-MCNC: 0.2 MG/DL (ref 0–1.2)
BUN SERPL-MCNC: 9 MG/DL (ref 8–23)
BUN/CREAT SERPL: 13.8 (ref 7–25)
CALCIUM SPEC-SCNC: 9 MG/DL (ref 8.6–10.5)
CHLORIDE SERPL-SCNC: 110 MMOL/L (ref 98–107)
CO2 SERPL-SCNC: 24.8 MMOL/L (ref 22–29)
CREAT SERPL-MCNC: 0.65 MG/DL (ref 0.57–1)
DEPRECATED RDW RBC AUTO: 58.4 FL (ref 37–54)
EGFRCR SERPLBLD CKD-EPI 2021: 94.3 ML/MIN/1.73
ERYTHROCYTE [DISTWIDTH] IN BLOOD BY AUTOMATED COUNT: 16.8 % (ref 12.3–15.4)
GIE STN SPEC: NORMAL
GLUCOSE SERPL-MCNC: 103 MG/DL (ref 65–99)
HCT VFR BLD AUTO: 36.3 % (ref 34–46.6)
HGB BLD-MCNC: 11.7 G/DL (ref 12–15.9)
MAGNESIUM SERPL-MCNC: 2 MG/DL (ref 1.6–2.4)
MCH RBC QN AUTO: 30.7 PG (ref 26.6–33)
MCHC RBC AUTO-ENTMCNC: 32.2 G/DL (ref 31.5–35.7)
MCV RBC AUTO: 95.3 FL (ref 79–97)
PHOSPHATE SERPL-MCNC: 4.6 MG/DL (ref 2.5–4.5)
PLATELET # BLD AUTO: 255 10*3/MM3 (ref 140–450)
PMV BLD AUTO: 10.1 FL (ref 6–12)
POTASSIUM SERPL-SCNC: 3.8 MMOL/L (ref 3.5–5.2)
PROT SERPL-MCNC: 5.5 G/DL (ref 6–8.5)
RBC # BLD AUTO: 3.81 10*6/MM3 (ref 3.77–5.28)
SODIUM SERPL-SCNC: 143 MMOL/L (ref 136–145)
WBC NRBC COR # BLD AUTO: 4.34 10*3/MM3 (ref 3.4–10.8)

## 2025-07-09 PROCEDURE — 43239 EGD BIOPSY SINGLE/MULTIPLE: CPT | Performed by: INTERNAL MEDICINE

## 2025-07-09 PROCEDURE — 87206 SMEAR FLUORESCENT/ACID STAI: CPT | Performed by: INTERNAL MEDICINE

## 2025-07-09 PROCEDURE — 25810000003 SODIUM CHLORIDE 0.9 % SOLUTION: Performed by: INTERNAL MEDICINE

## 2025-07-09 PROCEDURE — 83735 ASSAY OF MAGNESIUM: CPT | Performed by: HOSPITALIST

## 2025-07-09 PROCEDURE — 93005 ELECTROCARDIOGRAM TRACING: CPT | Performed by: HOSPITALIST

## 2025-07-09 PROCEDURE — 25010000002 GLYCOPYRROLATE 0.2 MG/ML SOLUTION: Performed by: NURSE ANESTHETIST, CERTIFIED REGISTERED

## 2025-07-09 PROCEDURE — 84100 ASSAY OF PHOSPHORUS: CPT | Performed by: HOSPITALIST

## 2025-07-09 PROCEDURE — G0378 HOSPITAL OBSERVATION PER HR: HCPCS

## 2025-07-09 PROCEDURE — 25010000002 LIDOCAINE 2% SOLUTION: Performed by: NURSE ANESTHETIST, CERTIFIED REGISTERED

## 2025-07-09 PROCEDURE — 93010 ELECTROCARDIOGRAM REPORT: CPT | Performed by: INTERNAL MEDICINE

## 2025-07-09 PROCEDURE — 88342 IMHCHEM/IMCYTCHM 1ST ANTB: CPT | Performed by: INTERNAL MEDICINE

## 2025-07-09 PROCEDURE — 63710000001 ACETAMINOPHEN 325 MG TABLET: Performed by: INTERNAL MEDICINE

## 2025-07-09 PROCEDURE — 88305 TISSUE EXAM BY PATHOLOGIST: CPT | Performed by: INTERNAL MEDICINE

## 2025-07-09 PROCEDURE — 82248 BILIRUBIN DIRECT: CPT | Performed by: HOSPITALIST

## 2025-07-09 PROCEDURE — 85027 COMPLETE CBC AUTOMATED: CPT | Performed by: HOSPITALIST

## 2025-07-09 PROCEDURE — 80053 COMPREHEN METABOLIC PANEL: CPT | Performed by: HOSPITALIST

## 2025-07-09 PROCEDURE — 25010000002 PROPOFOL 10 MG/ML EMULSION: Performed by: NURSE ANESTHETIST, CERTIFIED REGISTERED

## 2025-07-09 PROCEDURE — A9270 NON-COVERED ITEM OR SERVICE: HCPCS | Performed by: INTERNAL MEDICINE

## 2025-07-09 RX ORDER — PANTOPRAZOLE SODIUM 40 MG/1
40 TABLET, DELAYED RELEASE ORAL
Qty: 30 TABLET | Refills: 0 | Status: SHIPPED | OUTPATIENT
Start: 2025-07-10

## 2025-07-09 RX ORDER — LIDOCAINE 4 G/G
1 PATCH TOPICAL
Qty: 15 EACH | Refills: 0 | Status: SHIPPED | OUTPATIENT
Start: 2025-07-09

## 2025-07-09 RX ORDER — GLYCOPYRROLATE 0.2 MG/ML
INJECTION INTRAMUSCULAR; INTRAVENOUS AS NEEDED
Status: DISCONTINUED | OUTPATIENT
Start: 2025-07-09 | End: 2025-07-09 | Stop reason: SURG

## 2025-07-09 RX ORDER — NYSTATIN 100000 [USP'U]/ML
5 SUSPENSION ORAL 4 TIMES DAILY
Status: DISCONTINUED | OUTPATIENT
Start: 2025-07-09 | End: 2025-07-09 | Stop reason: HOSPADM

## 2025-07-09 RX ORDER — PROPOFOL 10 MG/ML
VIAL (ML) INTRAVENOUS AS NEEDED
Status: DISCONTINUED | OUTPATIENT
Start: 2025-07-09 | End: 2025-07-09 | Stop reason: SURG

## 2025-07-09 RX ORDER — NYSTATIN 100000 [USP'U]/ML
5 SUSPENSION ORAL 4 TIMES DAILY
Qty: 473 ML | Refills: 0 | Status: SHIPPED | OUTPATIENT
Start: 2025-07-09 | End: 2025-07-16

## 2025-07-09 RX ORDER — LIDOCAINE HYDROCHLORIDE 20 MG/ML
INJECTION, SOLUTION INFILTRATION; PERINEURAL AS NEEDED
Status: DISCONTINUED | OUTPATIENT
Start: 2025-07-09 | End: 2025-07-09 | Stop reason: SURG

## 2025-07-09 RX ORDER — SODIUM CHLORIDE 9 MG/ML
50 INJECTION, SOLUTION INTRAVENOUS CONTINUOUS
Status: ACTIVE | OUTPATIENT
Start: 2025-07-09 | End: 2025-07-09

## 2025-07-09 RX ORDER — CHLORDIAZEPOXIDE HYDROCHLORIDE 25 MG/1
25 CAPSULE, GELATIN COATED ORAL 2 TIMES DAILY
Start: 2025-07-09 | End: 2025-07-14

## 2025-07-09 RX ADMIN — ACETAMINOPHEN 325MG 650 MG: 325 TABLET ORAL at 15:37

## 2025-07-09 RX ADMIN — ACETAMINOPHEN 325MG 650 MG: 325 TABLET ORAL at 05:52

## 2025-07-09 RX ADMIN — SODIUM CHLORIDE 50 ML/HR: 9 INJECTION, SOLUTION INTRAVENOUS at 12:44

## 2025-07-09 RX ADMIN — PANTOPRAZOLE SODIUM 40 MG: 40 TABLET, DELAYED RELEASE ORAL at 05:52

## 2025-07-09 RX ADMIN — DULOXETINE 60 MG: 30 CAPSULE, DELAYED RELEASE ORAL at 09:36

## 2025-07-09 RX ADMIN — CHLORDIAZEPOXIDE HYDROCHLORIDE 25 MG: 25 CAPSULE ORAL at 09:36

## 2025-07-09 RX ADMIN — LEVOTHYROXINE SODIUM 100 MCG: 0.1 TABLET ORAL at 05:52

## 2025-07-09 RX ADMIN — LIDOCAINE HYDROCHLORIDE 100 MG: 20 INJECTION, SOLUTION INFILTRATION; PERINEURAL at 12:56

## 2025-07-09 RX ADMIN — GLYCOPYRROLATE 0.2 MG: 0.2 INJECTION INTRAMUSCULAR; INTRAVENOUS at 12:51

## 2025-07-09 RX ADMIN — PROPOFOL 100 MG: 10 INJECTION, EMULSION INTRAVENOUS at 12:56

## 2025-07-09 RX ADMIN — LIDOCAINE 1 PATCH: 4 PATCH TOPICAL at 01:07

## 2025-07-09 RX ADMIN — PROPOFOL 140 MCG/KG/MIN: 10 INJECTION, EMULSION INTRAVENOUS at 12:56

## 2025-07-09 RX ADMIN — ACETAMINOPHEN 325MG 650 MG: 325 TABLET ORAL at 01:01

## 2025-07-09 NOTE — PROGRESS NOTES
Dedicated to Hospital Care    [phone]   LOS: 1 day     Name: Sonya Marcus  Age/Sex: 70 y.o. female  :  1955        PCP: Regina Ferro APRN ([place])  Chief Complaint   Patient presents with    Altered Mental Status      Subjective   She is awake and alert this morning.  Has had issues with anxiety.  See note from last evening.  I was called regarding the anxiety last night for additional medications.  This morning she has questions about the procedure I did walk her through what would be done and what it would look like.  She still complaining of pain all over.  I took time to explain all the medications and the side effects involved and tried to relay my concerns about her medication regimen to see maybe if we can find something different that might control her symptoms better.    General: No Fever or Chills, Cardiac: No Chest Pain or Palpitations, Resp: No Cough or SOA, GI: No Nausea, Vomiting, or Diarrhea, and Other: No bleeding    [Held by provider] aspirin, 81 mg, Oral, Daily  barium sulfate, 700 mg, Oral, Once  barium sulfate, 183 mL, Oral, Once in imaging  barium sulfate, 135 mL, Oral, Once in imaging  buPROPion XL, 300 mg, Oral, Nightly  chlordiazePOXIDE, 25 mg, Oral, BID  DULoxetine, 60 mg, Oral, BID  levothyroxine, 100 mcg, Oral, Q AM  Lidocaine, 1 patch, Transdermal, Q24H  melatonin, 2.5 mg, Oral, Nightly  pantoprazole, 40 mg, Oral, Q AM  rosuvastatin, 10 mg, Oral, Nightly  sod bicarb-citric acid-simethicone, 1 packet, Oral, Once in imaging      sodium chloride, 50 mL/hr, Last Rate: 50 mL/hr (25 1244)        Objective   Vital Signs  Temp:  [97.9 °F (36.6 °C)-98.2 °F (36.8 °C)] 97.9 °F (36.6 °C)  Heart Rate:  [61-87] 64  Resp:  [16] 16  BP: ()/(52-82) 148/82  Body mass index is 29.99 kg/m².    Intake/Output Summary (Last 24 hours) at 2025 1249  Last data filed at 2025 0554  Gross per 24 hour   Intake 240 ml   Output 1050 ml   Net -810 ml       Physical Exam  Sleeping  on my arrival but after awakening was awake alert and oriented x 4  Clear to auscultation anteriorly  Respirations even nonlabored  Clear to auscultation anteriorly  Abdomen is soft nontender nondistended she deferred additional physical exam and just kept telling me to call her daughter with additional questioning her request to examine further      Results Review:       I reviewed the patient's new clinical results.  Results from last 7 days   Lab Units 07/09/25 0618 07/08/25 0534 07/07/25 0626 07/06/25  1241   WBC 10*3/mm3 4.34 4.21 4.49 5.99   HEMOGLOBIN g/dL 11.7* 11.8* 12.1 13.6   PLATELETS 10*3/mm3 255 271 276 334     Results from last 7 days   Lab Units 07/09/25 0618 07/08/25 0534 07/07/25 0626 07/06/25  1241   SODIUM mmol/L 143 141 140 143   POTASSIUM mmol/L 3.8 3.6 3.9 4.0   CHLORIDE mmol/L 110* 108* 107 107   CO2 mmol/L 24.8 22.0 23.6 25.4   BUN mg/dL 9.0 9.0 13.0 12.0   CREATININE mg/dL 0.65 0.66 0.65 0.74   CALCIUM mg/dL 9.0 8.9 9.0 10.2   MAGNESIUM mg/dL 2.0 1.9  --  2.0   PHOSPHORUS mg/dL 4.6* 3.7  --   --    Estimated Creatinine Clearance: 72.1 mL/min (by C-G formula based on SCr of 0.65 mg/dL).      Assessment & Plan   Active Hospital Problems    Diagnosis  POA    **AMS (altered mental status) [R41.82]  Yes    Esophageal dysphagia [R13.19]  Unknown    Esophagitis [K20.90]  Unknown    Abnormal esophagram [R93.3]  Unknown    BP (high blood pressure) [I10]  Yes    Cardiac murmur [R01.1]  Yes    Stage 2 chronic kidney disease [N18.2]  Yes    Coronary arteriosclerosis [I25.10]  Yes    Bilateral primary osteoarthritis of knee [M17.0]  Yes    Polypharmacy [Z79.899]  Not Applicable    Hypothyroidism [E03.9]  Yes    Hyperlipidemia [E78.5]  Yes    Anxiety disorder [F41.9]  Yes    Gastroesophageal reflux disease [K21.9]  Yes      Resolved Hospital Problems   No resolved problems to display.       ASSESSMENT  This is a 70-year-old lady with previous history of polypharmacy, probable underlying mild  dementia, previous urinary tract infections, hypertension, hypothyroidism and hyperlipidemia who presented to the hospital with confusion and concern for development of a urinary tract infection.  She had no symptoms of UTI on admission other than the confusion.  Her urinalysis was bland and she was admitted to the hospital for further workup of her encephalopathy.  Edited by: Jeremy Navas MD at 7/8/2025 1427   PLAN  I still feel like she probably has some mild underlying dementia and this probably represented delirium.  Planning EGD today to evaluate this erosion seen on the esophagram.  Continue on antireflux medications and reflux precautions  Discussed with the patient and her daughter at the bedside regarding my recommendations we will hold the Atarax for now given the anticholinergic effects of this and her presentation with altered mentation.  Trialed a dose of BuSpar last night without any improvement.  Psychiatry was consulted will get their input and opinion on additional medications or medication changes to be made.  Discussed with the discharge planner and plan is to discharge home at this point.  The patient is adamant she is not going back to rehab.  This further reinforces the need to stay in the hospital for psychiatry evaluation prior to discharge.  Plan to follow-up after endoscopy      VTE Prophylaxis:  Mechanical VTE prophylaxis orders are present.      Code Status and Medical Interventions: CPR (Attempt to Resuscitate); Full   Ordered at: 07/06/25 1654     Code Status (Patient has no pulse and is not breathing):    CPR (Attempt to Resuscitate)     Medical Interventions (Patient has pulse or is breathing):    Full          Disposition  Expected Discharge Date: 7/10/2025; Expected Discharge Time:        Jeremy Navas MD  [facility]  07/09/25  12:49 EDT

## 2025-07-09 NOTE — PLAN OF CARE
Goal Outcome Evaluation:         Pt A&Ox4 throughout shift, was on 2L n/c during HS due to desat to 87-89%, now on R/A and awake; NSR, ambulated to B/R and back this shift before requesting purewick for some incontinence she had had.  Purewick care done.  Family present at bedside through last half of shift, urging / coercing pt to agree to EGD.  Pt has been NPO since MN except sips with meds pending possible EGD, though case was not booked/no consent request was input and pt doesn't seem certain she wants to go through with procedure and seems to need additional explanation as to pros/cons and recommendation with MD guidance.  Daughter wishes to be present during and plans to stay until MD rounds.  Pain treated with scheduled lidocaine patch, PRN Tylenol and ice pack.  Continuing POC.          Problem: Adult Inpatient Plan of Care  Goal: Plan of Care Review  Outcome: Progressing  Goal: Optimal Comfort and Wellbeing  Outcome: Progressing  Intervention: Monitor Pain and Promote Comfort  Description: Assess pain level, treatment efficacy and patient response at regular intervals using a consistent pain scale.Consider the presence and impact of preexisting chronic pain.Encourage patient and caregiver involvement in pain assessment, interventions and safety measures.Promote activity; balance with sleep and rest to enhance healing.  Recent Flowsheet Documentation  Taken 7/9/2025 0000 by Clover Vines RN  Pain Management Interventions:   care clustered   diversional activity provided   pillow support provided   position adjusted   pain medication given  Intervention: Provide Person-Centered Care  Description: Use a family-focused approach to care; encourage support system presence and participation.Develop trust and rapport by proactively providing information, encouraging questions, addressing concerns and offering reassurance.Acknowledge emotional response to hospitalization.Recognize and utilize personal coping  strategies and strengths; develop goals via shared decision-making.Honor spiritual and cultural preferences.  Recent Flowsheet Documentation  Taken 7/9/2025 0000 by Clover Vines RN  Trust Relationship/Rapport:   care explained   choices provided   empathic listening provided   questions answered   reassurance provided   thoughts/feelings acknowledged   questions encouraged  Taken 7/8/2025 2000 by Clover Vines RN  Trust Relationship/Rapport:   choices provided   care explained   empathic listening provided   questions encouraged   reassurance provided   thoughts/feelings acknowledged   questions answered  Goal: Readiness for Transition of Care  Outcome: Progressing     Problem: Skin Injury Risk Increased  Goal: Skin Health and Integrity  Outcome: Progressing  Intervention: Optimize Skin Protection  Description: Perform a full pressure injury risk assessment, as indicated by screening, upon admission to care unit.Reassess skin (full inspection and injury risk, including skin temperature, consistency and color) frequently (e.g., scheduled interval, with change in condition) to provide optimal early detection and prevention.Maintain adequate tissue perfusion (e.g., encourage fluid balance; avoid crossing legs, constrictive clothing or devices) to promote tissue oxygenation.Maintain head of bed at lowest degree of elevation tolerated, considering medical condition and other restrictions. Use positioning supports to prevent sliding and friction. Consider low friction textiles.Avoid positioning onto an area that remains reddened or on bony prominences.Minimize incontinence and moisture (e.g., toileting schedule; moisture-wicking pad, diaper or incontinence collection device; skin moisture barrier).Cleanse skin promptly and gently, when soiled, utilizing a pH-balanced cleanser.Relieve and redistribute pressure (e.g., scheduled position changes, weight shifts, use of support surface, medical device repositioning,  protective dressing application, use of positioning device, microclimate control, use of pressure-injury-monitorEncourage increased activity, such as sitting in a chair at the bedside or early mobilization, when able to tolerate. Avoid prolonged sitting.  Recent Flowsheet Documentation  Taken 7/9/2025 0400 by Clover Vines RN  Head of Bed ([facility]) Positioning: HOB elevated  Taken 7/9/2025 0200 by Clover Vines RN  Head of Bed ([facility]) Positioning: HOB elevated  Taken 7/9/2025 0000 by Clover Vines RN  Activity Management: activity encouraged  Pressure Reduction Techniques:   frequent weight shift encouraged   heels elevated off bed   weight shift assistance provided  Head of Bed (HOB) Positioning: HOB elevated  Pressure Reduction Devices:   positioning supports utilized   pressure-redistributing mattress utilized   alternating pressure pump (JIMMY)  Skin Protection: transparent dressing maintained  Taken 7/8/2025 2200 by Clover Vines RN  Head of Bed (HOB) Positioning: HOB elevated  Taken 7/8/2025 2000 by Clover Vines RN  Activity Management: activity encouraged  Pressure Reduction Techniques:   frequent weight shift encouraged   weight shift assistance provided  Head of Bed (HOB) Positioning: HOB elevated  Pressure Reduction Devices:   positioning supports utilized   pressure-redistributing mattress utilized   alternating pressure pump (JIMMY)  Skin Protection:   incontinence pads utilized   transparent dressing maintained  Intervention: Promote and Optimize Oral Intake  Description: Perform a nutrition assessment that includes a nutrition-focused physical exam; identify malnutrition risk.Assess for adequate oral intake; if inadequate, offer oral supplemental food or drinks to enhance calorie and protein intake.Assess for vitamin and mineral deficiencies; supplement if depleted.Assess need for, and assist with, meal set-up and feeding.Adjust diet or meal schedule based on preferences and  tolerance.Minimize unnecessary dietary restrictions to increase oral intake.Establish bowel elimination program to increase comfort and appetite.Provide and encourage oral hygiene to enhance desire to eat.Consider enteral nutrition support if oral intake remains inadequate; provide parenteral nutrition if enteral is contraindicated.  Recent Flowsheet Documentation  Taken 7/9/2025 0000 by Clover Vines, RN  Oral Nutrition Promotion: rest periods promoted  Taken 7/8/2025 2000 by Clover Vines, RN  Oral Nutrition Promotion: rest periods promoted     Problem: Fall Injury Risk  Goal: Absence of Fall and Fall-Related Injury  Outcome: Progressing  Intervention: Identify and Manage Contributors  Description: Develop a fall prevention plan, considering patient-centered interventions and family/caregiver involvement; identify and address patient's facilitators and barriers.Provide reorientation, appropriate sensory stimulation and routines with changes in mental status to decrease risk of fall.Promote use of personal vision and auditory aids.Assess assistance level required for safe and effective self-care; provide support as needed, such as toileting and mobilization. For age 65 and older, implement timed toileting with assistance.Encourage physical activity, such as performance of mobility and self-care at highest level of patient ability, multicomponent exercise program and provision of appropriate assistive devices.If fall occurs, assess the severity of injury; implement fall injury protocol. Determine the cause and revise fall injury prevention plan.Regularly review and advocate for medication adjustment to decrease fall risk; consider administration times, polypharmacy and age.Balance adequate pain management with potential for oversedation.  Recent Flowsheet Documentation  Taken 7/9/2025 0400 by Clover Vines, RN  Medication Review/Management:   medications reviewed   high-risk medications identified  Taken  7/9/2025 0200 by Clover Vines RN  Medication Review/Management:   medications reviewed   high-risk medications identified  Taken 7/9/2025 0000 by Clover Vines RN  Medication Review/Management:   medications reviewed   high-risk medications identified  Self-Care Promotion:   independence encouraged   BADL personal objects within reach   adaptive equipment use encouraged  Taken 7/8/2025 2200 by Clover Vines RN  Medication Review/Management:   medications reviewed   high-risk medications identified  Taken 7/8/2025 2000 by Clover Vines RN  Medication Review/Management:   medications reviewed   high-risk medications identified  Self-Care Promotion:   independence encouraged   BADL personal objects within reach   adaptive equipment use encouraged  Intervention: Promote Injury-Free Environment  Description: Provide a safe, barrier-free environment that encourages independent activity.Keep care area uncluttered and well-lighted.Determine need for increased observation or monitoring.Avoid use of devices that minimize mobility, such as restraints or indwelling urinary catheter.  Recent Flowsheet Documentation  Taken 7/9/2025 0400 by Clover Vines RN  Safety Promotion/Fall Prevention: safety round/check completed  Taken 7/9/2025 0200 by Clover Vines RN  Safety Promotion/Fall Prevention: safety round/check completed  Taken 7/9/2025 0000 by Clover Vines RN  Safety Promotion/Fall Prevention: safety round/check completed  Taken 7/8/2025 2200 by Clover Vines RN  Safety Promotion/Fall Prevention: safety round/check completed  Taken 7/8/2025 2000 by Clover Vines RN  Safety Promotion/Fall Prevention: safety round/check completed

## 2025-07-09 NOTE — PROGRESS NOTES
EGD    INDICATIONS  Dysphagia  Abnormal esophagram    FINDINGS  NO stricture- Diffuse, white plaques were found in the lower third of the esophagus.  Cells for cytology were obtained by brushing.     Diffuse moderate inflammation characterized by erythema was found in the entire examined stomach.  Biopsies were taken with a cold forceps for histology.  Biopsies were taken with a cold forceps for Helicobacter pylori testing    RECOMMENDATIONS  - change pepcid to PPI  - Await pathology results.   - If brushings are consistent with the presence of Candida infection, will ensure baseline liver function tests have been checked and will recommend treatment  - ok to advance diet  - GI will sign off and we are available as needed during hospital stay otherwise we are happy to see patient as an outpatient in follow up

## 2025-07-09 NOTE — ANESTHESIA PREPROCEDURE EVALUATION
Anesthesia Evaluation     Patient summary reviewed and Nursing notes reviewed                Airway   Mallampati: III  No difficulty expected  Dental    (+) upper dentures and lower dentures    Pulmonary    (+) ,sleep apnea  Cardiovascular     ECG reviewed  Rhythm: regular  Rate: normal    (+) hypertension, valvular problems/murmurs MVP, CAD, angina, hyperlipidemia,  carotid artery disease      Neuro/Psych  (+) psychiatric history Anxiety and Depression  GI/Hepatic/Renal/Endo    (+) obesity, GERD, renal disease- CRI and stones, thyroid problem hypothyroidism    Musculoskeletal     (+) myalgias  Abdominal    Substance History   (+) alcohol use     OB/GYN negative ob/gyn ROS         Other   arthritis,                 Anesthesia Plan    ASA 3     MAC     intravenous induction     Anesthetic plan, risks, benefits, and alternatives have been provided, discussed and informed consent has been obtained with: patient.    CODE STATUS:    Code Status (Patient has no pulse and is not breathing): CPR (Attempt to Resuscitate)  Medical Interventions (Patient has pulse or is breathing): Full

## 2025-07-09 NOTE — NURSING NOTE
Pt's daughter called and requested updates.  Pt daughter will be coming up to sit with pt.  Daughter wants me to notate that pt wants to have procedure now and send message to GI MD but I advised there is no way for us to communicate with the doctor this late at night as it is APRNs on call only and they will not be the ones to input that order set.    I advised at length and in multiple ways that the dayshift RN's report and notes had indicated that an EGD was planned but there is no updated GI note and no incomplete note, no consent request and no case booking that I currently see so all I can advise on from the GI stance is the APRN who spoke to the pt on dayshift and noted the results of the conversation and pt's refusal at that time.    2203 - Entered pt room to advise her I spoke to her daughter and that we would plan to make her NPO at [place] so that there may be a possibility of EGD being done still if it was just a matter of no case booking input/new note indication update.    Pt verbalized then to me that she does not wish to be NPO and that she does not wish to have the EGD done.  I advised she has the right to refuse but that if she is uncertain, not being NPO could delay her care if EGD is planned and canceled.  Pt again advised she does not want to do the EGD tomorrow.  Advised pt I will re-evaluate with her at [place] and let her advise how she wants to proceed.  Pt reassessed for A&O status/mentation and she is appropriate and oriented x4 at this time and initial assessment/no changes noted this shift so far.

## 2025-07-09 NOTE — SIGNIFICANT NOTE
07/09/25 1254   OTHER   Discipline physical therapist   Rehab Time/Intention   Session Not Performed other (see comments)  (Patient FRANCISCO for EGD this PM. PT will f/u.)   Recommendation   PT - Next Appointment 07/10/25

## 2025-07-09 NOTE — PLAN OF CARE
Goal Outcome Evaluation:              Outcome Evaluation: Goals met pt to DC

## 2025-07-09 NOTE — ANESTHESIA POSTPROCEDURE EVALUATION
Patient: Sonya Marcus    Procedure Summary       Date: 07/09/25 Room / Location: [facility] ENDOSCOPY 8 / [facility] ENDOSCOPY    Anesthesia Start: 1250 Anesthesia Stop: 1316    Procedure: ESOPHAGOGASTRODUODENOSCOPY with biopsy and brushing (Esophagus) Diagnosis:       Esophagitis      Esophageal dysphagia      Abnormal esophagram      (Esophagitis [K20.90])      (Esophageal dysphagia [R13.19])      (Abnormal esophagram [R93.3])    Surgeons: Ramón Gilliland MD Provider: Vlad Last MD    Anesthesia Type: MAC ASA Status: 3            Anesthesia Type: MAC    Vitals  Vitals Value Taken Time   /74 07/09/25 13:35   Temp     Pulse 73 07/09/25 13:37   Resp 16 07/09/25 13:34   SpO2 94 % 07/09/25 13:37   Vitals shown include unfiled device data.        Post Anesthesia Care and Evaluation    Patient location during evaluation: PACU  Patient participation: complete - patient participated  Level of consciousness: awake and alert  Pain management: adequate    Airway patency: patent  Anesthetic complications: No anesthetic complications    Cardiovascular status: acceptable  Respiratory status: acceptable  Hydration status: acceptable    Comments: --------------------            07/09/25               1334     --------------------   BP:       144/74     Pulse:      74       Resp:       16       Temp:                SpO2:      95%      --------------------

## 2025-07-09 NOTE — OUTREACH NOTE
Prep Survey      Flowsheet Row Responses   [facility] patient discharged from? [place]   Is LACE score < 7 ? No   Eligibility [facility]   Date of Admission 07/06/25   Date of Discharge 07/09/25   Discharge Disposition Home-Health Care Sv   Discharge diagnosis AMS (altered mental status)   Does the patient have one of the following disease processes/diagnoses(primary or secondary)? Other   Does the patient have Home health ordered? Yes   What is the Home health agency?  [facility]   Prep survey completed? Yes            Marian SNELL - Registered Nurse

## 2025-07-09 NOTE — ANESTHESIA POSTPROCEDURE EVALUATION
Patient: Sonya Marcus    Procedure Summary       Date: 07/09/25 Room / Location: [facility] ENDOSCOPY 8 / [facility] ENDOSCOPY    Anesthesia Start: 1250 Anesthesia Stop: 1316    Procedure: ESOPHAGOGASTRODUODENOSCOPY with biopsy and brushing (Esophagus) Diagnosis:       Esophagitis      Esophageal dysphagia      Abnormal esophagram      (Esophagitis [K20.90])      (Esophageal dysphagia [R13.19])      (Abnormal esophagram [R93.3])    Surgeons: Ramón Gilliland MD Provider: Vlad Last MD    Anesthesia Type: MAC ASA Status: 3            Anesthesia Type: MAC    Vitals  Vitals Value Taken Time   /73 07/09/25 13:25   Temp     Pulse 78 07/09/25 13:27   Resp 16 07/09/25 13:14   SpO2 93 % 07/09/25 13:27   Vitals shown include unfiled device data.        Post Anesthesia Care and Evaluation    Patient location during evaluation: PACU  Patient participation: complete - patient participated  Level of consciousness: awake and alert  Pain management: adequate    Airway patency: patent  Anesthetic complications: No anesthetic complications    Cardiovascular status: acceptable  Respiratory status: acceptable  Hydration status: acceptable    Comments: --------------------            07/09/25               1324     --------------------   BP:       131/73     Pulse:      80       Resp:       16       Temp:                SpO2:      94%      --------------------

## 2025-07-10 ENCOUNTER — TRANSITIONAL CARE MANAGEMENT TELEPHONE ENCOUNTER (OUTPATIENT)
Dept: CALL CENTER | Facility: HOSPITAL | Age: 70
End: 2025-07-10
Payer: MEDICARE

## 2025-07-10 ENCOUNTER — TELEPHONE (OUTPATIENT)
Dept: FAMILY MEDICINE CLINIC | Facility: CLINIC | Age: 70
End: 2025-07-10
Payer: MEDICARE

## 2025-07-10 DIAGNOSIS — E03.9 ACQUIRED HYPOTHYROIDISM: Primary | ICD-10-CM

## 2025-07-10 LAB
QT INTERVAL: 442 MS
QTC INTERVAL: 468 MS

## 2025-07-10 RX ORDER — LEVOTHYROXINE SODIUM 100 UG/1
100 TABLET ORAL
Qty: 90 TABLET | Refills: 0 | Status: SHIPPED | OUTPATIENT
Start: 2025-07-10

## 2025-07-10 NOTE — OUTREACH NOTE
Call Center TCM Note      Flowsheet Row Responses   [facility] patient discharged from? [place]   Does the patient have one of the following disease processes/diagnoses(primary or secondary)? Other   TCM attempt successful? No   Unsuccessful attempts Attempt 2            FLAKITO Kirkland Registered Nurse    7/10/2025, 12:51 EDT

## 2025-07-10 NOTE — OUTREACH NOTE
Call Center TCM Note      Flowsheet Row Responses   [facility] patient discharged from? [place]   Does the patient have one of the following disease processes/diagnoses(primary or secondary)? Other   TCM attempt successful? No  [no verbal release on file]   Unsuccessful attempts Attempt 1   Call Status Left message            FLAKITO Kirkland Registered Nurse    7/10/2025, 08:42 EDT

## 2025-07-10 NOTE — TELEPHONE ENCOUNTER
Call from Selena with [facility] for verbal orders for OT/PT to eval and treat. Pt released from [facility] yesterday (7/9/2025) Verbal orders given.

## 2025-07-10 NOTE — CASE MANAGEMENT/SOCIAL WORK
Case Management Discharge Note      Final Note: Home with family to transport. Selena/ Alisia following up with patient's PCP for possible HH need.    Provided Post Acute Provider List?: N/A  N/A Provider List Comment: Denies needs  Provided Post Acute Provider Quality & Resource List?: N/A  N/A Quality & Resource List Comment: Denies needs    Selected Continued Care - Discharged on 7/9/2025 Admission date: 7/6/2025 - Discharge disposition: Home or Self Care      Destination    No services have been selected for the patient.                Durable Medical Equipment    No services have been selected for the patient.                Dialysis/Infusion    No services have been selected for the patient.                Home Medical Care    No services have been selected for the patient.                Therapy    No services have been selected for the patient.                Community Resources    No services have been selected for the patient.                Community & DME    No services have been selected for the patient.                    Selected Continued Care - Prior Encounters Includes continued care and service providers with selected services from prior encounters from 4/7/2025 to 7/9/2025      Discharged on 6/27/2025 Admission date: 6/19/2025 - Discharge disposition: Skilled Nursing Facility (DC - External)      Destination       Service Provider Services Address Phone Fax Patient Preferred    [facility] Skilled Nursing [address] [place], [place] [id number] [phone] --                          Transportation Services  Transportation: Private Transportation  Private: Car    Final Discharge Disposition Code: 01 - home or self-care

## 2025-07-11 ENCOUNTER — TRANSITIONAL CARE MANAGEMENT TELEPHONE ENCOUNTER (OUTPATIENT)
Dept: CALL CENTER | Facility: HOSPITAL | Age: 70
End: 2025-07-11
Payer: MEDICARE

## 2025-07-11 ENCOUNTER — TELEPHONE (OUTPATIENT)
Dept: FAMILY MEDICINE CLINIC | Facility: CLINIC | Age: 70
End: 2025-07-11
Payer: MEDICARE

## 2025-07-11 LAB
CYTO UR: NORMAL
LAB AP CASE REPORT: NORMAL
LAB AP SPECIAL STAINS: NORMAL
PATH REPORT.FINAL DX SPEC: NORMAL
PATH REPORT.GROSS SPEC: NORMAL

## 2025-07-11 NOTE — TELEPHONE ENCOUNTER
Received call from Hermelindo with PT at [facility].  He is requesting a verbal order to move patient's care visit from today to Monday July 14th.  Patient is unable to have the visit today.  A good callback number for Hermelindo is [phone]

## 2025-07-11 NOTE — OUTREACH NOTE
Call Center TCM Note      Flowsheet Row Responses   [facility] patient discharged from? [place]   Does the patient have one of the following disease processes/diagnoses(primary or secondary)? Other   TCM attempt successful? No   Unsuccessful attempts Attempt 3            Rohini CONDE - Licensed Nurse    7/11/2025, 09:01 EDT

## 2025-07-11 NOTE — TELEPHONE ENCOUNTER
Returned call to Hermelindo advising that PCP agrees to move appt from 7/11/25 to 7/14/205. Hermelindo verbalized understanding.

## 2025-07-14 ENCOUNTER — TELEPHONE (OUTPATIENT)
Dept: FAMILY MEDICINE CLINIC | Facility: CLINIC | Age: 70
End: 2025-07-14
Payer: MEDICARE

## 2025-07-14 NOTE — TELEPHONE ENCOUNTER
[facility] REGARD[facility] PHYSICAL THERAPY VERBAL ORDERS ONCE A WEEK FOR 8 WEEKS, A NURSE TO EVALUATE DUE TO MEDICATION MISMANAGEMENT,  TO EVALUATE.     PATIENT IS GOING HIGH ON DEPRESSION SCALE, PAIN LEVEL ALWAYS 8/10.     PH # [phone]

## 2025-07-16 ENCOUNTER — TELEPHONE (OUTPATIENT)
Dept: FAMILY MEDICINE CLINIC | Facility: CLINIC | Age: 70
End: 2025-07-16
Payer: MEDICARE

## 2025-07-16 NOTE — TELEPHONE ENCOUNTER
Gardenia with [facility] called for verbal orders for a psych nurse to come in and see the patient.    Gardenia with [facility] also ask for verbal order for occupational therapy to come in 1 time a week for 4 weeks     Best number to call back   [phone]

## 2025-07-17 ENCOUNTER — TELEPHONE (OUTPATIENT)
Dept: FAMILY MEDICINE CLINIC | Facility: CLINIC | Age: 70
End: 2025-07-17

## 2025-07-17 NOTE — TELEPHONE ENCOUNTER
[facility]ia [facility] called for verbal orders for a psych nurse to come in and see the patient.     [facility]ia [facility] also ask for verbal order for occupational therapy to come in 1 time a week for 4 weeks     [facility] asked if the  can come in today 7/17/25 to evaluate the patient     [facility]ia [facility] stated that to order for the  has already been approved [facility] stated that the date just needs to be moved up to today 7/17/25     Best number to call back   [phone]

## 2025-07-17 NOTE — TELEPHONE ENCOUNTER
A user error has taken place: encounter opened in error, closed for administrative reasons.    
in ASU:

## 2025-07-18 ENCOUNTER — TELEPHONE (OUTPATIENT)
Dept: FAMILY MEDICINE CLINIC | Facility: CLINIC | Age: 70
End: 2025-07-18
Payer: MEDICARE

## 2025-07-18 NOTE — TELEPHONE ENCOUNTER
Gardenia from [facility] called today to inform you that [employer] adult protective services has been called. Patients family is not providing a safe home environment with appropriate care for patient. She says if you have any questions to give her a call at [phone]

## 2025-08-07 DIAGNOSIS — K21.9 GASTROESOPHAGEAL REFLUX DISEASE, UNSPECIFIED WHETHER ESOPHAGITIS PRESENT: Chronic | ICD-10-CM

## 2025-08-07 RX ORDER — ONDANSETRON 4 MG/1
TABLET, ORALLY DISINTEGRATING ORAL
Qty: 90 TABLET | Refills: 0 | OUTPATIENT
Start: 2025-08-07

## 2025-08-07 RX ORDER — ONDANSETRON 4 MG/1
4 TABLET, ORALLY DISINTEGRATING ORAL EVERY 8 HOURS PRN
Qty: 90 TABLET | Refills: 0 | Status: SHIPPED | OUTPATIENT
Start: 2025-08-07

## 2025-08-09 DIAGNOSIS — E03.9 ACQUIRED HYPOTHYROIDISM: ICD-10-CM

## 2025-08-09 DIAGNOSIS — F41.1 GENERALIZED ANXIETY DISORDER: ICD-10-CM

## 2025-08-11 RX ORDER — ASPIRIN 81 MG/1
81 TABLET ORAL DAILY
OUTPATIENT
Start: 2025-08-11

## 2025-08-11 RX ORDER — DULOXETIN HYDROCHLORIDE 60 MG/1
60 CAPSULE, DELAYED RELEASE ORAL 2 TIMES DAILY
Qty: 180 CAPSULE | Refills: 1 | OUTPATIENT
Start: 2025-08-11

## 2025-08-11 RX ORDER — BUPROPION HYDROCHLORIDE 150 MG/1
300 TABLET ORAL NIGHTLY
OUTPATIENT
Start: 2025-08-11

## 2025-08-11 RX ORDER — LEVOTHYROXINE SODIUM 100 UG/1
100 TABLET ORAL
Qty: 90 TABLET | Refills: 0 | OUTPATIENT
Start: 2025-08-11

## 2025-08-13 ENCOUNTER — APPOINTMENT (OUTPATIENT)
Dept: CT IMAGING | Facility: HOSPITAL | Age: 70
End: 2025-08-13
Payer: MEDICARE

## 2025-08-13 ENCOUNTER — HOSPITAL ENCOUNTER (INPATIENT)
Facility: HOSPITAL | Age: 70
LOS: 9 days | Discharge: HOME HEALTH | End: 2025-08-22
Attending: EMERGENCY MEDICINE | Admitting: INTERNAL MEDICINE
Payer: MEDICARE

## 2025-08-13 ENCOUNTER — APPOINTMENT (OUTPATIENT)
Dept: GENERAL RADIOLOGY | Facility: HOSPITAL | Age: 70
End: 2025-08-13
Payer: MEDICARE

## 2025-08-14 PROBLEM — F32.2 SEVERE DEPRESSION: Status: ACTIVE | Noted: 2025-08-14

## 2025-08-14 PROBLEM — F19.10 SUBSTANCE ABUSE: Status: ACTIVE | Noted: 2025-08-14

## 2025-08-22 ENCOUNTER — READMISSION MANAGEMENT (OUTPATIENT)
Dept: CALL CENTER | Facility: HOSPITAL | Age: 70
End: 2025-08-22
Payer: MEDICARE

## 2025-08-25 ENCOUNTER — TRANSITIONAL CARE MANAGEMENT TELEPHONE ENCOUNTER (OUTPATIENT)
Dept: CALL CENTER | Facility: HOSPITAL | Age: 70
End: 2025-08-25
Payer: MEDICARE

## 2025-08-26 ENCOUNTER — TELEPHONE (OUTPATIENT)
Dept: FAMILY MEDICINE CLINIC | Facility: CLINIC | Age: 70
End: 2025-08-26
Payer: MEDICARE

## 2025-08-26 DIAGNOSIS — F41.1 GENERALIZED ANXIETY DISORDER: ICD-10-CM

## 2025-08-26 RX ORDER — METOPROLOL SUCCINATE 50 MG/1
50 TABLET, EXTENDED RELEASE ORAL DAILY
Qty: 30 TABLET | Refills: 0 | Status: SHIPPED | OUTPATIENT
Start: 2025-08-26

## 2025-08-26 RX ORDER — HYDROXYZINE HYDROCHLORIDE 25 MG/1
25 TABLET, FILM COATED ORAL EVERY 6 HOURS PRN
Qty: 90 TABLET | Refills: 0 | Status: SHIPPED | OUTPATIENT
Start: 2025-08-26

## 2025-08-26 RX ORDER — DULOXETIN HYDROCHLORIDE 60 MG/1
60 CAPSULE, DELAYED RELEASE ORAL 2 TIMES DAILY
Qty: 60 CAPSULE | Refills: 0 | Status: SHIPPED | OUTPATIENT
Start: 2025-08-26

## 2025-08-26 RX ORDER — QUETIAPINE FUMARATE 25 MG/1
12.5 TABLET, FILM COATED ORAL EVERY 12 HOURS SCHEDULED
Qty: 30 TABLET | Refills: 0 | Status: SHIPPED | OUTPATIENT
Start: 2025-08-26

## (undated) DEVICE — NEEDLE, QUINCKE, 20GX3.5": Brand: MEDLINE

## (undated) DEVICE — FRCP BX RADJAW4 NDL 2.8 240CM LG OG BX40

## (undated) DEVICE — BRUSH CYTO COLONOSCOPE 7F3MM 240CM RED

## (undated) DEVICE — DUAL CUT SAGITTAL BLADE

## (undated) DEVICE — PREP SOL POVIDONE/IODINE BT 4OZ

## (undated) DEVICE — APPL CHLORAPREP HI/LITE 26ML ORNG

## (undated) DEVICE — SUT VIC 2/0 CT1 36IN

## (undated) DEVICE — TUBING, SUCTION, 1/4" X 10', STRAIGHT: Brand: MEDLINE

## (undated) DEVICE — SYR LUERLOK 20CC BX/50

## (undated) DEVICE — LN SMPL CO2 SHTRM SD STREAM W/M LUER

## (undated) DEVICE — NITINOL WIRE WITH HYDROPHILIC TIP: Brand: SENSOR

## (undated) DEVICE — SYR LUERLOK 30CC

## (undated) DEVICE — TRAP FLD MINIVAC MEGADYNE 100ML

## (undated) DEVICE — CANN O2 ETCO2 FITS ALL CONN CO2 SMPL A/ 7IN DISP LF

## (undated) DEVICE — ZIP 16 SURGICAL SKIN CLOSURE DEVICE, PSA: Brand: ZIP 16 SURGICAL SKIN CLOSURE DEVICE

## (undated) DEVICE — LOU CYSTO: Brand: MEDLINE INDUSTRIES, INC.

## (undated) DEVICE — PENCL SMOKE/EVAC MEGADYNE TELESCP 10FT

## (undated) DEVICE — BNDG,ELSTC,MATRIX,STRL,6"X5YD,LF,HOOK&LP: Brand: MEDLINE

## (undated) DEVICE — BLCK/BITE BLOX W/DENTL/RIM W/STRAP 54F

## (undated) DEVICE — PATIENT RETURN ELECTRODE, SINGLE-USE, CONTACT QUALITY MONITORING, ADULT, WITH 9FT CORD, FOR PATIENTS WEIGING OVER 33LBS. (15KG): Brand: MEGADYNE

## (undated) DEVICE — KT ORCA ORCAPOD DISP STRL

## (undated) DEVICE — SUCTION MAT (LOW PROFILE), 50X34: Brand: NEPTUNE

## (undated) DEVICE — NON RIMMED SPEED PIN 65MM STERILE

## (undated) DEVICE — 450 ML BOTTLE OF 0.05% CHLORHEXIDINE GLUCONATE IN 99.95% STERILE WATER FOR IRRIGATION, USP AND APPLICATOR.: Brand: IRRISEPT ANTIMICROBIAL WOUND LAVAGE

## (undated) DEVICE — DRSNG TELFA PAD NONADH STR 1S 3X4IN

## (undated) DEVICE — SOL NACL 0.9PCT 1000ML

## (undated) DEVICE — SOL ISO/ALC 70PCT 4OZ

## (undated) DEVICE — GLV SURG SIGNATURE ESSENTIAL PF LTX SZ8.5

## (undated) DEVICE — ADHS SKIN SURG TISS VISC PREMIERPRO EXOFIN HI/VISC FAST/DRY

## (undated) DEVICE — SUT ETHIB 2 CV V37 MS/4 30IN MX69G

## (undated) DEVICE — ADAPT CLN BIOGUARD AIR/H2O DISP

## (undated) DEVICE — ADHS LIQ MASTISOL 2/3ML

## (undated) DEVICE — CONTAINER,SPECIMEN,OR STERILE,4OZ: Brand: MEDLINE

## (undated) DEVICE — Device

## (undated) DEVICE — SENSR O2 OXIMAX FNGR A/ 18IN NONSTR

## (undated) DEVICE — COVER,MAYO STAND,STERILE: Brand: MEDLINE

## (undated) DEVICE — GLV SURG PREMIERPRO ORTHO LTX PF SZ8.5 BRN

## (undated) DEVICE — PK KN TOTL 40

## (undated) DEVICE — SUT ETHLN 2/0 PS 18IN 585H

## (undated) DEVICE — GLV SURG BIOGEL LTX PF 8

## (undated) DEVICE — THE STERILE LIGHT HANDLE COVER IS USED WITH STERIS SURGICAL LIGHTING AND VISUALIZATION SYSTEMS.

## (undated) DEVICE — TIDISHIELD UROLOGY DRAIN BAGS FROSTY VINYL STERILE FITS SIEMENS UROSKOP ACCESS 20 PER CASE: Brand: TIDISHIELD

## (undated) DEVICE — DECANTER BAG 9": Brand: MEDLINE INDUSTRIES, INC.

## (undated) DEVICE — DRSNG SURESITE WNDW 2.38X2.75